# Patient Record
Sex: FEMALE | Race: WHITE | NOT HISPANIC OR LATINO | Employment: OTHER | URBAN - METROPOLITAN AREA
[De-identification: names, ages, dates, MRNs, and addresses within clinical notes are randomized per-mention and may not be internally consistent; named-entity substitution may affect disease eponyms.]

---

## 2017-07-21 RX ORDER — CLOPIDOGREL BISULFATE 75 MG/1
75 TABLET ORAL EVERY MORNING
COMMUNITY
End: 2021-10-30 | Stop reason: HOSPADM

## 2017-07-21 RX ORDER — HYDROCHLOROTHIAZIDE 25 MG/1
25 TABLET ORAL EVERY MORNING
COMMUNITY

## 2017-07-21 RX ORDER — METOPROLOL SUCCINATE 50 MG/1
25 TABLET, EXTENDED RELEASE ORAL EVERY MORNING
COMMUNITY

## 2017-07-21 RX ORDER — ATORVASTATIN CALCIUM 10 MG/1
10 TABLET, FILM COATED ORAL
COMMUNITY
End: 2021-10-30 | Stop reason: HOSPADM

## 2017-07-24 ENCOUNTER — ANESTHESIA EVENT (OUTPATIENT)
Dept: PERIOP | Facility: AMBULARY SURGERY CENTER | Age: 77
End: 2017-07-24
Payer: COMMERCIAL

## 2017-07-24 ENCOUNTER — ANESTHESIA (OUTPATIENT)
Dept: PERIOP | Facility: AMBULARY SURGERY CENTER | Age: 77
End: 2017-07-24
Payer: COMMERCIAL

## 2017-07-24 ENCOUNTER — HOSPITAL ENCOUNTER (OUTPATIENT)
Facility: AMBULARY SURGERY CENTER | Age: 77
Setting detail: OUTPATIENT SURGERY
Discharge: HOME | End: 2017-07-24
Attending: OPHTHALMOLOGY | Admitting: OPHTHALMOLOGY
Payer: COMMERCIAL

## 2017-07-24 VITALS
DIASTOLIC BLOOD PRESSURE: 76 MMHG | TEMPERATURE: 98.1 F | SYSTOLIC BLOOD PRESSURE: 164 MMHG | BODY MASS INDEX: 40.48 KG/M2 | RESPIRATION RATE: 20 BRPM | OXYGEN SATURATION: 98 % | HEIGHT: 62 IN | HEART RATE: 58 BPM | WEIGHT: 220 LBS

## 2017-07-24 PROCEDURE — V2632 POST CHMBR INTRAOCULAR LENS: HCPCS | Performed by: OPHTHALMOLOGY

## 2017-07-24 DEVICE — IOL SN60WF 17.5: Type: IMPLANTABLE DEVICE | Site: EYE | Status: FUNCTIONAL

## 2017-07-24 RX ORDER — GATIFLOXACIN 5 MG/ML
SOLUTION/ DROPS OPHTHALMIC AS NEEDED
Status: DISCONTINUED | OUTPATIENT
Start: 2017-07-24 | End: 2017-07-24 | Stop reason: HOSPADM

## 2017-07-24 RX ORDER — GATIFLOXACIN 5 MG/ML
1 SOLUTION/ DROPS OPHTHALMIC 2 TIMES DAILY
Qty: 3 ML | Refills: 0
Start: 2017-07-24 | End: 2021-10-30 | Stop reason: HOSPADM

## 2017-07-24 RX ORDER — TETRACAINE HYDROCHLORIDE 5 MG/ML
1 SOLUTION OPHTHALMIC ONCE
Status: COMPLETED | OUTPATIENT
Start: 2017-07-24 | End: 2017-07-24

## 2017-07-24 RX ORDER — MIDAZOLAM HYDROCHLORIDE 1 MG/ML
INJECTION INTRAMUSCULAR; INTRAVENOUS AS NEEDED
Status: DISCONTINUED | OUTPATIENT
Start: 2017-07-24 | End: 2017-07-24 | Stop reason: SURG

## 2017-07-24 RX ORDER — PHENYLEPHRINE HCL 2.5 %
1 DROPS OPHTHALMIC (EYE)
Status: ACTIVE | OUTPATIENT
Start: 2017-07-24 | End: 2017-07-24

## 2017-07-24 RX ORDER — LIDOCAINE HYDROCHLORIDE 20 MG/ML
1 JELLY TOPICAL
Status: COMPLETED | OUTPATIENT
Start: 2017-07-24 | End: 2017-07-24

## 2017-07-24 RX ORDER — CYCLOPENTOLATE HYDROCHLORIDE 10 MG/ML
1 SOLUTION/ DROPS OPHTHALMIC
Status: COMPLETED | OUTPATIENT
Start: 2017-07-24 | End: 2017-07-24

## 2017-07-24 RX ORDER — KETOROLAC TROMETHAMINE 5 MG/ML
1 SOLUTION OPHTHALMIC 4 TIMES DAILY
Qty: 5 ML | Refills: 0
Start: 2017-07-24 | End: 2021-10-30 | Stop reason: HOSPADM

## 2017-07-24 RX ORDER — KETOROLAC TROMETHAMINE 5 MG/ML
1 SOLUTION OPHTHALMIC
Status: DISCONTINUED | OUTPATIENT
Start: 2017-07-24 | End: 2017-07-24 | Stop reason: HOSPADM

## 2017-07-24 RX ORDER — LIDOCAINE HYDROCHLORIDE 10 MG/ML
INJECTION, SOLUTION EPIDURAL; INFILTRATION; INTRACAUDAL; PERINEURAL AS NEEDED
Status: DISCONTINUED | OUTPATIENT
Start: 2017-07-24 | End: 2017-07-24 | Stop reason: HOSPADM

## 2017-07-24 RX ORDER — TETRACAINE HYDROCHLORIDE 5 MG/ML
SOLUTION OPHTHALMIC AS NEEDED
Status: DISCONTINUED | OUTPATIENT
Start: 2017-07-24 | End: 2017-07-24 | Stop reason: HOSPADM

## 2017-07-24 RX ADMIN — LIDOCAINE HYDROCHLORIDE 1 APPLICATION: 20 JELLY TOPICAL at 12:25

## 2017-07-24 RX ADMIN — CYCLOPENTOLATE HYDROCHLORIDE 1 DROP: 10 SOLUTION/ DROPS OPHTHALMIC at 13:11

## 2017-07-24 RX ADMIN — PHENYLEPHRINE HYDROCHLORIDE 1 DROP: 25 SOLUTION/ DROPS OPHTHALMIC at 12:55

## 2017-07-24 RX ADMIN — TETRACAINE HYDROCHLORIDE 1 DROP: 5 SOLUTION OPHTHALMIC at 12:25

## 2017-07-24 RX ADMIN — LIDOCAINE HYDROCHLORIDE 1 APPLICATION: 20 JELLY TOPICAL at 12:40

## 2017-07-24 RX ADMIN — PHENYLEPHRINE HYDROCHLORIDE 1 DROP: 25 SOLUTION/ DROPS OPHTHALMIC at 12:25

## 2017-07-24 RX ADMIN — CYCLOPENTOLATE HYDROCHLORIDE 1 DROP: 10 SOLUTION/ DROPS OPHTHALMIC at 12:25

## 2017-07-24 RX ADMIN — KETOROLAC TROMETHAMINE 1 DROP: 5 SOLUTION OPHTHALMIC at 12:25

## 2017-07-24 RX ADMIN — LIDOCAINE HYDROCHLORIDE 1 APPLICATION: 20 JELLY TOPICAL at 13:11

## 2017-07-24 RX ADMIN — MIDAZOLAM HYDROCHLORIDE 2 MG: 1 INJECTION, SOLUTION INTRAMUSCULAR; INTRAVENOUS at 13:27

## 2017-07-24 RX ADMIN — CYCLOPENTOLATE HYDROCHLORIDE 1 DROP: 10 SOLUTION/ DROPS OPHTHALMIC at 12:40

## 2017-07-24 RX ADMIN — KETOROLAC TROMETHAMINE 1 DROP: 5 SOLUTION OPHTHALMIC at 12:40

## 2017-07-24 RX ADMIN — LIDOCAINE HYDROCHLORIDE 1 APPLICATION: 20 JELLY TOPICAL at 12:55

## 2017-07-24 RX ADMIN — KETOROLAC TROMETHAMINE 1 DROP: 5 SOLUTION OPHTHALMIC at 12:55

## 2017-07-24 RX ADMIN — PHENYLEPHRINE HYDROCHLORIDE 1 DROP: 25 SOLUTION/ DROPS OPHTHALMIC at 12:40

## 2017-07-24 RX ADMIN — KETOROLAC TROMETHAMINE 1 DROP: 5 SOLUTION OPHTHALMIC at 13:11

## 2017-07-24 RX ADMIN — CYCLOPENTOLATE HYDROCHLORIDE 1 DROP: 10 SOLUTION/ DROPS OPHTHALMIC at 12:55

## 2019-05-01 ENCOUNTER — TRANSCRIBE ORDERS (OUTPATIENT)
Dept: ADMINISTRATIVE | Facility: HOSPITAL | Age: 79
End: 2019-05-01

## 2019-05-01 DIAGNOSIS — Z12.39 BREAST SCREENING, UNSPECIFIED: Primary | ICD-10-CM

## 2019-05-01 DIAGNOSIS — Z78.0 POST-MENOPAUSAL: ICD-10-CM

## 2019-05-15 ENCOUNTER — HOSPITAL ENCOUNTER (OUTPATIENT)
Dept: RADIOLOGY | Facility: HOSPITAL | Age: 79
Discharge: HOME | End: 2019-05-15
Attending: FAMILY MEDICINE
Payer: COMMERCIAL

## 2019-05-15 VITALS — BODY MASS INDEX: 38.98 KG/M2 | WEIGHT: 220 LBS | HEIGHT: 63 IN

## 2019-05-15 DIAGNOSIS — Z78.0 POST-MENOPAUSAL: ICD-10-CM

## 2019-05-15 DIAGNOSIS — Z12.39 BREAST SCREENING, UNSPECIFIED: ICD-10-CM

## 2019-05-15 PROCEDURE — 77080 DXA BONE DENSITY AXIAL: CPT

## 2019-05-15 PROCEDURE — 77063 BREAST TOMOSYNTHESIS BI: CPT

## 2019-05-15 PROCEDURE — 77067 SCR MAMMO BI INCL CAD: CPT

## 2021-02-19 ENCOUNTER — IMMUNIZATIONS (OUTPATIENT)
Dept: FAMILY MEDICINE CLINIC | Facility: HOSPITAL | Age: 81
End: 2021-02-19

## 2021-02-19 DIAGNOSIS — Z23 ENCOUNTER FOR IMMUNIZATION: Primary | ICD-10-CM

## 2021-02-19 PROCEDURE — 0011A SARS-COV-2 / COVID-19 MRNA VACCINE (MODERNA) 100 MCG: CPT

## 2021-02-19 PROCEDURE — 91301 SARS-COV-2 / COVID-19 MRNA VACCINE (MODERNA) 100 MCG: CPT

## 2021-03-18 ENCOUNTER — IMMUNIZATIONS (OUTPATIENT)
Dept: FAMILY MEDICINE CLINIC | Facility: HOSPITAL | Age: 81
End: 2021-03-18

## 2021-03-18 DIAGNOSIS — Z23 ENCOUNTER FOR IMMUNIZATION: Primary | ICD-10-CM

## 2021-03-18 PROCEDURE — 0012A SARS-COV-2 / COVID-19 MRNA VACCINE (MODERNA) 100 MCG: CPT

## 2021-03-18 PROCEDURE — 91301 SARS-COV-2 / COVID-19 MRNA VACCINE (MODERNA) 100 MCG: CPT

## 2021-10-23 ENCOUNTER — APPOINTMENT (EMERGENCY)
Dept: RADIOLOGY | Facility: HOSPITAL | Age: 81
End: 2021-10-23
Payer: COMMERCIAL

## 2021-10-23 ENCOUNTER — HOSPITAL ENCOUNTER (EMERGENCY)
Facility: HOSPITAL | Age: 81
Discharge: HOME | End: 2021-10-23
Attending: EMERGENCY MEDICINE
Payer: COMMERCIAL

## 2021-10-23 VITALS
TEMPERATURE: 97.7 F | SYSTOLIC BLOOD PRESSURE: 165 MMHG | WEIGHT: 196 LBS | RESPIRATION RATE: 18 BRPM | DIASTOLIC BLOOD PRESSURE: 70 MMHG | HEART RATE: 65 BPM | BODY MASS INDEX: 34.72 KG/M2 | OXYGEN SATURATION: 96 %

## 2021-10-23 DIAGNOSIS — R59.1 LYMPHADENOPATHY: ICD-10-CM

## 2021-10-23 DIAGNOSIS — E04.9 ENLARGED THYROID: ICD-10-CM

## 2021-10-23 DIAGNOSIS — G81.90 HEMIPARESIS (HCC): Primary | ICD-10-CM

## 2021-10-23 DIAGNOSIS — G56.30 RADIAL NERVE PALSY: ICD-10-CM

## 2021-10-23 LAB
ANION GAP SERPL CALCULATED.3IONS-SCNC: 11 MMOL/L (ref 4–13)
APTT PPP: 22 SECONDS (ref 23–37)
BUN SERPL-MCNC: 31 MG/DL (ref 5–25)
CALCIUM SERPL-MCNC: 9.8 MG/DL (ref 8.3–10.1)
CHLORIDE SERPL-SCNC: 101 MMOL/L (ref 100–108)
CO2 SERPL-SCNC: 25 MMOL/L (ref 21–32)
CREAT SERPL-MCNC: 1.89 MG/DL (ref 0.6–1.3)
ERYTHROCYTE [DISTWIDTH] IN BLOOD BY AUTOMATED COUNT: 12.4 % (ref 11.6–15.1)
FLUAV RNA RESP QL NAA+PROBE: NEGATIVE
FLUBV RNA RESP QL NAA+PROBE: NEGATIVE
GFR SERPL CREATININE-BSD FRML MDRD: 24 ML/MIN/1.73SQ M
GLUCOSE SERPL-MCNC: 131 MG/DL (ref 65–140)
GLUCOSE SERPL-MCNC: 133 MG/DL (ref 65–140)
HCT VFR BLD AUTO: 39.9 % (ref 34.8–46.1)
HGB BLD-MCNC: 13.3 G/DL (ref 11.5–15.4)
INR PPP: 0.96 (ref 0.84–1.19)
MCH RBC QN AUTO: 29 PG (ref 26.8–34.3)
MCHC RBC AUTO-ENTMCNC: 33.3 G/DL (ref 31.4–37.4)
MCV RBC AUTO: 87 FL (ref 82–98)
PLATELET # BLD AUTO: 261 THOUSANDS/UL (ref 149–390)
PMV BLD AUTO: 11.5 FL (ref 8.9–12.7)
POTASSIUM SERPL-SCNC: 3.2 MMOL/L (ref 3.5–5.3)
PROTHROMBIN TIME: 12.6 SECONDS (ref 11.6–14.5)
RBC # BLD AUTO: 4.59 MILLION/UL (ref 3.81–5.12)
RSV RNA RESP QL NAA+PROBE: NEGATIVE
SARS-COV-2 RNA RESP QL NAA+PROBE: NEGATIVE
SODIUM SERPL-SCNC: 137 MMOL/L (ref 136–145)
TROPONIN I SERPL-MCNC: <0.02 NG/ML
WBC # BLD AUTO: 6.93 THOUSAND/UL (ref 4.31–10.16)

## 2021-10-23 PROCEDURE — 99285 EMERGENCY DEPT VISIT HI MDM: CPT

## 2021-10-23 PROCEDURE — 70498 CT ANGIOGRAPHY NECK: CPT

## 2021-10-23 PROCEDURE — 84484 ASSAY OF TROPONIN QUANT: CPT | Performed by: EMERGENCY MEDICINE

## 2021-10-23 PROCEDURE — 99285 EMERGENCY DEPT VISIT HI MDM: CPT | Performed by: EMERGENCY MEDICINE

## 2021-10-23 PROCEDURE — 85610 PROTHROMBIN TIME: CPT | Performed by: EMERGENCY MEDICINE

## 2021-10-23 PROCEDURE — 0241U HB NFCT DS VIR RESP RNA 4 TRGT: CPT | Performed by: EMERGENCY MEDICINE

## 2021-10-23 PROCEDURE — 85027 COMPLETE CBC AUTOMATED: CPT | Performed by: EMERGENCY MEDICINE

## 2021-10-23 PROCEDURE — 82948 REAGENT STRIP/BLOOD GLUCOSE: CPT

## 2021-10-23 PROCEDURE — 80048 BASIC METABOLIC PNL TOTAL CA: CPT | Performed by: EMERGENCY MEDICINE

## 2021-10-23 PROCEDURE — 93005 ELECTROCARDIOGRAM TRACING: CPT

## 2021-10-23 PROCEDURE — NC001 PR NO CHARGE: Performed by: PSYCHIATRY & NEUROLOGY

## 2021-10-23 PROCEDURE — 36415 COLL VENOUS BLD VENIPUNCTURE: CPT | Performed by: EMERGENCY MEDICINE

## 2021-10-23 PROCEDURE — 71045 X-RAY EXAM CHEST 1 VIEW: CPT

## 2021-10-23 PROCEDURE — 70496 CT ANGIOGRAPHY HEAD: CPT

## 2021-10-23 PROCEDURE — 85730 THROMBOPLASTIN TIME PARTIAL: CPT | Performed by: EMERGENCY MEDICINE

## 2021-10-23 RX ORDER — SULFAMETHOXAZOLE AND TRIMETHOPRIM 800; 160 MG/1; MG/1
1 TABLET ORAL 2 TIMES DAILY
COMMUNITY
Start: 2021-10-18 | End: 2021-10-25

## 2021-10-23 RX ADMIN — IOHEXOL 70 ML: 350 INJECTION, SOLUTION INTRAVENOUS at 13:27

## 2021-10-27 ENCOUNTER — HOSPITAL ENCOUNTER (OUTPATIENT)
Dept: RADIOLOGY | Facility: HOSPITAL | Age: 81
Discharge: HOME | DRG: 698 | End: 2021-10-27
Payer: COMMERCIAL

## 2021-10-27 DIAGNOSIS — R59.1 LYMPHADENOPATHY: ICD-10-CM

## 2021-10-27 DIAGNOSIS — E04.9 ENLARGEMENT OF THYROID: ICD-10-CM

## 2021-10-27 PROCEDURE — 71250 CT THORAX DX C-: CPT

## 2021-10-28 ENCOUNTER — APPOINTMENT (EMERGENCY)
Dept: RADIOLOGY | Facility: HOSPITAL | Age: 81
DRG: 698 | End: 2021-10-28
Payer: COMMERCIAL

## 2021-10-28 ENCOUNTER — HOSPITAL ENCOUNTER (INPATIENT)
Facility: HOSPITAL | Age: 81
LOS: 2 days | Discharge: HOME HEALTH | DRG: 698 | End: 2021-10-30
Attending: EMERGENCY MEDICINE | Admitting: STUDENT IN AN ORGANIZED HEALTH CARE EDUCATION/TRAINING PROGRAM
Payer: COMMERCIAL

## 2021-10-28 DIAGNOSIS — N32.89 BLADDER MASS: ICD-10-CM

## 2021-10-28 DIAGNOSIS — N30.01 HEMATURIA DUE TO ACUTE CYSTITIS: Primary | ICD-10-CM

## 2021-10-28 DIAGNOSIS — N13.30 HYDRONEPHROSIS, UNSPECIFIED HYDRONEPHROSIS TYPE: ICD-10-CM

## 2021-10-28 DIAGNOSIS — I63.9 STROKE (HCC): ICD-10-CM

## 2021-10-28 DIAGNOSIS — M89.9 BONE LESION: ICD-10-CM

## 2021-10-28 PROBLEM — M21.339 WRIST DROP: Status: ACTIVE | Noted: 2021-10-28

## 2021-10-28 PROBLEM — E87.6 HYPOKALEMIA: Status: ACTIVE | Noted: 2021-10-28

## 2021-10-28 LAB
ALBUMIN SERPL BCP-MCNC: 3.8 G/DL (ref 3.5–5)
ALP SERPL-CCNC: 75 U/L (ref 46–116)
ALT SERPL W P-5'-P-CCNC: 29 U/L (ref 12–78)
ANION GAP SERPL CALCULATED.3IONS-SCNC: 12 MMOL/L (ref 4–13)
AST SERPL W P-5'-P-CCNC: 19 U/L (ref 5–45)
BACTERIA UR QL AUTO: ABNORMAL /HPF
BASOPHILS # BLD AUTO: 0.06 THOUSANDS/ΜL (ref 0–0.1)
BASOPHILS NFR BLD AUTO: 1 % (ref 0–1)
BILIRUB SERPL-MCNC: 0.63 MG/DL (ref 0.2–1)
BILIRUB UR QL STRIP: NEGATIVE
BUN SERPL-MCNC: 31 MG/DL (ref 5–25)
CALCIUM SERPL-MCNC: 9.6 MG/DL (ref 8.3–10.1)
CHLORIDE SERPL-SCNC: 104 MMOL/L (ref 100–108)
CLARITY UR: ABNORMAL
CO2 SERPL-SCNC: 24 MMOL/L (ref 21–32)
COLOR UR: YELLOW
CREAT SERPL-MCNC: 1.47 MG/DL (ref 0.6–1.3)
EOSINOPHIL # BLD AUTO: 0.21 THOUSAND/ΜL (ref 0–0.61)
EOSINOPHIL NFR BLD AUTO: 3 % (ref 0–6)
ERYTHROCYTE [DISTWIDTH] IN BLOOD BY AUTOMATED COUNT: 12.2 % (ref 11.6–15.1)
GFR SERPL CREATININE-BSD FRML MDRD: 33 ML/MIN/1.73SQ M
GLUCOSE SERPL-MCNC: 118 MG/DL (ref 65–140)
GLUCOSE UR STRIP-MCNC: NEGATIVE MG/DL
HCT VFR BLD AUTO: 41.8 % (ref 34.8–46.1)
HGB BLD-MCNC: 13.7 G/DL (ref 11.5–15.4)
HGB UR QL STRIP.AUTO: ABNORMAL
IMM GRANULOCYTES # BLD AUTO: 0.04 THOUSAND/UL (ref 0–0.2)
IMM GRANULOCYTES NFR BLD AUTO: 1 % (ref 0–2)
KETONES UR STRIP-MCNC: NEGATIVE MG/DL
LEUKOCYTE ESTERASE UR QL STRIP: ABNORMAL
LYMPHOCYTES # BLD AUTO: 1.2 THOUSANDS/ΜL (ref 0.6–4.47)
LYMPHOCYTES NFR BLD AUTO: 15 % (ref 14–44)
MCH RBC QN AUTO: 29 PG (ref 26.8–34.3)
MCHC RBC AUTO-ENTMCNC: 32.8 G/DL (ref 31.4–37.4)
MCV RBC AUTO: 88 FL (ref 82–98)
MONOCYTES # BLD AUTO: 0.59 THOUSAND/ΜL (ref 0.17–1.22)
MONOCYTES NFR BLD AUTO: 7 % (ref 4–12)
NEUTROPHILS # BLD AUTO: 5.88 THOUSANDS/ΜL (ref 1.85–7.62)
NEUTS SEG NFR BLD AUTO: 73 % (ref 43–75)
NITRITE UR QL STRIP: NEGATIVE
NON-SQ EPI CELLS URNS QL MICRO: ABNORMAL /HPF
NRBC BLD AUTO-RTO: 0 /100 WBCS
OTHER STN SPEC: ABNORMAL
PH UR STRIP.AUTO: 5.5 [PH]
PLATELET # BLD AUTO: 280 THOUSANDS/UL (ref 149–390)
PMV BLD AUTO: 10.9 FL (ref 8.9–12.7)
POTASSIUM SERPL-SCNC: 3.4 MMOL/L (ref 3.5–5.3)
PROT SERPL-MCNC: 7.1 G/DL (ref 6.4–8.2)
PROT UR STRIP-MCNC: ABNORMAL MG/DL
RBC # BLD AUTO: 4.73 MILLION/UL (ref 3.81–5.12)
RBC #/AREA URNS AUTO: ABNORMAL /HPF
SODIUM SERPL-SCNC: 140 MMOL/L (ref 136–145)
SP GR UR STRIP.AUTO: 1.02 (ref 1–1.03)
UROBILINOGEN UR QL STRIP.AUTO: 0.2 E.U./DL
WBC # BLD AUTO: 7.98 THOUSAND/UL (ref 4.31–10.16)
WBC #/AREA URNS AUTO: ABNORMAL /HPF

## 2021-10-28 PROCEDURE — 99223 1ST HOSP IP/OBS HIGH 75: CPT | Performed by: STUDENT IN AN ORGANIZED HEALTH CARE EDUCATION/TRAINING PROGRAM

## 2021-10-28 PROCEDURE — 36415 COLL VENOUS BLD VENIPUNCTURE: CPT | Performed by: EMERGENCY MEDICINE

## 2021-10-28 PROCEDURE — 80053 COMPREHEN METABOLIC PANEL: CPT | Performed by: EMERGENCY MEDICINE

## 2021-10-28 PROCEDURE — 74176 CT ABD & PELVIS W/O CONTRAST: CPT

## 2021-10-28 PROCEDURE — 81001 URINALYSIS AUTO W/SCOPE: CPT | Performed by: EMERGENCY MEDICINE

## 2021-10-28 PROCEDURE — 99285 EMERGENCY DEPT VISIT HI MDM: CPT

## 2021-10-28 PROCEDURE — 73700 CT LOWER EXTREMITY W/O DYE: CPT

## 2021-10-28 PROCEDURE — 85025 COMPLETE CBC W/AUTO DIFF WBC: CPT | Performed by: EMERGENCY MEDICINE

## 2021-10-28 PROCEDURE — G1004 CDSM NDSC: HCPCS

## 2021-10-28 PROCEDURE — 99285 EMERGENCY DEPT VISIT HI MDM: CPT | Performed by: EMERGENCY MEDICINE

## 2021-10-28 PROCEDURE — 96365 THER/PROPH/DIAG IV INF INIT: CPT

## 2021-10-28 PROCEDURE — 87086 URINE CULTURE/COLONY COUNT: CPT | Performed by: SPECIALIST

## 2021-10-28 PROCEDURE — 0T9B70Z DRAINAGE OF BLADDER WITH DRAINAGE DEVICE, VIA NATURAL OR ARTIFICIAL OPENING: ICD-10-PCS | Performed by: STUDENT IN AN ORGANIZED HEALTH CARE EDUCATION/TRAINING PROGRAM

## 2021-10-28 PROCEDURE — 96375 TX/PRO/DX INJ NEW DRUG ADDON: CPT

## 2021-10-28 PROCEDURE — 96376 TX/PRO/DX INJ SAME DRUG ADON: CPT

## 2021-10-28 RX ORDER — TRAMADOL HYDROCHLORIDE 50 MG/1
50 TABLET ORAL EVERY 6 HOURS PRN
Status: DISCONTINUED | OUTPATIENT
Start: 2021-10-28 | End: 2021-10-30 | Stop reason: HOSPADM

## 2021-10-28 RX ORDER — METOPROLOL SUCCINATE 25 MG/1
25 TABLET, EXTENDED RELEASE ORAL EVERY MORNING
Status: DISCONTINUED | OUTPATIENT
Start: 2021-10-29 | End: 2021-10-29

## 2021-10-28 RX ORDER — ATORVASTATIN CALCIUM 10 MG/1
10 TABLET, FILM COATED ORAL
Status: DISCONTINUED | OUTPATIENT
Start: 2021-10-28 | End: 2021-10-29

## 2021-10-28 RX ORDER — CEFTRIAXONE 1 G/50ML
1000 INJECTION, SOLUTION INTRAVENOUS ONCE
Status: COMPLETED | OUTPATIENT
Start: 2021-10-28 | End: 2021-10-28

## 2021-10-28 RX ORDER — CEFTRIAXONE 1 G/50ML
1000 INJECTION, SOLUTION INTRAVENOUS EVERY 24 HOURS
Status: DISCONTINUED | OUTPATIENT
Start: 2021-10-29 | End: 2021-10-30 | Stop reason: HOSPADM

## 2021-10-28 RX ORDER — HYDROMORPHONE HCL/PF 1 MG/ML
0.5 SYRINGE (ML) INJECTION ONCE
Status: COMPLETED | OUTPATIENT
Start: 2021-10-28 | End: 2021-10-28

## 2021-10-28 RX ORDER — HYDROMORPHONE HCL/PF 1 MG/ML
0.2 SYRINGE (ML) INJECTION ONCE
Status: COMPLETED | OUTPATIENT
Start: 2021-10-28 | End: 2021-10-28

## 2021-10-28 RX ORDER — POTASSIUM CHLORIDE 20 MEQ/1
40 TABLET, EXTENDED RELEASE ORAL ONCE
Status: COMPLETED | OUTPATIENT
Start: 2021-10-28 | End: 2021-10-28

## 2021-10-28 RX ADMIN — TRAMADOL HYDROCHLORIDE 50 MG: 50 TABLET, FILM COATED ORAL at 21:53

## 2021-10-28 RX ADMIN — HYDROMORPHONE HYDROCHLORIDE 0.5 MG: 1 INJECTION, SOLUTION INTRAMUSCULAR; INTRAVENOUS; SUBCUTANEOUS at 15:41

## 2021-10-28 RX ADMIN — WATER: 1 INJECTION INTRAMUSCULAR; INTRAVENOUS; SUBCUTANEOUS at 18:35

## 2021-10-28 RX ADMIN — CEFTRIAXONE 1000 MG: 1 INJECTION, SOLUTION INTRAVENOUS at 14:32

## 2021-10-28 RX ADMIN — POTASSIUM CHLORIDE 40 MEQ: 1500 TABLET, EXTENDED RELEASE ORAL at 18:40

## 2021-10-28 RX ADMIN — HYDROMORPHONE HYDROCHLORIDE 0.2 MG: 1 INJECTION, SOLUTION INTRAMUSCULAR; INTRAVENOUS; SUBCUTANEOUS at 12:32

## 2021-10-28 RX ADMIN — ATORVASTATIN CALCIUM 10 MG: 10 TABLET, FILM COATED ORAL at 21:53

## 2021-10-29 ENCOUNTER — APPOINTMENT (INPATIENT)
Dept: NON INVASIVE DIAGNOSTICS | Facility: HOSPITAL | Age: 81
DRG: 698 | End: 2021-10-29
Payer: COMMERCIAL

## 2021-10-29 ENCOUNTER — APPOINTMENT (INPATIENT)
Dept: RADIOLOGY | Facility: HOSPITAL | Age: 81
DRG: 698 | End: 2021-10-29
Payer: COMMERCIAL

## 2021-10-29 PROBLEM — I63.9 STROKE (HCC): Status: ACTIVE | Noted: 2021-10-29

## 2021-10-29 LAB
ALBUMIN SERPL BCP-MCNC: 3.3 G/DL (ref 3.5–5)
ALP SERPL-CCNC: 69 U/L (ref 46–116)
ALT SERPL W P-5'-P-CCNC: 25 U/L (ref 12–78)
ANION GAP SERPL CALCULATED.3IONS-SCNC: 11 MMOL/L (ref 4–13)
AORTIC ROOT: 3.2 CM
AST SERPL W P-5'-P-CCNC: 15 U/L (ref 5–45)
BASOPHILS # BLD AUTO: 0.05 THOUSANDS/ΜL (ref 0–0.1)
BASOPHILS NFR BLD AUTO: 1 % (ref 0–1)
BILIRUB SERPL-MCNC: 0.63 MG/DL (ref 0.2–1)
BUN SERPL-MCNC: 36 MG/DL (ref 5–25)
CALCIUM ALBUM COR SERPL-MCNC: 10 MG/DL (ref 8.3–10.1)
CALCIUM SERPL-MCNC: 9.4 MG/DL (ref 8.3–10.1)
CHLORIDE SERPL-SCNC: 105 MMOL/L (ref 100–108)
CO2 SERPL-SCNC: 24 MMOL/L (ref 21–32)
CREAT SERPL-MCNC: 1.43 MG/DL (ref 0.6–1.3)
E WAVE DECELERATION TIME: 257 MS
EOSINOPHIL # BLD AUTO: 0.36 THOUSAND/ΜL (ref 0–0.61)
EOSINOPHIL NFR BLD AUTO: 4 % (ref 0–6)
ERYTHROCYTE [DISTWIDTH] IN BLOOD BY AUTOMATED COUNT: 12.2 % (ref 11.6–15.1)
FRACTIONAL SHORTENING: 32 % (ref 28–44)
GFR SERPL CREATININE-BSD FRML MDRD: 34 ML/MIN/1.73SQ M
GLUCOSE SERPL-MCNC: 91 MG/DL (ref 65–140)
HCT VFR BLD AUTO: 38.7 % (ref 34.8–46.1)
HGB BLD-MCNC: 13 G/DL (ref 11.5–15.4)
IMM GRANULOCYTES # BLD AUTO: 0.03 THOUSAND/UL (ref 0–0.2)
IMM GRANULOCYTES NFR BLD AUTO: 0 % (ref 0–2)
INTERVENTRICULAR SEPTUM IN DIASTOLE (PARASTERNAL SHORT AXIS VIEW): 1.2 CM
LEFT INTERNAL DIMENSION IN SYSTOLE: 4 CM (ref 2.1–4)
LEFT VENTRICULAR INTERNAL DIMENSION IN DIASTOLE: 5.9 CM (ref 4.93–7.34)
LEFT VENTRICULAR POSTERIOR WALL IN END DIASTOLE: 1.1 CM
LEFT VENTRICULAR STROKE VOLUME: 103 ML
LYMPHOCYTES # BLD AUTO: 1.75 THOUSANDS/ΜL (ref 0.6–4.47)
LYMPHOCYTES NFR BLD AUTO: 18 % (ref 14–44)
MCH RBC QN AUTO: 29.3 PG (ref 26.8–34.3)
MCHC RBC AUTO-ENTMCNC: 33.6 G/DL (ref 31.4–37.4)
MCV RBC AUTO: 87 FL (ref 82–98)
MONOCYTES # BLD AUTO: 0.65 THOUSAND/ΜL (ref 0.17–1.22)
MONOCYTES NFR BLD AUTO: 7 % (ref 4–12)
MV E'TISSUE VEL-LAT: 65 CM/S
MV E'TISSUE VEL-SEP: 3 CM/S
MV PEAK A VEL: 1.32 M/S
MV PEAK E VEL: 73 CM/S
MV STENOSIS PRESSURE HALF TIME: 0 MS
NEUTROPHILS # BLD AUTO: 7.02 THOUSANDS/ΜL (ref 1.85–7.62)
NEUTS SEG NFR BLD AUTO: 70 % (ref 43–75)
NRBC BLD AUTO-RTO: 0 /100 WBCS
PLATELET # BLD AUTO: 278 THOUSANDS/UL (ref 149–390)
PMV BLD AUTO: 11 FL (ref 8.9–12.7)
POTASSIUM SERPL-SCNC: 3.5 MMOL/L (ref 3.5–5.3)
PROT SERPL-MCNC: 6.5 G/DL (ref 6.4–8.2)
RBC # BLD AUTO: 4.43 MILLION/UL (ref 3.81–5.12)
RIGHT VENTRICLE ID DIMENSION: 3.3 CM
RV PSP: 31 MMHG
SL CV LV EF: 60
SL CV PED ECHO LEFT VENTRICLE DIASTOLIC VOLUME (MOD BIPLANE) 2D: 175 ML
SL CV PED ECHO LEFT VENTRICLE SYSTOLIC VOLUME (MOD BIPLANE) 2D: 72 ML
SODIUM SERPL-SCNC: 140 MMOL/L (ref 136–145)
TR PEAK VELOCITY: 2.4 M/S
TRICUSPID VALVE PEAK REGURGITATION VELOCITY: 2.39 M/S
TRICUSPID VALVE S': 0.7 CM/S
TV PEAK GRADIENT: 23 MMHG
WBC # BLD AUTO: 9.86 THOUSAND/UL (ref 4.31–10.16)
Z-SCORE OF LEFT VENTRICULAR DIMENSION IN END SYSTOLE: -0.19

## 2021-10-29 PROCEDURE — G0008 ADMIN INFLUENZA VIRUS VAC: HCPCS | Performed by: STUDENT IN AN ORGANIZED HEALTH CARE EDUCATION/TRAINING PROGRAM

## 2021-10-29 PROCEDURE — G1004 CDSM NDSC: HCPCS

## 2021-10-29 PROCEDURE — 99223 1ST HOSP IP/OBS HIGH 75: CPT | Performed by: PSYCHIATRY & NEUROLOGY

## 2021-10-29 PROCEDURE — 97167 OT EVAL HIGH COMPLEX 60 MIN: CPT

## 2021-10-29 PROCEDURE — 97163 PT EVAL HIGH COMPLEX 45 MIN: CPT

## 2021-10-29 PROCEDURE — 90662 IIV NO PRSV INCREASED AG IM: CPT | Performed by: STUDENT IN AN ORGANIZED HEALTH CARE EDUCATION/TRAINING PROGRAM

## 2021-10-29 PROCEDURE — 70551 MRI BRAIN STEM W/O DYE: CPT

## 2021-10-29 PROCEDURE — 72141 MRI NECK SPINE W/O DYE: CPT

## 2021-10-29 PROCEDURE — 85025 COMPLETE CBC W/AUTO DIFF WBC: CPT | Performed by: STUDENT IN AN ORGANIZED HEALTH CARE EDUCATION/TRAINING PROGRAM

## 2021-10-29 PROCEDURE — 80053 COMPREHEN METABOLIC PANEL: CPT | Performed by: STUDENT IN AN ORGANIZED HEALTH CARE EDUCATION/TRAINING PROGRAM

## 2021-10-29 PROCEDURE — 93306 TTE W/DOPPLER COMPLETE: CPT | Performed by: INTERNAL MEDICINE

## 2021-10-29 PROCEDURE — 97535 SELF CARE MNGMENT TRAINING: CPT

## 2021-10-29 PROCEDURE — 93306 TTE W/DOPPLER COMPLETE: CPT

## 2021-10-29 PROCEDURE — 99232 SBSQ HOSP IP/OBS MODERATE 35: CPT | Performed by: STUDENT IN AN ORGANIZED HEALTH CARE EDUCATION/TRAINING PROGRAM

## 2021-10-29 PROCEDURE — 99222 1ST HOSP IP/OBS MODERATE 55: CPT | Performed by: INTERNAL MEDICINE

## 2021-10-29 PROCEDURE — 97110 THERAPEUTIC EXERCISES: CPT

## 2021-10-29 RX ORDER — ROSUVASTATIN CALCIUM 5 MG/1
40 TABLET, COATED ORAL
Status: DISCONTINUED | OUTPATIENT
Start: 2021-10-29 | End: 2021-10-29

## 2021-10-29 RX ORDER — ASPIRIN 81 MG/1
81 TABLET, CHEWABLE ORAL DAILY
Status: DISCONTINUED | OUTPATIENT
Start: 2021-10-29 | End: 2021-10-30 | Stop reason: HOSPADM

## 2021-10-29 RX ORDER — ATORVASTATIN CALCIUM 40 MG/1
40 TABLET, FILM COATED ORAL
Status: DISCONTINUED | OUTPATIENT
Start: 2021-10-30 | End: 2021-10-30 | Stop reason: HOSPADM

## 2021-10-29 RX ORDER — SODIUM CHLORIDE 9 MG/ML
75 INJECTION, SOLUTION INTRAVENOUS CONTINUOUS
Status: DISCONTINUED | OUTPATIENT
Start: 2021-10-29 | End: 2021-10-30 | Stop reason: HOSPADM

## 2021-10-29 RX ORDER — ROSUVASTATIN CALCIUM 5 MG/1
10 TABLET, COATED ORAL
Status: DISCONTINUED | OUTPATIENT
Start: 2021-10-30 | End: 2021-10-29

## 2021-10-29 RX ORDER — ATORVASTATIN CALCIUM 80 MG/1
80 TABLET, FILM COATED ORAL
Status: DISCONTINUED | OUTPATIENT
Start: 2021-10-29 | End: 2021-10-29

## 2021-10-29 RX ADMIN — METOPROLOL SUCCINATE 25 MG: 25 TABLET, EXTENDED RELEASE ORAL at 11:39

## 2021-10-29 RX ADMIN — INFLUENZA A VIRUS A/VICTORIA/2570/2019 IVR-215 (H1N1) ANTIGEN (FORMALDEHYDE INACTIVATED), INFLUENZA A VIRUS A/TASMANIA/503/2020 IVR-221 (H3N2) ANTIGEN (FORMALDEHYDE INACTIVATED), INFLUENZA B VIRUS B/PHUKET/3073/2013 ANTIGEN (FORMALDEHYDE INACTIVATED), AND INFLUENZA B VIRUS B/WASHINGTON/02/2019 ANTIGEN (FORMALDEHYDE INACTIVATED) 0.7 ML: 60; 60; 60; 60 INJECTION, SUSPENSION INTRAMUSCULAR at 11:38

## 2021-10-29 RX ADMIN — CEFTRIAXONE 1000 MG: 1 INJECTION, SOLUTION INTRAVENOUS at 11:40

## 2021-10-29 RX ADMIN — ASPIRIN 81 MG CHEWABLE TABLET 81 MG: 81 TABLET CHEWABLE at 11:55

## 2021-10-29 RX ADMIN — TRAMADOL HYDROCHLORIDE 50 MG: 50 TABLET, FILM COATED ORAL at 23:12

## 2021-10-29 RX ADMIN — SODIUM CHLORIDE 75 ML/HR: 0.9 INJECTION, SOLUTION INTRAVENOUS at 18:05

## 2021-10-30 VITALS
SYSTOLIC BLOOD PRESSURE: 154 MMHG | WEIGHT: 196 LBS | BODY MASS INDEX: 34.73 KG/M2 | RESPIRATION RATE: 24 BRPM | TEMPERATURE: 97.9 F | DIASTOLIC BLOOD PRESSURE: 80 MMHG | OXYGEN SATURATION: 95 % | HEART RATE: 68 BPM | HEIGHT: 63 IN

## 2021-10-30 LAB
ANION GAP SERPL CALCULATED.3IONS-SCNC: 11 MMOL/L (ref 4–13)
BACTERIA UR CULT: NORMAL
BUN SERPL-MCNC: 38 MG/DL (ref 5–25)
CALCIUM SERPL-MCNC: 9.3 MG/DL (ref 8.3–10.1)
CHLORIDE SERPL-SCNC: 105 MMOL/L (ref 100–108)
CHOLEST SERPL-MCNC: 144 MG/DL (ref 50–200)
CO2 SERPL-SCNC: 23 MMOL/L (ref 21–32)
CREAT SERPL-MCNC: 1.48 MG/DL (ref 0.6–1.3)
EST. AVERAGE GLUCOSE BLD GHB EST-MCNC: 108 MG/DL
GFR SERPL CREATININE-BSD FRML MDRD: 33 ML/MIN/1.73SQ M
GLUCOSE SERPL-MCNC: 89 MG/DL (ref 65–140)
HBA1C MFR BLD: 5.4 %
HDLC SERPL-MCNC: 72 MG/DL
LDLC SERPL CALC-MCNC: 50 MG/DL (ref 0–100)
POTASSIUM SERPL-SCNC: 3.2 MMOL/L (ref 3.5–5.3)
SODIUM SERPL-SCNC: 139 MMOL/L (ref 136–145)
TRIGL SERPL-MCNC: 109 MG/DL

## 2021-10-30 PROCEDURE — 97535 SELF CARE MNGMENT TRAINING: CPT

## 2021-10-30 PROCEDURE — 97110 THERAPEUTIC EXERCISES: CPT

## 2021-10-30 PROCEDURE — 99239 HOSP IP/OBS DSCHRG MGMT >30: CPT | Performed by: STUDENT IN AN ORGANIZED HEALTH CARE EDUCATION/TRAINING PROGRAM

## 2021-10-30 PROCEDURE — 80048 BASIC METABOLIC PNL TOTAL CA: CPT | Performed by: STUDENT IN AN ORGANIZED HEALTH CARE EDUCATION/TRAINING PROGRAM

## 2021-10-30 PROCEDURE — 83036 HEMOGLOBIN GLYCOSYLATED A1C: CPT | Performed by: STUDENT IN AN ORGANIZED HEALTH CARE EDUCATION/TRAINING PROGRAM

## 2021-10-30 PROCEDURE — 80061 LIPID PANEL: CPT | Performed by: STUDENT IN AN ORGANIZED HEALTH CARE EDUCATION/TRAINING PROGRAM

## 2021-10-30 PROCEDURE — 97530 THERAPEUTIC ACTIVITIES: CPT

## 2021-10-30 RX ORDER — POTASSIUM CHLORIDE 20 MEQ/1
40 TABLET, EXTENDED RELEASE ORAL ONCE
Status: COMPLETED | OUTPATIENT
Start: 2021-10-30 | End: 2021-10-30

## 2021-10-30 RX ORDER — ASPIRIN 81 MG/1
81 TABLET, CHEWABLE ORAL DAILY
Qty: 14 TABLET | Refills: 0 | Status: SHIPPED | OUTPATIENT
Start: 2021-10-31 | End: 2021-11-12 | Stop reason: HOSPADM

## 2021-10-30 RX ORDER — ATORVASTATIN CALCIUM 40 MG/1
40 TABLET, FILM COATED ORAL
Qty: 14 TABLET | Refills: 0 | Status: SHIPPED | OUTPATIENT
Start: 2021-10-30

## 2021-10-30 RX ORDER — TRAMADOL HYDROCHLORIDE 50 MG/1
50 TABLET ORAL EVERY 6 HOURS PRN
Qty: 12 TABLET | Refills: 0 | Status: SHIPPED | OUTPATIENT
Start: 2021-10-30 | End: 2021-11-02

## 2021-10-30 RX ADMIN — CEFTRIAXONE 1000 MG: 1 INJECTION, SOLUTION INTRAVENOUS at 10:08

## 2021-10-30 RX ADMIN — ASPIRIN 81 MG CHEWABLE TABLET 81 MG: 81 TABLET CHEWABLE at 10:08

## 2021-10-30 RX ADMIN — POTASSIUM CHLORIDE 40 MEQ: 1500 TABLET, EXTENDED RELEASE ORAL at 14:53

## 2021-11-01 ENCOUNTER — TELEPHONE (OUTPATIENT)
Dept: NEUROLOGY | Facility: CLINIC | Age: 81
End: 2021-11-01

## 2021-11-02 ENCOUNTER — TELEPHONE (OUTPATIENT)
Dept: HEMATOLOGY ONCOLOGY | Facility: MEDICAL CENTER | Age: 81
End: 2021-11-02

## 2021-11-09 ENCOUNTER — OFFICE VISIT (OUTPATIENT)
Dept: LAB | Facility: HOSPITAL | Age: 81
End: 2021-11-09
Attending: SPECIALIST
Payer: COMMERCIAL

## 2021-11-09 ENCOUNTER — APPOINTMENT (OUTPATIENT)
Dept: LAB | Facility: HOSPITAL | Age: 81
End: 2021-11-09
Attending: SPECIALIST
Payer: COMMERCIAL

## 2021-11-09 DIAGNOSIS — Z01.818 PRE-OP TESTING: ICD-10-CM

## 2021-11-09 LAB — POTASSIUM SERPL-SCNC: 3.1 MMOL/L (ref 3.5–5.3)

## 2021-11-09 PROCEDURE — 84132 ASSAY OF SERUM POTASSIUM: CPT

## 2021-11-09 PROCEDURE — 36415 COLL VENOUS BLD VENIPUNCTURE: CPT

## 2021-11-09 PROCEDURE — 93005 ELECTROCARDIOGRAM TRACING: CPT

## 2021-11-10 ENCOUNTER — APPOINTMENT (OUTPATIENT)
Dept: LAB | Facility: HOSPITAL | Age: 81
End: 2021-11-10
Payer: COMMERCIAL

## 2021-11-10 DIAGNOSIS — E87.6 HYPOKALEMIA: ICD-10-CM

## 2021-11-10 LAB
ATRIAL RATE: 62 BPM
P AXIS: 51 DEGREES
POTASSIUM SERPL-SCNC: 3.8 MMOL/L (ref 3.5–5.3)
PR INTERVAL: 184 MS
QRS AXIS: -55 DEGREES
QRSD INTERVAL: 140 MS
QT INTERVAL: 444 MS
QTC INTERVAL: 450 MS
T WAVE AXIS: -34 DEGREES
VENTRICULAR RATE: 62 BPM

## 2021-11-10 PROCEDURE — 84132 ASSAY OF SERUM POTASSIUM: CPT

## 2021-11-10 PROCEDURE — 93010 ELECTROCARDIOGRAM REPORT: CPT | Performed by: INTERNAL MEDICINE

## 2021-11-10 PROCEDURE — 36415 COLL VENOUS BLD VENIPUNCTURE: CPT

## 2021-11-11 ENCOUNTER — HOSPITAL ENCOUNTER (OUTPATIENT)
Facility: HOSPITAL | Age: 81
Setting detail: OUTPATIENT SURGERY
Discharge: HOME | End: 2021-11-12
Attending: SPECIALIST | Admitting: SPECIALIST
Payer: COMMERCIAL

## 2021-11-11 ENCOUNTER — ANESTHESIA (OUTPATIENT)
Dept: PERIOP | Facility: HOSPITAL | Age: 81
End: 2021-11-11
Payer: COMMERCIAL

## 2021-11-11 ENCOUNTER — APPOINTMENT (OUTPATIENT)
Dept: RADIOLOGY | Facility: HOSPITAL | Age: 81
End: 2021-11-11
Payer: COMMERCIAL

## 2021-11-11 ENCOUNTER — ANESTHESIA EVENT (OUTPATIENT)
Dept: PERIOP | Facility: HOSPITAL | Age: 81
End: 2021-11-11
Payer: COMMERCIAL

## 2021-11-11 DIAGNOSIS — D41.4 NEOPLASM OF UNCERTAIN BEHAVIOR OF BLADDER: ICD-10-CM

## 2021-11-11 DIAGNOSIS — I63.9 CEREBROVASCULAR ACCIDENT (CVA), UNSPECIFIED MECHANISM (HCC): ICD-10-CM

## 2021-11-11 DIAGNOSIS — N32.89 BLADDER MASS: Primary | ICD-10-CM

## 2021-11-11 PROCEDURE — 88342 IMHCHEM/IMCYTCHM 1ST ANTB: CPT | Performed by: PATHOLOGY

## 2021-11-11 PROCEDURE — 88307 TISSUE EXAM BY PATHOLOGIST: CPT | Performed by: PATHOLOGY

## 2021-11-11 PROCEDURE — 88341 IMHCHEM/IMCYTCHM EA ADD ANTB: CPT | Performed by: PATHOLOGY

## 2021-11-11 RX ORDER — FENTANYL CITRATE 50 UG/ML
INJECTION, SOLUTION INTRAMUSCULAR; INTRAVENOUS AS NEEDED
Status: DISCONTINUED | OUTPATIENT
Start: 2021-11-11 | End: 2021-11-11

## 2021-11-11 RX ORDER — LEVOFLOXACIN 5 MG/ML
500 INJECTION, SOLUTION INTRAVENOUS EVERY 24 HOURS
Status: COMPLETED | OUTPATIENT
Start: 2021-11-12 | End: 2021-11-12

## 2021-11-11 RX ORDER — SODIUM CHLORIDE, SODIUM LACTATE, POTASSIUM CHLORIDE, CALCIUM CHLORIDE 600; 310; 30; 20 MG/100ML; MG/100ML; MG/100ML; MG/100ML
125 INJECTION, SOLUTION INTRAVENOUS CONTINUOUS
Status: DISCONTINUED | OUTPATIENT
Start: 2021-11-11 | End: 2021-11-12 | Stop reason: HOSPADM

## 2021-11-11 RX ORDER — ATORVASTATIN CALCIUM 40 MG/1
40 TABLET, FILM COATED ORAL
Status: DISCONTINUED | OUTPATIENT
Start: 2021-11-11 | End: 2021-11-12 | Stop reason: HOSPADM

## 2021-11-11 RX ORDER — EPHEDRINE SULFATE 50 MG/ML
INJECTION INTRAVENOUS AS NEEDED
Status: DISCONTINUED | OUTPATIENT
Start: 2021-11-11 | End: 2021-11-11

## 2021-11-11 RX ORDER — MAGNESIUM HYDROXIDE/ALUMINUM HYDROXICE/SIMETHICONE 120; 1200; 1200 MG/30ML; MG/30ML; MG/30ML
30 SUSPENSION ORAL EVERY 6 HOURS PRN
Status: DISCONTINUED | OUTPATIENT
Start: 2021-11-11 | End: 2021-11-12 | Stop reason: HOSPADM

## 2021-11-11 RX ORDER — PROPOFOL 10 MG/ML
INJECTION, EMULSION INTRAVENOUS AS NEEDED
Status: DISCONTINUED | OUTPATIENT
Start: 2021-11-11 | End: 2021-11-11

## 2021-11-11 RX ORDER — HYDROCHLOROTHIAZIDE 25 MG/1
25 TABLET ORAL EVERY MORNING
Status: DISCONTINUED | OUTPATIENT
Start: 2021-11-12 | End: 2021-11-12 | Stop reason: HOSPADM

## 2021-11-11 RX ORDER — LIDOCAINE HYDROCHLORIDE 10 MG/ML
INJECTION, SOLUTION EPIDURAL; INFILTRATION; INTRACAUDAL; PERINEURAL AS NEEDED
Status: DISCONTINUED | OUTPATIENT
Start: 2021-11-11 | End: 2021-11-11

## 2021-11-11 RX ORDER — METOPROLOL SUCCINATE 25 MG/1
25 TABLET, EXTENDED RELEASE ORAL EVERY MORNING
Status: DISCONTINUED | OUTPATIENT
Start: 2021-11-12 | End: 2021-11-12 | Stop reason: HOSPADM

## 2021-11-11 RX ORDER — GLYCINE 1.5 G/100ML
SOLUTION IRRIGATION AS NEEDED
Status: DISCONTINUED | OUTPATIENT
Start: 2021-11-11 | End: 2021-11-11 | Stop reason: HOSPADM

## 2021-11-11 RX ORDER — ONDANSETRON 2 MG/ML
4 INJECTION INTRAMUSCULAR; INTRAVENOUS EVERY 6 HOURS PRN
Status: DISCONTINUED | OUTPATIENT
Start: 2021-11-11 | End: 2021-11-12 | Stop reason: HOSPADM

## 2021-11-11 RX ORDER — ONDANSETRON 2 MG/ML
INJECTION INTRAMUSCULAR; INTRAVENOUS AS NEEDED
Status: DISCONTINUED | OUTPATIENT
Start: 2021-11-11 | End: 2021-11-11

## 2021-11-11 RX ORDER — LEVOFLOXACIN 5 MG/ML
500 INJECTION, SOLUTION INTRAVENOUS ONCE
Status: COMPLETED | OUTPATIENT
Start: 2021-11-11 | End: 2021-11-11

## 2021-11-11 RX ORDER — FENTANYL CITRATE/PF 50 MCG/ML
25 SYRINGE (ML) INJECTION
Status: DISCONTINUED | OUTPATIENT
Start: 2021-11-11 | End: 2021-11-11 | Stop reason: HOSPADM

## 2021-11-11 RX ADMIN — LEVOFLOXACIN 500 MG: 5 INJECTION, SOLUTION INTRAVENOUS at 10:32

## 2021-11-11 RX ADMIN — FENTANYL CITRATE 25 MCG: 50 INJECTION, SOLUTION INTRAMUSCULAR; INTRAVENOUS at 10:58

## 2021-11-11 RX ADMIN — FENTANYL CITRATE 25 MCG: 50 INJECTION, SOLUTION INTRAMUSCULAR; INTRAVENOUS at 11:12

## 2021-11-11 RX ADMIN — ONDANSETRON 4 MG: 2 INJECTION INTRAMUSCULAR; INTRAVENOUS at 11:13

## 2021-11-11 RX ADMIN — PROPOFOL 150 MG: 10 INJECTION, EMULSION INTRAVENOUS at 10:40

## 2021-11-11 RX ADMIN — PROPOFOL 50 MG: 10 INJECTION, EMULSION INTRAVENOUS at 11:39

## 2021-11-11 RX ADMIN — SODIUM CHLORIDE, SODIUM LACTATE, POTASSIUM CHLORIDE, AND CALCIUM CHLORIDE 125 ML/HR: .6; .31; .03; .02 INJECTION, SOLUTION INTRAVENOUS at 23:59

## 2021-11-11 RX ADMIN — PROPOFOL 50 MG: 10 INJECTION, EMULSION INTRAVENOUS at 10:42

## 2021-11-11 RX ADMIN — LIDOCAINE HYDROCHLORIDE 50 MG: 10 INJECTION, SOLUTION EPIDURAL; INFILTRATION; INTRACAUDAL; PERINEURAL at 10:40

## 2021-11-11 RX ADMIN — SODIUM CHLORIDE, SODIUM LACTATE, POTASSIUM CHLORIDE, AND CALCIUM CHLORIDE: .6; .31; .03; .02 INJECTION, SOLUTION INTRAVENOUS at 10:09

## 2021-11-11 RX ADMIN — EPHEDRINE SULFATE 10 MG: 50 INJECTION, SOLUTION INTRAVENOUS at 10:46

## 2021-11-11 RX ADMIN — FENTANYL CITRATE 50 MCG: 50 INJECTION, SOLUTION INTRAMUSCULAR; INTRAVENOUS at 10:42

## 2021-11-11 RX ADMIN — ATORVASTATIN CALCIUM 40 MG: 40 TABLET, FILM COATED ORAL at 21:49

## 2021-11-12 VITALS
BODY MASS INDEX: 35.34 KG/M2 | WEIGHT: 192.02 LBS | OXYGEN SATURATION: 96 % | SYSTOLIC BLOOD PRESSURE: 125 MMHG | HEART RATE: 70 BPM | TEMPERATURE: 98.2 F | HEIGHT: 62 IN | DIASTOLIC BLOOD PRESSURE: 60 MMHG | RESPIRATION RATE: 18 BRPM

## 2021-11-12 LAB
ATRIAL RATE: 70 BPM
P AXIS: 27 DEGREES
PR INTERVAL: 188 MS
QRS AXIS: -51 DEGREES
QRSD INTERVAL: 144 MS
QT INTERVAL: 458 MS
QTC INTERVAL: 494 MS
T WAVE AXIS: -27 DEGREES
VENTRICULAR RATE: 70 BPM

## 2021-11-12 PROCEDURE — 93010 ELECTROCARDIOGRAM REPORT: CPT | Performed by: INTERNAL MEDICINE

## 2021-11-12 RX ADMIN — ONDANSETRON 4 MG: 2 INJECTION INTRAMUSCULAR; INTRAVENOUS at 12:28

## 2021-11-12 RX ADMIN — ATORVASTATIN CALCIUM 40 MG: 40 TABLET, FILM COATED ORAL at 15:45

## 2021-11-12 RX ADMIN — LEVOFLOXACIN 500 MG: 5 INJECTION, SOLUTION INTRAVENOUS at 10:32

## 2021-11-12 RX ADMIN — HYDROCHLOROTHIAZIDE 25 MG: 25 TABLET ORAL at 09:00

## 2021-11-12 RX ADMIN — ONDANSETRON 4 MG: 2 INJECTION INTRAMUSCULAR; INTRAVENOUS at 02:14

## 2021-11-12 RX ADMIN — METOPROLOL SUCCINATE 25 MG: 25 TABLET, EXTENDED RELEASE ORAL at 09:00

## 2021-12-01 ENCOUNTER — TELEPHONE (OUTPATIENT)
Dept: HEMATOLOGY ONCOLOGY | Facility: CLINIC | Age: 81
End: 2021-12-01

## 2021-12-06 ENCOUNTER — HOSPITAL ENCOUNTER (OUTPATIENT)
Dept: RADIOLOGY | Facility: HOSPITAL | Age: 81
Discharge: HOME | End: 2021-12-06
Attending: SPECIALIST
Payer: COMMERCIAL

## 2021-12-06 DIAGNOSIS — C67.9 MALIGNANT NEOPLASM OF BLADDER, UNSPECIFIED (HCC): ICD-10-CM

## 2021-12-06 PROCEDURE — G1004 CDSM NDSC: HCPCS

## 2021-12-06 PROCEDURE — A9503 TC99M MEDRONATE: HCPCS

## 2021-12-06 PROCEDURE — 78306 BONE IMAGING WHOLE BODY: CPT

## 2021-12-07 ENCOUNTER — TELEPHONE (OUTPATIENT)
Dept: NEUROLOGY | Facility: CLINIC | Age: 81
End: 2021-12-07

## 2021-12-15 ENCOUNTER — DOCUMENTATION (OUTPATIENT)
Dept: HEMATOLOGY ONCOLOGY | Facility: MEDICAL CENTER | Age: 81
End: 2021-12-15

## 2021-12-16 ENCOUNTER — TELEPHONE (OUTPATIENT)
Dept: HEMATOLOGY ONCOLOGY | Facility: MEDICAL CENTER | Age: 81
End: 2021-12-16

## 2021-12-16 ENCOUNTER — OFFICE VISIT (OUTPATIENT)
Dept: HEMATOLOGY ONCOLOGY | Facility: MEDICAL CENTER | Age: 81
End: 2021-12-16
Payer: COMMERCIAL

## 2021-12-16 VITALS
HEART RATE: 60 BPM | HEIGHT: 62 IN | SYSTOLIC BLOOD PRESSURE: 108 MMHG | TEMPERATURE: 97.3 F | OXYGEN SATURATION: 98 % | BODY MASS INDEX: 33.49 KG/M2 | WEIGHT: 182 LBS | RESPIRATION RATE: 16 BRPM | DIASTOLIC BLOOD PRESSURE: 62 MMHG

## 2021-12-16 DIAGNOSIS — C67.8 MALIGNANT NEOPLASM OF OVERLAPPING SITES OF BLADDER (HCC): Primary | ICD-10-CM

## 2021-12-16 PROCEDURE — 99215 OFFICE O/P EST HI 40 MIN: CPT | Performed by: INTERNAL MEDICINE

## 2021-12-16 RX ORDER — ASPIRIN 81 MG/1
81 TABLET, CHEWABLE ORAL DAILY
COMMUNITY

## 2021-12-17 ENCOUNTER — TELEPHONE (OUTPATIENT)
Dept: UROLOGY | Facility: AMBULATORY SURGERY CENTER | Age: 81
End: 2021-12-17

## 2022-01-05 ENCOUNTER — TELEPHONE (OUTPATIENT)
Dept: HEMATOLOGY ONCOLOGY | Facility: MEDICAL CENTER | Age: 82
End: 2022-01-05

## 2022-01-05 NOTE — TELEPHONE ENCOUNTER
Patient cancelled f/u appt with dr Diamante Lewis for 1/6/2022  Spoke with patient  Patient is going out of town for a few weeks  Has not decided aon whether she will pursue treatment  Patient will call office if she wishes to f/u with Dr Diamante Lewis

## 2022-02-10 ENCOUNTER — TELEPHONE (OUTPATIENT)
Dept: UROLOGY | Facility: CLINIC | Age: 82
End: 2022-02-10

## 2022-02-14 ENCOUNTER — CONSULT (OUTPATIENT)
Dept: UROLOGY | Facility: CLINIC | Age: 82
End: 2022-02-14
Payer: COMMERCIAL

## 2022-02-14 ENCOUNTER — PATIENT OUTREACH (OUTPATIENT)
Dept: HEMATOLOGY ONCOLOGY | Facility: CLINIC | Age: 82
End: 2022-02-14

## 2022-02-14 VITALS
BODY MASS INDEX: 35.07 KG/M2 | OXYGEN SATURATION: 99 % | DIASTOLIC BLOOD PRESSURE: 78 MMHG | SYSTOLIC BLOOD PRESSURE: 130 MMHG | HEART RATE: 62 BPM | HEIGHT: 62 IN | WEIGHT: 190.6 LBS | RESPIRATION RATE: 18 BRPM

## 2022-02-14 DIAGNOSIS — Z76.89 ENCOUNTER FOR SUPPORT AND COORDINATION OF TRANSITION OF CARE: ICD-10-CM

## 2022-02-14 DIAGNOSIS — C67.8 MALIGNANT NEOPLASM OF OVERLAPPING SITES OF BLADDER (HCC): Primary | ICD-10-CM

## 2022-02-14 DIAGNOSIS — Z71.2 ENCOUNTER TO DISCUSS TEST RESULTS: ICD-10-CM

## 2022-02-14 PROCEDURE — 52000 CYSTOURETHROSCOPY: CPT | Performed by: UROLOGY

## 2022-02-14 PROCEDURE — 99205 OFFICE O/P NEW HI 60 MIN: CPT | Performed by: UROLOGY

## 2022-02-14 RX ORDER — ACETAMINOPHEN 325 MG/1
975 TABLET ORAL ONCE
Status: CANCELLED | OUTPATIENT
Start: 2022-02-14 | End: 2022-02-14

## 2022-02-14 NOTE — PROGRESS NOTES
Problem List Items Addressed This Visit        Genitourinary    Malignant neoplasm of overlapping sites of bladder ([facility] [address] ) - Primary    Relevant Orders    Basic metabolic panel    MRI abdomen w wo contrast    MRI pelvis w wo contrast    Case request operating room: TRANSURETHRAL RESECTION OF BLADDER TUMOR (TURBT), CYSTOSCOPY WITH RETROGRADE PYELOGRAM (Completed)    Ambulatory Referral to Hematology / Oncology    Ambulatory Referral to Radiation Oncology       Other    Encounter to discuss test results    Encounter for support and coordination of transition of care            Discussion:    Crys Ramirez and her family and I had a productive consultation today totaling over 60 minutes of counseling and coordination of care and participation in a discussion regarding goals of care and the shared decision making/informed decision-making model  She underwent a resection of bladder tumor in the latter part of last year showing a invasive high-grade muscle invasive tumor  She did consult with Medical Oncology but had various things going on it did not pursue immediate treatment, opting for no intervention at that time  She did wish to have an opinion with me as 1 of our fellowship trained urologic surgeons with experience treating bladder cancer  I reviewed with her her presentation along with the presence of our oncology nurse navigator    We talked about the implications of a diagnosis of muscle invasive bladder cancer and the options of no intervention, active surveillance of her case (to be contrasted with no intervention in which patient would choose comfort care measures), radical cystectomy with ileal conduit urinary diversion and bilateral pelvic lymph node dissection, potential placement of bilateral nephroureteral or nephrostomy tubes to help with bilateral hydronephrosis, and potential tri modality therapy with repeat resection of any bladder tumor recurrence or tumor burden and chemotherapy and radiation  We talked about the potential survival with each of the above measures and the risks and benefits and alternatives of each of these as well  I did review with her that the complication rate for radical cystectomy is likely over 100% in her age and demographic cohort, we also talked about expected hospital stays and the typical complications experienced after radical cystectomy with ileal conduit urinary diversion  I outlined to her the multi-disciplinary approach to trimodality care with chemotherapy and/or immunotherapy and radiation to the bladder and radical resection by way of a transurethral approach  We did perform cystoscopy today which does show some recurrence of bladder tumor, this appears to be over the right anterior lateral aspect of the bladder and has a total area of roughly 5 centimeters or so  This tumor is mostly plateau like in nature and does not have a large papillary component  I do recommend consideration of a repeat TURBT with bilateral retrograde pyelography for appropriate staging  She does believe that she would be amenable to this approach  While she has seen our medical oncology team previously, she is interested in a 2nd opinion and I have placed a repeat referral to Medical Oncology so that she may obtain this here within the [address] [facility],[address]  I have also placed an order for our radiation oncology colleagues to evaluate this patient with the goal of her proceeding potentially to trimodality therapy  She has not yet decided on whether not she would want this versus comfort care but she has fully participated in the shared decision-making model  Of note she is quite functional, still working part-time, she is of sound mind and she is also able to make her own healthcare decisions  All questions and concerns answered addressed    She will return for TURBT with all indicated procedures      Assessment and plan:     Please see problem oriented charting for the assessment plan of today's urological complaints    Bhargav Mohamud MD      Chief Complaint     As above      History of Present Illness     Shala Baxter is a 79 y o  woman with a heavy smoking history who had gross hematuria in October 2021 and underwent TURBT for this in early November 2021  At that time a large tumor was noted at the right lateral wall, right trigone and left bladder, the orifices could not be located    She tolerated cystoscopy well today, please see separate procedure note for details  ECOG performance status is currently 0  No flank pain or pelvic pain at this time  The following portions of the patient's history were reviewed and updated as appropriate: allergies, current medications, past family history, past medical history, past social history, past surgical history and problem list     Detailed Urologic History     - please refer to HPI    Review of Systems     Review of Systems   Constitutional: Negative  HENT: Negative  Eyes: Negative  Respiratory: Negative  Cardiovascular: Negative  Gastrointestinal: Negative  Endocrine: Negative  Genitourinary: Negative  Musculoskeletal: Negative  Skin: Negative  Allergic/Immunologic: Negative  Neurological: Negative  Hematological: Negative  Psychiatric/Behavioral: Negative  Allergies     Allergies   Allergen Reactions    Other GI Intolerance     Allergic to darvocet  Allergic to adhesive tape,moderate rash    Percocet [Oxycodone-Acetaminophen] GI Intolerance    Vicodin [Hydrocodone-Acetaminophen] GI Intolerance       Physical Exam     Physical Exam  Vitals reviewed  Constitutional:       General: She is not in acute distress  Appearance: Normal appearance  She is obese  She is not ill-appearing, toxic-appearing or diaphoretic  HENT:      Head: Normocephalic and atraumatic  Eyes:      General: No scleral icterus  Right eye: No discharge           Left eye: No discharge  Cardiovascular:      Pulses: Normal pulses  Pulmonary:      Effort: Pulmonary effort is normal    Abdominal:      General: There is no distension  Palpations: There is no mass  Tenderness: There is no abdominal tenderness  Hernia: No hernia is present  Genitourinary:     General: Normal vulva  Musculoskeletal:         General: No swelling  Skin:     General: Skin is warm  Neurological:      General: No focal deficit present  Mental Status: She is alert and oriented to person, place, and time  Cranial Nerves: No cranial nerve deficit  Psychiatric:         Mood and Affect: Mood normal          Behavior: Behavior normal          Thought Content:  Thought content normal          Judgment: Judgment normal              Vital Signs  Vitals:    02/14/22 1514   BP: 130/78   BP Location: Left arm   Patient Position: Sitting   Cuff Size: Large   Pulse: 62   Resp: 18   SpO2: 99%   Weight: 86 5 kg (190 lb 9 6 oz)   Height: 5' 2" (1 575 m)         Current Medications       Current Outpatient Medications:     aspirin 81 mg chewable tablet, Chew 81 mg daily, Disp: , Rfl:     atorvastatin (LIPITOR) 40 mg tablet, Take 1 tablet (40 mg total) by mouth daily with dinner, Disp: 14 tablet, Rfl: 0    hydrochlorothiazide (HYDRODIURIL) 25 mg tablet, Take 25 mg by mouth every morning, Disp: , Rfl:     metoprolol succinate (TOPROL-XL) 50 mg 24 hr tablet, Take 25 mg by mouth every morning, Disp: , Rfl:       Active Problems     Patient Active Problem List   Diagnosis    Hypokalemia    Bone lesion    Wrist drop    Stroke ([facility] [address] )    Malignant neoplasm of overlapping sites of bladder ([facility] [address] )    Encounter to discuss test results    Encounter for support and coordination of transition of care         Past Medical History     Past Medical History:   Diagnosis Date    Arthritis     right knee    Cancer ([facility] [address] ) 2003    ovarian    Hay fever     Hyperlipidemia     Hypertension     Myocardial infarction [facility])     questionable MI during exploratory surgery    Ovarian cancer ([facility] [address] )     Stroke ([facility] [address] ) 2003    " mini stroke " no deficits ; another poss stroke with  left hand weakness    Wears glasses     Wears partial dentures     upper & lower         Surgical History     Past Surgical History:   Procedure Laterality Date    BREAST SURGERY Right     exc  bx  lump (R) breast    DILATION AND CURETTAGE OF UTERUS      HYSTERECTOMY  2003    TAHBSO    JOINT REPLACEMENT Right     OOPHORECTOMY Bilateral     cancer    DC CYSTOURETHROSCOPY,FULGUR >5 CM LESN Bilateral 2021    Procedure: CYSTOSCOPY, TRANSURETHRAL RESECTION OF BLADDER TUMOR (TURBT), EVACUATION OF CLOTS, LARGE TUMOR 7-8CM; Surgeon: Tricia River MD;  Location: [address] [place] [address];  Service: Urology    DC XCAPSL CTRC RMVL INSJ IO LENS PROSTH W/O ECP Left 2017    Procedure: EXTRACTION EXTRACAPSULAR CATARACT PHACO INTRAOCULAR LENS (IOL);   Surgeon: Kaylan Jay MD;  Location: [facility] MAIN OR;  Service: Ophthalmology    TONSILLECTOMY           Family History     Family History   Problem Relation Age of Onset   Ramona Courser Parkinsonism Mother     Heart disease Father     Cancer Father         leukemia    Heart disease Sister     Diabetes Sister     Diabetes Brother     Diabetes Sister     Melanoma Sister     Melanoma Sister     Diabetes Maternal Aunt     No Known Problems Maternal Aunt     No Known Problems Paternal Aunt          Social History     Social History     Social History     Tobacco Use   Smoking Status Former Smoker    Packs/day: 1 50    Years: 40 00    Pack years: 60 00    Types: Cigarettes    Quit date:     Years since quittin 1   Smokeless Tobacco Never Used         Pertinent Lab Values     Lab Results   Component Value Date    CREATININE 1 48 (H) 10/30/2021                 Pertinent Imaging      Films reviewed

## 2022-02-14 NOTE — PROGRESS NOTES
Saw pt with 2 family members in new consult with Dr Alissa Bright  Present for visit and cystoscopy  Introduced myself, explained my role and provided contact information  Reinforced treatment options presented and answered questions to her satisfaction  Assured her I would follow-up with her in next couple of days after she had time to process visit and to write down any questions that may come up  She was very appreciative

## 2022-02-14 NOTE — LETTER
February 14, 2022     [facility], [facility] [address] [address]    Patient: Eitan Phan   YOB: 1940   Date of Visit: 2/14/2022       Dear Dr Gamal Middleton: Thank you for referring Mayank Melara to me for evaluation  Below are my notes for this consultation  If you have questions, please do not hesitate to call me  I look forward to following your patient along with you  Sincerely,        Taylor Haas MD        CC: MD Taylor Ceja MD  2/14/2022  4:22 PM  Sign when Signing Visit  Office Cystoscopy Procedure Note    Indication:    Urothelial carcinoma of the bladder    Informed consent   The risks, benefits, complications, treatment options, and expected outcomes were discussed with the patient  The patient concurred with the proposed plan and provided informed consent  Anesthesia  Lidocaine jelly 2%    Antibiotic prophylaxis   None    Procedure  In the presence of a female nurse, the patient was placed in the supine frog-legged position, was prepped and draped in the usual manner using sterile technique, and 2% lidocaine jelly instilled into the urethra  Prior to this pelvic examination showed normal labia majora and minora, a moderate caliber vaginal introitus, moderate quality vaginal mucosa, and showed no pelvic floor descensus and no urethral hypermobility  Upon stress maneuvers there was no stress incontinence  A 17 F flexible cystoscope was then inserted into the urethra and the urethra and bladder carefully examined  The following findings were noted:    Findings:  Urethra:  Normal  Bladder:  Abnormal:  Areas of recurrence over the right lateral and right posterior bladder wall, a mixture of small papillary and plateau like tumor burden is noted  Ureteral orifices:  Abnormal, due to tumor burden  Other findings:  None     Specimens: None                 Complications:    None; patient tolerated the procedure well           Disposition:  To home            Condition: Stable    Plan: Recurrence of bladder tumor, likely high-grade, we plan for a transurethral section of bladder tumor and bilateral retrograde pyelography and all indicated procedures  Cystoscopy     Date/Time 2/14/2022 4:21 PM     Performed by  Radha Chapman MD     Authorized by Radha Chapman MD      Universal Protocol:  Consent: Verbal consent obtained  Written consent obtained  Risks and benefits: risks, benefits and alternatives were discussed  Consent given by: patient  Patient understanding: patient states understanding of the procedure being performed  Patient consent: the patient's understanding of the procedure matches consent given  Procedure consent: procedure consent matches procedure scheduled  Relevant documents: relevant documents present and verified  Test results: test results available and properly labeled  Site marked: the operative site was not marked  Radiology Images displayed and confirmed  If images not available, report reviewed: imaging studies available  Required items: required blood products, implants, devices, and special equipment available  Patient identity confirmed: verbally with patient and provided demographic data        Procedure Details:  Procedure type: cystoscopy    Patient tolerance: Patient tolerated the procedure well with no immediate complications    Additional Procedure Details: Office Cystoscopy Procedure Note    Indication:    Urothelial carcinoma of the bladder    Informed consent   The risks, benefits, complications, treatment options, and expected outcomes were discussed with the patient  The patient concurred with the proposed plan and provided informed consent      Anesthesia  Lidocaine jelly 2%    Antibiotic prophylaxis   None    Procedure  In the presence of a female nurse, the patient was placed in the supine frog-legged position, was prepped and draped in the usual manner using sterile technique, and 2% lidocaine jelly instilled into the urethra  Prior to this pelvic examination showed normal labia majora and minora, a moderate caliber vaginal introitus, moderate quality vaginal mucosa, and showed no pelvic floor descensus and no urethral hypermobility  Upon stress maneuvers there was no stress incontinence  A 17 F flexible cystoscope was then inserted into the urethra and the urethra and bladder carefully examined  The following findings were noted:    Findings:  Urethra:  Normal  Bladder:  Abnormal:  Areas of recurrence over the right lateral and right posterior bladder wall, a mixture of small papillary and plateau like tumor burden is noted  Ureteral orifices:  Abnormal, due to tumor burden  Other findings:  None     Specimens: None                 Complications:    None; patient tolerated the procedure well           Disposition: To home            Condition: Stable    Plan: Recurrence of bladder tumor, likely high-grade, we plan for a transurethral section of bladder tumor and bilateral retrograde pyelography and all indicated procedures              Carmelo Riley MD  2/14/2022  4:20 PM  Sign when Signing Visit       Problem List Items Addressed This Visit        Genitourinary    Malignant neoplasm of overlapping sites of bladder [facility]) - Primary    Relevant Orders    Basic metabolic panel    MRI abdomen w wo contrast    MRI pelvis w wo contrast    Case request operating room: TRANSURETHRAL RESECTION OF BLADDER TUMOR (TURBT), CYSTOSCOPY WITH RETROGRADE PYELOGRAM (Completed)    Ambulatory Referral to Hematology / Oncology    Ambulatory Referral to Radiation Oncology       Other    Encounter to discuss test results    Encounter for support and coordination of transition of care            Discussion:    Malorie Thomas and her family and I had a productive consultation today totaling over 60 minutes of counseling and coordination of care and participation in a discussion regarding goals of care and the shared decision making/informed decision-making model  She underwent a resection of bladder tumor in the latter part of last year showing a invasive high-grade muscle invasive tumor  She did consult with Medical Oncology but had various things going on it did not pursue immediate treatment, opting for no intervention at that time  She did wish to have an opinion with me as 1 of our fellowship trained urologic surgeons with experience treating bladder cancer  I reviewed with her her presentation along with the presence of our oncology nurse navigator  We talked about the implications of a diagnosis of muscle invasive bladder cancer and the options of no intervention, active surveillance of her case (to be contrasted with no intervention in which patient would choose comfort care measures), radical cystectomy with ileal conduit urinary diversion and bilateral pelvic lymph node dissection, potential placement of bilateral nephroureteral or nephrostomy tubes to help with bilateral hydronephrosis, and potential tri modality therapy with repeat resection of any bladder tumor recurrence or tumor burden and chemotherapy and radiation  We talked about the potential survival with each of the above measures and the risks and benefits and alternatives of each of these as well  I did review with her that the complication rate for radical cystectomy is likely over 100% in her age and demographic cohort, we also talked about expected hospital stays and the typical complications experienced after radical cystectomy with ileal conduit urinary diversion  I outlined to her the multi-disciplinary approach to trimodality care with chemotherapy and/or immunotherapy and radiation to the bladder and radical resection by way of a transurethral approach    We did perform cystoscopy today which does show some recurrence of bladder tumor, this appears to be over the right anterior lateral aspect of the bladder and has a total area of roughly 5 centimeters or so   This tumor is mostly plateau like in nature and does not have a large papillary component  I do recommend consideration of a repeat TURBT with bilateral retrograde pyelography for appropriate staging  She does believe that she would be amenable to this approach  While she has seen our medical oncology team previously, she is interested in a 2nd opinion and I have placed a repeat referral to Medical Oncology so that she may obtain this here within the [facility]  I have also placed an order for our radiation oncology colleagues to evaluate this patient with the goal of her proceeding potentially to trimodality therapy  She has not yet decided on whether not she would want this versus comfort care but she has fully participated in the shared decision-making model  Of note she is quite functional, still working part-time, she is of sound mind and she is also able to make her own healthcare decisions  All questions and concerns answered addressed  She will return for TURBT with all indicated procedures      Assessment and plan:     Please see problem oriented charting for the assessment plan of today's urological complaints    Radha Chapman MD      Chief Complaint     As above      History of Present Illness     Tiffani Arredondo is a 79 y o  woman with a heavy smoking history who had gross hematuria in October 2021 and underwent TURBT for this in early November 2021  At that time a large tumor was noted at the right lateral wall, right trigone and left bladder, the orifices could not be located    She tolerated cystoscopy well today, please see separate procedure note for details  ECOG performance status is currently 0  No flank pain or pelvic pain at this time      The following portions of the patient's history were reviewed and updated as appropriate: allergies, current medications, past family history, past medical history, past social history, past surgical history and problem list     Detailed Urologic History     - please refer to HPI    Review of Systems     Review of Systems   Constitutional: Negative  HENT: Negative  Eyes: Negative  Respiratory: Negative  Cardiovascular: Negative  Gastrointestinal: Negative  Endocrine: Negative  Genitourinary: Negative  Musculoskeletal: Negative  Skin: Negative  Allergic/Immunologic: Negative  Neurological: Negative  Hematological: Negative  Psychiatric/Behavioral: Negative  Allergies     Allergies   Allergen Reactions    Other GI Intolerance     Allergic to darvocet  Allergic to adhesive tape,moderate rash    Percocet [Oxycodone-Acetaminophen] GI Intolerance    Vicodin [Hydrocodone-Acetaminophen] GI Intolerance       Physical Exam     Physical Exam  Vitals reviewed  Constitutional:       General: She is not in acute distress  Appearance: Normal appearance  She is obese  She is not ill-appearing, toxic-appearing or diaphoretic  HENT:      Head: Normocephalic and atraumatic  Eyes:      General: No scleral icterus  Right eye: No discharge  Left eye: No discharge  Cardiovascular:      Pulses: Normal pulses  Pulmonary:      Effort: Pulmonary effort is normal    Abdominal:      General: There is no distension  Palpations: There is no mass  Tenderness: There is no abdominal tenderness  Hernia: No hernia is present  Genitourinary:     General: Normal vulva  Musculoskeletal:         General: No swelling  Skin:     General: Skin is warm  Neurological:      General: No focal deficit present  Mental Status: She is alert and oriented to person, place, and time  Cranial Nerves: No cranial nerve deficit  Psychiatric:         Mood and Affect: Mood normal          Behavior: Behavior normal          Thought Content:  Thought content normal          Judgment: Judgment normal              Vital Signs  Vitals:    02/14/22 1514   BP: 130/78   BP Location: Left arm   Patient Position: Sitting   Cuff Size: Large   Pulse: 62   Resp: 18   SpO2: 99%   Weight: 86 5 kg (190 lb 9 6 oz)   Height: 5' 2" (1 575 m)         Current Medications       Current Outpatient Medications:     aspirin 81 mg chewable tablet, Chew 81 mg daily, Disp: , Rfl:     atorvastatin (LIPITOR) 40 mg tablet, Take 1 tablet (40 mg total) by mouth daily with dinner, Disp: 14 tablet, Rfl: 0    hydrochlorothiazide (HYDRODIURIL) 25 mg tablet, Take 25 mg by mouth every morning, Disp: , Rfl:     metoprolol succinate (TOPROL-XL) 50 mg 24 hr tablet, Take 25 mg by mouth every morning, Disp: , Rfl:       Active Problems     Patient Active Problem List   Diagnosis    Hypokalemia    Bone lesion    Wrist drop    [facility]roke [facility]    [facility]    Encounter to discuss test results    Encounter for support and coordination of transition of care         Past Medical History     Past Medical History:   Diagnosis Date    Arthritis     right knee    Cancer ([facility] ) 2003    ovarian    Hay fever     Hyperlipidemia     Hypertension     Myocardial infarction ([facility] ) 2003    questionable MI during exploratory surgery    Ovarian cancer ([facility]     Stroke ([facility] ) 2003    " mini stroke " no deficits ; another poss stroke with  left hand weakness    Wears glasses     Wears partial dentures     upper & lower         Surgical History     Past Surgical History:   Procedure Laterality Date    BREAST SURGERY Right     exc  bx  lump (R) breast    DILATION AND CURETTAGE OF UTERUS      HYSTERECTOMY  2003    TAHBSO    JOINT REPLACEMENT Right     OOPHORECTOMY Bilateral     cancer    VT CYSTOURETHROSCOPY,FULGUR >5 CM LESN Bilateral 11/11/2021    Procedure: CYSTOSCOPY, TRANSURETHRAL RESECTION OF BLADDER TUMOR (TURBT), EVACUATION OF CLOTS, LARGE TUMOR 7-8CM;   Surgeon: Lani Faust MD;  Location: [address] [place] [address];  Service: Urology    VT XCAPSL CTRC RMVL INSJ IO LENS PROSTH W/O ECP Left 2017    Procedure: EXTRACTION EXTRACAPSULAR CATARACT PHACO INTRAOCULAR LENS (IOL);   Surgeon: Vita Mcdaniels MD;  Location: [facility] MAIN OR;  Service: Ophthalmology    TONSILLECTOMY           Family History     Family History   Problem Relation Age of Onset   Clifm Ab Parkinsonism Mother     Heart disease Father     Cancer Father         leukemia    Heart disease Sister     Diabetes Sister     Diabetes Brother     Diabetes Sister     Melanoma Sister     Melanoma Sister     Diabetes Maternal Aunt     No Known Problems Maternal Aunt     No Known Problems Paternal Aunt          Social History     Social History     Social History     Tobacco Use   Smoking Status Former Smoker    Packs/day: 1 50    Years: 40 00    Pack years: 60 00    Types: Cigarettes    Quit date:     Years since quittin 1   Smokeless Tobacco Never Used         Pertinent Lab Values     Lab Results   Component Value Date    CREATININE 1 48 (H) 10/30/2021                 Pertinent Imaging      Films reviewed

## 2022-02-14 NOTE — PROGRESS NOTES
Office Cystoscopy Procedure Note    Indication:    Urothelial carcinoma of the bladder    Informed consent   The risks, benefits, complications, treatment options, and expected outcomes were discussed with the patient  The patient concurred with the proposed plan and provided informed consent  Anesthesia  Lidocaine jelly 2%    Antibiotic prophylaxis   None    Procedure  In the presence of a female nurse, the patient was placed in the supine frog-legged position, was prepped and draped in the usual manner using sterile technique, and 2% lidocaine jelly instilled into the urethra  Prior to this pelvic examination showed normal labia majora and minora, a moderate caliber vaginal introitus, moderate quality vaginal mucosa, and showed no pelvic floor descensus and no urethral hypermobility  Upon stress maneuvers there was no stress incontinence  A 17 F flexible cystoscope was then inserted into the urethra and the urethra and bladder carefully examined  The following findings were noted:    Findings:  Urethra:  Normal  Bladder:  Abnormal:  Areas of recurrence over the right lateral and right posterior bladder wall, a mixture of small papillary and plateau like tumor burden is noted  Ureteral orifices:  Abnormal, due to tumor burden  Other findings:  None     Specimens: None                 Complications:    None; patient tolerated the procedure well           Disposition: To home            Condition: Stable    Plan: Recurrence of bladder tumor, likely high-grade, we plan for a transurethral section of bladder tumor and bilateral retrograde pyelography and all indicated procedures  Cystoscopy     Date/Time 2/14/2022 4:21 PM     Performed by  Sukhdeep Adamson MD     Authorized by Sukhdeep Adamson MD      Universal Protocol:  Consent: Verbal consent obtained  Written consent obtained    Risks and benefits: risks, benefits and alternatives were discussed  Consent given by: patient  Patient understanding: patient states understanding of the procedure being performed  Patient consent: the patient's understanding of the procedure matches consent given  Procedure consent: procedure consent matches procedure scheduled  Relevant documents: relevant documents present and verified  Test results: test results available and properly labeled  Site marked: the operative site was not marked  Radiology Images displayed and confirmed  If images not available, report reviewed: imaging studies available  Required items: required blood products, implants, devices, and special equipment available  Patient identity confirmed: verbally with patient and provided demographic data        Procedure Details:  Procedure type: cystoscopy    Patient tolerance: Patient tolerated the procedure well with no immediate complications    Additional Procedure Details: Office Cystoscopy Procedure Note    Indication:    Urothelial carcinoma of the bladder    Informed consent   The risks, benefits, complications, treatment options, and expected outcomes were discussed with the patient  The patient concurred with the proposed plan and provided informed consent  Anesthesia  Lidocaine jelly 2%    Antibiotic prophylaxis   None    Procedure  In the presence of a female nurse, the patient was placed in the supine frog-legged position, was prepped and draped in the usual manner using sterile technique, and 2% lidocaine jelly instilled into the urethra  Prior to this pelvic examination showed normal labia majora and minora, a moderate caliber vaginal introitus, moderate quality vaginal mucosa, and showed no pelvic floor descensus and no urethral hypermobility  Upon stress maneuvers there was no stress incontinence  A 17 F flexible cystoscope was then inserted into the urethra and the urethra and bladder carefully examined    The following findings were noted:    Findings:  Urethra:  Normal  Bladder:  Abnormal:  Areas of recurrence over the right lateral and right posterior bladder wall, a mixture of small papillary and plateau like tumor burden is noted  Ureteral orifices:  Abnormal, due to tumor burden  Other findings:  None     Specimens: None                 Complications:    None; patient tolerated the procedure well           Disposition: To home            Condition: Stable    Plan: Recurrence of bladder tumor, likely high-grade, we plan for a transurethral section of bladder tumor and bilateral retrograde pyelography and all indicated procedures

## 2022-02-14 NOTE — PATIENT INSTRUCTIONS
Bladder Cancer   WHAT YOU NEED TO KNOW:   What is bladder cancer? Bladder cancer starts in the cells that line your bladder  What increases my risk for bladder cancer? · Smoking cigarettes    · Age older than 60    · Exposure to certain chemicals found in paint, dyes, rubber, plastic, metal, and automobile exhaust    · Exposure to arsenic in drinking water or food    · A family history of bladder cancer    · Chronic bladder irritation or inflammation from urinary catheters or frequent urinary tract infections    · Eating large amounts of high-fat foods or red meats    What are the signs and symptoms of bladder cancer? · Blood in your urine or urine that is pink or orange    · A sudden need to urinate, or urinating more often than usual    · Trouble starting the stream of urine or urinating very little    · Pain or burning when you urinate    · Pain in your abdomen or pelvis     · Unexplained weight loss    · Feeling tired or weak    How is bladder cancer diagnosed? Your healthcare provider will examine you  He or she may insert a gloved finger into your rectum and feel your bladder  You may need any of the following:  · A urine sample  is checked for blood, an infection, or abnormal cells  · X-ray, ultrasound, CT, or MRI  pictures may show the tumor size and location  The pictures may also show if the cancer has spread to other places in your body  You may be given contrast liquid to help your bladder, kidneys, and ureters show up better in pictures  Tell the healthcare provider if you have ever had an allergic reaction to contrast liquid  Do not enter the MRI room with anything metal  Metal can cause serious injury  Tell the healthcare provider if you have any metal in or on your body  · Cystoscopy  is a procedure used to look inside the bladder  · A biopsy  is a procedure to remove a small piece of tissue from your bladder  The tissue is sent to the lab and tested for cancer      How is bladder cancer treated? Your healthcare provider will help you create a treatment plan  He or she will talk to you about the benefits and risks of each treatment  Some treatments may cause incontinence (leaking urine) or bowel movement problems  You may also develop problems with having sex or being able to have children  Talk to your provider about these and other problems that may develop after treatment  This will help you feel comfortable with your treatment plan  You may need more than one of the following:  · Transurethral resection of bladder tumor (TURBT)  is a procedure used to remove the tumor  Bladder muscle near the tumor may also be removed  This procedure is done by inserting tools through your urethra and into your bladder  TURBT may be done if the cancer has not spread to the muscle layer of the bladder  · Immunotherapy  is medicine given to help your immune system kill cancer cells  It is injected into your vein or directly into your bladder  · Chemotherapy  is medicine given to kill cancer cells  It may be given to you as a pill or an injection into your vein or muscle  It may also be injected directly into your bladder  This is called intravesical chemo  Intravesical chemo is placed into the bladder through a catheter  The chemo usually stays in the bladder for 2 hours  Then chemo is drained from the bladder and the catheter is removed  · Radiation therapy  uses high-energy x-ray beams to kill cancer cells  · Surgery  may be needed to remove your bladder  Surrounding organs and lymph nodes may also be removed  Surgery may be needed if the cancer has spread to the muscle layer of the bladder  What can I do to care for myself? · Do not smoke  Nicotine can damage blood vessels and make it hard to manage your bladder cancer  Smoking also increases your risk for new or returning cancer  Ask your healthcare provider for information if you currently smoke and need help to quit  E-cigarettes or smokeless tobacco still contain nicotine  Talk to your healthcare provider before you use these products  · Limit or do not drink alcohol  Alcohol may cause you to become dehydrated  Ask your oncologist if it is safe for you to drink alcohol, and how much is safe to drink  · Eat healthy foods  Healthy foods include fruit, vegetables, whole-grain breads, low-fat dairy products, beans, lean meats, and fish  Your healthcare provider may recommend that you eat less red meat  You need to eat enough calories to help prevent weight loss and increase your energy level  You also need protein to give you strength  If you do not feel hungry, eat small amounts often instead of large meals  · Drink liquids as directed  You may need to drink more liquids than usual to prevent dehydration  Ask how much liquid to drink each day and which liquids are best for you  · Exercise as directed  Exercise may help increase your energy level and appetite  Ask your healthcare provider how much exercise you need and which exercises are best for you  Where can I find more information and support? It may be difficult for you and your family to go through cancer and cancer treatments  Join a support group or talk with others who have gone through treatment  · [address] [place] [address]  Phone: 7- [phone]  Web Address: http://[employer]/  [employer]    · [address] [place] [address]  Phone: 2- 023 - [phone]  Web Address: http://[employer]/  gov    Call 911 for any of the following:   · You suddenly feel lightheaded and short of breath  · You cough up blood  When should I seek immediate care? · Your arm or leg feels warm, tender, and painful  It may look swollen and red  · You are unable to urinate  When should I contact my healthcare provider? · You have a fever       · You vomit and cannot keep any liquids or food down  · You have new or worsening pain  · Your pain gets worse or does not go away after you take pain medicine  · You have questions or concerns about your condition or care  CARE AGREEMENT:   You have the right to help plan your care  Learn about your health condition and how it may be treated  Discuss treatment options with your healthcare providers to decide what care you want to receive  You always have the right to refuse treatment  The above information is an  only  It is not intended as medical advice for individual conditions or treatments  Talk to your doctor, nurse or pharmacist before following any medical regimen to see if it is safe and effective for you  © Copyright [employer] 2021 Information is for End User's use only and may not be sold, redistributed or otherwise used for commercial purposes   All illustrations and images included in CareNotes® are the copyrighted property of [employer]  or [address] [place] [address]

## 2022-02-15 ENCOUNTER — TELEPHONE (OUTPATIENT)
Dept: UROLOGY | Facility: CLINIC | Age: 82
End: 2022-02-15

## 2022-02-15 NOTE — TELEPHONE ENCOUNTER
Patient is sched for 4/4 after MRI on 3/22 performed  She is aware she needs a  and hospital will contact her day prior w time of arrival  She will go for PATs week of 3/14 and advised 7 day hold of Asprin products, multivitamins and fish oils  I am mailing her a surgical packet and she is aware to contact us w any questions or concerns

## 2022-02-16 ENCOUNTER — PATIENT OUTREACH (OUTPATIENT)
Dept: HEMATOLOGY ONCOLOGY | Facility: CLINIC | Age: 82
End: 2022-02-16

## 2022-02-16 DIAGNOSIS — C67.8 MALIGNANT NEOPLASM OF OVERLAPPING SITES OF BLADDER (HCC): Primary | ICD-10-CM

## 2022-02-18 ENCOUNTER — PATIENT OUTREACH (OUTPATIENT)
Dept: HEMATOLOGY ONCOLOGY | Facility: CLINIC | Age: 82
End: 2022-02-18

## 2022-02-18 NOTE — PROGRESS NOTES
Called pt to provide [facility] and review other appts  Pt confirmed appt  Pt expressed nervousness and support provided  Enouraged her to call if she needs anything and confirmed I would attend her visit with Dr Lucía Pal

## 2022-02-28 ENCOUNTER — CONSULT (OUTPATIENT)
Dept: HEMATOLOGY ONCOLOGY | Facility: CLINIC | Age: 82
End: 2022-02-28
Payer: COMMERCIAL

## 2022-02-28 VITALS
HEIGHT: 62 IN | WEIGHT: 188 LBS | DIASTOLIC BLOOD PRESSURE: 82 MMHG | OXYGEN SATURATION: 99 % | RESPIRATION RATE: 17 BRPM | BODY MASS INDEX: 34.6 KG/M2 | TEMPERATURE: 97.9 F | SYSTOLIC BLOOD PRESSURE: 150 MMHG | HEART RATE: 66 BPM

## 2022-02-28 DIAGNOSIS — C67.8 MALIGNANT NEOPLASM OF OVERLAPPING SITES OF BLADDER (HCC): Primary | ICD-10-CM

## 2022-02-28 PROCEDURE — 99205 OFFICE O/P NEW HI 60 MIN: CPT | Performed by: INTERNAL MEDICINE

## 2022-02-28 NOTE — PROGRESS NOTES
Oncology Consult Note  Angi Wang 79 y o  female MRN: [id number]  Unit/Bed#:  Encounter: [id number]      Presenting Complaint:  Bladder cancer    History of Presenting Illness:  44-year-old  female with history of hypertension, coronary artery disease, stroke, she used to smoke heavily quit smoking in 2001, history of ovarian cancer status post surgical resection in 2003, I do not have the exact records in front of me, the patient experienced hematuria CT scan showed evidence of old right coronary artery infarction    CT scan of the abdomen and pelvis without IV contrast showed 4 7 x 3 5 x 4 1 cm right posterior lateral bladder lesion however the exam is limited without IV contrast    MRI of the brain was done on 10/29/2021 showed acute to subacute infarction involving posterior right frontal lobe, she is on aspirin    Status post cystoscopy showing transitional cell bladder cancer, muscle invasive, involving the muscularis propria another cystoscopy on 02/14/2022 showed recurrence of bladder tumor     Scheduled for MRI of the abdomen and pelvis and to follow up for another TURBT in March 2022  Hematology Outpatient Follow - Up Note  Angi Wang 79 y o  female MRN: @ Encounter: [id number]        Date:  2/28/2022        Assessment/ Plan:    44-year-old  female with history of hypertension, coronary artery disease, stroke, quit smoking in 2001, she said that she had a history of ovarian cancer in 2003 status post surgical resection    Presented with hematuria in October 2021, was found to have 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 more pronounced on the right side, the CT scan was done without IV contrast, no evidence of distant metastases    Now scheduled for another TURBT    I had long discussion with the patient and her daughter, for muscle invasive usually the treatment is neoadjuvant chemotherapy followed by surgical resection however given her advanced age and comorbidities, might be not able to have surgical resection    I will wait for imaging studies and determine to proceed from there    Pembrolizumab is indicated for patient for bladder cancer who are not candidate for cisplatin/platinum treatment and locally advanced or metastatic cancer      Labs and imaging studies are reviewed by ordering provider once results are available  If there are findings that need immediate attention, you will be contacted when results available  Discussing results and the implication on your healthcare is best discussed in person at your follow-up visit  HPI:      Interval History:        Previous Treatment:         Test Results:    Imaging: No results found  Labs:   Lab Results   Component Value Date    WBC 9 86 10/29/2021    HGB 13 0 10/29/2021    HCT 38 7 10/29/2021    MCV 87 10/29/2021     10/29/2021     Lab Results   Component Value Date    K 3 8 11/10/2021     10/30/2021    CO2 23 10/30/2021    BUN 38 (H) 10/30/2021    CREATININE 1 48 (H) 10/30/2021    CALCIUM 9 3 10/30/2021    CORRECTEDCA 10 0 10/29/2021    AST 15 10/29/2021    ALT 25 10/29/2021    ALKPHOS 69 10/29/2021    EGFR 33 10/30/2021       No results found for: IRON, TIBC, FERRITIN    No results found for: DLJRFRPC19      ROS: Review of Systems   Constitutional: Positive for appetite change  Negative for chills, diaphoresis, fatigue and unexpected weight change  HENT:   Negative for mouth sores, nosebleeds, sore throat, trouble swallowing and voice change  Eyes: Negative for eye problems and icterus  Respiratory: Negative for chest tightness, cough, hemoptysis and wheezing  Cardiovascular: Negative for chest pain, leg swelling and palpitations  Gastrointestinal: Negative for abdominal distention, abdominal pain, blood in stool, constipation, diarrhea, nausea and vomiting  Endocrine: Negative for hot flashes     Genitourinary: Negative for bladder incontinence, difficulty urinating, dyspareunia, dysuria and frequency  Musculoskeletal: Negative for arthralgias, back pain, gait problem, neck pain and neck stiffness  Skin: Negative for itching and rash  Neurological: Negative for dizziness, gait problem, headaches, numbness, seizures and speech difficulty  Hematological: Negative for adenopathy  Does not bruise/bleed easily  Psychiatric/Behavioral: Negative for decreased concentration, depression, sleep disturbance and suicidal ideas  The patient is not nervous/anxious  Current Medications: Reviewed  Allergies: Reviewed  PMH/FH/SH:  Reviewed      Physical Exam:    Body surface area is 1 86 meters squared  Wt Readings from Last 3 Encounters:   02/28/22 85 3 kg (188 lb)   02/14/22 86 5 kg (190 lb 9 6 oz)   12/16/21 82 6 kg (182 lb)        Temp Readings from Last 3 Encounters:   02/28/22 97 9 °F (36 6 °C)   12/16/21 (!) 97 3 °F (36 3 °C)   11/12/21 98 2 °F (36 8 °C) (Oral)        BP Readings from Last 3 Encounters:   02/28/22 150/82   02/14/22 130/78   12/16/21 108/62         Pulse Readings from Last 3 Encounters:   02/28/22 66   02/14/22 62   12/16/21 60        Physical Exam  Vitals reviewed  Constitutional:       General: She is not in acute distress  Appearance: She is well-developed  She is not diaphoretic  HENT:      Head: Normocephalic and atraumatic  Eyes:      Conjunctiva/sclera: Conjunctivae normal    Neck:      Trachea: No tracheal deviation  Cardiovascular:      Rate and Rhythm: Normal rate and regular rhythm  Heart sounds: No murmur heard  No friction rub  No gallop  Pulmonary:      Effort: Pulmonary effort is normal  No respiratory distress  Breath sounds: Normal breath sounds  No wheezing or rales  Chest:      Chest wall: No tenderness  Abdominal:      General: There is no distension  Palpations: Abdomen is soft  Tenderness: There is no abdominal tenderness     Musculoskeletal: Cervical back: Normal range of motion and neck supple  Right lower leg: Edema present  Left lower leg: Edema present  Lymphadenopathy:      Cervical: No cervical adenopathy  Skin:     General: Skin is warm and dry  Coloration: Skin is not pale  Findings: No erythema  Neurological:      Mental Status: She is alert and oriented to person, place, and time  Psychiatric:         Behavior: Behavior normal          Thought Content: Thought content normal          Judgment: Judgment normal          ECO    Goals and Barriers:  Current Goal: Minimize effects of disease  Barriers: None  Patient's Capacity to Self Care:  Patient is able to self care  Code Status: [unfilled]      Review of Systems - As stated in the HPI otherwise the fourteen point review of systems was negative  Past Medical History:   Diagnosis Date    Arthritis     right knee    Cancer ([facility] ) 2003    ovarian    Hay fever     Hyperlipidemia     Hypertension     Myocardial infarction [facility])     questionable MI during exploratory surgery    Ovarian cancer ([facility] )     Stroke ([facility] )     " mini stroke " no deficits ; another poss stroke with  left hand weakness    Wears glasses     Wears partial dentures     upper & lower       Social History     Socioeconomic History    Marital status:       Spouse name: None    Number of children: None    Years of education: None    Highest education level: None   Occupational History    None   Tobacco Use    Smoking status: Former Smoker     Packs/day: 1 50     Years: 40 00     Pack years: 60 00     Types: Cigarettes     Quit date:      Years since quittin 1    Smokeless tobacco: Never Used   Vaping Use    Vaping Use: Never used   Substance and Sexual Activity    Alcohol use: Yes     Comment: occassional, socially    Drug use: No    Sexual activity: None   Other Topics Concern    None   Social History Narrative    None     Social Determinants of Health     Financial Resource Strain: Not on file   Food Insecurity: Not on file   Transportation Needs: Not on file   Physical Activity: Not on file   Stress: Not on file   Social Connections: Not on file   Intimate Partner Violence: Not on file   Housing Stability: Not on file       Family History   Problem Relation Age of Onset    Parkinsonism Mother     Heart disease Father     Cancer Father         leukemia    Heart disease Sister     Diabetes Sister     Diabetes Brother     Diabetes Sister     Melanoma Sister     Melanoma Sister     Diabetes Maternal Aunt     No Known Problems Maternal Aunt     No Known Problems Paternal Aunt        Allergies   Allergen Reactions    Other GI Intolerance     Allergic to darvocet  Allergic to adhesive tape,moderate rash    Percocet [Oxycodone-Acetaminophen] GI Intolerance    Vicodin [Hydrocodone-Acetaminophen] GI Intolerance         Current Outpatient Medications:     aspirin 81 mg chewable tablet, Chew 81 mg daily, Disp: , Rfl:     atorvastatin (LIPITOR) 40 mg tablet, Take 1 tablet (40 mg total) by mouth daily with dinner (Patient taking differently: Take 40 mg by mouth Every two days ), Disp: 14 tablet, Rfl: 0    hydrochlorothiazide (HYDRODIURIL) 25 mg tablet, Take 25 mg by mouth every morning, Disp: , Rfl:     metoprolol succinate (TOPROL-XL) 50 mg 24 hr tablet, Take 25 mg by mouth every morning, Disp: , Rfl:       /82 (BP Location: Left arm, Patient Position: Sitting, Cuff Size: Adult)   Pulse 66   Temp 97 9 °F (36 6 °C)   Resp 17   Ht 5' 2" (1 575 m)   Wt 85 3 kg (188 lb)   SpO2 99%   BMI 34 39 kg/m²       General Appearance:    Alert, oriented        Eyes:    PERRL   Ears:    Normal external ear canals, both ears   Nose:   Nares normal, septum midline   Throat:   Mucosa moist  Pharynx without injection      Neck:   Supple       Lungs:     Clear to auscultation bilaterally   Chest Wall:    No tenderness or deformity    Heart: Regular rate and rhythm       Abdomen:     Soft, non-tender, bowel sounds +, no organomegaly           Extremities:   Extremities no cyanosis or edema       Skin:   no rash or icterus  Lymph nodes:   Cervical, supraclavicular, and axillary nodes normal   Neurologic:   CNII-XII intact, normal strength, sensation and reflexes     Throughout               No results found for this or any previous visit (from the past 48 hour(s))  No results found    ECOG :      Assessment and plan:

## 2022-03-01 ENCOUNTER — PATIENT OUTREACH (OUTPATIENT)
Dept: CASE MANAGEMENT | Facility: HOSPITAL | Age: 82
End: 2022-03-01

## 2022-03-01 NOTE — PROGRESS NOTES
LSW received DT and problem list via referral process  Pt self scored 6/10 but did not note any concerns or stressors  LSW contacted pt via telephone to review DT and offer support  Pt stated she did not have time to speak and requested a call back  LSW will call pt back at a later date

## 2022-03-07 ENCOUNTER — PATIENT OUTREACH (OUTPATIENT)
Dept: CASE MANAGEMENT | Facility: HOSPITAL | Age: 82
End: 2022-03-07

## 2022-03-07 NOTE — PROGRESS NOTES
LSW attempted to contact pt via telephone, no answer       LSW will attempt 3rd outreach prior to closing referral

## 2022-03-11 ENCOUNTER — PATIENT OUTREACH (OUTPATIENT)
Dept: CASE MANAGEMENT | Facility: HOSPITAL | Age: 82
End: 2022-03-11

## 2022-03-11 NOTE — PROGRESS NOTES
LSW attempted 3rd outreach, no answer  LSW is closing referral and will remain available for new referrals as needed

## 2022-03-14 ENCOUNTER — LAB (OUTPATIENT)
Dept: LAB | Facility: HOSPITAL | Age: 82
End: 2022-03-14
Attending: UROLOGY
Payer: COMMERCIAL

## 2022-03-14 ENCOUNTER — APPOINTMENT (OUTPATIENT)
Dept: LAB | Facility: HOSPITAL | Age: 82
End: 2022-03-14
Attending: UROLOGY
Payer: COMMERCIAL

## 2022-03-14 DIAGNOSIS — C67.8 MALIGNANT NEOPLASM OF OVERLAPPING SITES OF BLADDER (HCC): ICD-10-CM

## 2022-03-14 LAB
ANION GAP SERPL CALCULATED.3IONS-SCNC: 8 MMOL/L (ref 4–13)
APTT PPP: 23 SECONDS (ref 23–37)
BASOPHILS # BLD AUTO: 0.08 THOUSANDS/ΜL (ref 0–0.1)
BASOPHILS NFR BLD AUTO: 1 % (ref 0–1)
BUN SERPL-MCNC: 34 MG/DL (ref 5–25)
CALCIUM SERPL-MCNC: 9.5 MG/DL (ref 8.3–10.1)
CHLORIDE SERPL-SCNC: 104 MMOL/L (ref 100–108)
CO2 SERPL-SCNC: 27 MMOL/L (ref 21–32)
CREAT SERPL-MCNC: 1.23 MG/DL (ref 0.6–1.3)
EOSINOPHIL # BLD AUTO: 0.49 THOUSAND/ΜL (ref 0–0.61)
EOSINOPHIL NFR BLD AUTO: 6 % (ref 0–6)
ERYTHROCYTE [DISTWIDTH] IN BLOOD BY AUTOMATED COUNT: 12.3 % (ref 11.6–15.1)
GFR SERPL CREATININE-BSD FRML MDRD: 40 ML/MIN/1.73SQ M
GLUCOSE P FAST SERPL-MCNC: 103 MG/DL (ref 65–99)
HCT VFR BLD AUTO: 43.3 % (ref 34.8–46.1)
HGB BLD-MCNC: 13.8 G/DL (ref 11.5–15.4)
IMM GRANULOCYTES # BLD AUTO: 0.02 THOUSAND/UL (ref 0–0.2)
IMM GRANULOCYTES NFR BLD AUTO: 0 % (ref 0–2)
INR PPP: 0.89 (ref 0.84–1.19)
LYMPHOCYTES # BLD AUTO: 1.43 THOUSANDS/ΜL (ref 0.6–4.47)
LYMPHOCYTES NFR BLD AUTO: 19 % (ref 14–44)
MCH RBC QN AUTO: 28.3 PG (ref 26.8–34.3)
MCHC RBC AUTO-ENTMCNC: 31.9 G/DL (ref 31.4–37.4)
MCV RBC AUTO: 89 FL (ref 82–98)
MONOCYTES # BLD AUTO: 0.64 THOUSAND/ΜL (ref 0.17–1.22)
MONOCYTES NFR BLD AUTO: 8 % (ref 4–12)
NEUTROPHILS # BLD AUTO: 4.95 THOUSANDS/ΜL (ref 1.85–7.62)
NEUTS SEG NFR BLD AUTO: 66 % (ref 43–75)
NRBC BLD AUTO-RTO: 0 /100 WBCS
PLATELET # BLD AUTO: 282 THOUSANDS/UL (ref 149–390)
PMV BLD AUTO: 11.7 FL (ref 8.9–12.7)
POTASSIUM SERPL-SCNC: 3.9 MMOL/L (ref 3.5–5.3)
PROTHROMBIN TIME: 11.9 SECONDS (ref 11.6–14.5)
RBC # BLD AUTO: 4.87 MILLION/UL (ref 3.81–5.12)
SODIUM SERPL-SCNC: 139 MMOL/L (ref 136–145)
WBC # BLD AUTO: 7.61 THOUSAND/UL (ref 4.31–10.16)

## 2022-03-14 PROCEDURE — 86901 BLOOD TYPING SEROLOGIC RH(D): CPT

## 2022-03-14 PROCEDURE — 86850 RBC ANTIBODY SCREEN: CPT

## 2022-03-14 PROCEDURE — 93005 ELECTROCARDIOGRAM TRACING: CPT

## 2022-03-14 PROCEDURE — 85610 PROTHROMBIN TIME: CPT

## 2022-03-14 PROCEDURE — 36415 COLL VENOUS BLD VENIPUNCTURE: CPT

## 2022-03-14 PROCEDURE — 85025 COMPLETE CBC W/AUTO DIFF WBC: CPT

## 2022-03-14 PROCEDURE — 87086 URINE CULTURE/COLONY COUNT: CPT

## 2022-03-14 PROCEDURE — 86900 BLOOD TYPING SEROLOGIC ABO: CPT

## 2022-03-14 PROCEDURE — 80048 BASIC METABOLIC PNL TOTAL CA: CPT

## 2022-03-14 PROCEDURE — 85730 THROMBOPLASTIN TIME PARTIAL: CPT

## 2022-03-15 LAB
ATRIAL RATE: 60 BPM
P AXIS: -25 DEGREES
PR INTERVAL: 182 MS
QRS AXIS: -56 DEGREES
QRSD INTERVAL: 132 MS
QT INTERVAL: 448 MS
QTC INTERVAL: 448 MS
T WAVE AXIS: -34 DEGREES
VENTRICULAR RATE: 60 BPM

## 2022-03-15 PROCEDURE — 93010 ELECTROCARDIOGRAM REPORT: CPT | Performed by: INTERNAL MEDICINE

## 2022-03-16 ENCOUNTER — TELEPHONE (OUTPATIENT)
Dept: UROLOGY | Facility: CLINIC | Age: 82
End: 2022-03-16

## 2022-03-16 LAB — BACTERIA UR CULT: NORMAL

## 2022-03-16 NOTE — TELEPHONE ENCOUNTER
Please arrange for the patient have a nursing visit for a catheterized urine specimen for culture as soon as possible given upcoming surgery and mixed contaminants noted on voided urine specimen, thank you

## 2022-03-16 NOTE — TELEPHONE ENCOUNTER
Scheduled patient for 3/17 nurse visit for straight cath sample  Please call and confirm  Thank you!

## 2022-03-17 ENCOUNTER — PROCEDURE VISIT (OUTPATIENT)
Dept: UROLOGY | Facility: CLINIC | Age: 82
End: 2022-03-17
Payer: COMMERCIAL

## 2022-03-17 VITALS
HEART RATE: 64 BPM | WEIGHT: 185 LBS | HEIGHT: 62 IN | SYSTOLIC BLOOD PRESSURE: 140 MMHG | BODY MASS INDEX: 34.04 KG/M2 | DIASTOLIC BLOOD PRESSURE: 80 MMHG

## 2022-03-17 DIAGNOSIS — C67.8 MALIGNANT NEOPLASM OF OVERLAPPING SITES OF BLADDER (HCC): Primary | ICD-10-CM

## 2022-03-17 LAB
ABO GROUP BLD: NORMAL
BLD GP AB SCN SERPL QL: NEGATIVE
RH BLD: POSITIVE
SPECIMEN EXPIRATION DATE: NORMAL

## 2022-03-17 PROCEDURE — 87086 URINE CULTURE/COLONY COUNT: CPT | Performed by: UROLOGY

## 2022-03-17 PROCEDURE — 51701 INSERT BLADDER CATHETER: CPT

## 2022-03-17 PROCEDURE — 87077 CULTURE AEROBIC IDENTIFY: CPT | Performed by: UROLOGY

## 2022-03-17 NOTE — PROGRESS NOTES
[birth year]  Ted Roca is a 79 y o  female  [id number]    Diagnosis:  Chief Complaint     Bladder Cancer          Patient presents for straight cath urine specimen managed by Dr Arnol Morales  Patient was scheduled in [facility] office today, but she showed up at the [facility] office  RN agreed to perform walk in RN visit  Plan:  Office to monitor for results and will call patient  Patient instructed to call with any questions or concerns in the meantime  Vitals:    03/17/22 1110   BP: 140/80   BP Location: Left arm   Pulse: 64   Weight: 83 9 kg (185 lb)   Height: 5' 2" (1 575 m)           Procedure:    Universal Protocol:  Procedure performed by:  Consent: Verbal consent obtained  Risks and benefits: risks, benefits and alternatives were discussed  Consent given by: patient  Patient understanding: patient states understanding of the procedure being performed  Patient identity confirmed: verbally with patient      Bladder catheterization    Date/Time: 3/17/2022 11:41 AM  Performed by: Pablo Ponce RN  Authorized by: Yannick Ward MD     Patient location:  Bedside  Consent:     Consent given by:  Patient  Universal protocol:     Procedure explained and questions answered to patient or proxy's satisfaction: yes      Patient identity confirmed:  Verbally with patient  Pre-procedure details:     Procedure purpose:  Therapeutic  Anesthesia (see MAR for exact dosages): Anesthesia method:  None  Procedure details:     Bladder irrigation: no      Catheter insertion:  Non-indwelling    Approach: natural orifice      Catheter type:  Latex    Catheter size:  14 Fr    Number of attempts:  2    Successful placement: yes      Urine characteristics:  Yellow    Provider performed due to:  Complicated insertion  Post-procedure details:     Patient tolerance of procedure:   Tolerated well, no immediate complications  Comments:      Due to RN unable to find urethra, SABAS Kwok came into exam room and was able to obtain straight cath urine specimen which was then sent out for urine culture  Patient is aware office will call her if any antibiotics are warranted  All questions answered              Jeana Esteves, RN,BSN

## 2022-03-21 LAB — BACTERIA UR CULT: ABNORMAL

## 2022-03-22 ENCOUNTER — HOSPITAL ENCOUNTER (OUTPATIENT)
Dept: RADIOLOGY | Facility: HOSPITAL | Age: 82
Discharge: HOME | End: 2022-03-22
Attending: UROLOGY
Payer: COMMERCIAL

## 2022-03-22 ENCOUNTER — TELEPHONE (OUTPATIENT)
Dept: UROLOGY | Facility: CLINIC | Age: 82
End: 2022-03-22

## 2022-03-22 DIAGNOSIS — C67.8 MALIGNANT NEOPLASM OF OVERLAPPING SITES OF BLADDER (HCC): ICD-10-CM

## 2022-03-22 DIAGNOSIS — R82.71 BACTERIURIA: Primary | ICD-10-CM

## 2022-03-22 LAB
ATRIAL RATE: 66 BPM
QRS AXIS: -57 DEGREES
QRSD INTERVAL: 136 MS
QT INTERVAL: 438 MS
QTC INTERVAL: 433 MS
T WAVE AXIS: -35 DEGREES
VENTRICULAR RATE: 59 BPM

## 2022-03-22 PROCEDURE — A9585 GADOBUTROL INJECTION: HCPCS | Performed by: UROLOGY

## 2022-03-22 PROCEDURE — G1004 CDSM NDSC: HCPCS

## 2022-03-22 PROCEDURE — 72197 MRI PELVIS W/O & W/DYE: CPT

## 2022-03-22 PROCEDURE — 93010 ELECTROCARDIOGRAM REPORT: CPT | Performed by: INTERNAL MEDICINE

## 2022-03-22 PROCEDURE — 74183 MRI ABD W/O CNTR FLWD CNTR: CPT

## 2022-03-22 RX ORDER — CEFUROXIME AXETIL 500 MG/1
500 TABLET ORAL EVERY 12 HOURS SCHEDULED
Qty: 6 TABLET | Refills: 0 | Status: SHIPPED | OUTPATIENT
Start: 2022-03-22 | End: 2022-03-25

## 2022-03-22 RX ADMIN — GADOBUTROL 9 ML: 604.72 INJECTION INTRAVENOUS at 12:01

## 2022-03-22 NOTE — TELEPHONE ENCOUNTER
Called and made patient aware of lab work and antibiotics sent  Verified pharmacy and instructions on when to start taking it  No further questions

## 2022-03-22 NOTE — RESULT ENCOUNTER NOTE
Called micro lab and they said they don't preform sensitivities on gram positive rods  Person I spoke with directed to ID pharmacists who are able to make suggestions according to lab    Names are [facility] and Lamin Ríos [phone]    Spoke with [facility] (ID pharmacist) per the most recent studies he recommends the following:  - Cefuroxime  MG BID 3 days prior to surgery  -Ancef pre procedure

## 2022-03-22 NOTE — TELEPHONE ENCOUNTER
----- Message from Bar Lin, RN sent at 3/22/2022  9:04 AM EDT -----  Called micro lab and they said they don't preform sensitivities on gram positive rods  Person I spoke with directed to ID pharmacists who are able to make suggestions according to lab    Names are Jory Robb and Willian Marcelo [phone]    Spoke with Jory Robb (ID pharmacist) per the most recent studies he recommends the following:  - Cefuroxime  MG BID 3 days prior to surgery  -Ancef pre procedure

## 2022-03-22 NOTE — TELEPHONE ENCOUNTER
Attempted to call patient annd phone was picked up then hung up   Will try to advise patient of medication again later

## 2022-03-25 ENCOUNTER — TELEPHONE (OUTPATIENT)
Dept: HEMATOLOGY ONCOLOGY | Facility: CLINIC | Age: 82
End: 2022-03-25

## 2022-03-28 ENCOUNTER — RADIATION ONCOLOGY CONSULT (OUTPATIENT)
Dept: RADIATION ONCOLOGY | Facility: HOSPITAL | Age: 82
End: 2022-03-28
Attending: RADIOLOGY
Payer: COMMERCIAL

## 2022-03-28 VITALS
HEART RATE: 58 BPM | BODY MASS INDEX: 34.57 KG/M2 | WEIGHT: 189 LBS | RESPIRATION RATE: 18 BRPM | TEMPERATURE: 97.2 F | OXYGEN SATURATION: 96 %

## 2022-03-28 DIAGNOSIS — C67.8 MALIGNANT NEOPLASM OF OVERLAPPING SITES OF BLADDER (HCC): ICD-10-CM

## 2022-03-28 DIAGNOSIS — C67.8 MALIGNANT NEOPLASM OF OVERLAPPING SITES OF BLADDER (HCC): Primary | ICD-10-CM

## 2022-03-28 PROCEDURE — 99204 OFFICE O/P NEW MOD 45 MIN: CPT | Performed by: RADIOLOGY

## 2022-03-28 PROCEDURE — 99211 OFF/OP EST MAY X REQ PHY/QHP: CPT | Performed by: RADIOLOGY

## 2022-03-28 RX ORDER — MELATONIN
1000 DAILY
COMMUNITY

## 2022-03-28 NOTE — PROGRESS NOTES
[facility] [place][birth year][place] a 79 y o  female diagnosed with muscle invasive high-grade urothelial carcinoma  during hospitalization for hip pain she was found to have a  bladder mass and bilateral hydronephrosis  She had TURBT with Dr Liberty Moraes on 11/11/21 and was referred to Medical Oncology  She had consult with Dr Lisette Pineda on 12/16/21 and cancelled follow up with him because she was going out of town  She then had consults with Dr Charissa De La Rosa and Dr Jose Maria Gongora  She is being referred by Dr Jose Maria Gongora  10/28/21 CT renal stone study  Approximate 5 cm right posterolateral bladder lesion suspicious for bladder malignancy  RIGHT KIDNEY AND URETER:  Moderate right hydroureteronephrosis without a ureteral calculus secondary to a right bladder lesion described below  LEFT KIDNEY AND URETER:  Mild left hydroureteronephrosis without a left ureteral calculus  11/11/21 TURBT with Dr Liberty Moraes      12/6/21 Bone Scan  1  No definite scintigraphic evidence of osseous metastasis  Likely degenerative changes at the lumbosacral junction, see discussion above  2   Asymmetric right-sided hydroureteronephrosis in this patient with right bladder mass which was seen on prior CT dated 10/28/2021   3   Degenerative changes as above  12/16/21 Dr Lisette Pineda, Medical Oncology  Mrs Elisa Cruz states that she would not undergo a cystectomy, possibly would allow a right flank nephrostomy to if a stent cannot be placed  Patient also does not believe that she will accept chemotherapy - concerned about the potential side effects and toxicities  She stated that she would be willing to undergo radiation  return in 3 weeks but this will likely change when decisions have been made      1/5/22 Telephone call to Medical Oncology  Patient cancelled f/u appt with dr Maxime Tavarez for 1/6/2022 because she is going out of town for a few weeks    2/14/22 Consult with Dr Jose Maria Gongora  Cystoscopy done  Findings:  Urethra:                       Normal  Bladder: Abnormal:  Areas of recurrence over the right lateral and right posterior bladder wall, a mixture of small papillary and plateau like tumor burden is noted  Ureteral orifices:          Abnormal, due to tumor burden  Other findings:            None     plan for a transurethral section of bladder tumor and bilateral retrograde pyelography and all indicated procedures  22 Consult with Dr Shree Bansal  usually the treatment is neoadjuvant chemotherapy followed by surgical resection however given her advanced age and comorbidities, might not be able to have surgical resection  wait for imaging studies to determine how to proceed       3/22/22 MRI pelvis w wo contrast  1  Areas of focal nodular mucosal thickening along the posterior bladder wall, with a more nodular component just to the left of the midline which shows restricted diffusion  Smaller nodular areas of mucosal thickening along the right lateral and posterior bladder wall also show restricted diffusion compatible with tumor  2   No evidence of invasion of adjacent structures or lymphadenopathy  3/22/22 MRI abdomen w wo contrast  1  No metastatic disease identified in the abdomen  2   Known ventral hernias, better shown on prior CT scan  No evidence of bowel obstruction  3   Cholelithiasis  Upcomin22 TURBT with Dr Keesha Hansen  22 Dr Keesha Hansen  6/10/22 Medical Oncology               Oncology History   Malignant neoplasm of overlapping sites of bladder [facility])    Initial Diagnosis    Malignant neoplasm of overlapping sites of bladder ([facility] [address] )     2021 Biopsy    TURBT by Dr Popeye Nogueira  Bladder tumor (TURB):  - Invasive high grade papillary urothelial carcinoma  - Carcinoma invades muscularis propria (detrusor muscle)     Comment:  - Focal in situ urothelial carcinoma with micropapillary features is noted     - CK AE1/3, CK7, CK20, Uroplakin, and GATA3 highlight tumor while desmin highlights muscularis propria involvement by tumor    - Intradepartmental consultation concurs with the diagnosis of carcinoma invading muscularis propria    - Dr Claudell Ruths was notified of the diagnosis on 11/23/21 at 9:00 am     B  Bladder, right posterior wall (biopsy):  - Invasive high grade urothelial carcinoma  - Muscularis propria (detrusor muscle) negative for carcinoma          Review of Systems:  Review of Systems   Constitutional: Negative  HENT: Negative  Eyes: Negative  Respiratory: Negative  Cardiovascular: Negative  Gastrointestinal: Negative  Endocrine: Negative  Genitourinary: Negative  Musculoskeletal: Negative  Skin: Negative  Allergic/Immunologic: Negative  Neurological: Negative  Hematological: Negative  Psychiatric/Behavioral: Negative          Clinical Trial: no      Pregnancy test needed:  no    ONCOTYPE/MAMMOPRINT results: n/a    PFT: n/a    Prior Radiation: no    Teaching: NCI radiation packet     MST completed    Implantable Devices (Port, Pacemaker, pain stimulator): no    Hip Replacement: no    Covid Vaccine Status: vaccinated        Health Maintenance   Topic Date Due    Medicare Annual Wellness Visit (AWV)  Never done    Depression Screening  Never done    BMI: Followup Plan  Never done    DTaP,Tdap,and Td Vaccines (1 - Tdap) Never done    Fall Risk  Never done    Pneumococcal Vaccine: 65+ Years (2 of 4 - PPSV23) 05/18/2016    COVID-19 Vaccine (3 - Moderna risk 4-dose series) 04/15/2021    BMI: Adult  03/17/2023    Osteoporosis Screening  Completed    Influenza Vaccine  Completed    HIB Vaccine  Aged Out    Hepatitis B Vaccine  Aged Out    IPV Vaccine  Aged Out    Hepatitis A Vaccine  Aged Out    Meningococcal ACWY Vaccine  Aged Out    HPV Vaccine  Aged Out     Past Medical History:   Diagnosis Date    Arthritis     right knee    Bladder cancer ([facility] [address] )     Cancer ([facility] [address] ) 2003    ovarian    Hay fever     Hyperlipidemia     Hypertension     Myocardial infarction [facility]     questionable MI during exploratory surgery    Ovarian cancer [facility])     surgery (did not have chemo or radiation)    Stroke [facility]     " mini stroke " no deficits ; another poss stroke with  left hand weakness    Wears glasses     Wears partial dentures     upper & lower     Past Surgical History:   Procedure Laterality Date    BREAST SURGERY Right     exc  bx  lump (R) breast    DILATION AND CURETTAGE OF UTERUS      HYSTERECTOMY  2003    TAHBSO    JOINT REPLACEMENT Right     OOPHORECTOMY Bilateral     cancer    ID CYSTOURETHROSCOPY,FULGUR >5 CM LESN Bilateral 2021    Procedure: CYSTOSCOPY, TRANSURETHRAL RESECTION OF BLADDER TUMOR (TURBT), EVACUATION OF CLOTS, LARGE TUMOR 7-8CM; Surgeon: Demarco Bergman MD;  Location: [address] [place] [address];  Service: Urology    ID XCAPSL CTRC RMVL INSJ IO LENS PROSTH W/O ECP Left 2017    Procedure: EXTRACTION EXTRACAPSULAR CATARACT PHACO INTRAOCULAR LENS (IOL);   Surgeon: Mariaa Tony MD;  [facility] MAIN OR;  Service: Ophthalmology    TONSILLECTOMY       Family History   Problem Relation Age of Onset   Ardeth Needs Parkinsonism Mother     Heart disease Father    Ardeth Needs Cancer Father         leukemia    Heart disease Sister     Diabetes Sister     Diabetes Brother     Diabetes Sister     Melanoma Sister     Melanoma Sister     Diabetes Maternal Aunt     No Known Problems Maternal Aunt     No Known Problems Paternal Aunt      Social History     Tobacco Use    Smoking status: Former Smoker     Packs/day: 1 50     Years: 40 00     Pack years: 60 00     Types: Cigarettes     Quit date:      Years since quittin 2    Smokeless tobacco: Never Used   Vaping Use    Vaping Use: Never used   Substance Use Topics    Alcohol use: Yes     Comment: occassional, socially    Drug use: No        Current Outpatient Medications:     aspirin 81 mg chewable tablet, Chew 81 mg daily, Disp: , Rfl:     atorvastatin (LIPITOR) 40 mg tablet, Take 1 tablet (40 mg total) by mouth daily with dinner (Patient taking differently: Take 40 mg by mouth Every two days ), Disp: 14 tablet, Rfl: 0    cholecalciferol (VITAMIN D3) 1,000 units tablet, Take 1,000 Units by mouth daily, Disp: , Rfl:     hydrochlorothiazide (HYDRODIURIL) 25 mg tablet, Take 25 mg by mouth every morning, Disp: , Rfl:     metoprolol succinate (TOPROL-XL) 50 mg 24 hr tablet, Take 25 mg by mouth every morning, Disp: , Rfl:   Allergies   Allergen Reactions    Other GI Intolerance     Allergic to darvocet  Allergic to adhesive tape,moderate rash    Percocet [Oxycodone-Acetaminophen] GI Intolerance    Vicodin [Hydrocodone-Acetaminophen] GI Intolerance      Vitals:    03/28/22 1429   Pulse: 58   Resp: 18   Temp: (!) 97 2 °F (36 2 °C)   TempSrc: Temporal   SpO2: 96%   Weight: 85 7 kg (189 lb)

## 2022-03-28 NOTE — PROGRESS NOTES
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         History of Present Illness   Kirstie Nunez is a 79 y o  female who initially presented for evaluation of left-sided hip pain on 10/28/21  She also reported gross hematuria  CT renal stone study demonstrated a 4 7 x 3 5 x 4 1 cm right posterolateral bladder lesion  CT of the left lower extremity on 10/28/21 demonstrated scattered dense subcentimeter left iliac lesions, likely bone islands  She also reported wrist drop and neuroimaging was performed  MRI brain on 10/29/21 demonstrated an acute to subacute infarct involving the posterior right frontal lobe without edema or mass effect  On 2021 she underwent cystoscopy with TURBT  Per the note, there was a 6 x 7 cm large polypoid bladder tumor extending from the right lateral wall over the right trigone to the left bladder  Resection was performed with extensive fulguration and deep muscle biopsy was taken separately  No remaining tumor was noted with muscle fibers seen throughout the entire resection  Pathology confirmed an invasive high grade papillary urothelial carcinoma invading the muscularis propria (detrusor muscle)  There was focal in situ urothelial carcinoma with micropapillary features  The right posterior wall biopsy was also positive for invasive high grade urothelial carcinoma  Muscularis propria was negative for carcinoma  Bone scan on 21 demonstrated no definite scintigraphic evidence of osseous metastasis  There was asymmetric right sided hydronephrosis  She was evaluated by medical oncology and declined additional surgery or chemotherapy      She was re-evaluated by urology in 2/2022  Cystoscopy on 2/14/2022 demonstrated areas of recurrence over the right lateral and right posterior bladder wall, a mixture of small papillary and plateau like tumor burden  MRI pelvis on 3/22/2022 demonstrated focal nodular bladder wall thickening along the posterior bladder wall in the midline and slightly to the left of midline with corresponding restricted diffusion likely representing tumor, measuring approximately 1 3 x 0 8 cm  There was mild nodular mucosal thickening along the right posterior and lateral bladder wall with some restricted diffusion suspicious for tumor  There was no evidence of invasion of adjacent structures or adenopathy  MRI abdomen demonstrated no evidence of metastatic disease  She is now scheduled for repeat cystoscopy and TURBT on 4/4/2022  Upon interview, she endorses the above history  She denies gross hematuria, urinary urgency, urinary frequency, urinary hesitancy or dysuria  She denies flank pain  She has regular bowel movements and denies hematochezia  She has no weight loss or bone pain  She is a former smoker  She is without additional acute concerns  Historical Information   Oncology History   Malignant neoplasm of overlapping sites of bladder [facility])   2021 Initial Diagnosis    Malignant neoplasm of overlapping sites of bladder ([facility] [address] )     11/11/2021 Biopsy    TURBT by Dr Jm Bland  Bladder tumor (TURB):  - Invasive high grade papillary urothelial carcinoma  - Carcinoma invades muscularis propria (detrusor muscle)     Comment:  - Focal in situ urothelial carcinoma with micropapillary features is noted     - CK AE1/3, CK7, CK20, Uroplakin, and GATA3 highlight tumor while desmin highlights muscularis propria involvement by tumor    - Intradepartmental consultation concurs with the diagnosis of carcinoma invading muscularis propria    - Dr Roland Carolina was notified of the diagnosis on 11/23/21 at 9:00 am     B  Bladder, right posterior wall (biopsy):  - Invasive high grade urothelial carcinoma  - Muscularis propria (detrusor muscle) negative for carcinoma            Past Medical History:   Diagnosis Date    Arthritis     right knee    Bladder cancer ([facility] [address] )     Cancer ([facility] ) 2003    ovarian    Hay fever     Hyperlipidemia     Hypertension     Myocardial infarction [facility]) 2003    questionable MI during exploratory surgery    Ovarian cancer ([facility] ) 2003    surgery (did not have chemo or radiation)    Stroke ([facility] ) 2003    " mini stroke " no deficits ; another poss stroke with  left hand weakness    Wears glasses     Wears partial dentures     upper & lower     Past Surgical History:   Procedure Laterality Date    BREAST SURGERY Right     exc  bx  lump (R) breast    DILATION AND CURETTAGE OF UTERUS      HYSTERECTOMY  2003    TAHBSO    JOINT REPLACEMENT Right     OOPHORECTOMY Bilateral     cancer    WA CYSTOURETHROSCOPY,FULGUR >5 CM LESN Bilateral 11/11/2021    Procedure: CYSTOSCOPY, TRANSURETHRAL RESECTION OF BLADDER TUMOR (TURBT), EVACUATION OF CLOTS, LARGE TUMOR 7-8CM; Surgeon: Melissa Navas MD;  Location: [address] [place] [address];  Service: Urology    WA XCAPSL CTRC RMVL INSJ IO LENS PROSTH W/O ECP Left 7/24/2017    Procedure: EXTRACTION EXTRACAPSULAR CATARACT PHACO INTRAOCULAR LENS (IOL);   Surgeon: Rosemarie Yap MD;  [facility] MAIN OR;  Service: Ophthalmology    TONSILLECTOMY         Family History   Problem Relation Age of Onset   [facility] Parkinsonism Mother     Heart disease Father    [facility] Cancer Father         leukemia    Heart disease Sister     Diabetes Sister     Diabetes Brother     Diabetes Sister     Melanoma Sister     Melanoma Sister     Diabetes Maternal Aunt     No Known Problems Maternal Aunt     No Known Problems Paternal Aunt        Social History   Social History     Substance and Sexual Activity   Alcohol Use Yes    Comment: occassional, socially     Social History     Substance and Sexual Activity   Drug Use No     Social History     Tobacco Use   Smoking Status Former Smoker    Packs/day: 1 50    Years: 40 00    Pack years: 60 00    Types: Cigarettes    Quit date:     Years since quittin 2   Smokeless Tobacco Never Used         Meds/Allergies     Current Outpatient Medications:     aspirin 81 mg chewable tablet, Chew 81 mg daily, Disp: , Rfl:     atorvastatin (LIPITOR) 40 mg tablet, Take 1 tablet (40 mg total) by mouth daily with dinner (Patient taking differently: Take 40 mg by mouth Every two days ), Disp: 14 tablet, Rfl: 0    cholecalciferol (VITAMIN D3) 1,000 units tablet, Take 1,000 Units by mouth daily, Disp: , Rfl:     hydrochlorothiazide (HYDRODIURIL) 25 mg tablet, Take 25 mg by mouth every morning, Disp: , Rfl:     metoprolol succinate (TOPROL-XL) 50 mg 24 hr tablet, Take 25 mg by mouth every morning, Disp: , Rfl:   Allergies   Allergen Reactions    Other GI Intolerance     Allergic to darvocet  Allergic to adhesive tape,moderate rash    Percocet [Oxycodone-Acetaminophen] GI Intolerance    Vicodin [Hydrocodone-Acetaminophen] GI Intolerance         Review of Systems   Constitutional: Negative  HENT: Negative  Eyes: Negative  Respiratory: Negative  Cardiovascular: Negative  Gastrointestinal: Negative  Endocrine: Negative  Genitourinary: Negative  Musculoskeletal: Negative  Skin: Negative  Allergic/Immunologic: Negative  Neurological: Negative  Hematological: Negative  Psychiatric/Behavioral: Negative  OBJECTIVE:   Pulse 58   Temp (!) 97 2 °F (36 2 °C) (Temporal)   Resp 18   Wt 85 7 kg (189 lb)   SpO2 96%   BMI 34 57 kg/m²   Pain Assessment:  0  Performance Status: Karnofsky: 90 - Able to carry on normal activity; minor signs or symptoms of disease     Physical Exam  HENT:      Head: Normocephalic and atraumatic  Eyes:      General: No scleral icterus  Pupils: Pupils are equal, round, and reactive to light     Cardiovascular:      Pulses: Normal pulses  Pulmonary:      Effort: Pulmonary effort is normal    Abdominal:      General: Abdomen is flat  There is no distension  Musculoskeletal:      Cervical back: Normal range of motion  Skin:     General: Skin is warm and dry  Neurological:      General: No focal deficit present  Mental Status: She is alert  Psychiatric:         Mood and Affect: Mood normal               RESULTS  Lab Results    Chemistry        Component Value Date/Time    K 3 9 03/14/2022 0956     03/14/2022 0956    CO2 27 03/14/2022 0956    BUN 34 (H) 03/14/2022 0956    CREATININE 1 23 03/14/2022 0956        Component Value Date/Time    CALCIUM 9 5 03/14/2022 0956    ALKPHOS 69 10/29/2021 0606    AST 15 10/29/2021 0606    ALT 25 10/29/2021 0606            Lab Results   Component Value Date    WBC 7 61 03/14/2022    HGB 13 8 03/14/2022    HCT 43 3 03/14/2022    MCV 89 03/14/2022     03/14/2022         Imaging Studies  MRI pelvis w wo contrast    Result Date: 3/25/2022  Narrative: Clinical history: Recent diagnosis of recurrent muscle invasive bladder cancer  TECHNIQUE: Multi planar imaging of the pelvis was obtained both before and after the administration of 9 mL of intravenous contrast  COMPARISON: No prior MRI exams of the pelvis  Most recent prior imaging of the pelvis is a CT scan dated October 28, 2021  FINDINGS: Bladder: Focal nodular bladder wall thickening along the posterior bladder wall in the midline and slightly to the left of midline with corresponding restricted diffusion likely represents tumor and measures approximately 1 3 x 0 8 cm (8, 84)  Mild nodular mucosal thickening along the right posterior and lateral bladder wall shows some restricted diffusion suspicious for tumor  No evidence of invasion of the vagina or pelvic side walls  Bowel: Multiple colonic diverticula identified  Small bowel appears normal  Reproductive: Status post hysterectomy  Lymph nodes: No lymphadenopathy   Osseous structures no abnormal bone marrow signal      Impression: 1  Areas of focal nodular mucosal thickening along the posterior bladder wall, with a more nodular component just to the left of the midline which shows restricted diffusion  Smaller nodular areas of mucosal thickening along the right lateral and posterior bladder wall also show restricted diffusion compatible with tumor  2   No evidence of invasion of adjacent structures or lymphadenopathy  Workstation performed: [id number]     MRI abdomen w wo contrast    Result Date: 3/25/2022  Narrative: MRI - ABDOMEN - WITH AND WITHOUT CONTRAST INDICATION: 79 years / Female  C67 8: Malignant neoplasm of overlapping sites of bladder  Bladder cancer follow-up  COMPARISON: No prior MRI studies of the abdomen  Most recent CT scan is dated October 28, 2021  TECHNIQUE:  The following pulse sequences were obtained prior to and following the administration of intravenous contrast:     Coronal and axial T2 with TE of 90 and 180 respectively, axial T2 with fat saturation, axial FIESTA fat-sat, axial T1-weighted in-and-out-of phase, axial DWI/ADC, precontrast axial T1 with fat saturation, post-contrast dynamic axial T1 with fat saturation at 20, 70, and 180 seconds, coronal T1  with fat saturation and 7 minute delayed axial T1 with fat saturation  IV Contrast:  9 mL of Gadobutrol injection (SINGLE-DOSE) FINDINGS: LOWER CHEST:   Unremarkable  LIVER: Normal in size and configuration  No suspicious mass  The hepatic veins and portal veins are patent  BILE DUCTS: No intrahepatic or extrahepatic bile duct dilation  GALLBLADDER:  Cholelithiasis  Focal fundal adenomyomatosis  PANCREAS:  Unremarkable  ADRENAL GLANDS:  Normal  SPLEEN:  Normal  KIDNEYS/PROXIMAL URETERS: Simple cyst posterior upper pole right kidney measure up to 0 9 cm    No suspicious renal mass  BOWEL:   No dilated loops of bowel  PERITONEUM/RETROPERITONEUM: No mass  No ascites   LYMPH NODES: No abdominal lymphadenopathy  VASCULAR STRUCTURES:  No aneurysm  ABDOMINAL WALL: Known ventral hernia is better shown on CT scan from 2021  No evidence of bowel obstruction  OSSEOUS STRUCTURES:  No abnormal bone marrow signal      Impression: 1  No metastatic disease identified in the abdomen  2   Known ventral hernias, better shown on prior CT scan  No evidence of bowel obstruction  3   Cholelithiasis  Workstation performed: [id number]         Pathology: See above  ASSESSMENT  1  Malignant neoplasm of overlapping sites of bladder [facility])  Ambulatory Referral to Radiation Oncology     Cancer Staging  At least stage II [id number] 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    PLAN/DISCUSSION     Alfonso Solis is a 79 y o  female with recently diagnosed stage II [id number] 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  MRI of the pelvis and abdomen did not reveal lymphadenopathy or metastatic disease  CT chest in 10/2021 did not reveal metastatic disease  Bone scan was also unrevealing  I reviewed the diagnosis of localized muscle invasive bladder cancer  I discussed treatment strategies including neoadjuvant chemotherapy followed by radical cystectomy  She has not been offered and refuses chemotherapy  She is not interested in radical cystectomy  Therefore, bladder preservation with radiation alone is offered  This is preceded by maximal TURBT, and this procedure is scheduled for 4/4/2022  The operative report and pathology will be reviewed after they are available  I described optimal candidates for bladder preservation  These features include chemotherapy candidates with tumors that present without moderate/severe hydronephrosis, are without concurrent extensive or multifocal Tis, and are < 6 cm  I discussed the logistics of radiotherapy including the need for CT simulation and treatment over the course of 4 weeks, targeting the whole bladder with margin    Given her desire to minimize toxicity, I would not electively treat the pelvic lymph nodes  I discussed that generally, outcomes are improved with concurrent chemotherapy; however, she has not been deemed a candidate for such intervention  RT alone is a potentially curative approach in those who refuse surgery  She inquired about no additional treatment following TURBT  I reviewed the possibility of disease recurrence or progression which may lead to metastasis and/or death  She has discussed immunotherapy (pembrolizumab) with medical oncology, which is typically offered to those with locally advanced or metastatic urothelial carcinoma who are not eligible for any platinum-containing chemotherapy  The combination of immunotherapy and radiation has not been well studied  I described potential side effects of pelvic radiation  These include but are not limited to acute side effects such as fatigue, skin reaction/dermatitis, hyperpigmentation, dysuria, diarrhea, nausea/vomiting and abdominal fullness/bloating  In the long term, she could experience a permanent change in bowel habits, bowel obstruction, vaginal stenosis, cystitis, proctitis, risk of fistulas/adhesions, vaginal cuff dehiscence, soft tissue necrosis and risk of secondary malignancy  We also discussed that, even with appropriate therapy, there is still a risk of progression or recurrence  Going forward, she will proceed with TURBT and will be re-evaluated through  multidisciplinary clinic  She states she needs more time to consider options at this moment  I have encouraged her to call with questions or concerns  Thank you for allowing me to participate in Ms Chambers's care  Jase Gao MD  1/11/2585,8:76 PM      Portions of the record may have been created with voice recognition software  Occasional wrong word or "sound a like" substitutions may have occurred due to the inherent limitations of voice recognition software    Read the chart carefully and recognize, using context, where substitutions have occurred

## 2022-03-29 NOTE — PRE-PROCEDURE INSTRUCTIONS
Pre-Surgery Instructions:   Medication Instructions    aspirin 81 mg chewable tablet Patient was instructed by Physician and understands   atorvastatin (LIPITOR) 40 mg tablet Patient was instructed by Physician and understands   cholecalciferol (VITAMIN D3) 1,000 units tablet Patient was instructed by Physician and understands   hydrochlorothiazide (HYDRODIURIL) 25 mg tablet Patient was instructed by Physician and understands   metoprolol succinate (TOPROL-XL) 50 mg 24 hr tablet Patient was instructed by Physician and understands  Patient may take Toprol morning of procedure with sip of water is she wishes    She held Aspirin starting 3/28

## 2022-04-01 PROCEDURE — NC001 PR NO CHARGE: Performed by: UROLOGY

## 2022-04-01 NOTE — H&P
H&P Exam - Urology   Josette Yip 79 y o  female MRN: [id number]  Unit/Bed#:  Encounter: [id number]    Assessment/Plan     Assessment:  28-year-old woman with bladder cancer, muscle invasive, she wishes to avoid cystectomy  She is considering trimodality therapy  She is here for transurethral resection bladder tumor  Ready for surgery today  Plan:  Proceed to transurethral resection of bladder tumor and all indicated procedures    History of Present Illness   HPI:  Josette Yip is a 79 y o  female who presents with muscle invasive bladder cancer, opting for bladder sparing therapy  Here today for transurethral resection of bladder tumor for treatment of recurrence  Changes in her state of health since her last visit include non  The following portions of the patient's history were reviewed and updated as appropriate: allergies, current medications, past family history, past medical history, past social history, past surgical history and problem list     Review of Systems   Constitutional: Negative  HENT: Negative  Eyes: Negative  Respiratory: Negative  Cardiovascular: Negative  Gastrointestinal: Negative  Endocrine: Negative  Genitourinary: Negative  Musculoskeletal: Negative  Skin: Negative  Allergic/Immunologic: Negative  Neurological: Negative  Hematological: Negative  Psychiatric/Behavioral: Negative          Historical Information   Past Medical History:   Diagnosis Date    Arthritis     right knee    Bl[facility] )     Cancer ([place] [id number] ) 2003    ovarian    Hay fever     Hyperlipidemia     Hypertension     Myocardial infarction [facility]) 2003    questionable MI during exploratory surgery    Ovarian cancer ([place] [address] ) 2003    surgery (did not have chemo or radiation)    Stroke ([place] [address] ) 2003    " mini stroke " no deficits ; another poss stroke with  left hand weakness    Wears glasses     Wears partial dentures     upper & lower     Past Surgical History: Procedure Laterality Date    BREAST SURGERY Right     exc  bx  lump (R) breast    DILATION AND CURETTAGE OF UTERUS      HYSTERECTOMY      TAHBSO    JOINT REPLACEMENT Right     OOPHORECTOMY Bilateral     cancer    SC CYSTOURETHROSCOPY,FULGUR >5 CM LESN Bilateral 2021    Procedure: CYSTOSCOPY, TRANSURETHRAL RESECTION OF BLADDER TUMOR (TURBT), EVACUATION OF CLOTS, LARGE TUMOR 7-8CM; Surgeon: Garima Rg MD;  Location: [address] [place] [address];  Service: Urology    SC XCAPSL CTRC RMVL INSJ IO LENS PROSTH W/O ECP Left 2017    Procedure: EXTRACTION EXTRACAPSULAR CATARACT PHACO INTRAOCULAR LENS (IOL); Surgeon: Cam Bermudez MD;  Location: [facility] MAIN OR;  Service: Ophthalmology    TONSILLECTOMY       Social History   Social History     Substance and Sexual Activity   Alcohol Use Yes    Comment: occassional, socially     Social History     Substance and Sexual Activity   Drug Use No     Social History     Tobacco Use   Smoking Status Former Smoker    Packs/day: 1 50    Years: 40 00    Pack years: 60 00    Types: Cigarettes    Quit date:     Years since quittin 2   Smokeless Tobacco Never Used     E-Cigarette/Vaping    E-Cigarette Use Never User      E-Cigarette/Vaping Substances    Nicotine No     THC No     CBD No     Flavoring No     Other No     Unknown No      Family History:   Family History   Problem Relation Age of Onset    Parkinsonism Mother     Heart disease Father     Cancer Father         leukemia    Heart disease Sister     Diabetes Sister     Diabetes Brother     Diabetes Sister     Melanoma Sister     Melanoma Sister     Diabetes Maternal Aunt     No Known Problems Maternal Aunt     No Known Problems Paternal Aunt        Meds/Allergies   PTA meds:   Cannot display prior to admission medications because the patient has not been admitted in this contact       Allergies   Allergen Reactions    Other GI Intolerance     Allergic to darvocet  Allergic to adhesive tape,moderate rash    Percocet [Oxycodone-Acetaminophen] GI Intolerance    Vicodin [Hydrocodone-Acetaminophen] GI Intolerance       Objective   Vitals: Height 5' 2" (1 575 m), weight 85 7 kg (189 lb), not currently breastfeeding  No intake/output data recorded  Invasive Devices  Report    Drain            Urethral Catheter Three way 22 Fr  140 days                Physical Exam  Vitals reviewed  Constitutional:       General: She is not in acute distress  Appearance: Normal appearance  She is not ill-appearing, toxic-appearing or diaphoretic  HENT:      Head: Normocephalic and atraumatic  Eyes:      General: No scleral icterus  Right eye: No discharge  Left eye: No discharge  Cardiovascular:      Pulses: Normal pulses  Pulmonary:      Effort: Pulmonary effort is normal    Abdominal:      General: There is no distension  Palpations: There is no mass  Genitourinary:     Comments: Deferred for the OR  Musculoskeletal:         General: No swelling  Skin:     General: Skin is warm  Neurological:      General: No focal deficit present  Mental Status: She is alert and oriented to person, place, and time  Cranial Nerves: No cranial nerve deficit  Psychiatric:         Mood and Affect: Mood normal          Behavior: Behavior normal          Thought Content: Thought content normal          Judgment: Judgment normal          Lab Results: I have personally reviewed pertinent reports  Imaging: I have personally reviewed pertinent reports  EKG, Pathology, and Other Studies: I have personally reviewed pertinent reports  VTE Prophylaxis: Sequential compression device Ragena Marker)     Code Status: Prior  Advance Directive and Living Will:      Power of :    POLST:      Counseling / Coordination of Care  Total floor / unit time spent today 15 minutes    Greater than 50% of total time was spent with the patient and / or family counseling and / or coordination of care   A description of the counseling / coordination of care:  Review of today's case

## 2022-04-02 ENCOUNTER — ANESTHESIA EVENT (OUTPATIENT)
Dept: PERIOP | Facility: HOSPITAL | Age: 82
End: 2022-04-02
Payer: COMMERCIAL

## 2022-04-03 NOTE — ANESTHESIA PREPROCEDURE EVALUATION
Procedure:  TRANSURETHRAL RESECTION OF BLADDER TUMOR (TURBT) (N/A Bladder)  CYSTOSCOPY WITH RETROGRADE PYELOGRAM (Bilateral Bladder)    Relevant Problems   NEURO/PSYCH   (+) Stroke ([facility] [address] ) (10/21  Had TURBT in11/21 after Cleared with Neurologist)      Other   (+) Malignant neoplasm of overlapping sites of bladder ([facility] [address] )   (+) Wrist drop (resolved)      10/21  Left Ventricle Left ventricular cavity size is normal  Wall thickness is mildly increased  The left ventricular ejection fraction is 60% by visual estimation       Physical Exam    Airway    Mallampati score: II  TM Distance: <3 FB  Neck ROM: full     Dental   Comment: Dentures to be removed  Remaining teeth are not loose,     Cardiovascular      Pulmonary      Other Findings        Anesthesia Plan  ASA Score- 3     Anesthesia Type- general with ASA Monitors  Additional Monitors:   Airway Plan: LMA  Comment: NPO after MN  Plan Factors-Exercise comment: Does not attempt to mount 4 METS but able to perform ADLs  Chart reviewed  EKG reviewed  Existing labs reviewed  Patient summary reviewed  Patient is not a current smoker  Patient not instructed to abstain from smoking on day of procedure  Patient did not smoke on day of surgery  Induction- intravenous  Postoperative Plan-     Informed Consent- Anesthetic plan and risks discussed with patient  I personally reviewed this patient with the CRNA  Discussed and agreed on the Anesthesia Plan with the CRNA  Chapincito Rodriguez

## 2022-04-04 ENCOUNTER — TELEPHONE (OUTPATIENT)
Dept: UROLOGY | Facility: CLINIC | Age: 82
End: 2022-04-04

## 2022-04-04 ENCOUNTER — HOSPITAL ENCOUNTER (OUTPATIENT)
Facility: HOSPITAL | Age: 82
Setting detail: OUTPATIENT SURGERY
Discharge: HOME | End: 2022-04-04
Attending: UROLOGY | Admitting: UROLOGY
Payer: COMMERCIAL

## 2022-04-04 ENCOUNTER — ANESTHESIA (OUTPATIENT)
Dept: PERIOP | Facility: HOSPITAL | Age: 82
End: 2022-04-04
Payer: COMMERCIAL

## 2022-04-04 ENCOUNTER — APPOINTMENT (OUTPATIENT)
Dept: RADIOLOGY | Facility: HOSPITAL | Age: 82
End: 2022-04-04
Payer: COMMERCIAL

## 2022-04-04 VITALS
SYSTOLIC BLOOD PRESSURE: 159 MMHG | BODY MASS INDEX: 34.78 KG/M2 | OXYGEN SATURATION: 97 % | HEART RATE: 62 BPM | RESPIRATION RATE: 18 BRPM | WEIGHT: 189 LBS | TEMPERATURE: 97.6 F | DIASTOLIC BLOOD PRESSURE: 70 MMHG | HEIGHT: 62 IN

## 2022-04-04 DIAGNOSIS — C67.8 MALIGNANT NEOPLASM OF OVERLAPPING SITES OF BLADDER (HCC): Primary | ICD-10-CM

## 2022-04-04 PROCEDURE — 88307 TISSUE EXAM BY PATHOLOGIST: CPT | Performed by: PATHOLOGY

## 2022-04-04 PROCEDURE — 72170 X-RAY EXAM OF PELVIS: CPT

## 2022-04-04 PROCEDURE — 52235 CYSTOSCOPY AND TREATMENT: CPT | Performed by: UROLOGY

## 2022-04-04 PROCEDURE — C1758 CATHETER, URETERAL: HCPCS | Performed by: UROLOGY

## 2022-04-04 PROCEDURE — C1769 GUIDE WIRE: HCPCS | Performed by: UROLOGY

## 2022-04-04 RX ORDER — ACETAMINOPHEN 325 MG/1
975 TABLET ORAL ONCE
Status: COMPLETED | OUTPATIENT
Start: 2022-04-04 | End: 2022-04-04

## 2022-04-04 RX ORDER — ONDANSETRON 2 MG/ML
INJECTION INTRAMUSCULAR; INTRAVENOUS AS NEEDED
Status: DISCONTINUED | OUTPATIENT
Start: 2022-04-04 | End: 2022-04-04

## 2022-04-04 RX ORDER — FENTANYL CITRATE/PF 50 MCG/ML
25 SYRINGE (ML) INJECTION
Status: DISCONTINUED | OUTPATIENT
Start: 2022-04-04 | End: 2022-04-04 | Stop reason: HOSPADM

## 2022-04-04 RX ORDER — EPHEDRINE SULFATE 50 MG/ML
INJECTION INTRAVENOUS AS NEEDED
Status: DISCONTINUED | OUTPATIENT
Start: 2022-04-04 | End: 2022-04-04

## 2022-04-04 RX ORDER — ONDANSETRON 2 MG/ML
4 INJECTION INTRAMUSCULAR; INTRAVENOUS EVERY 6 HOURS PRN
Status: DISCONTINUED | OUTPATIENT
Start: 2022-04-04 | End: 2022-04-04 | Stop reason: HOSPADM

## 2022-04-04 RX ORDER — DIPHENHYDRAMINE HCL 25 MG
12.5 TABLET ORAL EVERY 6 HOURS PRN
Status: DISCONTINUED | OUTPATIENT
Start: 2022-04-04 | End: 2022-04-04 | Stop reason: HOSPADM

## 2022-04-04 RX ORDER — OXYCODONE HYDROCHLORIDE 5 MG/1
5 TABLET ORAL EVERY 4 HOURS PRN
Qty: 8 TABLET | Refills: 0 | Status: SHIPPED | OUTPATIENT
Start: 2022-04-04 | End: 2022-04-09

## 2022-04-04 RX ORDER — GLYCOPYRROLATE 0.2 MG/ML
INJECTION INTRAMUSCULAR; INTRAVENOUS AS NEEDED
Status: DISCONTINUED | OUTPATIENT
Start: 2022-04-04 | End: 2022-04-04

## 2022-04-04 RX ORDER — FUROSEMIDE 10 MG/ML
20 INJECTION INTRAMUSCULAR; INTRAVENOUS ONCE
Status: COMPLETED | OUTPATIENT
Start: 2022-04-04 | End: 2022-04-04

## 2022-04-04 RX ORDER — SULFAMETHOXAZOLE AND TRIMETHOPRIM 400; 80 MG/1; MG/1
2 TABLET ORAL EVERY 12 HOURS SCHEDULED
Qty: 12 TABLET | Refills: 0 | Status: SHIPPED | OUTPATIENT
Start: 2022-04-04 | End: 2022-04-07

## 2022-04-04 RX ORDER — LIDOCAINE HYDROCHLORIDE 10 MG/ML
INJECTION, SOLUTION EPIDURAL; INFILTRATION; INTRACAUDAL; PERINEURAL AS NEEDED
Status: DISCONTINUED | OUTPATIENT
Start: 2022-04-04 | End: 2022-04-04

## 2022-04-04 RX ORDER — MAGNESIUM HYDROXIDE 1200 MG/15ML
LIQUID ORAL AS NEEDED
Status: DISCONTINUED | OUTPATIENT
Start: 2022-04-04 | End: 2022-04-04 | Stop reason: HOSPADM

## 2022-04-04 RX ORDER — ONDANSETRON 2 MG/ML
4 INJECTION INTRAMUSCULAR; INTRAVENOUS ONCE AS NEEDED
Status: DISCONTINUED | OUTPATIENT
Start: 2022-04-04 | End: 2022-04-04 | Stop reason: HOSPADM

## 2022-04-04 RX ORDER — PROPOFOL 10 MG/ML
INJECTION, EMULSION INTRAVENOUS AS NEEDED
Status: DISCONTINUED | OUTPATIENT
Start: 2022-04-04 | End: 2022-04-04

## 2022-04-04 RX ORDER — HYDROMORPHONE HCL/PF 1 MG/ML
0.5 SYRINGE (ML) INJECTION EVERY 2 HOUR PRN
Status: DISCONTINUED | OUTPATIENT
Start: 2022-04-04 | End: 2022-04-04 | Stop reason: HOSPADM

## 2022-04-04 RX ORDER — FENTANYL CITRATE 50 UG/ML
INJECTION, SOLUTION INTRAMUSCULAR; INTRAVENOUS AS NEEDED
Status: DISCONTINUED | OUTPATIENT
Start: 2022-04-04 | End: 2022-04-04

## 2022-04-04 RX ORDER — OXYCODONE HYDROCHLORIDE 5 MG/1
5 TABLET ORAL EVERY 4 HOURS PRN
Status: DISCONTINUED | OUTPATIENT
Start: 2022-04-04 | End: 2022-04-04 | Stop reason: HOSPADM

## 2022-04-04 RX ORDER — ACETAMINOPHEN 325 MG/1
650 TABLET ORAL EVERY 6 HOURS SCHEDULED
Status: DISCONTINUED | OUTPATIENT
Start: 2022-04-04 | End: 2022-04-04 | Stop reason: HOSPADM

## 2022-04-04 RX ORDER — SODIUM CHLORIDE, SODIUM LACTATE, POTASSIUM CHLORIDE, CALCIUM CHLORIDE 600; 310; 30; 20 MG/100ML; MG/100ML; MG/100ML; MG/100ML
125 INJECTION, SOLUTION INTRAVENOUS CONTINUOUS
Status: DISCONTINUED | OUTPATIENT
Start: 2022-04-04 | End: 2022-04-04 | Stop reason: HOSPADM

## 2022-04-04 RX ORDER — SODIUM CHLORIDE, SODIUM LACTATE, POTASSIUM CHLORIDE, CALCIUM CHLORIDE 600; 310; 30; 20 MG/100ML; MG/100ML; MG/100ML; MG/100ML
75 INJECTION, SOLUTION INTRAVENOUS CONTINUOUS
Status: DISCONTINUED | OUTPATIENT
Start: 2022-04-04 | End: 2022-04-04 | Stop reason: HOSPADM

## 2022-04-04 RX ORDER — LIDOCAINE HYDROCHLORIDE 10 MG/ML
0.5 INJECTION, SOLUTION EPIDURAL; INFILTRATION; INTRACAUDAL; PERINEURAL ONCE AS NEEDED
Status: DISCONTINUED | OUTPATIENT
Start: 2022-04-04 | End: 2022-04-04

## 2022-04-04 RX ORDER — MAGNESIUM HYDROXIDE/ALUMINUM HYDROXICE/SIMETHICONE 120; 1200; 1200 MG/30ML; MG/30ML; MG/30ML
30 SUSPENSION ORAL EVERY 6 HOURS PRN
Status: DISCONTINUED | OUTPATIENT
Start: 2022-04-04 | End: 2022-04-04 | Stop reason: HOSPADM

## 2022-04-04 RX ORDER — SENNOSIDES 8.6 MG
8.6 TABLET ORAL
Qty: 5 TABLET | Refills: 0 | Status: SHIPPED | OUTPATIENT
Start: 2022-04-04 | End: 2022-04-09

## 2022-04-04 RX ORDER — OXYCODONE HYDROCHLORIDE 5 MG/1
10 TABLET ORAL EVERY 4 HOURS PRN
Status: DISCONTINUED | OUTPATIENT
Start: 2022-04-04 | End: 2022-04-04 | Stop reason: HOSPADM

## 2022-04-04 RX ORDER — CEFAZOLIN SODIUM 2 G/50ML
2000 SOLUTION INTRAVENOUS ONCE
Status: COMPLETED | OUTPATIENT
Start: 2022-04-04 | End: 2022-04-04

## 2022-04-04 RX ORDER — SODIUM CHLORIDE, SODIUM LACTATE, POTASSIUM CHLORIDE, CALCIUM CHLORIDE 600; 310; 30; 20 MG/100ML; MG/100ML; MG/100ML; MG/100ML
INJECTION, SOLUTION INTRAVENOUS CONTINUOUS PRN
Status: DISCONTINUED | OUTPATIENT
Start: 2022-04-04 | End: 2022-04-04

## 2022-04-04 RX ADMIN — EPHEDRINE SULFATE 5 MG: 50 INJECTION, SOLUTION INTRAVENOUS at 14:23

## 2022-04-04 RX ADMIN — CEFAZOLIN SODIUM 2000 MG: 2 SOLUTION INTRAVENOUS at 14:02

## 2022-04-04 RX ADMIN — FUROSEMIDE 20 MG: 10 INJECTION, SOLUTION INTRAMUSCULAR; INTRAVENOUS at 15:18

## 2022-04-04 RX ADMIN — FENTANYL CITRATE 25 MCG: 50 INJECTION, SOLUTION INTRAMUSCULAR; INTRAVENOUS at 14:10

## 2022-04-04 RX ADMIN — PROPOFOL 100 MG: 10 INJECTION, EMULSION INTRAVENOUS at 14:05

## 2022-04-04 RX ADMIN — ONDANSETRON 4 MG: 2 INJECTION INTRAMUSCULAR; INTRAVENOUS at 14:18

## 2022-04-04 RX ADMIN — SODIUM CHLORIDE, SODIUM LACTATE, POTASSIUM CHLORIDE, AND CALCIUM CHLORIDE 75 ML/HR: .6; .31; .03; .02 INJECTION, SOLUTION INTRAVENOUS at 15:19

## 2022-04-04 RX ADMIN — GLYCOPYRROLATE 0.1 MG: 0.2 INJECTION, SOLUTION INTRAMUSCULAR; INTRAVENOUS at 14:19

## 2022-04-04 RX ADMIN — SODIUM CHLORIDE, SODIUM LACTATE, POTASSIUM CHLORIDE, AND CALCIUM CHLORIDE: .6; .31; .03; .02 INJECTION, SOLUTION INTRAVENOUS at 14:00

## 2022-04-04 RX ADMIN — EPHEDRINE SULFATE 5 MG: 50 INJECTION, SOLUTION INTRAVENOUS at 14:18

## 2022-04-04 RX ADMIN — PROPOFOL 50 MG: 10 INJECTION, EMULSION INTRAVENOUS at 14:06

## 2022-04-04 RX ADMIN — LIDOCAINE HYDROCHLORIDE 50 MG: 10 INJECTION, SOLUTION EPIDURAL; INFILTRATION; INTRACAUDAL at 14:05

## 2022-04-04 RX ADMIN — ACETAMINOPHEN 325MG 975 MG: 325 TABLET ORAL at 11:25

## 2022-04-04 RX ADMIN — FENTANYL CITRATE 25 MCG: 50 INJECTION, SOLUTION INTRAMUSCULAR; INTRAVENOUS at 14:20

## 2022-04-04 NOTE — INTERVAL H&P NOTE
H&P reviewed  After examining the patient I find no changes in the patients condition since the H&P had been written      Vitals:    04/04/22 1140   BP: (!) 182/98   Pulse: 58   Resp: 20   Temp: 98 °F (36 7 °C)   SpO2: 99%

## 2022-04-04 NOTE — NURSING NOTE
Pt posadas draining easily without problems after irrigation and clot removal by Dr Sherry Cuevas  No leaking  Pt denies pain  Pt and family instructed in length on posadas care, emptying of bag and issues that would require pt to return to ER  Pt and family verbalized understanding of same   Discharge instructions reviewed in detail and pt prepared for discharge home with family

## 2022-04-04 NOTE — DISCHARGE INSTRUCTIONS
Adams Catheter Placement and Care   WHAT YOU NEED TO KNOW:   A Adams catheter is a sterile tube that is inserted into your bladder to drain urine  It is also called an indwelling urinary catheter  DISCHARGE INSTRUCTIONS:   Return to the emergency department if:   · Your catheter comes out  · You suddenly have material that looks like sand in the tubing or drainage bag  · No urine is draining into the bag and you have checked the system  · You have pain in your hip, back, pelvis, or lower abdomen  · You are confused or cannot think clearly  Call your doctor or urologist if:   · You have a fever  · You have bladder spasms for more than 1 day after the catheter is placed  · You see blood in the tubing or drainage bag  · You have a rash or itching where the catheter tube is secured to your skin  · Urine leaks from or around the catheter, tubing, or drainage bag  · The closed drainage system has accidently come open or apart  · You see a layer of crystals inside the tubing  · You have questions or concerns about your condition or care  Care for your catheter and drainage bag: You can reduce your risk for infection and injury by caring for your catheter and drainage bag properly  · Wash your hands often  Wash before and after you touch your catheter, tubing, or drainage bag  Use soap and water  Wear clean disposable gloves when you care for your catheter or disconnect the drainage bag  Wash your hands before you prepare or eat food  · Clean your genital area 2 times every day  Clean your catheter area and anal opening after every bowel movement  ? For men:  Use a soapy cloth to clean the tip of your penis  Start where the catheter enters  Wipe backward making sure to pull back the foreskin  Then use a cloth with clear water in the same direction to clean away the soap  ? For women:  Use a soapy cloth to clean the area that the catheter enters your body   Make sure to separate your labia and wipe toward the anus  Then use a cloth with clear water and wipe in the same direction  · Secure the catheter tube  so you do not pull or move the catheter  This helps prevent pain and bladder spasms  Healthcare providers will show you how to use medical tape or a strap to secure the catheter tube to your body  · Keep a closed drainage system  Your catheter should always be attached to the drainage bag to form a closed system  Do not disconnect any part of the closed system unless you need to change the bag  · Keep the drainage bag below the level of your waist   This helps stop urine from moving back up the tubing and into your bladder  Do not loop or kink the tubing  This can cause urine to back up and collect in your bladder  Do not let the drainage bag touch or lie on the floor  · Empty the drainage bag when needed  The weight of a full drainage bag can be painful  Empty the drainage bag every 3 to 6 hours or when it is ? full  · Clean and change the drainage bag as directed  Ask your healthcare provider how often you should change the drainage bag and what cleaning solution to use  Wear disposable gloves when you change the bag  Do not allow the end of the catheter or tubing to touch anything  Clean the ends with an alcohol pad before you reconnect them  What to do if problems develop:   · No urine is draining into the bag:      ? Check for kinks in the tubing and straighten them out  ? Check the tape or strap used to secure the catheter tube to your skin  Make sure it is not blocking the tube  ? Make sure you are not sitting or lying on the tubing  ? Make sure the urine bag is hanging below the level of your waist     · Urine leaks from or around the catheter, tubing, or drainage bag:  Check if the closed drainage system has accidently come open or apart  Clean the catheter and tubing ends with a new alcohol pad and reconnect them      Follow up with your doctor or urologist as directed:  Write down your questions so you remember to ask them during your visits  © Copyright [employer] 2022 Information is for End User's use only and may not be sold, redistributed or otherwise used for commercial purposes  All illustrations and images included in CareNotes® are the copyrighted property of [employer]  or Nadiya Godinez  The above information is an  only  It is not intended as medical advice for individual conditions or treatments  Talk to your doctor, nurse or pharmacist before following any medical regimen to see if it is safe and effective for you  Transurethral Resection of Bladder Tumors   WHAT YOU NEED TO KNOW:     Kristian,    Today you had a transurethral resection of bladder tumor  This went well  I could not find your ureteral orifices to do retrograde pyelography due to your previous resections  You lost minimal blood and the specimen was sent for analysis  Your case will be discussed at tumor board  I will arrange for your Adams catheter to be removed in a few days in the office  It is normal to have urgency and frequency of urination along with blood in the urine as you recover from this operation  If you have questions or concerns, please let us know  Thank you for allowing us take care of you today,  Dr Garcia Ripa  Transurethral resection of bladder tumors (TURBT) is surgery to remove one or more tumors from your bladder  DISCHARGE INSTRUCTIONS:   Medicines:   · Medicines  help decrease pain or prevent vomiting  · Take your medicine as directed  Contact your healthcare provider if you think your medicine is not helping or if you have side effects  Tell him or her if you are allergic to any medicine  Keep a list of the medicines, vitamins, and herbs you take  Include the amounts, and when and why you take them  Bring the list or the pill bottles to follow-up visits   Carry your medicine list with you in case of an emergency  Follow up with your healthcare provider as directed:  Write down your questions so you remember to ask them during your visits  Care for your Adams catheter:  Keep the bag below your waist  This will prevent urine from flowing back into your bladder and causing an infection or other problems  Also, keep the tube free of kinks so the urine will drain properly  Do not pull on the catheter  This can cause pain and bleeding and may cause the catheter to come out  Empty your urine drainage bag when it is ½ to ? full, or every 8 hours  If you have a smaller leg bag, empty it every 3 to 4 hours  Do the following when you empty your urine drainage bag:  · Hold the urine bag over the toilet or a large container  · Remove the drain spout from its sleeve at the bottom of the urine bag  Do not touch the tip of the drain spout  Open the slide valve on the spout  · Let the urine flow out of the urine bag into the toilet or container  Do not let the drainage tube touch anything  · Clean the end of the drain spout with alcohol when the bag is empty  Ask which cleaning solution is best to use  · Close the slide valve and put the drain spout into its sleeve at the bottom of the urine bag  Write down how much urine was in your bag if you were asked to keep a record  Contact your healthcare provider if:   · You have a fever or chills  · You have new or more blood in your urine  · You have nausea or are vomiting  · You have new or more pain when you urinate  · You are unable to control when you urinate  · You have questions or concerns about your condition or care  Seek care immediately or call 911 if:   · You have heavy bleeding from your urethra  · You start to urinate less often, very little, or not at all  · You have severe pain in your abdomen or pelvis      © Copyright [employer] 2018 Information is for End User's use only and may not be sold, redistributed or otherwise used for commercial purposes  All illustrations and images included in CareNotes® are the copyrighted property of [employer]  or Nadiya Godinez  The above information is an  only  It is not intended as medical advice for individual conditions or treatments  Talk to your doctor, nurse or pharmacist before following any medical regimen to see if it is safe and effective for you

## 2022-04-04 NOTE — ANESTHESIA POSTPROCEDURE EVALUATION
Post-Op Assessment Note    CV Status:  Stable  Pain Score: 0    Pain management: adequate     Mental Status:  Alert and awake   Hydration Status:  Euvolemic   PONV Controlled:  Controlled   Airway Patency:  Patent      Post Op Vitals Reviewed: Yes      Staff: CRNA   Comments: vss, sv        No complications documented      BP  160/70   Temp   97 9   Pulse  73   Resp   14   SpO2   96% RA

## 2022-04-04 NOTE — TELEPHONE ENCOUNTER
Patient still currently admitted  Adams removal scheduled for 4/7/22 at 10am with Frankie  Please confirm on d/c

## 2022-04-07 ENCOUNTER — PROCEDURE VISIT (OUTPATIENT)
Dept: UROLOGY | Facility: CLINIC | Age: 82
End: 2022-04-07

## 2022-04-07 VITALS
HEIGHT: 62 IN | BODY MASS INDEX: 34.78 KG/M2 | SYSTOLIC BLOOD PRESSURE: 146 MMHG | WEIGHT: 189 LBS | DIASTOLIC BLOOD PRESSURE: 96 MMHG | RESPIRATION RATE: 18 BRPM

## 2022-04-07 DIAGNOSIS — C67.8 MALIGNANT NEOPLASM OF OVERLAPPING SITES OF BLADDER (HCC): Primary | ICD-10-CM

## 2022-04-07 PROCEDURE — 99024 POSTOP FOLLOW-UP VISIT: CPT

## 2022-04-07 NOTE — PROGRESS NOTES
0/4/[birth year]68    Torrie Curtis is a 79 y o  female  [id number]    Diagnosis:  Chief Complaint     Bladder Cancer          Patient presents for posadas removal s/p TURBT on 4/4/22 with Dr Saldana Meth:  Patient will follow up for post op appt as scheduled  Procedure:  Patient arrived to visit today in good spirits  She reports she is feeling well  Urine was clear yellow in drainage bag  She states she did have hematuria right after discharge but that subsided  Posadas catheter removed after deflation of intact balloon  Patient tolerated well  She knows to drink at least 40oz of water today  Advised if she has not urinated after 6 hrs, she should call the office and come in  She verbalized understanding       Vitals:    04/07/22 1015   BP: 146/96   Resp: 18   Weight: 85 7 kg (189 lb)   Height: 5' 2" (1 575 m)         Lupe Encarnacion RN BSN

## 2022-04-11 ENCOUNTER — TELEPHONE (OUTPATIENT)
Dept: UROLOGY | Facility: CLINIC | Age: 82
End: 2022-04-11

## 2022-04-11 NOTE — TELEPHONE ENCOUNTER
Fyi, I spoke to pt and confirmed cancellation  Pt need to reschedule 5/5 apt to a Tuesday so a family member can come along  She can't come by herself  I r/s to 5/10 at 4:00

## 2022-04-19 ENCOUNTER — PATIENT OUTREACH (OUTPATIENT)
Dept: HEMATOLOGY ONCOLOGY | Facility: CLINIC | Age: 82
End: 2022-04-19

## 2022-04-19 ENCOUNTER — DOCUMENTATION (OUTPATIENT)
Dept: HEMATOLOGY ONCOLOGY | Facility: CLINIC | Age: 82
End: 2022-04-19

## 2022-04-19 NOTE — PROGRESS NOTES
Called pt and left voicemail stating I was calling to discuss recommendations after discussion of case during tumor board    Advised that Medical Oncology appt was moved up to discuss treatment options with Renee Martinez PA-C to 5/6/22 at 11am   Requested call back to further discuss

## 2022-04-19 NOTE — PROGRESS NOTES
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, s/p TURBT, but did not pursue any treatment at that time  4/4/2022 pt with repeat TURBT which showed Invasive high grade papillary urothelial carcinoma          Recommendations of Group:  Pt has previously declined any surgical intervention and previously undecided about any treatment  Follow-up with Medical Oncology to discuss immunotherapy and possibly radiation  Patient was discussed at the Multidisciplinary Urology Case review on 4/19/2022      NCCN guidelines were available for review  The final treatment plan will be left to the discretion of the patient and the treating physician  DISCLAIMERS:  TO THE TREATING PHYSICIAN:  This conference is a meeting of clinicians from various specialty areas who evaluate and discuss patients for whom a multidisciplinary treatment approach is being considered  Please note that the above opinion was a consensus of the conference attendees and is intended only to assist in quality care of the discussed patient  The responsibility for follow up on the input given during the conference, along with any final decisions regarding plan of care, is that of the patient and the patient's provider  Accordingly, appointments have only been recommended based on this information and have NOT been scheduled unless otherwise noted  TO THE PATIENT:  This summary is a brief record of major aspects of your cancer treatment  You may choose to share a copy with any of your doctors or nurses  However, this is not a detailed or comprehensive record of your care

## 2022-04-20 ENCOUNTER — PATIENT OUTREACH (OUTPATIENT)
Dept: HEMATOLOGY ONCOLOGY | Facility: CLINIC | Age: 82
End: 2022-04-20

## 2022-04-20 NOTE — PROGRESS NOTES
Received voicemail from pt stating the appt I gave her for a Friday she can not do and is unable to do Fridays because her DIL can not be there with her  States she will not be home tomorrow  Left message for pt with new appt information  Due to lmited schedule for [facility] and Dr Audi Romero in [facility], next option is for 5/9/22 at 9:30am with [facility]  Advised if she would be willing to go to [address] [place] [address] for this visit, we could have a sooner appt for her    Requested call back

## 2022-05-06 ENCOUNTER — APPOINTMENT (OUTPATIENT)
Dept: LAB | Facility: HOSPITAL | Age: 82
End: 2022-05-06
Payer: COMMERCIAL

## 2022-05-06 ENCOUNTER — TELEPHONE (OUTPATIENT)
Dept: HEMATOLOGY ONCOLOGY | Facility: CLINIC | Age: 82
End: 2022-05-06

## 2022-05-06 DIAGNOSIS — C67.8 MALIGNANT NEOPLASM OF OVERLAPPING SITES OF BLADDER (HCC): ICD-10-CM

## 2022-05-06 LAB
ALBUMIN SERPL BCP-MCNC: 3.7 G/DL (ref 3.5–5)
ALP SERPL-CCNC: 74 U/L (ref 46–116)
ALT SERPL W P-5'-P-CCNC: 23 U/L (ref 12–78)
ANION GAP SERPL CALCULATED.3IONS-SCNC: 10 MMOL/L (ref 4–13)
AST SERPL W P-5'-P-CCNC: 17 U/L (ref 5–45)
BASOPHILS # BLD AUTO: 0.07 THOUSANDS/ΜL (ref 0–0.1)
BASOPHILS NFR BLD AUTO: 1 % (ref 0–1)
BILIRUB SERPL-MCNC: 0.55 MG/DL (ref 0.2–1)
BUN SERPL-MCNC: 36 MG/DL (ref 5–25)
CALCIUM SERPL-MCNC: 9.7 MG/DL (ref 8.3–10.1)
CHLORIDE SERPL-SCNC: 102 MMOL/L (ref 100–108)
CO2 SERPL-SCNC: 27 MMOL/L (ref 21–32)
CREAT SERPL-MCNC: 1.37 MG/DL (ref 0.6–1.3)
EOSINOPHIL # BLD AUTO: 0.24 THOUSAND/ΜL (ref 0–0.61)
EOSINOPHIL NFR BLD AUTO: 3 % (ref 0–6)
ERYTHROCYTE [DISTWIDTH] IN BLOOD BY AUTOMATED COUNT: 12.3 % (ref 11.6–15.1)
GFR SERPL CREATININE-BSD FRML MDRD: 35 ML/MIN/1.73SQ M
GLUCOSE SERPL-MCNC: 96 MG/DL (ref 65–140)
HCT VFR BLD AUTO: 40.1 % (ref 34.8–46.1)
HGB BLD-MCNC: 13.2 G/DL (ref 11.5–15.4)
IMM GRANULOCYTES # BLD AUTO: 0.02 THOUSAND/UL (ref 0–0.2)
IMM GRANULOCYTES NFR BLD AUTO: 0 % (ref 0–2)
LYMPHOCYTES # BLD AUTO: 1.62 THOUSANDS/ΜL (ref 0.6–4.47)
LYMPHOCYTES NFR BLD AUTO: 22 % (ref 14–44)
MCH RBC QN AUTO: 28.6 PG (ref 26.8–34.3)
MCHC RBC AUTO-ENTMCNC: 32.9 G/DL (ref 31.4–37.4)
MCV RBC AUTO: 87 FL (ref 82–98)
MONOCYTES # BLD AUTO: 0.55 THOUSAND/ΜL (ref 0.17–1.22)
MONOCYTES NFR BLD AUTO: 7 % (ref 4–12)
NEUTROPHILS # BLD AUTO: 4.96 THOUSANDS/ΜL (ref 1.85–7.62)
NEUTS SEG NFR BLD AUTO: 67 % (ref 43–75)
NRBC BLD AUTO-RTO: 0 /100 WBCS
PLATELET # BLD AUTO: 305 THOUSANDS/UL (ref 149–390)
PMV BLD AUTO: 11.1 FL (ref 8.9–12.7)
POTASSIUM SERPL-SCNC: 3.9 MMOL/L (ref 3.5–5.3)
PROT SERPL-MCNC: 7.1 G/DL (ref 6.4–8.2)
RBC # BLD AUTO: 4.61 MILLION/UL (ref 3.81–5.12)
SODIUM SERPL-SCNC: 139 MMOL/L (ref 136–145)
WBC # BLD AUTO: 7.46 THOUSAND/UL (ref 4.31–10.16)

## 2022-05-06 PROCEDURE — 85025 COMPLETE CBC W/AUTO DIFF WBC: CPT

## 2022-05-06 PROCEDURE — 80053 COMPREHEN METABOLIC PANEL: CPT

## 2022-05-06 PROCEDURE — 36415 COLL VENOUS BLD VENIPUNCTURE: CPT

## 2022-05-09 ENCOUNTER — OFFICE VISIT (OUTPATIENT)
Dept: HEMATOLOGY ONCOLOGY | Facility: CLINIC | Age: 82
End: 2022-05-09
Payer: COMMERCIAL

## 2022-05-09 VITALS
BODY MASS INDEX: 34.96 KG/M2 | TEMPERATURE: 97.7 F | HEIGHT: 62 IN | WEIGHT: 190 LBS | SYSTOLIC BLOOD PRESSURE: 170 MMHG | OXYGEN SATURATION: 100 % | DIASTOLIC BLOOD PRESSURE: 72 MMHG | RESPIRATION RATE: 18 BRPM | HEART RATE: 65 BPM

## 2022-05-09 DIAGNOSIS — C67.8 MALIGNANT NEOPLASM OF OVERLAPPING SITES OF BLADDER (HCC): Primary | ICD-10-CM

## 2022-05-09 PROCEDURE — 99215 OFFICE O/P EST HI 40 MIN: CPT | Performed by: PHYSICIAN ASSISTANT

## 2022-05-09 NOTE — PROGRESS NOTES
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 diagnosed 10/2021  Recurrent disease on cystoscopy 2/22  S/P TURBT 4/2022 with recurrent disease  No evidence of invasion of adjacent structures or lymphadenopathy on MRI abd and pelvis 3/22/22  She is not interested in neoadjuvant chemotherapy and surgical resection  We reviewed the possibility of Keytruda immunotherapy which is indicated   for patient for bladder cancer who are not candidate for cisplatin/platinum treatment and locally advanced or metastatic cancer    Reviewed that this can be dosed Q 6 weeks  We discussed potential side effects  She also met with Dr Peri Hollingsworth regarding radiation therapy  She states she will likely not proceed with radiation therapy  She wants to meet with Dr Harjeet Trujillo tomorrow and review her options for a few more days  She did give signed informed consent for Keytruda  No appointments were scheduled at this time pending rediscussion later this week  History of Presenting Illness:  Carrington Mosquera is an [id number]  female seen initially 12/2021 by Dr Huma Samuel regarding Bladder cancer  She has a history of hypertension, coronary artery disease, stroke  She used to smoke heavily, quit smoking in 2001  She has history of ovarian cancer status post surgical resection in 2003  In October 2021, she experienced left sided hip pain and 2 week history of hematuria  Noncontrast CT scan of the chest 10/27/21 - 2 nonspecific solid pulmonary nodules that require follow-up, largest 0 7 cm  Evidence of old right coronary artery infarct with questioned right ventricular aneurysm    CT renal stone study - 4 7 x 3 5 x 4 1 cm right posterolateral bladder lesion suspicious for bladder malignancy  Noncontrast CT scan of her left lower extremity 10/28/2021 - Scattered dense subcentimeter left iliac lesions, likely bone islands; blastic metastatic disease cannot be excluded in the setting of a suspected right bladder malignancy  Patient also reported wrist drop for which an MRI of the brain was done which showed an acute to subacute infarct  Echo did not show PFO  She is on aspirin     Status post cystoscopy 11/11/21 by Dr Ta Dunn showing transitional cell bladder cancer, muscle invasive, 6-7cm involving the muscularis propria  At her 12/16/21 appointment with Dr Dyan Garcia, she reported she was not interested in surgical resection nor chemotherapy  3 week f/u requested  Another cystoscopy on 02/14/2022 by Dr Zoran Snow showed recurrence of bladder tumor      MRI of the abdomen and pelvis 3/22/22: No metastatic disease identified in the abdomen  Known ventral hernias  Areas of focal nodular mucosal thickening along the posterior bladder wall, with a more nodular component just to the left of the midline which shows restricted diffusion  Smaller nodular areas of mucosal thickening along the right lateral and posterior bladder wall also show restricted diffusion compatible with tumor  No evidence of invasion of adjacent structures or lymphadenopathy  Appointment with Dr Amadou Ramos 3/28/22  She again reiterated that she is not interested in chemotherapy and surgery  He discussed radiation therapy  She states she needs more time to consider options at this moment  4/4/22: cystoscopy, TURBT  Distorted trigonal anatomy due to previous resections  Unable to find ureteral orifices despite fishing for these with a wire   Most tumor recurrences along the right lateral wall the bladder, this area is completely resected  Pathology consistent with Bladder tumor, multifocal (TURB):  - Invasive high grade papillary urothelial carcinoma  - Muscularis propria (detrusor muscle) is negative for carcinoma    Case again discussed at multidisciplinary urology group 4/19/22  Pt has previously declined any surgical intervention and previously undecided about any treatment  Follow-up with Medical Oncology to discuss immunotherapy and possibly radiation  Interval History:    Here with son, Olena Dunbar and daughter in law, Sybil Stewart  Feeling very well other than new pain  Denies any bladder discomfort, hematuria  Test Results:        Labs:   Lab Results   Component Value Date    HGB 13 2 05/06/2022    HCT 40 1 05/06/2022    MCV 87 05/06/2022     05/06/2022    WBC 7 46 05/06/2022    NRBC 0 05/06/2022     Lab Results   Component Value Date    K 3 9 05/06/2022     05/06/2022    CO2 27 05/06/2022    BUN 36 (H) 05/06/2022    CREATININE 1 37 (H) 05/06/2022    GLUF 103 (H) 03/14/2022    CALCIUM 9 7 05/06/2022    CORRECTEDCA 10 0 10/29/2021    AST 17 05/06/2022    ALT 23 05/06/2022    ALKPHOS 74 05/06/2022    EGFR 35 05/06/2022       Imaging: No results found  ROS:  As mentioned in HPI & Interval History otherwise 14 point ROS negative  Allergies: Allergies   Allergen Reactions    Other GI Intolerance     Allergic to darvocet  Allergic to adhesive tape,moderate rash    Percocet [Oxycodone-Acetaminophen] GI Intolerance    Vicodin [Hydrocodone-Acetaminophen] GI Intolerance     Current Medications: Reviewed  PMH/FH/SH:  Reviewed      Physical Exam:    There is no height or weight on file to calculate BSA      Ht Readings from Last 3 Encounters:   04/07/22 5' 2" (1 575 m)   03/29/22 5' 2" (1 575 m)   03/17/22 5' 2" (1 575 m)        Wt Readings from Last 3 Encounters:   04/07/22 85 7 kg (189 lb)   03/29/22 85 7 kg (189 lb)   03/28/22 85 7 kg (189 lb)        Temp Readings from Last 3 Encounters:   04/04/22 97 6 °F (36 4 °C)   03/28/22 (!) 97 2 °F (36 2 °C) (Temporal)   02/28/22 97 9 °F (36 6 °C)        BP Readings from Last 3 Encounters:   04/07/22 146/96   22 159/70   22 140/80           Physical Exam  Vitals reviewed  Constitutional:       General: She is not in acute distress  Appearance: She is well-developed  She is not diaphoretic  HENT:      Head: Normocephalic and atraumatic  Eyes:      Conjunctiva/sclera: Conjunctivae normal    Neck:      Trachea: No tracheal deviation  Cardiovascular:      Rate and Rhythm: Normal rate and regular rhythm  Heart sounds: No murmur heard  No friction rub  No gallop  Pulmonary:      Effort: Pulmonary effort is normal  No respiratory distress  Breath sounds: Normal breath sounds  No wheezing or rales  Chest:      Chest wall: No tenderness  Abdominal:      General: There is no distension  Palpations: Abdomen is soft  Tenderness: There is no abdominal tenderness  Musculoskeletal:      Cervical back: Normal range of motion and neck supple  Lymphadenopathy:      Cervical: No cervical adenopathy  Skin:     General: Skin is warm and dry  Coloration: Skin is not pale  Findings: No erythema  Neurological:      Mental Status: She is alert and oriented to person, place, and time  Psychiatric:         Behavior: Behavior normal          Thought Content: Thought content normal          Judgment: Judgment normal          ECO      Emergency Contacts:    Extended Emergency Contact Information  Primary Emergency Contact:  [address] [place] [address] [employer]: [phone]  Relation: None

## 2022-05-09 NOTE — PROGRESS NOTES
Problem List Items Addressed This Visit        Genitourinary    Malignant neoplasm of overlapping sites of bladder ([facility] [address] ) - Primary       Other    Encounter to discuss test results    Goals of care, counseling/discussion      Other Visit Diagnoses     Left hip pain        Relevant Orders    Ambulatory Referral to Orthopedic Surgery            Discussion:    Mary Garcia is doing well after her last transurethral resection of bladder tumor  This shows recurrent, high-grade disease, without muscle invasion  She is complaining of some left hip pain that radiates down to her left knee  I have referred her to orthopedics for their evaluation  She has undergone a right-sided knee replacement in the past     ECOG is currently 0, eating well, drinking well, accomplishing all activities of daily living  She does think that she will undergo trimodality therapy, she would like to start this in July as she has some trips upcoming  She will be due for surveillance cystoscopy in 3 months, she will return for that purpose  All questions and concerns answered and addressed today    Assessment and plan:       Please see problem oriented charting for the assessment plan of today's urological complaints    Jaqueline Orlando MD      Chief Complaint     As above      History of Present Illness     Jacqulynn Skiff is a 79 y o  woman with MIBC, previously refusing invasive surgeries, considering trimodality therapy, s/p TURBT for recurrence not long ago  Post-op course has been uncomplicated  Pathology shows recurrent high grade T1 bladder cancer, muscle prsent and negative    ECOG is currently 0    New complaints include left hip and knee pain, this is not present when she is not ambulatory, it does get worse with ambulation, with continued ambulation it tends to improve slightly    She thinks that she may have some arthritis in her hip and knee and has previously undergone a right-sided knee replacement in the past    The following portions of the patient's history were reviewed and updated as appropriate: allergies, current medications, past family history, past medical history, past social history, past surgical history and problem list       Detailed Urologic History     - please refer to HPI    Review of Systems     Review of Systems   Constitutional: Negative  HENT: Negative  Eyes: Negative  Respiratory: Negative  Cardiovascular: Negative  Gastrointestinal: Negative  Endocrine: Negative  Genitourinary: Negative  Musculoskeletal: Positive for arthralgias and gait problem  Skin: Negative  Allergic/Immunologic: Negative  Hematological: Negative  Psychiatric/Behavioral: Negative  Allergies     Allergies   Allergen Reactions    Other GI Intolerance     Allergic to darvocet  Allergic to adhesive tape,moderate rash    Percocet [Oxycodone-Acetaminophen] GI Intolerance    Vicodin [Hydrocodone-Acetaminophen] GI Intolerance       Physical Exam     Physical Exam  Vitals reviewed  Constitutional:       General: She is not in acute distress  Appearance: Normal appearance  She is not ill-appearing, toxic-appearing or diaphoretic  HENT:      Head: Normocephalic and atraumatic  Eyes:      General: No scleral icterus  Right eye: No discharge  Left eye: No discharge  Cardiovascular:      Pulses: Normal pulses  Pulmonary:      Effort: Pulmonary effort is normal    Abdominal:      General: There is no distension  Palpations: There is no mass  Musculoskeletal:         General: No swelling  Comments: Antalgic gait, limping   Skin:     Coloration: Skin is not jaundiced  Neurological:      General: No focal deficit present  Mental Status: She is alert and oriented to person, place, and time  Cranial Nerves: No cranial nerve deficit  Psychiatric:         Mood and Affect: Mood normal          Behavior: Behavior normal          Thought Content:  Thought content normal          Judgment: Judgment normal              Vital Signs  Vitals:    05/10/22 1047   BP: 144/82   Pulse: 71   Resp: 16   SpO2: 98%   Weight: 84 6 kg (186 lb 6 4 oz)   Height: 5' 2" (1 575 m)         Current Medications       Current Outpatient Medications:     aspirin 81 mg chewable tablet, Chew 81 mg daily, Disp: , Rfl:     atorvastatin (LIPITOR) 40 mg tablet, Take 1 tablet (40 mg total) by mouth daily with dinner (Patient taking differently: Take 20 mg by mouth Every two days ), Disp: 14 tablet, Rfl: 0    cholecalciferol (VITAMIN D3) 1,000 units tablet, Take 1,000 Units by mouth daily, Disp: , Rfl:     hydrochlorothiazide (HYDRODIURIL) 25 mg tablet, Take 25 mg by mouth every morning, Disp: , Rfl:     metoprolol succinate (TOPROL-XL) 50 mg 24 hr tablet, Take 25 mg by mouth every morning (Patient not taking: Reported on 5/9/2022 ), Disp: , Rfl:     senna (SENOKOT) 8 6 mg, Take 1 tablet (8 6 mg total) by mouth daily at bedtime for 5 days (Patient not taking: Reported on 4/7/2022 ), Disp: 5 tablet, Rfl: 0      Active Problems     Patient Active Problem List   Diagnosis    Hypokalemia    Bone lesion    Wrist drop    Stroke (HCC)    Malignant neoplasm of overlapping sites of bladder ([facility] [address] )    Encounter to discuss test results    Goals of care, counseling/discussion         Past Medical History     Past Medical History:   Diagnosis Date    Arthritis     right knee    Hay fever     Hyperlipidemia     Hypertension     Myocardial infarction ([facility] [address] ) 2003    questionable MI during exploratory surgery    Ovarian cancer ([facility] [address] ) 2003    surgery (did not have chemo or radiation)    [facility] 2003    " mini stroke " no deficits ; another poss stroke with  left hand weakness    Wears glasses     Wears partial dentures     upper & lower         Surgical History     Past Surgical History:   Procedure Laterality Date    BREAST SURGERY Right     exc  bx  lump (R) breast    DILATION AND CURETTAGE OF UTERUS      HYSTERECTOMY      TAHBSO    JOINT REPLACEMENT Right     OOPHORECTOMY Bilateral     cancer    MA CYSTOURETHROSCOPY,FULGUR 0 5-2 CM LESN N/A 2022    Procedure: TRANSURETHRAL RESECTION OF BLADDER (MULTIFOCAL) TUMOR (TURBT); Surgeon: Alex Vuong MD;  [facility];  Service: Urology    MA CYSTOURETHROSCOPY,FULGUR >5 CM LESN Bilateral 2021    Procedure: CYSTOSCOPY, TRANSURETHRAL RESECTION OF BLADDER TUMOR (TURBT), EVACUATION OF CLOTS, LARGE TUMOR 7-8CM; Surgeon: Lori Rust MD;  Location: [address] [place] [address];  Service: Urology    MA CYSTOURETHROSCOPY,URETER CATHETER Bilateral 2022    Procedure: CYSTOSCOPY WITH ATTEMPTED B/L RETROGRADE PYELOGRAM;  Surgeon: Alex Vuong MD;  [facility];  Service: Urology    MA XCAPSL CTRC RMVL INSJ IO LENS PROSTH W/O ECP Left 2017    Procedure: EXTRACTION EXTRACAPSULAR CATARACT PHACO INTRAOCULAR LENS (IOL); Surgeon: Allegra Bowman MD;  Location: [facility] MAIN OR;  Service: Ophthalmology    TONSILLECTOMY           Family History     Family History   Problem Relation Age of Onset   Tony Flower Parkinsonism Mother     Heart disease Father     Cancer Father         leukemia    Heart disease Sister     Diabetes Sister     Diabetes Brother     Diabetes Sister     Melanoma Sister     Melanoma Sister     Diabetes Maternal Aunt     No Known Problems Maternal Aunt     No Known Problems Paternal Aunt          Social History     Social History     Social History     Tobacco Use   Smoking Status Former Smoker    Packs/day: 1 50    Years: 40 00    Pack years: 60 00    Types: Cigarettes    Quit date:     Years since quittin 3   Smokeless Tobacco Never Used         Pertinent Lab Values     Lab Results   Component Value Date    CREATININE 1 37 (H) 2022       Final Diagnosis   A   Bladder tumor, multifocal (TURB):  - Invasive high grade papillary urothelial carcinoma  - Muscularis propria (detrusor muscle) is negative for carcinoma     Comment:  - The patient's prior biopsy result, [id number], showing muscularis propria (detrusor muscle) invasion is noted     - A focus of possible lymph-vascular invasion is present however clefting artifact around tumor cells is present precluding definitive characterization       Case signed out at [address] [place] [address] [place] [id number]      Electronically signed by Taylor Macias MD on 4/18/2022 at  9:22 AM           Pertinent Imaging      No new imaging for my review

## 2022-05-09 NOTE — PATIENT INSTRUCTIONS
Bladder Cancer   WHAT YOU NEED TO KNOW:   Bladder cancer starts in the cells that line your bladder  DISCHARGE INSTRUCTIONS:   Call 911 for any of the following:   · You suddenly feel lightheaded and short of breath  · You cough up blood  Seek care immediately if:   · Your arm or leg feels warm, tender, and painful  It may look swollen and red  · You are unable to urinate  Contact your healthcare provider or oncologist if:   · You have a fever  · You vomit and cannot keep any liquids or food down  · You have new or worsening pain  · Your pain gets worse or does not go away after you take pain medicine  · You have questions or concerns about your condition or care  Self-care:   · Do not smoke  Nicotine can damage blood vessels and make it hard to manage your bladder cancer  Smoking also increases your risk for new or returning cancer  Ask your healthcare provider for information if you currently smoke and need help to quit  E-cigarettes or smokeless tobacco still contain nicotine  Talk to your healthcare provider before you use these products  · Limit or do not drink alcohol  Alcohol may cause you to become dehydrated  Ask your oncologist if it is safe for you to drink alcohol, and how much is safe to drink  · Eat healthy foods  Healthy foods include fruit, vegetables, whole-grain breads, low-fat dairy products, beans, lean meats, and fish  Your healthcare provider may recommend that you eat less red meat  You need to eat enough calories to help prevent weight loss and increase your energy level  You also need protein to give you strength  If you do not feel hungry, eat small amounts often instead of large meals  · Drink liquids as directed  You may need to drink more liquids than usual to prevent dehydration  Ask how much liquid to drink each day and which liquids are best for you  · Exercise as directed    Exercise may help increase your energy level and appetite  Ask your healthcare provider how much exercise you need and which exercises are best for you  For more information and support: It may be difficult for you and your family to go through cancer and cancer treatments  Join a support group or talk with others who have gone through treatment  · [address]  [place]    [address]  Phone: [phone]  Web Address: http://[employer]/  [place]    · [address] [place] [address], [address] [place] [address]  Phone: [phone]  Web Address: http://[employer]/  gov    Follow up with your healthcare provider or oncologist as directed: You will need to see your oncologist for ongoing treatment  Write down your questions so you remember to ask them during your visits  © Copyright [employer] 2022 Information is for End User's use only and may not be sold, redistributed or otherwise used for commercial purposes  All illustrations and images included in CareNotes® are the copyrighted property of A D A M , Inc  or [address] [place] [address]  The above information is an  only  It is not intended as medical advice for individual conditions or treatments  Talk to your doctor, nurse or pharmacist before following any medical regimen to see if it is safe and effective for you

## 2022-05-10 ENCOUNTER — OFFICE VISIT (OUTPATIENT)
Dept: UROLOGY | Facility: CLINIC | Age: 82
End: 2022-05-10
Payer: COMMERCIAL

## 2022-05-10 VITALS
HEIGHT: 62 IN | OXYGEN SATURATION: 98 % | HEART RATE: 71 BPM | RESPIRATION RATE: 16 BRPM | SYSTOLIC BLOOD PRESSURE: 144 MMHG | DIASTOLIC BLOOD PRESSURE: 82 MMHG | WEIGHT: 186.4 LBS | BODY MASS INDEX: 34.3 KG/M2

## 2022-05-10 DIAGNOSIS — C67.8 MALIGNANT NEOPLASM OF OVERLAPPING SITES OF BLADDER (HCC): Primary | ICD-10-CM

## 2022-05-10 DIAGNOSIS — M25.552 LEFT HIP PAIN: ICD-10-CM

## 2022-05-10 DIAGNOSIS — Z71.2 ENCOUNTER TO DISCUSS TEST RESULTS: ICD-10-CM

## 2022-05-10 DIAGNOSIS — Z71.89 GOALS OF CARE, COUNSELING/DISCUSSION: ICD-10-CM

## 2022-05-10 PROCEDURE — 99214 OFFICE O/P EST MOD 30 MIN: CPT | Performed by: UROLOGY

## 2022-05-11 ENCOUNTER — PATIENT OUTREACH (OUTPATIENT)
Dept: HEMATOLOGY ONCOLOGY | Facility: CLINIC | Age: 82
End: 2022-05-11

## 2022-05-11 NOTE — PROGRESS NOTES
Pt seen by Dr oBnnie Fair 5/10/2022 and at this time agreeable to radiation with systemic treatment but wants to wait to start until July  Last seen by Dr Estephanie Lema on 3/28/2022  Please schedule pt for appt

## 2022-05-18 ENCOUNTER — TELEPHONE (OUTPATIENT)
Dept: RADIATION ONCOLOGY | Facility: HOSPITAL | Age: 82
End: 2022-05-18

## 2022-05-18 NOTE — TELEPHONE ENCOUNTER
I called the patient to review recent TURBT pathology and the recommendation to proceed with radiotherapy with or without pembrolizumab for bladder preservation as she has not been offered and is not interested in cystectomy  I reviewed the appropriate timing of radiation, taking place in the weeks following maximal TURBT  As such, I offered simulation and treatment now  She has developed leg pain, worse when standing in the morning  She denies numbness, weakness or tingling  She is awaiting evaluation next week  She states she will not make a decision regarding treatment until this evaluation  I counseled that delaying radiation may negatively impact outcomes give potential tumor progression  She is still not willing to make a decision at this time  I urged her to go to the ED for expedited evaluation of her leg pain, and she declined  All questions were answered to her satisfaction and she will contact this office if and when she wishes to proceed with radiation

## 2022-08-02 ENCOUNTER — OFFICE VISIT (OUTPATIENT)
Dept: AUDIOLOGY | Facility: CLINIC | Age: 82
End: 2022-08-02
Payer: COMMERCIAL

## 2022-08-02 DIAGNOSIS — H90.3 SENSORINEURAL HEARING LOSS (SNHL), BILATERAL: Primary | ICD-10-CM

## 2022-08-02 PROCEDURE — 92567 TYMPANOMETRY: CPT | Performed by: AUDIOLOGIST

## 2022-08-02 PROCEDURE — 92557 COMPREHENSIVE HEARING TEST: CPT | Performed by: AUDIOLOGIST

## 2022-08-02 NOTE — PROGRESS NOTES
ADULT HEARING EVALUATION - [place] [address] AUDIOLOGY      Patient Name: Bobby Pillai   MRN:  [id number]   :  1940   Age: 79 y o  Gender: female   DOS: 2022     HISTORY:     Bobby Pillai, a 79 y o  female, was seen on 2022 at the referral of Dr Carrillo Baltazar for an audiometric evaluation  Ms Nisreen Panchal was unaccompanied to today's visit  Ms Nisreen Panchal is a new patient to our practice  Today, Ms Chambers's primary complaint(s) was bilateral hearing loss, AD>AS  Onset of symptoms reportedly began 3-4 years ago and have been gradual in nature  Of note, Ms Nisreen Panchal explained a cluster of vertigo episodes during this initial onset  Since this time, her right ear has been poorer re: left  Ms Nisreen Panchal denied otalgia, otorrhea, aural fullness, tinnitus and dizziness  History of otitis was negative  Ms Nisreen Panchal has no history of ear surgeries  Family history of hearing loss was positive, with her sister, brother, and father having been diagnosed with acquired hearing loss over time  Communication difficulties include needing to ask communication partners to repeat themselves  History of noise exposure is positive, with Ms Nisreen Panchal being 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  Other documented medical history states that Ms Nisreen Panchal  has a past medical history of Arthritis, Hay fever, Hyperlipidemia, Hypertension, Myocardial infarction [facility]) (), Ovarian cancer ([facility] [address] ) (), Stroke [facility]) (), Wears glasses, and Wears partial dentures  Ms Nisreen Panchal has had her hearing tested previously 5-10 years ago (previous records not available for review)      RESULTS:    Otoscopic Evaluation:   Right Ear: Unremarkable, canal clear   Left Ear: Unremarkable, canal clear    Tympanometry:   Right Ear: Type A; normal middle ear pressure and static compliance    Left Ear: Type A; normal middle ear pressure and static compliance     Audiometry:  Conventional pure tone audiometry from 250 - 8000 Hz  obtained with good reliability and revealed the following:     Right Ear: moderate to profound sensorineural hearing loss (SNHL)   Left Ear: normal to severe sensorineural hearing loss (SNHL)     Stengers negative from 250 - 1000 Hz    Speech Audiometry:    Speech Reception (SRT)   Right Ear: NR @ 105 dB HL, SAT @ 55 dB HL   Left Ear: 20 dB HL    Word Recognition Scores (WRS):  Right Ear: very poor (8 % correct)     Left Ear: excellent (92 % correct)   Stimuli: NU-6    IMPRESSIONS:  Bilateral SNHL, normal to severe AS, moderate to profound AD  There is marked asymmetry AD>AS  Establishment of care with ENT recommended for further investigation/monitoring of asymmetrical hearing  The results of today's findings were reviewed with Ms Arlyn Barrios and her hearing thresholds were explained at length  Treatment options, including amplification and communication strategies, were discussed as appropriate  Ms Arlyn Barrios voiced understanding of her test results and had no further questions  RECOMMENDATIONS:    1 ) Follow-up with referring provider  2 ) Establish care with ENT for HL asymmetry  3 ) Based on today's findings and patient symptoms, Ms Arlyn Barrios is a candidate for a trial with amplification  A hearing aid evaluation to further discuss Ms Chambers's lifestyle, communication needs, and develop a treatment plan for their hearing loss is recommended following medical clearance  *see attached audiogram*    It was a pleasure working with Ms Arlyn Barrios today  Thank you for referring this patient  Patience Irving    Clinical Audiologist    [id number] [place] [id number]

## 2022-08-08 ENCOUNTER — OFFICE VISIT (OUTPATIENT)
Dept: OTOLARYNGOLOGY | Facility: CLINIC | Age: 82
End: 2022-08-08
Payer: COMMERCIAL

## 2022-08-08 VITALS — TEMPERATURE: 97.3 F | WEIGHT: 190 LBS | BODY MASS INDEX: 34.96 KG/M2 | HEIGHT: 62 IN

## 2022-08-08 DIAGNOSIS — H90.3 SENSORINEURAL HEARING LOSS (SNHL), BILATERAL: Primary | ICD-10-CM

## 2022-08-08 DIAGNOSIS — H90.A21 SENSORINEURAL HEARING LOSS (SNHL) OF RIGHT EAR WITH RESTRICTED HEARING OF LEFT EAR: ICD-10-CM

## 2022-08-08 PROCEDURE — 99204 OFFICE O/P NEW MOD 45 MIN: CPT | Performed by: NURSE PRACTITIONER

## 2022-08-08 RX ORDER — MELOXICAM 15 MG/1
TABLET ORAL
COMMUNITY
Start: 2022-05-26 | End: 2023-04-13 | Stop reason: ALTCHOICE

## 2022-08-08 NOTE — ASSESSMENT & PLAN NOTE
Gradual hearing loss, unknown longevity  Reviewed recent audiogram indicating left SNHL mild sloping to severe in high frequency, right hearing loss severe to profound SNHL  Tymps type A bilaterally  Word discrimination on left 92% and 8% on right  Reviewed causes of asymmetrical SNHL in gradual nature including noise exposure, autoimmune, Lyme, viral, inflammatory process, vs acoustic neuroma  Reviewed treatment options including lab studies, and imaging  Pt did undergo MRI brain without contrast 10/2021, review of results with no evaluation of IAC  Agreed to reach out to radiology for addendum  Pt declined lab studies at this time  Offered options for bilateral SNHL including acceptance, lifestyle changes such as moving closer to those who are speaking, or hearing amplification  She would qualify for hearing amplification based on audiogram  Bi Cros may be most beneficial due to severity of right hearing loss  Pt declining surgical discussion related to ears  Discussed hearing aid options including facilities to obtain a hearing aid from as well as costs and benefits  Discussed that quality of life may improve with hearing amplification  Recommend repeat audiogram annually and pt agreed

## 2022-08-08 NOTE — PROGRESS NOTES
Assessment/Plan:    Sensorineural hearing loss (SNHL) of right ear with restricted hearing of left ear  Gradual hearing loss, unknown longevity  Reviewed recent audiogram indicating left SNHL mild sloping to severe in high frequency, right hearing loss severe to profound SNHL  Tymps type A bilaterally  Word discrimination on left 92% and 8% on right  Reviewed causes of asymmetrical SNHL in gradual nature including noise exposure, autoimmune, Lyme, viral, inflammatory process, vs acoustic neuroma  Reviewed treatment options including lab studies, and imaging  Pt did undergo MRI brain without contrast 10/2021, review of results with no evaluation of IAC  Agreed to reach out to radiology for addendum  Pt declined lab studies at this time  Offered options for bilateral SNHL including acceptance, lifestyle changes such as moving closer to those who are speaking, or hearing amplification  She would qualify for hearing amplification based on audiogram  Bi Cros may be most beneficial due to severity of right hearing loss  Pt declining surgical discussion related to ears  Discussed hearing aid options including facilities to obtain a hearing aid from as well as costs and benefits  Discussed that quality of life may improve with hearing amplification  Recommend repeat audiogram annually and pt agreed  Diagnoses and all orders for this visit:    Sensorineural hearing loss (SNHL), bilateral    Sensorineural hearing loss (SNHL) of right ear with restricted hearing of left ear          Subjective:      Patient ID: Lynne Barillas is a 79 y o  female  Presents today as a new patient due to hearing loss  Gradual hearing loss  Family complains of her not hearing well  Right is worse than left  Denies excessive noise exposure  Notes vertigo several years ago and hearing loss at the time  No otalgia, otorrhea  No prior ear surgery  No current hearing aids          The following portions of the patient's history were reviewed and updated as appropriate: allergies, current medications, past family history, past medical history, past social history, past surgical history and problem list     Review of Systems   Constitutional: Negative  HENT: Positive for hearing loss  Negative for congestion, ear discharge, ear pain, nosebleeds, postnasal drip, rhinorrhea, sinus pressure, sinus pain, sore throat, tinnitus and voice change  Eyes: Negative  Respiratory: Negative for chest tightness and shortness of breath  Cardiovascular: Negative  Gastrointestinal: Negative  Endocrine: Negative  Musculoskeletal: Negative  Skin: Negative for color change  Neurological: Negative for dizziness, numbness and headaches  Psychiatric/Behavioral: Negative  Objective:      Temp (!) 97 3 °F (36 3 °C) (Temporal)   Ht 5' 2" (1 575 m)   Wt 86 2 kg (190 lb)   BMI 34 75 kg/m²          Physical Exam  Constitutional:       Appearance: She is well-developed  HENT:      Head: Normocephalic  Right Ear: Hearing, tympanic membrane, ear canal and external ear normal  No decreased hearing noted  No drainage or tenderness  Tympanic membrane is not perforated or erythematous  Left Ear: Hearing, tympanic membrane, ear canal and external ear normal  No decreased hearing noted  No drainage or tenderness  Tympanic membrane is not perforated or erythematous  Nose: Nose normal  No nasal deformity or septal deviation  Mouth/Throat:      Mouth: Mucous membranes are not pale and not dry  No oral lesions  Dentition: Normal dentition  Pharynx: Uvula midline  No oropharyngeal exudate  Neck:      Trachea: No tracheal deviation  Cardiovascular:      Rate and Rhythm: Normal rate  Pulmonary:      Effort: Pulmonary effort is normal  No accessory muscle usage or respiratory distress  Musculoskeletal:      Right shoulder: Normal range of motion        Cervical back: Full passive range of motion without pain, normal range of motion and neck supple  Lymphadenopathy:      Cervical: No cervical adenopathy  Skin:     General: Skin is warm and dry  Neurological:      Mental Status: She is alert and oriented to person, place, and time  Cranial Nerves: No cranial nerve deficit  Sensory: No sensory deficit  Psychiatric:         Behavior: Behavior is cooperative

## 2022-08-16 ENCOUNTER — PATIENT OUTREACH (OUTPATIENT)
Dept: HEMATOLOGY ONCOLOGY | Facility: CLINIC | Age: 82
End: 2022-08-16

## 2022-08-16 NOTE — PROGRESS NOTES
Call to pt to inquire about status of her following up regarding her bladder cancer  Pt states she has been dealing with additional health issues in the meantime, but is agreeable to scheduling with Medical Oncology, but she does not want to have radiation  Informed her I would look for an appt with Medical Oncology and either myself or their office will call her back  Pt agreeable to schedule now and proceed with Immunotherapy only  Available Mon/Tues only due to DIL schedule  Prefers [facility]    I looked for appt but unable to find any timely opening with Dr Danielle Ch or Judith Lane PA-C

## 2022-08-17 ENCOUNTER — PATIENT OUTREACH (OUTPATIENT)
Dept: HEMATOLOGY ONCOLOGY | Facility: CLINIC | Age: 82
End: 2022-08-17

## 2022-08-17 NOTE — PROGRESS NOTES
Left voicemail for pt advising of appt chanteled with Dr Radha Tinoco on 8/31/22 at 2pm  this visit was scheduled at [facility] due to availability but will schedule the remainder at [facility]  Requested call back with any questions or concerns

## 2022-08-17 NOTE — PROGRESS NOTES
Pt has been scheduled for 08/31/2022 2:00 PM with Dr Sandrita Stone  Due to availability patient is scheduled in [facility] for this visit  Remainder of visits can be scheduled at [facility]       [facility], can you please call the patient and let her know of apt and need to be seen in [facility]

## 2022-08-22 ENCOUNTER — PATIENT OUTREACH (OUTPATIENT)
Dept: HEMATOLOGY ONCOLOGY | Facility: CLINIC | Age: 82
End: 2022-08-22

## 2022-08-22 NOTE — PROGRESS NOTES
Incoming call from pt stating she will not be able to make appt 8/31/22 because she will be out of town and then has another trip planned in September and again in October and questioning if she should just wait until then  Strongly advised against this and reminded her the recommendation was to start treatment months ago  Attempted to reschedule for after her return but she states she doesn't know when she is returning Pt states she doesn't want to reschedule until after she sees the surgeon and asked if that would be ok  Again emphasized that is not advised and suggested we look for another appt  Pt again declined and states she feels fine, which I reminded her she may not experience symptoms  She verbalized understanding and stated she will wait until after she sees Dr Doni Morales

## 2022-08-29 ENCOUNTER — OFFICE VISIT (OUTPATIENT)
Dept: AUDIOLOGY | Facility: CLINIC | Age: 82
End: 2022-08-29

## 2022-08-29 DIAGNOSIS — H90.3 SENSORINEURAL HEARING LOSS (SNHL), BILATERAL: Primary | ICD-10-CM

## 2022-08-29 NOTE — PROGRESS NOTES
HEARING AID EVALUTION - [place] [address] AUDIOLOGY    Patient Name: Toyin Horne   MRN:  [id number]   :  1940   Age: 79 y o  Gender: female   DOS:      Toyin Horne was seen today for a hearing aid evaluation following her audiometric testing performed on 2022  Ms Rigo Samano was unaccompanied to today's visit  Ms Rigo Samano was referred by Dr Jodee Jones  The audiometric evaluation on 2022 revealed:              Right Ear: moderate to profound sensorineural hearing loss (SNHL)  Left Ear: normal to severe sensorineural hearing loss (SNHL)     Word recognition scores were very poor in the right ear and excellent in the left ear  This findings were reviewed with Ms Rigo Samano  All of her questions regarding her hearing status were addressed, and the importance of realistic expectations of hearing loss and amplification were emphasized  Ms Pavan Ann main goal is aiding her right ear  Hearing aids are assistive devices and are not designed to restore normal hearing  The difference between peripheral hearing loss and speech understanding (central hearing loss) was explained  While improvement with amplification is possible, there will always be word understanding problems due to the inherent nature of hearing loss  These problems will be greater in background noise than in quiet situations  Ms Rigo Samano was counseled on the importance of self-advocacy, motivation, as well as effective communication strategies that can be used to optimize hearing aid success  These effective communication strategies include:   1 ) Maintaining face-to-face communication, allowing for speechreading of facial expressions, lips, and gestures  2 ) Reducing background noise and distance between communication partners  3 ) Having communication partners reduce their rate of speech when appropriate     4 ) Beginning conversation by getting communication partner's attention and stating the topic, allowing for context clues to help fill in gaps in comprehension  5 ) Asking for rephrasing of aspects of conversation that are missed when needed rather than asking for repetitions  To better determine which communication difficulties they are looking to improve upon, a client-oriented scale of improvement (COSI) questionnaire was completed  Based on these results, Ms Chambers's prioritized communication difficulties include:     1 ) Understanding at work (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)   2 ) Understanding friends at dinner settings (goes out weekly)  3 ) Hearing family members  4 ) Hearing the television    Ms Arlyn Barrios was counseled at length regarding her hearing test and limitations of aiding the right side  Due to very poor WRS and poor tolerance of aided sound on this side during today's evaluation, it is likely that Ms Arlyn Barrios will not be happy with sound quality on this side  The prognosis for aiding Ms Chambers's left ear is much better  Expectations were established that Ms Arlyn Barrios may get some improved sound awareness on this side, but that benefit would be very limited  CROS technology was also offered as an option, as well as consideration of cochlear implantation  Ms Arlyn Barrios was uninterested in these two possibilities  Ms Arlyn Barrios wished to proceed with the right ear, and if this does not go as well as she expects, she will consider changing receivers and aiding the left ear  Ms Arlyn Barrios expressed understanding of these topics and possible poor outcome on this right side  Strengths and limitations of amplification, including various hearing aid styles, options, and circuitry were discussed at length with Ms Arlyn Barrios  Based on the degree of her hearing loss, preferences, and lifestyle needs, a trial with Ot[employer]ia miniRITE 3 hearing aid was recommended with size 2/100 dB  and small power dome domes  Ms Arlyn Barrios preferred the Chroma beige form factor color   [facility] office policies regarding our hearing aid program were reviewed, including the 45 day trial period, non-refundable fees, as well as the  warranties  At this time, Ms Juana Dinero wished to proceed with purchase of the above hearing aids  Devices ordered as specified (confirmation # [id number])  She will return for a hearing aid fitting in 2 weeks  It was a pleasure working with Ms Juana Dinero  They were encouraged to contact the clinic with any further questions or concerns in the meantime  Patience Wynne    Clinical Audiologist    [address] [address] [id number]      '

## 2022-09-12 ENCOUNTER — OFFICE VISIT (OUTPATIENT)
Dept: AUDIOLOGY | Facility: CLINIC | Age: 82
End: 2022-09-12
Payer: COMMERCIAL

## 2022-09-12 DIAGNOSIS — H90.3 SENSORINEURAL HEARING LOSS (SNHL), BILATERAL: Primary | ICD-10-CM

## 2022-09-12 PROCEDURE — V5257 HEARING AID, DIGIT, MON, BTE: HCPCS | Performed by: AUDIOLOGIST

## 2022-09-12 NOTE — PROGRESS NOTES
[place] Hearing Aid Fitting    Jessica Tamayo was seen today (9/12/2022) for a monaural hearing aid fitting of her Oticon More 3  in the canal (APRYL) hearing aid(s)  Ms Alexa Alcaraz was unaccompanied to today's visit    Device Information    Hearing Aid Fitting Date: 9/12/2022     Left Device Right Device   Hearing Aid Make: N/A Oticon   Hearing Aid Model: N/A More 3 miniRITE   Serial Number: N/[id number]   Repair Warranty Expiration Date: N/A 09/28/2022   Loss/Damage Warranty Expiration Date:  N/A 09/28/2022    Length: N/A 2    Output: N/A 100   Wax System: N/A Pro Wax miniFIT   Dome Size/Style: N/A 6mm power   Battery: N/A Lithium-ion Rechargeable   Earmold Serial Number: N/A N/A   Zettolegario Basil Date:  N/A N/A      Serial Number: [id number]  Accessories: N/A      Initial Hearing Aid Settings    Hearing aid(s) were programmed using [employer] software's VAC+ fitting formula and was validated by Ms Alexa Alcaraz for perceived comfort levels   Manual control button was deactivated  Hearing aids set to experience level 1 per Ms  Reyes's subjective listening preference  Ms Alexa Alcaraz has limited tolerance for amplification on this side    Device Orientation    Ms Alexa Alcaraz was counseled on device components and component function  Proper insertion and removal of the aid(s) was demonstrated  The importance of realistic expectations with expectation, especially in the presence of background noise, was emphasized  Hearing aids are assistive devices and are not designed to restore normal hearing sensitivity  The need for daily consistent usage (8-12 hours per day) for proper device acclimatization, as well as the importance of self-advocacy and practicing effective communication strategies was outlined  Effective communication strategies include:  1 ) Maintaining face-to-face communication, allowing for speechreading of facial expressions, lips, and gestures    2 ) Reducing background noise and distance between communication partners  3 ) Having communication partners reduce their rate of speech when appropriate  4 ) Beginning conversation by getting communication partner's attention and stating the topic, allowing for context clues to help fill in gaps in comprehension  5 ) Asking for rephrasing of missed aspects of conversation rather than asking for repetitions  Ms Jarrett Tyson was given the information booklet (or QR code pamphlet to the booklet) regarding hearing aid usage and operation, hearing aid cleaning tools, and hearing aid carrying case  Hearing aid repair and loss and damage warranties offered through the  were outlined thoroughly  Ms Jarrett Tyson agreed to the terms of sale listed on the purchase agreement containing device specifications, warranties, pricing information, as well as [facility] 45-day trial period timeline  After this period has elapsed, hearing aids cannot be returned  Recommendations & Follow-up    Ms Jarrett Tyson demonstrated understanding of the topics discussed  It appears that she has realistic expectations regarding the benefits and limitations with the use of amplification  The prognosis for successful hearing aid use is poor given Ms Chambers's WRS and intolerance of gain on this side  Limited prognosis was outlined at length in today's plan of care  Ms Jarrett Tyson will continue to increase gain slowly in an effort to feel more balanced  As a backup plan if Ms Chambers cannot acclimate she should consider switching the HA to her left ear, my initial recommendation  While improvement with amplification is expected, there will always be word understanding problems due to the inherent nature of hearing loss  These problems will be greater in background noise and adverse listening environments than in quiet situations  Ms Jarrett Tyson is to follow-up in 2-3 weeks for a hearing aid check within the trial period as scheduled  At this time of Ms Chambers's next visit, the following topics should be reviewed: wax guard removal/replacement, increasing adaptation/gain settings after initial acclimatization, activation of push button/toggle switches for manual volume control, as well as noise reductive features in Oticon Genie 2 software  The above recommendations were discussed with Ms Afshin Ibarra  It was a pleasure working with Ms Afshin Ibarra today  She was encouraged to contact the clinic with any further questions/concerns in the meantime  Patience Guthrie    Clinical Audiologist    [address] [place] [phone]

## 2022-09-12 NOTE — PROGRESS NOTES
Problem List Items Addressed This Visit        Genitourinary    Malignant neoplasm of overlapping sites of bladder [facility]) - Primary    Relevant Orders    CT chest abdomen pelvis wo contrast       Other    Goals of care, counseling/discussion            Discussion:    Elisa Nash is feeling well, ECOG is 0, we have previously recommended trimodality therapy, she does not wish to proceed to chemotherapy and radiation  Her cystoscopy today does show recurrence of a nodular area at the right bladder neck, along with some mucosal changes at the area of previous resection at the right anterior bladder wall  I did offer her resection of these areas which she wishes to survey this  We will proceed to surveillance cystoscopy with repeat CT scan without contrast given her relatively poor kidney function in 3 months  All questions and concerns answered and addressed  Assessment and plan:       Please see problem oriented charting for the assessment plan of today's urological complaints      Kylee Ceballos MD      Chief Complaint     As above      History of Present Illness     Kimberly Garcia is a 79 y o  woman with muscle invasive bladder cancer, previously recommended to undergo trimodality therapy  She does not want this done, she has been okay with palliative resection in the past   She is feeling well, ECOG of 0  Real-time review of cystoscopic findings with her, recurrent area of 2 centimeters of nodular bladder tumor at the right bladder neck  No other complaints today  The following portions of the patient's history were reviewed and updated as appropriate: allergies, current medications, past family history, past medical history, past social history, past surgical history and problem list     Detailed Urologic History     - please refer to HPI    Review of Systems     Review of Systems   Constitutional: Negative  HENT: Negative  Eyes: Negative  Respiratory: Negative      Cardiovascular: Negative  Gastrointestinal: Negative  Endocrine: Negative  Genitourinary: Negative  Musculoskeletal: Negative  Skin: Negative  Allergic/Immunologic: Negative  Neurological: Negative  Hematological: Negative  Psychiatric/Behavioral: Negative  Allergies     Allergies   Allergen Reactions    Other GI Intolerance     Allergic to darvocet  Allergic to adhesive tape,moderate rash    Percocet [Oxycodone-Acetaminophen] GI Intolerance    Vicodin [Hydrocodone-Acetaminophen] GI Intolerance    Medical Tape Rash    Propoxyphene Other (See Comments)       Physical Exam     Physical Exam  Vitals reviewed  Constitutional:       General: She is not in acute distress  Appearance: Normal appearance  She is not ill-appearing, toxic-appearing or diaphoretic  HENT:      Head: Normocephalic and atraumatic  Eyes:      General: No scleral icterus  Right eye: No discharge  Left eye: No discharge  Cardiovascular:      Pulses: Normal pulses  Pulmonary:      Effort: Pulmonary effort is normal    Abdominal:      General: There is no distension  Palpations: There is no mass  Genitourinary:     Comments: Vaginal mucosal atrophy is present  Musculoskeletal:         General: No swelling  Skin:     Coloration: Skin is not jaundiced  Neurological:      General: No focal deficit present  Mental Status: She is alert and oriented to person, place, and time  Cranial Nerves: No cranial nerve deficit  Psychiatric:         Mood and Affect: Mood normal          Behavior: Behavior normal          Thought Content: Thought content normal          Judgment: Judgment normal              Vital Signs  There were no vitals filed for this visit        Current Medications       Current Outpatient Medications:     aspirin 81 mg chewable tablet, Chew 81 mg daily, Disp: , Rfl:     atorvastatin (LIPITOR) 40 mg tablet, Take 1 tablet (40 mg total) by mouth daily with dinner (Patient taking differently: Take 20 mg by mouth Every two days), Disp: 14 tablet, Rfl: 0    cholecalciferol (VITAMIN D3) 1,000 units tablet, Take 1,000 Units by mouth daily, Disp: , Rfl:     hydrochlorothiazide (HYDRODIURIL) 25 mg tablet, Take 25 mg by mouth every morning, Disp: , Rfl:     meloxicam (MOBIC) 15 mg tablet, , Disp: , Rfl:     metoprolol succinate (TOPROL-XL) 50 mg 24 hr tablet, Take 25 mg by mouth every morning, Disp: , Rfl:     senna (SENOKOT) 8 6 mg, Take 1 tablet (8 6 mg total) by mouth daily at bedtime for 5 days (Patient not taking: Reported on 4/7/2022 ), Disp: 5 tablet, Rfl: 0      Active Problems     Patient Active Problem List   Diagnosis    Hypokalemia    Bone lesion    Wrist drop    Stroke ([address] )    Malignant neoplasm of overlapping sites of bladder [facility] )    Encounter to discuss test results    Goals of care, counseling/discussion    Sensorineural hearing loss (SNHL), bilateral    Sensorineural hearing loss (SNHL) of right ear with restricted hearing of left ear         Past Medical History     Past Medical History:   Diagnosis Date    Arthritis     right knee    Hay fever     Hyperlipidemia     Hypertension     Myocardial infarction ([address] ) 2003    questionable MI during exploratory surgery    Ovarian cancer ([address] ) 2003    surgery (did not have chemo or radiation)    Stroke ([facility] ) 2003    " mini stroke " no deficits ; another poss stroke with  left hand weakness    Wears glasses     Wears partial dentures     upper & lower         Surgical History     Past Surgical History:   Procedure Laterality Date    BREAST SURGERY Right     exc  bx  lump (R) breast    DILATION AND CURETTAGE OF UTERUS      HYSTERECTOMY  2003    TAHBSO    JOINT REPLACEMENT Right     OOPHORECTOMY Bilateral     cancer    IN CYSTOURETHROSCOPY,FULGUR 0 5-2 CM LESN N/A 4/4/2022    Procedure: TRANSURETHRAL RESECTION OF BLADDER (MULTIFOCAL) TUMOR (TURBT);   Surgeon: Roxanna Rao MD;  [facility];  Service: Urology    HI CYSTOURETHROSCOPY,FULGUR >5 CM LESN Bilateral 2021    Procedure: CYSTOSCOPY, TRANSURETHRAL RESECTION OF BLADDER TUMOR (TURBT), EVACUATION OF CLOTS, LARGE TUMOR 7-8CM; Surgeon: Lor Diamond MD;  Location: [address] [place] [address];  Service: Urology    HI CYSTOURETHROSCOPY,URETER CATHETER Bilateral 2022    Procedure: CYSTOSCOPY WITH ATTEMPTED B/L RETROGRADE PYELOGRAM;  Surgeon: Bhargav Mohamud MD;  [facility];  Service: Urology    HI XCAPSL CTRC RMVL INSJ IO LENS PROSTH W/O ECP Left 2017    Procedure: EXTRACTION EXTRACAPSULAR CATARACT PHACO INTRAOCULAR LENS (IOL);   Surgeon: Александр Roy MD;  Location: [facility] OR;  Service: Ophthalmology    TONSILLECTOMY           Family History     Family History   Problem Relation Age of Onset    Parkinsonism Mother     Heart disease Father     Cancer Father         leukemia    Heart disease Sister     Diabetes Sister     Diabetes Brother     Diabetes Sister     Melanoma Sister     Melanoma Sister     Diabetes Maternal Aunt     No Known Problems Maternal Aunt     No Known Problems Paternal Aunt          Social History     Social History     Social History     Tobacco Use   Smoking Status Former Smoker    Packs/day: 1 50    Years: 40 00    Pack years: 60 00    Types: Cigarettes    Quit date:     Years since quittin 7   Smokeless Tobacco Never Used         Pertinent Lab Values     Lab Results   Component Value Date    CREATININE 1 37 (H) 2022                 Pertinent Imaging      Repeat imaging has been ordered for 3 months from now

## 2022-09-14 ENCOUNTER — PROCEDURE VISIT (OUTPATIENT)
Dept: UROLOGY | Facility: CLINIC | Age: 82
End: 2022-09-14
Payer: COMMERCIAL

## 2022-09-14 VITALS
SYSTOLIC BLOOD PRESSURE: 130 MMHG | HEART RATE: 90 BPM | OXYGEN SATURATION: 98 % | BODY MASS INDEX: 35.3 KG/M2 | WEIGHT: 191.8 LBS | HEIGHT: 62 IN | RESPIRATION RATE: 16 BRPM | DIASTOLIC BLOOD PRESSURE: 70 MMHG

## 2022-09-14 DIAGNOSIS — C67.8 MALIGNANT NEOPLASM OF OVERLAPPING SITES OF BLADDER (HCC): Primary | ICD-10-CM

## 2022-09-14 DIAGNOSIS — Z71.89 GOALS OF CARE, COUNSELING/DISCUSSION: ICD-10-CM

## 2022-09-14 DIAGNOSIS — Z71.2 ENCOUNTER TO DISCUSS TEST RESULTS: ICD-10-CM

## 2022-09-14 PROCEDURE — 52000 CYSTOURETHROSCOPY: CPT | Performed by: UROLOGY

## 2022-09-14 PROCEDURE — 99214 OFFICE O/P EST MOD 30 MIN: CPT | Performed by: UROLOGY

## 2022-09-14 NOTE — PROGRESS NOTES
Office Cystoscopy Procedure Note    Indication:    Urothelial carcinoma of the bladder    Informed consent   The risks, benefits, complications, treatment options, and expected outcomes were discussed with the patient  The patient concurred with the proposed plan and provided informed consent  Anesthesia  Lidocaine jelly 2%    Antibiotic prophylaxis   None    Procedure  In the presence of a female nurse, the patient was placed in the supine frog-legged position, was prepped and draped in the usual manner using sterile technique, and 2% lidocaine jelly instilled into the urethra  Prior to this pelvic examination showed atrophic labia majora and minora, a small vaginal introitus, poor quality vaginal mucosa, and showed no pelvic floor descensus and no urethral hypermobility  Upon stress maneuvers there was no stress incontinence  A 17 F flexible cystoscope was then inserted into the urethra and the urethra and bladder carefully examined  The following findings were noted:    Findings:  Urethra:  Normal  Bladder:  Abnormal, area of recurrent nodular growth at the right bladder neck with a fibrinous exudate covering this area, some mucosal changes along the right anterior bladder wall as well at sites of previous resection  Ureteral orifices:  Difficult to visualize due to previous resections  Other findings:  None , retroflexed view confirms    Specimens: None                 Complications:    None; patient tolerated the procedure well           Disposition: To home            Condition: Stable    Plan: Recurrence is noted within the bladder, mostly at the right bladder neck and anterior wall the bladder  The patient declines palliative resection  Cystoscopy     Date/Time 9/14/2022 10:12 AM     Performed by  Alberto Guy MD     Authorized by Alberto Guy MD      Universal Protocol:  Consent: Verbal consent obtained  Written consent obtained    Risks and benefits: risks, benefits and alternatives were discussed  Consent given by: patient  Patient understanding: patient states understanding of the procedure being performed  Patient consent: the patient's understanding of the procedure matches consent given  Procedure consent: procedure consent matches procedure scheduled  Relevant documents: relevant documents present and verified  Test results: test results available and properly labeled  Site marked: the operative site was not marked  Radiology Images displayed and confirmed  If images not available, report reviewed: imaging studies available  Required items: required blood products, implants, devices, and special equipment available  Patient identity confirmed: verbally with patient and provided demographic data        Procedure Details:  Procedure type: cystoscopy    Patient tolerance: Patient tolerated the procedure well with no immediate complications    Additional Procedure Details: Office Cystoscopy Procedure Note    Indication:    Urothelial carcinoma of the bladder    Informed consent   The risks, benefits, complications, treatment options, and expected outcomes were discussed with the patient  The patient concurred with the proposed plan and provided informed consent  Anesthesia  Lidocaine jelly 2%    Antibiotic prophylaxis   None    Procedure  In the presence of a female nurse, the patient was placed in the supine frog-legged position, was prepped and draped in the usual manner using sterile technique, and 2% lidocaine jelly instilled into the urethra  Prior to this pelvic examination showed atrophic labia majora and minora, a small vaginal introitus, poor quality vaginal mucosa, and showed no pelvic floor descensus and no urethral hypermobility  Upon stress maneuvers there was no stress incontinence  A 17 F flexible cystoscope was then inserted into the urethra and the urethra and bladder carefully examined    The following findings were noted:    Findings:  Urethra:  Normal  Bladder:  Abnormal, area of recurrent nodular growth at the right bladder neck with a fibrinous exudate covering this area, some mucosal changes along the right anterior bladder wall as well at sites of previous resection  Ureteral orifices:  Difficult to visualize due to previous resections  Other findings:  None , retroflexed view confirms    Specimens: None                 Complications:    None; patient tolerated the procedure well           Disposition: To home            Condition: Stable    Plan: Recurrence is noted within the bladder, mostly at the right bladder neck and anterior wall the bladder  The patient declines palliative resection

## 2022-09-26 ENCOUNTER — OFFICE VISIT (OUTPATIENT)
Dept: AUDIOLOGY | Facility: CLINIC | Age: 82
End: 2022-09-26

## 2022-09-26 DIAGNOSIS — H90.3 SENSORINEURAL HEARING LOSS (SNHL), BILATERAL: Primary | ICD-10-CM

## 2022-09-28 NOTE — PROGRESS NOTES
[facility] AUDIOLOGY HEARING AID CHECK    Art Rivera was seen today (9/26/2022) for a hearing aid check of her monaural hearing aid  Today, Ms Granado reported that she was still getting used to the aid  There are no problems with insertion/removal and usage at home  She is continuing to work up to a full day of usage  Device Information    Hearing Aid Fitting Date: 9/26/2022     Left Device Right Device   Hearing Aid Make: N/A Oticon   Hearing Aid Model: N/A More 3 miniRITE   Serial Number: N/A [id number]   Repair Warranty Expiration Date: N/A 09/28/2022   Loss/Damage Warranty Expiration Date:  N/A 09/28/2022    Length: N/A 2    Output: N/A 100   Wax System: N/A Pro Wax miniFIT   Dome Size/Style: N/A 6mm power   Battery: N/A Lithium-ion Rechargeable   E[employer]old Serial Number: N/A N/A   Britta Bimler Date:  N/A N/A      Serial Number: [id number]  Accessories: N/A    Visual inspection of the device(s) revealed no noticeable damage or defects  A listening check revealed good sound quality from the device(s)  Datalogging revealed ~6h of daily usage  Changes to Device(s)   None    Recommendations & Follow-up    Hearing aids(s) are in good working condition  Ms Granado stated that she has no further questions with her hearing aid(s) and does not feed that any other adjustments are needed at this time  She will follow-up in 2-3 weeks, sooner if other problems/concerns arise      Obed Aguayo  Clinical Audiologist    [address] [address] [phone]

## 2022-10-10 ENCOUNTER — OFFICE VISIT (OUTPATIENT)
Dept: AUDIOLOGY | Facility: CLINIC | Age: 82
End: 2022-10-10

## 2022-10-10 DIAGNOSIS — H90.3 SENSORINEURAL HEARING LOSS (SNHL), BILATERAL: Primary | ICD-10-CM

## 2022-10-12 NOTE — PROGRESS NOTES
[address] Roberto Linares was seen today (10/10/2022) for a hearing aid check of her monaural hearing aid  Today, Ms Roberto Linares reported that she was not able to get used to the aid  Hearing aid sounds distorted (e g , static) noise  Device Information    Hearing Aid Fitting Date: 10/10/2022     Right Device Left Device   Hearing Aid Make: N/A Oticon   Hearing Aid Model: N/A More 3 miniRITE   Serial Number: N/A [id number]   Repair Warranty Expiration Date: N/A 09/28/2022   Loss/Damage Warranty Expiration Date:  N/A 09/28/2022    Length: N/A 2    Output: N/A 100   Wax System: N/A Pro Wax miniFIT   Dome Size/Style: N/A 6mm power   Battery: N/A Lithium-ion Rechargeable   Earmold Serial Number: N/A N/A   Voncille Horse Date:  N/A N/A      Serial Number: [id number]  Accessories: N/A    Visual inspection of the device(s) revealed no noticeable damage or defects  A listening check revealed good sound quality from the device(s)  Datalogging revealed ~6h of daily usage  Changes to Device(s)  • None    Recommendations & Follow-up    Hearing aids(s) are in good working condition  Ms Marisol Meneses right ear only had 8% WRS on her hearing exam  Poor prognosis outlined at fitting was again reviewed  It was decided to switch the hearing aid to the left ear for better outcomes  Ms Robetro Linares reported greatly improved comfort, and no longer perceived static  R ear is likely a candidate for a cochlear implant  She was not interested in this  She will trial this and follow-up in 2 weeks      Martín Cespedes  Clinical Audiologist    [address] [address] [id number]

## 2022-11-03 ENCOUNTER — OFFICE VISIT (OUTPATIENT)
Dept: AUDIOLOGY | Facility: CLINIC | Age: 82
End: 2022-11-03

## 2022-11-03 DIAGNOSIS — H90.3 SENSORINEURAL HEARING LOSS (SNHL), BILATERAL: Primary | ICD-10-CM

## 2022-11-10 NOTE — PROGRESS NOTES
[facility] HEARING AID CHECK    Janeth Pope was seen today (11/3/2022) for a hearing aid check of her monaural hearing aid that was switched to her left (better) ear  Device Information    Hearing Aid Fitting Date: 11/3/2022     Right Device Left Device   Hearing Aid Make: N/A Oticon   Hearing Aid Model: N/A More 3 miniRITE   Serial Number: [id number]   Repair Warranty Expiration Date: N/A 09/28/2022   Loss/Damage Warranty Expiration Date:  N/A 09/28/2022    Length: N/A 2    Output: N/A 100   Wax System: N/A Pro Wax miniFIT   Dome Size/Style: N/A 6mm power   Battery: N/A Lithium-ion Rechargeable   Earmold Serial Number: N/A N/A   Cherelle Rueda Date:  N/A N/A      Serial Number: [id number]  Accessories: N/A    Visual inspection of the device(s) revealed no noticeable damage or defects  A listening check revealed good sound quality from the device(s)  Datalogging revealed ~6h of daily usage  Recommendations & Follow-up    Hearing aids(s) are in good working condition  Cleaning and maintenance was reviewed  No other changes made today  Ms Betty Payton will follow-up in 1 month for huy [facility]  Clinical Audiologist    [address] [place] [id number]

## 2022-12-05 ENCOUNTER — HOSPITAL ENCOUNTER (OUTPATIENT)
Dept: RADIOLOGY | Facility: HOSPITAL | Age: 82
Discharge: HOME | End: 2022-12-05
Attending: UROLOGY

## 2022-12-05 DIAGNOSIS — C67.8 MALIGNANT NEOPLASM OF OVERLAPPING SITES OF BLADDER (HCC): ICD-10-CM

## 2022-12-16 NOTE — PROGRESS NOTES
Problem List Items Addressed This Visit        Genitourinary    Malignant neoplasm of overlapping sites of bladder ([facility] [address] ) - Primary    Relevant Medications    cephalexin (KEFLEX) 500 mg capsule    Other Relevant Orders    Cytology, urine    Case request operating room: TRANSURETHRAL RESECTION OF BLADDER TUMOR (TURBT) (Completed)       Other    Encounter to discuss test results    Goals of care, counseling/discussion       Discussion:    Juarez Hall is doing mostly well although she is having worsening lower Aba tract symptoms with urgency and frequency having to urinate every 15 to 20 minutes  I do suspect that this is due to the nodule of bladder cancer that is near her bladder outlet mostly on the right side of her bladder  She has extensive debris within the bladder today as well as some heterotrophic calcification over the bladder nodule  I have prescribed for her empiric Keflex for treatment  A urine cytology was sent today  I do recommend palliative transurethral resection of bladder tumor  I have sent a message to her medical oncology team regarding starting Dorma Wheeler as well  I will see her back for palliative TURBT  We will also consider bilateral retrograde pyelography at that time possible stent placement      Assessment and plan:       Please see problem oriented charting for the assessment plan of today's urological complaints      Steph Sibley MD      Chief Complaint     Chief Complaint   Patient presents with   • Cystoscopy         History of Present Illness     Sadi Marquez is a 79 y o  woman with muscle invasive bladder cancer, she made the informed decision to not undergo try modality therapy but has been okay with palliative resection of recurrences in the past   At her last surveillance cystoscopy we noted a recurrence at the bladder neck measuring 2 cm, she did not wish for intervention at that time    Her symptoms have worsened with urgency and frequency of urination along with debris in the bladder  This is confirmed cystoscopically with concerning findings for worsening of her recurrence  She is amenable to a palliative TURBT and will consider going forward    The following portions of the patient's history were reviewed and updated as appropriate: allergies, current medications, past family history, past medical history, past social history, past surgical history and problem list     Detailed Urologic History     - please refer to HPI    Review of Systems     Review of Systems   Constitutional: Negative  HENT: Negative  Eyes: Negative  Respiratory: Negative  Cardiovascular: Negative  Gastrointestinal: Negative  Endocrine: Negative  Genitourinary: Positive for frequency and urgency  Musculoskeletal: Negative  Skin: Negative  Allergic/Immunologic: Negative  Neurological: Negative  Hematological: Negative  Psychiatric/Behavioral: Negative  Allergies     Allergies   Allergen Reactions   • Percocet [Oxycodone-Acetaminophen] GI Intolerance   • Vicodin [Hydrocodone-Acetaminophen] GI Intolerance     Also darvocet   • Medical Tape Rash   • Propoxyphene Other (See Comments)       Physical Exam     Physical Exam  Vitals reviewed  Exam conducted with a chaperone present  Constitutional:       General: She is not in acute distress  Appearance: Normal appearance  She is obese  She is not ill-appearing, toxic-appearing or diaphoretic  HENT:      Head: Normocephalic and atraumatic  Eyes:      General: No scleral icterus  Right eye: No discharge  Left eye: No discharge  Cardiovascular:      Pulses: Normal pulses  Pulmonary:      Effort: Pulmonary effort is normal    Abdominal:      General: There is no distension  Palpations: There is no mass  Genitourinary:     Comments: Vaginal mucosal atrophy is present  Musculoskeletal:         General: No swelling  Skin:     General: Skin is warm     Neurological: General: No focal deficit present  Mental Status: She is alert and oriented to person, place, and time  Cranial Nerves: No cranial nerve deficit  Psychiatric:         Mood and Affect: Mood normal          Behavior: Behavior normal          Thought Content:  Thought content normal          Judgment: Judgment normal              Vital Signs  Vitals:    12/19/22 1242   BP: 122/72   BP Location: Left arm   Patient Position: Sitting   Cuff Size: Adult   Pulse: 80   SpO2: 98%   Weight: 84 4 kg (186 lb)   Height: 5' 2" (1 575 m)         Current Medications       Current Outpatient Medications:   •  aspirin 81 mg chewable tablet, Chew 81 mg daily, Disp: , Rfl:   •  atorvastatin (LIPITOR) 40 mg tablet, Take 1 tablet (40 mg total) by mouth daily with dinner (Patient taking differently: Take 20 mg by mouth Every two days), Disp: 14 tablet, Rfl: 0  •  cephalexin (KEFLEX) 500 mg capsule, Take 1 capsule (500 mg total) by mouth every 6 (six) hours for 5 days, Disp: 20 capsule, Rfl: 0  •  cholecalciferol (VITAMIN D3) 1,000 units tablet, Take 1,000 Units by mouth daily, Disp: , Rfl:   •  fluticasone (FLONASE) 50 mcg/act nasal spray, , Disp: , Rfl:   •  hydrochlorothiazide (HYDRODIURIL) 25 mg tablet, Take 25 mg by mouth every morning, Disp: , Rfl:   •  meloxicam (MOBIC) 15 mg tablet, , Disp: , Rfl:   •  metoprolol succinate (TOPROL-XL) 50 mg 24 hr tablet, Take 25 mg by mouth every morning, Disp: , Rfl:   •  senna (SENOKOT) 8 6 mg, Take 1 tablet (8 6 mg total) by mouth daily at bedtime for 5 days, Disp: 5 tablet, Rfl: 0  •  traZODone (DESYREL) 50 mg tablet, Take 50 mg by mouth daily, Disp: , Rfl:       Active Problems     Patient Active Problem List   Diagnosis   • Hypokalemia   • Bone lesion   • Wrist drop   • Stroke [facility])   • Malignant neoplasm of overlapping sites of bladder [facility])   • Encounter to discuss test results   • Goals of care, counseling/discussion   • Sensorineural hearing loss (SNHL), bilateral   • Sensorineural hearing loss (SNHL) of right ear with restricted hearing of left ear         Past Medical History     Past Medical History:   Diagnosis Date   • Arthritis     right knee   • Hay fever    • Hyperlipidemia    • Hypertension    • Myocardial infarction [facility]) 2003    questionable MI during exploratory surgery   • Ovarian cancer ([facility] [address] ) 2003    surgery (did not have chemo or radiation)   • Stroke [facility]) 2003    " mini stroke " no deficits ; another poss stroke with  left hand weakness   • Wears glasses    • Wears partial dentures     upper & lower         Surgical History     Past Surgical History:   Procedure Laterality Date   • BREAST SURGERY Right     exc  bx  lump (R) breast   • DILATION AND CURETTAGE OF UTERUS     • HYSTERECTOMY  2003    TAHBSO   • JOINT REPLACEMENT Right    • OOPHORECTOMY Bilateral     cancer   • WA CYSTOURETHROSCOPY,FULGUR 0 5-2 CM LESN N/A 4/4/2022    Procedure: TRANSURETHRAL RESECTION OF BLADDER (MULTIFOCAL) TUMOR (TURBT); Surgeon: Dai Hernandez MD;  Location: [facility];  Service: Urology   • WA CYSTOURETHROSCOPY,FULGUR >5 CM LESN Bilateral 11/11/2021    Procedure: CYSTOSCOPY, TRANSURETHRAL RESECTION OF BLADDER TUMOR (TURBT), EVACUATION OF CLOTS, LARGE TUMOR 7-8CM; Surgeon: Alida Shook MD;  Location: [address] [place] [address];  Service: Urology   • WA CYSTOURETHROSCOPY,URETER CATHETER Bilateral 4/4/2022    Procedure: CYSTOSCOPY WITH ATTEMPTED B/L RETROGRADE PYELOGRAM;  Surgeon: Dai Hernandez MD;  Location: [facility];  Service: Urology   • WA XCAPSL CTRC RMVL INSJ IO LENS PROSTH W/O ECP Left 7/24/2017    Procedure: EXTRACTION EXTRACAPSULAR CATARACT PHACO INTRAOCULAR LENS (IOL);   Surgeon: Estuardo Jay MD;  Location: [facility] MAIN OR;  Service: Ophthalmology   • TONSILLECTOMY           Family History     Family History   Problem Relation Age of Onset   • Parkinsonism Mother    • Heart disease Father    • Cancer Father         leukemia   • Heart disease Sister    • Diabetes Sister    • Diabetes Brother    • Diabetes Sister    • Melanoma Sister    • Melanoma Sister    • Diabetes Maternal Aunt    • No Known Problems Maternal Aunt    • No Known Problems Paternal Aunt          Social History     Social History     Social History     Tobacco Use   Smoking Status Former   • Packs/day: 1 50   • Years: 40 00   • Pack years: 60 00   • Types: Cigarettes   • Quit date:    • Years since quittin 9   Smokeless Tobacco Never         Pertinent Lab Values     Lab Results   Component Value Date    CREATININE 1 37 (H) 2022                 Pertinent Imaging     Mild hydronephrosis on the right and left, no sites of widely metastatic disease

## 2022-12-19 ENCOUNTER — TELEPHONE (OUTPATIENT)
Dept: HEMATOLOGY ONCOLOGY | Facility: CLINIC | Age: 82
End: 2022-12-19

## 2022-12-19 ENCOUNTER — PROCEDURE VISIT (OUTPATIENT)
Dept: UROLOGY | Facility: CLINIC | Age: 82
End: 2022-12-19

## 2022-12-19 VITALS
SYSTOLIC BLOOD PRESSURE: 122 MMHG | OXYGEN SATURATION: 98 % | BODY MASS INDEX: 34.23 KG/M2 | HEIGHT: 62 IN | DIASTOLIC BLOOD PRESSURE: 72 MMHG | WEIGHT: 186 LBS | HEART RATE: 80 BPM

## 2022-12-19 DIAGNOSIS — C67.8 MALIGNANT NEOPLASM OF OVERLAPPING SITES OF BLADDER (HCC): Primary | ICD-10-CM

## 2022-12-19 DIAGNOSIS — Z71.89 GOALS OF CARE, COUNSELING/DISCUSSION: ICD-10-CM

## 2022-12-19 DIAGNOSIS — Z71.2 ENCOUNTER TO DISCUSS TEST RESULTS: ICD-10-CM

## 2022-12-19 RX ORDER — GABAPENTIN 100 MG/1
300 CAPSULE ORAL ONCE
OUTPATIENT
Start: 2022-12-19 | End: 2022-12-19

## 2022-12-19 RX ORDER — FLUTICASONE PROPIONATE 50 MCG
SPRAY, SUSPENSION (ML) NASAL
COMMUNITY
Start: 2022-09-27

## 2022-12-19 RX ORDER — TRAZODONE HYDROCHLORIDE 50 MG/1
50 TABLET ORAL DAILY
COMMUNITY
Start: 2022-10-13

## 2022-12-19 RX ORDER — CEPHALEXIN 500 MG/1
500 CAPSULE ORAL EVERY 6 HOURS SCHEDULED
Qty: 20 CAPSULE | Refills: 0 | Status: SHIPPED | OUTPATIENT
Start: 2022-12-19 | End: 2022-12-24

## 2022-12-19 RX ORDER — ACETAMINOPHEN 325 MG/1
975 TABLET ORAL ONCE
OUTPATIENT
Start: 2022-12-19 | End: 2022-12-19

## 2022-12-19 NOTE — TELEPHONE ENCOUNTER
Spoke with pt in regard to scheduling follow up visit with either, Dr Florencia Roa or Connor Anderson PA-C  Patient scheduled for Monday 2/6/2023 at 56 AM in the [facility] office, patient made aware of location, patient stated she is only available on Monday's due to transportation  Offered the first available Monday appointment

## 2022-12-19 NOTE — LETTER
December 19, 2022     Dougie Carrasco, [address] [id number]    Patient: Lis Erickson   YOB: 1940   Date of Visit: 12/19/2022       Dear Dr Aniya Jeff: Thank you for referring Nathan Talavera to me for evaluation  Below are my notes for this consultation  If you have questions, please do not hesitate to call me  I look forward to following your patient along with you  Sincerely,        Kameron Neff MD        CC: MD Kameron Aguero MD  12/19/2022  1:15 PM  Sign when Signing Visit  Office Cystoscopy Procedure Note    Indication:    Urothelial carcinoma of the bladder    Informed consent   The risks, benefits, complications, treatment options, and expected outcomes were discussed with the patient  The patient concurred with the proposed plan and provided informed consent  Anesthesia  Lidocaine jelly 2%    Antibiotic prophylaxis   None    Procedure  In the presence of a female nurse, the patient was placed in the supine frog-legged position, was prepped and draped in the usual manner using sterile technique, and 2% lidocaine jelly instilled into the urethra  Prior to this pelvic examination showed atrophic labia majora and minora, a smaller caliber vaginal introitus, poor quality vaginal mucosa, and showed no pelvic floor descensus and no urethral hypermobility  Upon stress maneuvers there was no stress incontinence  A 17 F flexible cystoscope was then inserted into the urethra and the urethra and bladder carefully examined    The following findings were noted:    Findings:  Urethra:  Normal  Bladder:  Abnormal, large amount of debris present within the bladder, calcification present over the recurrent bladder nodule at the right bladder neck  Ureteral orifices:  Normal  Other findings:  None, retroflexed view confirms    Specimens: None                 Complications:    None; patient tolerated the procedure well           Disposition: To home Condition: Stable    Plan: Recurrence has worsened, debris also present in the bladder, I recommend a palliative transurethral resection of bladder tumor  This is in keeping with her goals of care  She will also consider Keytruda going forward       Cystoscopy     Date/Time 12/19/2022 1:15 PM     Performed by  Blue Martinez MD     Authorized by Blue Martinez MD      Universal Protocol:  Consent: Verbal consent obtained  Written consent obtained  Risks and benefits: risks, benefits and alternatives were discussed  Consent given by: patient  Patient understanding: patient states understanding of the procedure being performed  Patient consent: the patient's understanding of the procedure matches consent given  Procedure consent: procedure consent matches procedure scheduled  Relevant documents: relevant documents present and verified  Test results: test results available and properly labeled  Site marked: the operative site was not marked  Radiology Images displayed and confirmed  If images not available, report reviewed: imaging studies available  Required items: required blood products, implants, devices, and special equipment available  Patient identity confirmed: verbally with patient and provided demographic data        Procedure Details:  Procedure type: cystoscopy    Patient tolerance: Patient tolerated the procedure well with no immediate complications    Additional Procedure Details: Office Cystoscopy Procedure Note    Indication:    Urothelial carcinoma of the bladder    Informed consent   The risks, benefits, complications, treatment options, and expected outcomes were discussed with the patient  The patient concurred with the proposed plan and provided informed consent      Anesthesia  Lidocaine jelly 2%    Antibiotic prophylaxis   None    Procedure  In the presence of a female nurse, the patient was placed in the supine frog-legged position, was prepped and draped in the usual manner using sterile technique, and 2% lidocaine jelly instilled into the urethra  Prior to this pelvic examination showed atrophic labia majora and minora, a smaller caliber vaginal introitus, poor quality vaginal mucosa, and showed no pelvic floor descensus and no urethral hypermobility  Upon stress maneuvers there was no stress incontinence  A 17 F flexible cystoscope was then inserted into the urethra and the urethra and bladder carefully examined  The following findings were noted:    Findings:  Urethra:  Normal  Bladder:  Abnormal, large amount of debris present within the bladder, calcification present over the recurrent bladder nodule at the right bladder neck  Ureteral orifices:  Normal  Other findings:  None, retroflexed view confirms    Specimens: None                 Complications:    None; patient tolerated the procedure well           Disposition: To home            Condition: Stable    Plan: Recurrence has worsened, debris also present in the bladder, I recommend a palliative transurethral resection of bladder tumor  This is in keeping with her goals of care  She will also consider Keytruda going forward            Kareem Mendoza MD  12/19/2022  1:14 PM  Sign when Signing Visit       Problem List Items Addressed This Visit        Genitourinary    Malignant neoplasm of overlapping sites of bladder ([facility] [address] ) - Primary    Relevant Medications    cephalexin (KEFLEX) 500 mg capsule    Other Relevant Orders    Cytology, urine    Case request operating room: TRANSURETHRAL RESECTION OF BLADDER TUMOR (TURBT) (Completed)       Other    Encounter to discuss test results    Goals of care, counseling/discussion       Discussion:    Waqas Saldana is doing mostly well although she is having worsening lower Aba tract symptoms with urgency and frequency having to urinate every 15 to 20 minutes  I do suspect that this is due to the nodule of bladder cancer that is near her bladder outlet mostly on the right side of her bladder  She has extensive debris within the bladder today as well as some heterotrophic calcification over the bladder nodule  I have prescribed for her empiric Keflex for treatment  A urine cytology was sent today  I do recommend palliative transurethral resection of bladder tumor  I have sent a message to her medical oncology team regarding starting Stevie Coffin as well  I will see her back for palliative TURBT  We will also consider bilateral retrograde pyelography at that time possible stent placement      Assessment and plan:       Please see problem oriented charting for the assessment plan of today's urological complaints      Aramis Olvera MD      Chief Complaint     Chief Complaint   Patient presents with   • Cystoscopy         History of Present Illness     Kimberley Ortega is a 79 y o  woman with muscle invasive bladder cancer, she made the informed decision to not undergo try modality therapy but has been okay with palliative resection of recurrences in the past   At her last surveillance cystoscopy we noted a recurrence at the bladder neck measuring 2 cm, she did not wish for intervention at that time  Her symptoms have worsened with urgency and frequency of urination along with debris in the bladder  This is confirmed cystoscopically with concerning findings for worsening of her recurrence  She is amenable to a palliative TURBT and will consider going forward    The following portions of the patient's history were reviewed and updated as appropriate: allergies, current medications, past family history, past medical history, past social history, past surgical history and problem list     Detailed Urologic History     - please refer to HPI    Review of Systems     Review of Systems   Constitutional: Negative  HENT: Negative  Eyes: Negative  Respiratory: Negative  Cardiovascular: Negative  Gastrointestinal: Negative  Endocrine: Negative      Genitourinary: Positive for frequency and urgency  Musculoskeletal: Negative  Skin: Negative  Allergic/Immunologic: Negative  Neurological: Negative  Hematological: Negative  Psychiatric/Behavioral: Negative  Allergies     Allergies   Allergen Reactions   • Percocet [Oxycodone-Acetaminophen] GI Intolerance   • Vicodin [Hydrocodone-Acetaminophen] GI Intolerance     Also darvocet   • Medical Tape Rash   • Propoxyphene Other (See Comments)       Physical Exam     Physical Exam  Vitals reviewed  Exam conducted with a chaperone present  Constitutional:       General: She is not in acute distress  Appearance: Normal appearance  She is obese  She is not ill-appearing, toxic-appearing or diaphoretic  HENT:      Head: Normocephalic and atraumatic  Eyes:      General: No scleral icterus  Right eye: No discharge  Left eye: No discharge  Cardiovascular:      Pulses: Normal pulses  Pulmonary:      Effort: Pulmonary effort is normal    Abdominal:      General: There is no distension  Palpations: There is no mass  Genitourinary:     Comments: Vaginal mucosal atrophy is present  Musculoskeletal:         General: No swelling  Skin:     General: Skin is warm  Neurological:      General: No focal deficit present  Mental Status: She is alert and oriented to person, place, and time  Cranial Nerves: No cranial nerve deficit  Psychiatric:         Mood and Affect: Mood normal          Behavior: Behavior normal          Thought Content:  Thought content normal          Judgment: Judgment normal              Vital Signs  Vitals:    12/19/22 1242   BP: 122/72   BP Location: Left arm   Patient Position: Sitting   Cuff Size: Adult   Pulse: 80   SpO2: 98%   Weight: 84 4 kg (186 lb)   Height: 5' 2" (1 575 m)         Current Medications       Current Outpatient Medications:   •  aspirin 81 mg chewable tablet, Chew 81 mg daily, Disp: , Rfl:   •  atorvastatin (LIPITOR) 40 mg tablet, Take 1 tablet (40 mg total) by mouth daily with dinner (Patient taking differently: Take 20 mg by mouth Every two days), Disp: 14 tablet, Rfl: 0  •  cephalexin (KEFLEX) 500 mg capsule, Take 1 capsule (500 mg total) by mouth every 6 (six) hours for 5 days, Disp: 20 capsule, Rfl: 0  •  cholecalciferol (VITAMIN D3) 1,000 units tablet, Take 1,000 Units by mouth daily, Disp: , Rfl:   •  fluticasone (FLONASE) 50 mcg/act nasal spray, , Disp: , Rfl:   •  hydrochlorothiazide (HYDRODIURIL) 25 mg tablet, Take 25 mg by mouth every morning, Disp: , Rfl:   •  meloxicam (MOBIC) 15 mg tablet, , Disp: , Rfl:   •  metoprolol succinate (TOPROL-XL) 50 mg 24 hr tablet, Take 25 mg by mouth every morning, Disp: , Rfl:   •  senna (SENOKOT) 8 6 mg, Take 1 tablet (8 6 mg total) by mouth daily at bedtime for 5 days, Disp: 5 tablet, Rfl: 0  •  traZODone (DESYREL) 50 mg tablet, Take 50 mg by mouth daily, Disp: , Rfl:       Active Problems     Patient Active Problem List   Diagnosis   • Hypokalemia   • Bone lesion   • Wrist drop   • Stroke [facility])   • Malignant neoplasm of overlapping sites of bladder (HCC)   • Encounter to discuss test results   • Goals of care, counseling/discussion   • Sensorineural hearing loss (SNHL), bilateral   • Sensorineural hearing loss (SNHL) of right ear with restricted hearing of left ear         Past Medical History     Past Medical History:   Diagnosis Date   • Arthritis     right knee   • Hay fever    • Hyperlipidemia    • Hypertension    • Myocardial infarction [facility]) 2003    questionable MI during exploratory surgery   • Ovarian cancer ([facility] [address] ) 2003    surgery (did not have chemo or radiation)   • Stroke ([facility] [address] ) 2003    " mini stroke " no deficits ; another poss stroke with  left hand weakness   • Wears glasses    • Wears partial dentures     upper & lower         Surgical History     Past Surgical History:   Procedure Laterality Date   • BREAST SURGERY Right     exc  bx  lump (R) breast   • DILATION AND CURETTAGE OF UTERUS     • HYSTERECTOMY     TAHBSO   • JOINT REPLACEMENT Right    • OOPHORECTOMY Bilateral     cancer   • MS CYSTOURETHROSCOPY,FULGUR 0 5-2 CM LESN N/A 2022    Procedure: TRANSURETHRAL RESECTION OF BLADDER (MULTIFOCAL) TUMOR (TURBT); Surgeon: Jerson Montero MD;  Location: [facility];  Service: Urology   • MS CYSTOURETHROSCOPY,FULGUR >5 CM LESN Bilateral 2021    Procedure: CYSTOSCOPY, TRANSURETHRAL RESECTION OF BLADDER TUMOR (TURBT), EVACUATION OF CLOTS, LARGE TUMOR 7-8CM; Surgeon: Irene Tovar MD;  Location: [address] [place] [address];  Service: Urology   • MS CYSTOURETHROSCOPY,URETER CATHETER Bilateral 2022    Procedure: CYSTOSCOPY WITH ATTEMPTED B/L RETROGRADE PYELOGRAM;  Surgeon: Jerson Montero MD;  Location: [facility];  Service: Urology   • MS XCAPSL CTRC RMVL INSJ IO LENS PROSTH W/O ECP Left 2017    Procedure: EXTRACTION EXTRACAPSULAR CATARACT PHACO INTRAOCULAR LENS (IOL); Surgeon: Eliane Blanchard MD;  Location: [facility] MAIN OR;  Service: Ophthalmology   • TONSILLECTOMY           Family History     Family History   Problem Relation Age of Onset   • Parkinsonism Mother    • Heart disease Father    • Cancer Father         leukemia   • Heart disease Sister    • Diabetes Sister    • Diabetes Brother    • Diabetes Sister    • Melanoma Sister    • Melanoma Sister    • Diabetes Maternal Aunt    • No Known Problems Maternal Aunt    • No Known Problems Paternal Aunt          Social History     Social History     Social History     Tobacco Use   Smoking Status Former   • Packs/day: 1 50   • Years: 40 00   • Pack years: 60 00   • Types: Cigarettes   • Quit date:    • Years since quittin 9   Smokeless Tobacco Never         Pertinent Lab Values     Lab Results   Component Value Date    CREATININE 1 37 (H) 2022                 Pertinent Imaging     Mild hydronephrosis on the right and left, no sites of widely metastatic disease

## 2022-12-19 NOTE — PROGRESS NOTES
Office Cystoscopy Procedure Note    Indication:    Urothelial carcinoma of the bladder    Informed consent   The risks, benefits, complications, treatment options, and expected outcomes were discussed with the patient  The patient concurred with the proposed plan and provided informed consent  Anesthesia  Lidocaine jelly 2%    Antibiotic prophylaxis   None    Procedure  In the presence of a female nurse, the patient was placed in the supine frog-legged position, was prepped and draped in the usual manner using sterile technique, and 2% lidocaine jelly instilled into the urethra  Prior to this pelvic examination showed atrophic labia majora and minora, a smaller caliber vaginal introitus, poor quality vaginal mucosa, and showed no pelvic floor descensus and no urethral hypermobility  Upon stress maneuvers there was no stress incontinence  A 17 F flexible cystoscope was then inserted into the urethra and the urethra and bladder carefully examined  The following findings were noted:    Findings:  Urethra:  Normal  Bladder:  Abnormal, large amount of debris present within the bladder, calcification present over the recurrent bladder nodule at the right bladder neck  Ureteral orifices:  Normal  Other findings:  None, retroflexed view confirms    Specimens: None                 Complications:    None; patient tolerated the procedure well           Disposition: To home            Condition: Stable    Plan: Recurrence has worsened, debris also present in the bladder, I recommend a palliative transurethral resection of bladder tumor  This is in keeping with her goals of care  She will also consider Keytruda going forward       Cystoscopy     Date/Time 12/19/2022 1:15 PM     Performed by  Alba Samuels MD     Authorized by Alba Samuels MD      Universal Protocol:  Consent: Verbal consent obtained  Written consent obtained    Risks and benefits: risks, benefits and alternatives were discussed  Consent given by: patient  Patient understanding: patient states understanding of the procedure being performed  Patient consent: the patient's understanding of the procedure matches consent given  Procedure consent: procedure consent matches procedure scheduled  Relevant documents: relevant documents present and verified  Test results: test results available and properly labeled  Site marked: the operative site was not marked  Radiology Images displayed and confirmed  If images not available, report reviewed: imaging studies available  Required items: required blood products, implants, devices, and special equipment available  Patient identity confirmed: verbally with patient and provided demographic data        Procedure Details:  Procedure type: cystoscopy    Patient tolerance: Patient tolerated the procedure well with no immediate complications    Additional Procedure Details: Office Cystoscopy Procedure Note    Indication:    Urothelial carcinoma of the bladder    Informed consent   The risks, benefits, complications, treatment options, and expected outcomes were discussed with the patient  The patient concurred with the proposed plan and provided informed consent  Anesthesia  Lidocaine jelly 2%    Antibiotic prophylaxis   None    Procedure  In the presence of a female nurse, the patient was placed in the supine frog-legged position, was prepped and draped in the usual manner using sterile technique, and 2% lidocaine jelly instilled into the urethra  Prior to this pelvic examination showed atrophic labia majora and minora, a smaller caliber vaginal introitus, poor quality vaginal mucosa, and showed no pelvic floor descensus and no urethral hypermobility  Upon stress maneuvers there was no stress incontinence  A 17 F flexible cystoscope was then inserted into the urethra and the urethra and bladder carefully examined    The following findings were noted:    Findings:  Urethra:  Normal  Bladder:  Abnormal, large amount of debris present within the bladder, calcification present over the recurrent bladder nodule at the right bladder neck  Ureteral orifices:  Normal  Other findings:  None, retroflexed view confirms    Specimens: None                 Complications:    None; patient tolerated the procedure well           Disposition: To home            Condition: Stable    Plan: Recurrence has worsened, debris also present in the bladder, I recommend a palliative transurethral resection of bladder tumor  This is in keeping with her goals of care    She will also consider Christianreld Sharri going forward

## 2023-01-09 ENCOUNTER — TELEPHONE (OUTPATIENT)
Dept: UROLOGY | Facility: AMBULATORY SURGERY CENTER | Age: 83
End: 2023-01-09

## 2023-01-09 NOTE — TELEPHONE ENCOUNTER
Patient is calling to check on the status of scheduling surgery with Dr Zak Cruz  She is requesting a call back [phone]   She stated she works from 10:30 am -4 pm

## 2023-01-11 ENCOUNTER — OFFICE VISIT (OUTPATIENT)
Dept: AUDIOLOGY | Facility: CLINIC | Age: 83
End: 2023-01-11

## 2023-01-11 DIAGNOSIS — H90.3 SENSORINEURAL HEARING LOSS (SNHL), BILATERAL: Primary | ICD-10-CM

## 2023-01-11 NOTE — PROGRESS NOTES
[facility] AUDIOLOGY HEARING AID CHECK    Karime Chapman was seen today (1/11/2023) for a hearing aid check of her monaural hearing aid  Ms Naman Andrew stated that she was doing well overall and had no concerns  She noted that she has accidentally walked into the shower a few times with the aid on, forgetting that she is wearing it  Device Information    Hearing Aid Fitting Date: 1/11/2023     Right Device Left Device   Hearing Aid Make: N/A Oticon   Hearing Aid Model: N/A More 3 miniRITE   Serial Number: N/A [id number]   Repair Warranty Expiration Date: N/A 09/28/2022   Loss/Damage Warranty Expiration Date:  N/A 09/28/2022    Length: N/A 2    Output: N/A 100   Wax System: N/A Pro Wax miniFIT   Dome Size/Style: N/A 6mm power   Battery: N/A Lithium-ion Rechargeable   [employer]old Serial Number: N/A N/A   Truro Slack Date:  N/A N/A      Serial Number: [id number]  Accessories: N/A    Visual inspection of the device(s) revealed no noticeable damage or defects  A listening check revealed good sound quality from the device(s)  Datalogging revealed ~6h of daily usage  REM performed  Device is meeting NAL-NL2 targets for 45, 55, 65 dB SPL  Recommendations & Follow-up    Hearing aids(s) are in good working condition  Cleaning and maintenance was reviewed  No other changes made today  Ms Naman Andrew will follow-up in 1 month for huy Olivo  Clinical Audiologist    [address] [place] [phone]

## 2023-01-12 NOTE — TELEPHONE ENCOUNTER
Patient returning call to schedule surgery      Patient requesting a call back at [phone] after 4 pm

## 2023-01-16 NOTE — TELEPHONE ENCOUNTER
Patient called stating she is returning call to [facility]      Patient can be reached at [phone]

## 2023-01-17 NOTE — TELEPHONE ENCOUNTER
Pt called requesting c/b to schedule surgery   Pt stated she works so please call her before 10 am    Pt call [phone]

## 2023-01-19 NOTE — TELEPHONE ENCOUNTER
I spoke to the patient and scheduled her TURBT w/Jesu  RTG, Poss  Stent for 3/21/2023 at [facility] with Dr Darshan Boyd   Patient declined sooner at another location    -instructions given verbally and mailed  -CBC, CMP, T&S, PT/PTT, Urine C&S 3 weeks prior  -patient will avoid any potentially blood thinning medications 7 days prior  -[facility] - on auth tracker 1/19/2023

## 2023-02-02 ENCOUNTER — TELEPHONE (OUTPATIENT)
Dept: HEMATOLOGY ONCOLOGY | Facility: CLINIC | Age: 83
End: 2023-02-02

## 2023-02-02 NOTE — TELEPHONE ENCOUNTER
Appointment Cancellation Or Reschedule     Person calling in Patient   If someone other than patient calling, are they listed on the communication consent form? N/A   Provider Elvira Robertson PA-C   Office Visit Date and Time  02/06/23 10:30AM   Office Visit Location [facility]   Did patient want to reschedule their office appointment? If so, when was it scheduled to? No, will call back   Did you have STAR scheduled for this appointment? No   Do you need STAR set up for your new appointment? If yes, please send to "PATIENT RIDESHARE" pool for STAR rescheduling N/A   Is this patient calling to reschedule an infusion appointment? N/A   When is their next infusion appointment? N/A   Is this patient a Chemo patient? No   Reason for Cancellation or Reschedule Patient states she has upcoming surgery and cannot come until after  If the patient is cancelling an appointment and needs their STAR Transport cancelled, please route to Katie [address]  If the patient is a treatment patient, please route this to the office nurse  If the patient is not on treatment, please route to the Clerical pool based on location  If the patient is a surgical oncology patient, please route to surg/onc clinical pool  Route message as high priority if same day cancellation

## 2023-03-08 ENCOUNTER — APPOINTMENT (OUTPATIENT)
Dept: LAB | Facility: HOSPITAL | Age: 83
End: 2023-03-08

## 2023-03-08 ENCOUNTER — LAB REQUISITION (OUTPATIENT)
Dept: LAB | Facility: HOSPITAL | Age: 83
End: 2023-03-08

## 2023-03-08 ENCOUNTER — ANESTHESIA EVENT (OUTPATIENT)
Dept: PERIOP | Facility: HOSPITAL | Age: 83
End: 2023-03-08

## 2023-03-08 DIAGNOSIS — C67.8 MALIGNANT NEOPLASM OF OVERLAPPING SITES OF BLADDER (HCC): ICD-10-CM

## 2023-03-08 LAB
ABO GROUP BLD: NORMAL
ALBUMIN SERPL BCP-MCNC: 3.7 G/DL (ref 3.5–5)
ALP SERPL-CCNC: 73 U/L (ref 34–104)
ALT SERPL W P-5'-P-CCNC: 9 U/L (ref 7–52)
ANION GAP SERPL CALCULATED.3IONS-SCNC: 10 MMOL/L (ref 4–13)
APTT PPP: 25 SECONDS (ref 23–37)
AST SERPL W P-5'-P-CCNC: 12 U/L (ref 13–39)
BASOPHILS # BLD AUTO: 0.08 THOUSANDS/ÂΜL (ref 0–0.1)
BASOPHILS NFR BLD AUTO: 1 % (ref 0–1)
BILIRUB SERPL-MCNC: 0.47 MG/DL (ref 0.2–1)
BLD GP AB SCN SERPL QL: NEGATIVE
BUN SERPL-MCNC: 66 MG/DL (ref 5–25)
CALCIUM SERPL-MCNC: 9.5 MG/DL (ref 8.4–10.2)
CHLORIDE SERPL-SCNC: 108 MMOL/L (ref 96–108)
CO2 SERPL-SCNC: 22 MMOL/L (ref 21–32)
CREAT SERPL-MCNC: 2.78 MG/DL (ref 0.6–1.3)
EOSINOPHIL # BLD AUTO: 0.42 THOUSAND/ÂΜL (ref 0–0.61)
EOSINOPHIL NFR BLD AUTO: 4 % (ref 0–6)
ERYTHROCYTE [DISTWIDTH] IN BLOOD BY AUTOMATED COUNT: 12.1 % (ref 11.6–15.1)
GFR SERPL CREATININE-BSD FRML MDRD: 15 ML/MIN/1.73SQ M
GLUCOSE P FAST SERPL-MCNC: 103 MG/DL (ref 65–99)
HCT VFR BLD AUTO: 37.6 % (ref 34.8–46.1)
HGB BLD-MCNC: 11.9 G/DL (ref 11.5–15.4)
IMM GRANULOCYTES # BLD AUTO: 0.04 THOUSAND/UL (ref 0–0.2)
IMM GRANULOCYTES NFR BLD AUTO: 0 % (ref 0–2)
INR PPP: 1.01 (ref 0.84–1.19)
LYMPHOCYTES # BLD AUTO: 1.45 THOUSANDS/ÂΜL (ref 0.6–4.47)
LYMPHOCYTES NFR BLD AUTO: 15 % (ref 14–44)
MCH RBC QN AUTO: 28.1 PG (ref 26.8–34.3)
MCHC RBC AUTO-ENTMCNC: 31.6 G/DL (ref 31.4–37.4)
MCV RBC AUTO: 89 FL (ref 82–98)
MONOCYTES # BLD AUTO: 0.6 THOUSAND/ÂΜL (ref 0.17–1.22)
MONOCYTES NFR BLD AUTO: 6 % (ref 4–12)
NEUTROPHILS # BLD AUTO: 7.22 THOUSANDS/ÂΜL (ref 1.85–7.62)
NEUTS SEG NFR BLD AUTO: 74 % (ref 43–75)
NRBC BLD AUTO-RTO: 0 /100 WBCS
PLATELET # BLD AUTO: 313 THOUSANDS/UL (ref 149–390)
PMV BLD AUTO: 11 FL (ref 8.9–12.7)
POTASSIUM SERPL-SCNC: 4.1 MMOL/L (ref 3.5–5.3)
PROT SERPL-MCNC: 6.8 G/DL (ref 6.4–8.4)
PROTHROMBIN TIME: 13.4 SECONDS (ref 11.6–14.5)
RBC # BLD AUTO: 4.23 MILLION/UL (ref 3.81–5.12)
RH BLD: POSITIVE
SODIUM SERPL-SCNC: 140 MMOL/L (ref 135–147)
SPECIMEN EXPIRATION DATE: NORMAL
WBC # BLD AUTO: 9.81 THOUSAND/UL (ref 4.31–10.16)

## 2023-03-10 LAB — BACTERIA UR CULT: NORMAL

## 2023-03-16 NOTE — PRE-PROCEDURE INSTRUCTIONS
Pre-Surgery Instructions:   Medication Instructions   • aspirin 81 mg chewable tablet Instructions provided by MD   • atorvastatin (LIPITOR) 40 mg tablet Take night before surgery   • cholecalciferol (VITAMIN D3) 1,000 units tablet Stop taking 7 days prior to surgery  • hydrochlorothiazide (HYDRODIURIL) 25 mg tablet Hold day of surgery  • meloxicam (MOBIC) 15 mg tablet Stop taking 3 days prior to surgery  • metoprolol succinate (TOPROL-XL) 50 mg 24 hr tablet Take day of surgery  Medication instructions for day surgery reviewed  Please use only a sip of water to take your instructed medications  Avoid all over the counter vitamins, supplements and NSAIDS for one week prior to surgery per anesthesia guidelines  Tylenol is ok to take as needed  Pt states she received her ASA hold instructions from the surgeon- [facility] 7 days prior to surgery  You will receive a call one business day prior to surgery with an arrival time and hospital directions  If your surgery is scheduled on a Monday, the hospital will be calling you on the Friday prior to your surgery  If you have not heard from anyone by 8pm, please call the hospital supervisor through the hospital  at [phone]  Richard Rosenberg [phone])  Do not eat or drink anything after midnight the night before your surgery, including candy, mints, lifesavers, or chewing gum  Do not drink alcohol 24hrs before your surgery  Try not to smoke at least 24hrs before your surgery  Follow the pre surgery showering instructions as listed in the [facility] Surgical Experience Booklet” or otherwise provided by your surgeon's office  Do not shave the surgical area 24 hours before surgery  Do not apply any lotions, creams, including makeup, cologne, deodorant, or perfumes after showering on the day of your surgery  No contact lenses, eye make-up, or artificial eyelashes   Remove nail polish, including gel polish, and any artificial, gel, or acrylic nails if possible  Remove all jewelry including rings and body piercing jewelry  Wear causal clothing that is easy to take on and off  Consider your type of surgery  Keep any valuables, jewelry, piercings at home  Please bring any specially ordered equipment (sling, braces) if indicated  Arrange for a responsible person to drive you to and from the hospital on the day of your surgery  Visitor Guidelines discussed  Call the surgeon's office with any new illnesses, exposures, or additional questions prior to surgery  Please reference your [facility] Surgical Experience Booklet” for additional information to prepare for your upcoming surgery

## 2023-03-20 NOTE — H&P
H&P Exam - Urology   Gabe Perales 79 y o  female MRN: [id number]  Unit/Bed#: OR [place] Encounter: [id number]    Assessment/Plan     Assessment:  79year old woman with high risk bladder cancer, here for retrograde pyelography and palliative TURBT given recurrent lesion at the bladder neck causing obstruction  Ready for surgery today    Plan:  Proceed to transurethral resection of bladder tumor with bilateral retrograde pyelography and all indicated procedures    History of Present Illness   HPI:  Gabe Perales is a 79 y o  female who presents with high risk bladder cancer, has opted for palliative care and did not want cystectomy or aggressive systemic treatments  Here for a palliative resection of recurrent bladder cancer at the bladder neck    Changes in her state of health since she was last seen include none    The following portions of the patient's history were reviewed and updated as appropriate: allergies, current medications, past family history, past medical history, past social history, past surgical history and problem list        Review of Systems   Constitutional: Negative  HENT: Negative  Eyes: Negative  Respiratory: Negative  Cardiovascular: Negative  Gastrointestinal: Negative  Endocrine: Negative  Genitourinary: Positive for difficulty urinating  Musculoskeletal: Negative  Skin: Negative  Allergic/Immunologic: Negative  Neurological: Negative  Hematological: Negative  Psychiatric/Behavioral: Negative          Historical Information   Past Medical History:   Diagnosis Date   • Arthritis     right knee   • Hay fever    • Hyperlipidemia    • Hypertension    • Myocardial infarction [facility] 2003    questionable MI during exploratory surgery   • Ovarian cancer ([facility] [address] ) 2003    surgery (did not have chemo or radiation)   • Stroke [facility] 2003    " mini stroke " no deficits ; another poss stroke with  left hand weakness   • Wears glasses    • Wears partial dentures upper & lower     Past Surgical History:   Procedure Laterality Date   • BREAST SURGERY Right     exc  bx  lump (R) breast   • DILATION AND CURETTAGE OF UTERUS     • HYSTERECTOMY      TAHBSO   • JOINT REPLACEMENT Right    • OOPHORECTOMY Bilateral     cancer   • LA CYSTO BLADDER W/URETERAL CATHETERIZATION Bilateral 2022    Procedure: CYSTOSCOPY WITH ATTEMPTED B/L RETROGRADE PYELOGRAM;  Surgeon: Hannah Rodriguez MD;  [facility];  Service: Urology   • LA CYSTO W/REMOVAL OF TUMORS SMALL N/A 2022    Procedure: TRANSURETHRAL RESECTION OF BLADDER (MULTIFOCAL) TUMOR (TURBT); Surgeon: Hannah Rodriguez MD;  Location: [facility];  Service: Urology   • LA CYSTOURETHROSCOPY W/DEST &/RMVL TUMOR LARGE Bilateral 2021    Procedure: CYSTOSCOPY, TRANSURETHRAL RESECTION OF BLADDER TUMOR (TURBT), EVACUATION OF CLOTS, LARGE TUMOR 7-8CM; Surgeon: Augusto Webber MD;  Location: [address] [place] [address];  Service: Urology   • LA XCAPSL CTRC RMVL INSJ IO LENS PROSTH W/O ECP Left 2017    Procedure: EXTRACTION EXTRACAPSULAR CATARACT PHACO INTRAOCULAR LENS (IOL);   Surgeon: Janny Ceballos MD;  Location: [facility] MAIN OR;  Service: Ophthalmology   • TONSILLECTOMY       Social History   Social History     Substance and Sexual Activity   Alcohol Use Yes    Comment: occassional, socially     Social History     Substance and Sexual Activity   Drug Use No     Social History     Tobacco Use   Smoking Status Former   • Packs/day: 1 50   • Years: 40 00   • Pack years: 60 00   • Types: Cigarettes   • Quit date:    • Years since quittin 2   Smokeless Tobacco Never     E-Cigarette/Vaping   • E-Cigarette Use Never User      E-Cigarette/Vaping Substances   • Nicotine No    • THC No    • CBD No    • Flavoring No    • Other No    • Unknown No      Family History:   Family History   Problem Relation Age of Onset   • Parkinsonism Mother    • Heart disease Father    • Cancer Father         leukemia   • Heart disease Sister    • Diabetes Sister    • Diabetes Brother    • Diabetes Sister    • Melanoma Sister    • Melanoma Sister    • Diabetes Maternal Aunt    • No Known Problems Maternal Aunt    • No Known Problems Paternal Aunt        Meds/Allergies   all medications and allergies reviewed  Allergies   Allergen Reactions   • Percocet [Oxycodone-Acetaminophen] GI Intolerance   • Vicodin [Hydrocodone-Acetaminophen] GI Intolerance     Also darvocet   • Medical Tape Rash   • Propoxyphene Other (See Comments)       Objective   Vitals: Blood pressure (!) 181/79, pulse 76, temperature 97 6 °F (36 4 °C), temperature source Temporal, resp  rate 20, SpO2 97 %, not currently breastfeeding  No intake/output data recorded  Invasive Devices     None                 Physical Exam  Vitals reviewed  Constitutional:       General: She is not in acute distress  Appearance: Normal appearance  She is not ill-appearing, toxic-appearing or diaphoretic  HENT:      Head: Normocephalic and atraumatic  Eyes:      General: No scleral icterus  Right eye: No discharge  Left eye: No discharge  Cardiovascular:      Pulses: Normal pulses  Pulmonary:      Effort: Pulmonary effort is normal    Abdominal:      General: There is no distension  Palpations: There is no mass  Musculoskeletal:         General: No swelling  Skin:     Coloration: Skin is not jaundiced  Neurological:      General: No focal deficit present  Mental Status: She is alert and oriented to person, place, and time  Cranial Nerves: No cranial nerve deficit  Psychiatric:         Mood and Affect: Mood normal          Behavior: Behavior normal          Thought Content: Thought content normal          Judgment: Judgment normal          Lab Results: I have personally reviewed pertinent reports  Imaging: I have personally reviewed pertinent reports  EKG, Pathology, and Other Studies: I have personally reviewed pertinent reports      VTE Prophylaxis: Sequential compression device Nirmal Green     Code Status: Prior  Advance Directive and Living Will:      Power of :    POLST:      Counseling / Coordination of Care  Total floor / unit time spent today 15 minutes  Greater than 50% of total time was spent with the patient and / or family counseling and / or coordination of care  A description of the counseling / coordination of care: review of today's case

## 2023-03-21 ENCOUNTER — HOSPITAL ENCOUNTER (OUTPATIENT)
Facility: HOSPITAL | Age: 83
Setting detail: OUTPATIENT SURGERY
Discharge: HOME | End: 2023-03-21
Attending: UROLOGY | Admitting: UROLOGY

## 2023-03-21 ENCOUNTER — TELEPHONE (OUTPATIENT)
Dept: UROLOGY | Facility: CLINIC | Age: 83
End: 2023-03-21

## 2023-03-21 ENCOUNTER — ANESTHESIA (OUTPATIENT)
Dept: PERIOP | Facility: HOSPITAL | Age: 83
End: 2023-03-21

## 2023-03-21 ENCOUNTER — APPOINTMENT (OUTPATIENT)
Dept: RADIOLOGY | Facility: HOSPITAL | Age: 83
End: 2023-03-21

## 2023-03-21 VITALS
HEART RATE: 60 BPM | SYSTOLIC BLOOD PRESSURE: 159 MMHG | TEMPERATURE: 97.9 F | RESPIRATION RATE: 16 BRPM | OXYGEN SATURATION: 98 % | DIASTOLIC BLOOD PRESSURE: 80 MMHG

## 2023-03-21 DIAGNOSIS — N17.9 ACUTE KIDNEY INJURY (HCC): Primary | ICD-10-CM

## 2023-03-21 DIAGNOSIS — C67.8 MALIGNANT NEOPLASM OF OVERLAPPING SITES OF BLADDER (HCC): ICD-10-CM

## 2023-03-21 DIAGNOSIS — C67.8 MALIGNANT NEOPLASM OF OVERLAPPING SITES OF BLADDER (HCC): Primary | ICD-10-CM

## 2023-03-21 RX ORDER — OXYCODONE HYDROCHLORIDE 5 MG/1
5 TABLET ORAL EVERY 4 HOURS PRN
Status: CANCELLED | OUTPATIENT
Start: 2023-03-21

## 2023-03-21 RX ORDER — ONDANSETRON 4 MG/1
4 TABLET, FILM COATED ORAL EVERY 8 HOURS PRN
Qty: 20 TABLET | Refills: 0 | Status: SHIPPED | OUTPATIENT
Start: 2023-03-21

## 2023-03-21 RX ORDER — LIDOCAINE HYDROCHLORIDE 10 MG/ML
INJECTION, SOLUTION EPIDURAL; INFILTRATION; INTRACAUDAL; PERINEURAL AS NEEDED
Status: DISCONTINUED | OUTPATIENT
Start: 2023-03-21 | End: 2023-03-21

## 2023-03-21 RX ORDER — ACETAMINOPHEN 325 MG/1
650 TABLET ORAL EVERY 6 HOURS PRN
Status: CANCELLED | OUTPATIENT
Start: 2023-03-21

## 2023-03-21 RX ORDER — ACETAMINOPHEN 500 MG
500 TABLET ORAL EVERY 6 HOURS
Qty: 20 TABLET | Refills: 0 | Status: SHIPPED | OUTPATIENT
Start: 2023-03-21 | End: 2023-03-26

## 2023-03-21 RX ORDER — ONDANSETRON 2 MG/ML
INJECTION INTRAMUSCULAR; INTRAVENOUS AS NEEDED
Status: DISCONTINUED | OUTPATIENT
Start: 2023-03-21 | End: 2023-03-21

## 2023-03-21 RX ORDER — SODIUM CHLORIDE, SODIUM LACTATE, POTASSIUM CHLORIDE, CALCIUM CHLORIDE 600; 310; 30; 20 MG/100ML; MG/100ML; MG/100ML; MG/100ML
125 INJECTION, SOLUTION INTRAVENOUS CONTINUOUS
Status: CANCELLED | OUTPATIENT
Start: 2023-03-21

## 2023-03-21 RX ORDER — OXYCODONE HYDROCHLORIDE 5 MG/1
2.5 TABLET ORAL EVERY 4 HOURS PRN
Qty: 8 TABLET | Refills: 0 | Status: SHIPPED | OUTPATIENT
Start: 2023-03-21 | End: 2023-03-26

## 2023-03-21 RX ORDER — ROCURONIUM BROMIDE 10 MG/ML
INJECTION, SOLUTION INTRAVENOUS AS NEEDED
Status: DISCONTINUED | OUTPATIENT
Start: 2023-03-21 | End: 2023-03-21

## 2023-03-21 RX ORDER — FUROSEMIDE 10 MG/ML
INJECTION INTRAMUSCULAR; INTRAVENOUS AS NEEDED
Status: DISCONTINUED | OUTPATIENT
Start: 2023-03-21 | End: 2023-03-21

## 2023-03-21 RX ORDER — FENTANYL CITRATE 50 UG/ML
INJECTION, SOLUTION INTRAMUSCULAR; INTRAVENOUS AS NEEDED
Status: DISCONTINUED | OUTPATIENT
Start: 2023-03-21 | End: 2023-03-21

## 2023-03-21 RX ORDER — SODIUM CHLORIDE, SODIUM LACTATE, POTASSIUM CHLORIDE, CALCIUM CHLORIDE 600; 310; 30; 20 MG/100ML; MG/100ML; MG/100ML; MG/100ML
INJECTION, SOLUTION INTRAVENOUS CONTINUOUS PRN
Status: DISCONTINUED | OUTPATIENT
Start: 2023-03-21 | End: 2023-03-21

## 2023-03-21 RX ORDER — CEFAZOLIN SODIUM 2 G/50ML
SOLUTION INTRAVENOUS AS NEEDED
Status: DISCONTINUED | OUTPATIENT
Start: 2023-03-21 | End: 2023-03-21

## 2023-03-21 RX ORDER — SULFAMETHOXAZOLE AND TRIMETHOPRIM 400; 80 MG/1; MG/1
1 TABLET ORAL EVERY 12 HOURS SCHEDULED
Qty: 6 TABLET | Refills: 0 | Status: SHIPPED | OUTPATIENT
Start: 2023-03-21 | End: 2023-03-24

## 2023-03-21 RX ORDER — SODIUM CHLORIDE, SODIUM LACTATE, POTASSIUM CHLORIDE, CALCIUM CHLORIDE 600; 310; 30; 20 MG/100ML; MG/100ML; MG/100ML; MG/100ML
100 INJECTION, SOLUTION INTRAVENOUS CONTINUOUS
Status: CANCELLED | OUTPATIENT
Start: 2023-03-21

## 2023-03-21 RX ORDER — GABAPENTIN 300 MG/1
300 CAPSULE ORAL ONCE
Status: COMPLETED | OUTPATIENT
Start: 2023-03-21 | End: 2023-03-21

## 2023-03-21 RX ORDER — PROPOFOL 10 MG/ML
INJECTION, EMULSION INTRAVENOUS AS NEEDED
Status: DISCONTINUED | OUTPATIENT
Start: 2023-03-21 | End: 2023-03-21

## 2023-03-21 RX ORDER — ONDANSETRON 2 MG/ML
4 INJECTION INTRAMUSCULAR; INTRAVENOUS EVERY 6 HOURS PRN
Status: CANCELLED | OUTPATIENT
Start: 2023-03-21

## 2023-03-21 RX ORDER — ONDANSETRON 2 MG/ML
4 INJECTION INTRAMUSCULAR; INTRAVENOUS ONCE AS NEEDED
Status: DISCONTINUED | OUTPATIENT
Start: 2023-03-21 | End: 2023-03-23 | Stop reason: HOSPADM

## 2023-03-21 RX ORDER — FENTANYL CITRATE/PF 50 MCG/ML
50 SYRINGE (ML) INJECTION
Status: DISCONTINUED | OUTPATIENT
Start: 2023-03-21 | End: 2023-03-23 | Stop reason: HOSPADM

## 2023-03-21 RX ORDER — DEXAMETHASONE SODIUM PHOSPHATE 10 MG/ML
INJECTION, SOLUTION INTRAMUSCULAR; INTRAVENOUS AS NEEDED
Status: DISCONTINUED | OUTPATIENT
Start: 2023-03-21 | End: 2023-03-21

## 2023-03-21 RX ORDER — HYDROMORPHONE HCL IN WATER/PF 6 MG/30 ML
0.2 PATIENT CONTROLLED ANALGESIA SYRINGE INTRAVENOUS
Status: CANCELLED | OUTPATIENT
Start: 2023-03-21

## 2023-03-21 RX ORDER — ACETAMINOPHEN 325 MG/1
975 TABLET ORAL ONCE
Status: COMPLETED | OUTPATIENT
Start: 2023-03-21 | End: 2023-03-21

## 2023-03-21 RX ORDER — CEFAZOLIN SODIUM 2 G/50ML
2000 SOLUTION INTRAVENOUS ONCE
Status: DISCONTINUED | OUTPATIENT
Start: 2023-03-21 | End: 2023-03-23 | Stop reason: HOSPADM

## 2023-03-21 RX ADMIN — PROPOFOL 150 MG: 10 INJECTION, EMULSION INTRAVENOUS at 10:39

## 2023-03-21 RX ADMIN — SUGAMMADEX 200 MG: 100 INJECTION, SOLUTION INTRAVENOUS at 11:11

## 2023-03-21 RX ADMIN — FENTANYL CITRATE 50 MCG: 50 INJECTION INTRAMUSCULAR; INTRAVENOUS at 11:05

## 2023-03-21 RX ADMIN — DEXAMETHASONE SODIUM PHOSPHATE 10 MG: 10 INJECTION INTRAMUSCULAR; INTRAVENOUS at 10:39

## 2023-03-21 RX ADMIN — LIDOCAINE HYDROCHLORIDE 100 MG: 10 INJECTION, SOLUTION EPIDURAL; INFILTRATION; INTRACAUDAL; PERINEURAL at 10:39

## 2023-03-21 RX ADMIN — FUROSEMIDE 20 MG: 10 INJECTION, SOLUTION INTRAMUSCULAR; INTRAVENOUS at 11:07

## 2023-03-21 RX ADMIN — SODIUM CHLORIDE, SODIUM LACTATE, POTASSIUM CHLORIDE, AND CALCIUM CHLORIDE: .6; .31; .03; .02 INJECTION, SOLUTION INTRAVENOUS at 10:35

## 2023-03-21 RX ADMIN — PHENYLEPHRINE HYDROCHLORIDE 30 MCG/MIN: 10 INJECTION INTRAVENOUS at 10:47

## 2023-03-21 RX ADMIN — ONDANSETRON 4 MG: 2 INJECTION INTRAMUSCULAR; INTRAVENOUS at 10:39

## 2023-03-21 RX ADMIN — ROCURONIUM BROMIDE 30 MG: 10 INJECTION, SOLUTION INTRAVENOUS at 10:39

## 2023-03-21 RX ADMIN — GABAPENTIN 300 MG: 300 CAPSULE ORAL at 09:18

## 2023-03-21 RX ADMIN — DEXMEDETOMIDINE HYDROCHLORIDE 0.2 MCG/KG/HR: 100 INJECTION, SOLUTION INTRAVENOUS at 10:46

## 2023-03-21 RX ADMIN — ACETAMINOPHEN 975 MG: 325 TABLET ORAL at 09:18

## 2023-03-21 RX ADMIN — CEFAZOLIN SODIUM 2000 MG: 2 SOLUTION INTRAVENOUS at 10:49

## 2023-03-21 RX ADMIN — FENTANYL CITRATE 50 MCG: 50 INJECTION INTRAMUSCULAR; INTRAVENOUS at 10:39

## 2023-03-21 NOTE — ANESTHESIA POSTPROCEDURE EVALUATION
Post-Op Assessment Note    CV Status:  Stable    Pain management: adequate     Mental Status:  Sleepy   Hydration Status:  Euvolemic   PONV Controlled:  Controlled   Airway Patency:  Patent      Post Op Vitals Reviewed: Yes      Staff: Anesthesiologist, CRNA         No notable events documented      BP     Temp      Pulse     Resp      SpO2

## 2023-03-21 NOTE — INTERVAL H&P NOTE
H&P reviewed  After examining the patient I find no changes in the patients condition since the H&P had been written      Vitals:    03/21/23 0916   BP: (!) 181/79   Pulse: 76   Resp: 20   Temp: 97 6 °F (36 4 °C)   SpO2: 97%   Proceed to TURBT and bilateral retrograde pyelography with all indicated procedures

## 2023-03-21 NOTE — DISCHARGE INSTR - AVS FIRST PAGE
Savita Paz,    Today you had cystoscopy with palliative transurethral resection of bladder tumor  You have a Adams catheter in place  We will have this removed in the coming week  I have sent a message to our oncology nurse navigator to ensure that you have follow-up with Dr Ziggy Haile  I will order a follow-up CT scan to see if you have developed any swelling in your kidneys  If you have, we can have a discussion of nephrostomy tubes, small tube to drain the kidney from above to divert the urinary stream away from the bladder  You may have some blood in the urine and urgency and frequency of urination as you recover  While you do have a GI intolerance to oxycodone and similar medications I did send you with some of these medicines in the event that your pain is severe  I have given you Zofran to help with any nausea associated with taking any opiate pain medications  I will see you back in the office for review of your pathology in some weeks      I hope that you have a uneventful postoperative course and that you feel better soon,  Dr Medardo Zayas

## 2023-03-21 NOTE — TELEPHONE ENCOUNTER
Status post palliative TURBT  Has a 25 Angolan Adams catheter  Please arrange for Adams catheter removal and trial of void    She will need a visit with me for pathology review in 2 weeks

## 2023-03-21 NOTE — ANESTHESIA PREPROCEDURE EVALUATION
EF60%, PASP 31, RV nml    Medical History  History Comments   Hyperlipidemia    Hypertension    Arthritis right knee   Wears partial dentures upper & lower   Wears glasses    Hay fever    Myocardial infarction [facility]) questionable MI during exploratory surgery   Stroke [facility]) " mini stroke " no deficits ; another poss stroke with  left hand weakness   Ovarian cancer ([facility] [address] ) surgery (did not have chemo or radiation)     Procedure:  TRANSURETHRAL RESECTION OF BLADDER TUMOR (TURBT) (Bladder)  CYSTOSCOPY WITH RETROGRADE PYELOGRAM (Bladder)    Relevant Problems   ANESTHESIA (within normal limits)      NEURO/PSYCH   (+) Stroke [facility])        Physical Exam    Airway    Mallampati score: II  TM Distance: >3 FB  Neck ROM: full     Dental   upper dentures and lower dentures,     Cardiovascular  Rate: normal,     Pulmonary  Pulmonary exam normal     Other Findings  Per pt denies anything remaining that is loose or removeable      Anesthesia Plan  ASA Score- 3     Anesthesia Type- general with ASA Monitors  Additional Monitors:   Airway Plan: LMA  Plan Factors-Exercise tolerance (METS): >4 METS  Chart reviewed  Patient summary reviewed  Patient is not a current smoker  Induction- intravenous  Postoperative Plan- Plan for postoperative opioid use  Informed Consent- Anesthetic plan and risks discussed with patient  I personally reviewed this patient with the CRNA  Discussed and agreed on the Anesthesia Plan with the CRNA  Tyson Rocha

## 2023-03-21 NOTE — OP NOTE
OPERATIVE REPORT  PATIENT NAME: Lynne Barillas    :  1940  MRN: [id number]  Pt Location: [facility] OR ROOM 01    SURGERY DATE: 3/21/2023    Surgeon(s) and Role:     * Chelsie Garcia MD - Primary    Preop Diagnosis:  Malignant neoplasm of overlapping sites of bladder ([address] ) [C67 8]    Post-Op Diagnosis Codes:     * Malignant neoplasm of overlapping sites of bladder ([address] ) [C67 8]    Procedure(s):  TRANSURETHRAL RESECTION OF BLADDER TUMOR (TURBT)    Specimen(s):  ID Type Source Tests Collected by Time Destination   1 : RECURRENT BLADDER TUMOR AT [facility] AND BLADDER NECK Tissue Urinary Bladder TISSUE EXAM Chidi Ricardo MD 3/21/2023 11:02 AM        Estimated Blood Loss:   20 mL    Drains:  * No LDAs found *    Anesthesia Type:   General    Operative Indications:  Malignant neoplasm of overlapping sites of bladder ([facility] [address] ) [C67 8]      Operative Findings: The trigone is not identifiable, this was also noted that her initial TURBT with me  She has recurrent tumor along the right posterior bladder wall and right bladder neck blocking the bladder outlet  The bladder was lavaged completely prior to any resection or disruption of mucosa today  A recurrent tumor measuring 4 cm was resected from the trigone and bladder neck  Meticulous hemostasis was obtained  A 24 Turkmen catheter was placed  A bimanual examination shows palpable disease with restricted motion of the pelvic organs but not fixed pelvic organs  This is after resection of the tumor    Complications:   None    Procedure and Technique:    PROCEDURE SUMMARY:    On an outpatient basis the patient's urological workup revealed a bladder tumor at the trigone, posterior wall and right side of the bladder  Risk bladder cancer which has most invasion  She chemotherapy or radical   She had amenable to palliative resections  The management of bladder tumors including Transurethral resection of bladder tumor was discussed with the patient   All risks benefits and alternatives of this procedure were discussed with the patient as well  Witnessed informed consent for the proposed procedure was obtained and medical optimization was also obtained for this patient  The patient was brought to the operating room and placed in the supine position for the induction of general endotracheal anesthesia  A full time-out confirmed the proper patient, position, and procedure  The patient was then placed in the lithotomy position and prepped and draped using the standard sterile technique  All pressure points were carefully padded there was no undue pressure on any bony prominences, muscle bellies, or nervous structures     Prior to the procedure antibiotics were administered as per the guidelines, and thromboembolism prophylaxis was administered in the form of sequential compression devices     A 32 F resectoscope was inserted into the urethra     Bimanual exam under anesthesia findings:    Restricted motion of the pelvic organs pelvic structures with palpable disease  Cystoscopic findings:    Urethra:  Normal  Bladder:  Lesion identified, characteristics below  Ureteral orifices: Normal orthotopic position with clear effluent   Urachus:   Normal    Lesion characteristics:  -Appearance:    Nodular and Papillary  -Number of foci:   1  -Aggregate tumor diameter:  4 cm  -Largest individual tumor:  4 cm  -Location:    Right posterior bladder wall, bladder neck and trigone  -Clinical stage:   T3    Upper tract evaluation (retrograde pyelography and radiological findings): The upper tracts cannot be sedated given no identifiable anatomy of the trigone due to patient disease          Bladder tumor resection:    Using a resectoscope, all abnormal lesions noted above were resected and sent for pathology  Resection craters and surrounding urothelium were electrofulgurated and hemostasis was achieved  All instrument counts and sponge counts were correct              DISPOSITION:  PACU to home  Plan:  Patient will be discharged home with a Adams catheter  Our office will arrange follow up with me in 2-3 weeks               I was present for the entire procedure and A qualified resident physician was not available    Patient Disposition:  PACU         SIGNATURE: Vijay Miller MD  DATE: March 21, 2023  TIME: 11:13 AM

## 2023-03-22 NOTE — TELEPHONE ENCOUNTER
Alberto Guy MD  [place] [facility] (11:26 AM)     Please arrange a repeat staging CT Abd pelvis WITHOUT contrast for Crys Ramirez, thank you      Alberto Guy MD Yesterday (11:26 AM)     Please arrange a repeat staging CT Abd pelvis WITHOUT contrast for Crys Ramirez, thank you

## 2023-03-27 ENCOUNTER — PROCEDURE VISIT (OUTPATIENT)
Dept: UROLOGY | Facility: CLINIC | Age: 83
End: 2023-03-27

## 2023-03-27 ENCOUNTER — TELEPHONE (OUTPATIENT)
Dept: HEMATOLOGY ONCOLOGY | Facility: CLINIC | Age: 83
End: 2023-03-27

## 2023-03-27 VITALS
HEART RATE: 89 BPM | HEIGHT: 62 IN | DIASTOLIC BLOOD PRESSURE: 98 MMHG | OXYGEN SATURATION: 95 % | WEIGHT: 186 LBS | SYSTOLIC BLOOD PRESSURE: 148 MMHG | BODY MASS INDEX: 34.23 KG/M2

## 2023-03-27 DIAGNOSIS — R32 URINARY INCONTINENCE, UNSPECIFIED TYPE: Primary | ICD-10-CM

## 2023-03-27 DIAGNOSIS — C67.8 MALIGNANT NEOPLASM OF OVERLAPPING SITES OF BLADDER (HCC): ICD-10-CM

## 2023-03-27 DIAGNOSIS — N20.0 CALCULUS OF KIDNEY: ICD-10-CM

## 2023-03-27 LAB — POST-VOID RESIDUAL VOLUME, ML POC: 21 ML

## 2023-03-27 NOTE — TELEPHONE ENCOUNTER
Appointment Cancellation or Reschedule     Person calling in Patient   If someone other than patient calling, are they listed on the communication consent form? N/A   Provider Gianluca López PA-C   Office Visit Date and Time  04/06/23 9:50AM [place]   Office Visit Location [facility]   Did patient want to reschedule their visit? If so, when was it scheduled to? 04/03/23 8:30AM [facility]   Did the patient have STAR scheduled for this appointment? No   Does the patient need STAR scheduled for their new appointment? N/A   Is this patient calling to reschedule an infusion appointment? No   When is their next infusion appointment? N/A   Is this patient a Chemo patient? No   Reason for Cancellation or Reschedule Appointment scheduled on Hematology Clinic day    Was the No show policy reviewed with patient if patient is canceling within 24 hours? N/A     If the patient is cancelling an appointment and needs their STAR Transport cancelled, please route to [facility]  If the patient is a treatment patient, please route this to the office nurse  If the patient is not on treatment, please route to the Clerical pool based on location  If the patient is a surgical oncology patient, please route to surg/onc clinical pool  Route message as high priority if same day cancellation

## 2023-03-27 NOTE — PROGRESS NOTES
"1/72/7871    Heath Jose David  [birth year]  [id number]    Diagnosis  Chief Complaint    Malignant neoplasm of overlapping sites of bladder          Patient presents for voiding trial managed by Dr Loki Villareal  Patient to follow up as scheduled for post op visit  Procedure Adams removal/voiding trial    Adams catheter removed after deflation of an intact balloon  Patient tolerated well  Encouraged patient to hydrate well and return this afternoon for post void residual   she knows she may return early if uncomfortable and unable to urinate  Patient agrees to this plan  Patient returned this afternoon  Patient voided in the office but c/o leaking  Bladder scan performed, 21cc urine noted  Provided urine orders to ensure no infection  Patient to get testing done on Friday      Recent Results (from the past 4 hour(s))   POCT Measure PVR    Collection Time: 03/27/23  2:59 PM   Result Value Ref Range    POST-VOID RESIDUAL VOLUME, ML POC 21 mL           Vitals:    03/27/23 0851   BP: 148/98   Pulse: 89   SpO2: 95%   Weight: 84 4 kg (186 lb)   Height: 5' 2\" (1 575 m)           Sigifredo Morales RN      "

## 2023-03-28 ENCOUNTER — TELEPHONE (OUTPATIENT)
Dept: HEMATOLOGY ONCOLOGY | Facility: CLINIC | Age: 83
End: 2023-03-28

## 2023-03-28 NOTE — TELEPHONE ENCOUNTER
Appointment Cancellation or Reschedule     Person calling in Patient   If someone other than patient calling, are they listed on the communication consent form? N/A   Provider Shanon Thomas PA-C   Office Visit Date and Time 04/03 at 8:30am   Office Visit Location [facility]   Did patient want to reschedule their visit? If so, when was it scheduled to? no   Did the patient have STAR scheduled for this appointment? No   Does the patient need STAR scheduled for their new appointment? No   Is this patient calling to reschedule an infusion appointment? No   When is their next infusion appointment? n/a   Is this patient a Chemo patient? No   Reason for Cancellation or Reschedule Patient canceled, call back to reschedule   Was the No show policy reviewed with patient if patient is canceling within 24 hours? Yes     If the patient is cancelling an appointment and needs their STAR Transport cancelled, please route to [facility]  If the patient is a treatment patient, please route this to the office nurse  If the patient is not on treatment, please route to the Clerical pool based on location  If the patient is a surgical oncology patient, please route to surg/onc clinical pool  Route message as high priority if same day cancellation

## 2023-03-31 ENCOUNTER — HOSPITAL ENCOUNTER (OUTPATIENT)
Dept: RADIOLOGY | Facility: HOSPITAL | Age: 83
Discharge: HOME | End: 2023-03-31
Attending: UROLOGY

## 2023-03-31 DIAGNOSIS — N17.9 ACUTE KIDNEY INJURY (HCC): ICD-10-CM

## 2023-03-31 DIAGNOSIS — C67.8 MALIGNANT NEOPLASM OF OVERLAPPING SITES OF BLADDER (HCC): ICD-10-CM

## 2023-04-04 NOTE — PROGRESS NOTES
Problem List Items Addressed This Visit        Genitourinary    Malignant neoplasm of overlapping sites of bladder [facility]) - Primary       Other    Encounter to discuss test results    Goals of care, counseling/discussion   Other Visit Diagnoses     Other hydronephrosis        Relevant Orders    Ambulatory Referral to Interventional Radiology          Discussion:    Berna Aguilar and her family and I had a productive consultation today  We reviewed her high-grade urothelial carcinoma on recent palliative TURBT  While the pathology says that muscularis was not sampled, the resection did extend into the bladder wall and I suspect that this represents disease extending through the bladder wall to the surrounding tissues, at least stage III disease  She is seen to have bilateral hydronephrosis at this time  Her formal read on her CT scan is still pending  I spoke with her today about ongoing conservative measures which she has favored previously as she did not want radical cystectomy or other aggressive systemic therapies  We also talked about talking with the interventional radiology team for bilateral nephrostomy tube placement and told her that this might help with her severe urinary leakage which is her main complaint today  I suspect that her cancer has involve the sphincter mechanism and she also has poor bladder storage due to invasion due to her high-grade cancer in the bladder wall  I did make her aware that placement of nephrostomy tubes would prolong her life and thus potentially prolong her disease course from a renal function perspective  The alternative being no intervention with observation of her kidney function with the understanding that her hydronephrosis will worsen and her kidney function will also worsen until she goes into end-stage renal disease and ultimately death from end-stage renal disease      She has previously entertained Myriam Barry in the past   I did recommend that she follow-up with medical oncology for potential consideration of this given that she is not a candidate for cystectomy at this time given advancement of her disease  I will see her back in 3 months for cystoscopy to look at her disease burden and for recurrence that may require further resection  Ongoing goals of care discussions will be had with her going forward  At this time, apart from severe leakage, worse at night, she still is eating and drinking and obtaining some enjoyment from life at this time    I do not recommend storage medications now given her cystoscopy findings and her history of falls    Assessment and plan:       Please see problem oriented charting for the assessment plan of today's urological complaints      Barbie Goff MD      Chief Complaint     As above      History of Present Illness     Dani Omer is a 79 y  o  woman with high risk bladder cancer, has refused systemic therapy and consideration of cystectomy and radiation in the past  Undergoes palliative resections  The following portions of the patient's history were reviewed and updated as appropriate: allergies, current medications, past family history, past medical history, past social history, past surgical history and problem list       Detailed Urologic History     - please refer to HPI    Review of Systems     Review of Systems   Constitutional: Negative  HENT: Negative  Eyes: Negative  Respiratory: Negative  Cardiovascular: Negative  Gastrointestinal: Negative  Endocrine: Negative  Genitourinary: Negative  As per HPI   Musculoskeletal: Negative  Skin: Negative  Allergic/Immunologic: Negative  Neurological: Negative  Hematological: Negative  Psychiatric/Behavioral: Negative                Allergies     Allergies   Allergen Reactions   • Percocet [Oxycodone-Acetaminophen] GI Intolerance   • Vicodin [Hydrocodone-Acetaminophen] GI Intolerance     Also darvocet   • Medical Tape Rash   • "Propoxyphene Other (See Comments)       Physical Exam     Physical Exam  Vitals reviewed  Constitutional:       General: She is not in acute distress  Appearance: Normal appearance  She is not ill-appearing, toxic-appearing or diaphoretic  Pulmonary:      Effort: Pulmonary effort is normal    Musculoskeletal:         General: No deformity  Skin:     Coloration: Skin is not jaundiced  Neurological:      General: No focal deficit present  Mental Status: She is alert  Cranial Nerves: No cranial nerve deficit  Gait: Gait normal    Psychiatric:         Mood and Affect: Mood normal          Behavior: Behavior normal          Thought Content:  Thought content normal          Judgment: Judgment normal              Vital Signs  Vitals:    04/05/23 0903   BP: 146/88   BP Location: Left arm   Patient Position: Sitting   Pulse: 61   SpO2: 98%   Height: 5' 2\" (1 575 m)         Current Medications       Current Outpatient Medications:   •  aspirin 81 mg chewable tablet, Chew 81 mg daily, Disp: , Rfl:   •  atorvastatin (LIPITOR) 40 mg tablet, Take 1 tablet (40 mg total) by mouth daily with dinner (Patient taking differently: Take 20 mg by mouth Every two days), Disp: 14 tablet, Rfl: 0  •  cholecalciferol (VITAMIN D3) 1,000 units tablet, Take 1,000 Units by mouth daily, Disp: , Rfl:   •  fluticasone (FLONASE) 50 mcg/act nasal spray, , Disp: , Rfl:   •  hydrochlorothiazide (HYDRODIURIL) 25 mg tablet, Take 25 mg by mouth every morning, Disp: , Rfl:   •  meloxicam (MOBIC) 15 mg tablet, As needed, Disp: , Rfl:   •  metoprolol succinate (TOPROL-XL) 50 mg 24 hr tablet, Take 25 mg by mouth every morning, Disp: , Rfl:   •  ondansetron (ZOFRAN) 4 mg tablet, Take 1 tablet (4 mg total) by mouth every 8 (eight) hours as needed for nausea or vomiting, Disp: 20 tablet, Rfl: 0  •  traZODone (DESYREL) 50 mg tablet, Take 50 mg by mouth daily, Disp: , Rfl:       Active Problems     Patient Active Problem List   Diagnosis " "  • Hypokalemia   • Bone lesion   • Wrist drop   • Stroke [facility])   • Malignant neoplasm of overlapping sites of bladder ([facility] )   • Encounter to discuss test results   • Goals of care, counseling/discussion   • Sensorineural hearing loss (SNHL), bilateral   • Sensorineural hearing loss (SNHL) of right ear with restricted hearing of left ear         Past Medical History     Past Medical History:   Diagnosis Date   • Arthritis     right knee   • Hay fever    • Hyperlipidemia    • Hypertension    • Myocardial infarction [facility]) 2003    questionable MI during exploratory surgery   • Ovarian cancer ([facility] ) 2003    surgery (did not have chemo or radiation)   • Stroke ([place] [address] ) 2003    \" mini stroke \" no deficits ; another poss stroke with  left hand weakness   • Wears glasses    • Wears partial dentures     upper & lower         Surgical History     Past Surgical History:   Procedure Laterality Date   • BREAST SURGERY Right     exc  bx  lump (R) breast   • DILATION AND CURETTAGE OF UTERUS     • HYSTERECTOMY  2003    TAHBSO   • JOINT REPLACEMENT Right    • OOPHORECTOMY Bilateral     cancer   • ID CYSTO BLADDER W/URETERAL CATHETERIZATION Bilateral 4/4/2022    Procedure: CYSTOSCOPY WITH ATTEMPTED B/L RETROGRADE PYELOGRAM;  Surgeon: Eryn Jimenez MD;  [facility];  Service: Urology   • ID CYSTO W/REMOVAL OF TUMORS SMALL N/A 4/4/2022    Procedure: TRANSURETHRAL RESECTION OF BLADDER (MULTIFOCAL) TUMOR (TURBT); Surgeon: Eryn Jimeenz MD;  [facility];  Service: Urology   • ID CYSTOURETHROSCOPY W/DEST &/RMVL TUMOR LARGE Bilateral 11/11/2021    Procedure: CYSTOSCOPY, TRANSURETHRAL RESECTION OF BLADDER TUMOR (TURBT), EVACUATION OF CLOTS, LARGE TUMOR 7-8CM; Surgeon: Ye Silveira MD;  Location: [address] [place] [address];  Service: Urology   • ID CYSTOURETHROSCOPY W/DEST &/RMVL TUMOR LARGE N/A 3/21/2023    Procedure: TRANSURETHRAL RESECTION OF BLADDER TUMOR (TURBT);   Surgeon: Eryn Jimenez MD;  [facility];  Service: " Urology   • KS XCAPSL CTRC RMVL INSJ IO LENS PROSTH W/O ECP Left 2017    Procedure: EXTRACTION EXTRACAPSULAR CATARACT PHACO INTRAOCULAR LENS (IOL);   Surgeon: Josesito Ruiz MD;  Location: [facility] MAIN OR;  Service: Ophthalmology   • TONSILLECTOMY           Family History     Family History   Problem Relation Age of Onset   • Parkinsonism Mother    • Heart disease Father    • Cancer Father         leukemia   • Heart disease Sister    • Diabetes Sister    • Diabetes Brother    • Diabetes Sister    • Melanoma Sister    • Melanoma Sister    • Diabetes Maternal Aunt    • No Known Problems Maternal Aunt    • No Known Problems Paternal Aunt          Social History     Social History     Social History     Tobacco Use   Smoking Status Former   • Packs/day: 1 50   • Years: 40 00   • Pack years: 60 00   • Types: Cigarettes   • Quit date:    • Years since quittin 2   Smokeless Tobacco Never         Pertinent Lab Values     Lab Results   Component Value Date    CREATININE 2 78 (H) 2023               Pertinent Imaging      Imaging shows

## 2023-04-05 ENCOUNTER — OFFICE VISIT (OUTPATIENT)
Dept: UROLOGY | Facility: CLINIC | Age: 83
End: 2023-04-05

## 2023-04-05 ENCOUNTER — TELEPHONE (OUTPATIENT)
Dept: UROLOGY | Facility: CLINIC | Age: 83
End: 2023-04-05

## 2023-04-05 VITALS
SYSTOLIC BLOOD PRESSURE: 146 MMHG | BODY MASS INDEX: 34.02 KG/M2 | DIASTOLIC BLOOD PRESSURE: 88 MMHG | OXYGEN SATURATION: 98 % | HEIGHT: 62 IN | HEART RATE: 61 BPM

## 2023-04-05 DIAGNOSIS — N13.30 HYDRONEPHROSIS DUE TO OBSTRUCTIVE MALIGNANT BLADDER CANCER (HCC): Primary | ICD-10-CM

## 2023-04-05 DIAGNOSIS — N13.39 OTHER HYDRONEPHROSIS: ICD-10-CM

## 2023-04-05 DIAGNOSIS — C67.9 HYDRONEPHROSIS DUE TO OBSTRUCTIVE MALIGNANT BLADDER CANCER (HCC): Primary | ICD-10-CM

## 2023-04-05 DIAGNOSIS — Z71.2 ENCOUNTER TO DISCUSS TEST RESULTS: ICD-10-CM

## 2023-04-05 DIAGNOSIS — Z71.89 GOALS OF CARE, COUNSELING/DISCUSSION: ICD-10-CM

## 2023-04-05 DIAGNOSIS — C67.8 MALIGNANT NEOPLASM OF OVERLAPPING SITES OF BLADDER (HCC): Primary | ICD-10-CM

## 2023-04-05 RX ORDER — SODIUM CHLORIDE 9 MG/ML
75 INJECTION, SOLUTION INTRAVENOUS CONTINUOUS
OUTPATIENT
Start: 2023-04-05

## 2023-04-13 ENCOUNTER — ANESTHESIA EVENT (OUTPATIENT)
Dept: NON INVASIVE DIAGNOSTICS | Facility: HOSPITAL | Age: 83
End: 2023-04-13

## 2023-04-13 ENCOUNTER — ANESTHESIA (OUTPATIENT)
Dept: NON INVASIVE DIAGNOSTICS | Facility: HOSPITAL | Age: 83
End: 2023-04-13

## 2023-04-13 RX ORDER — FENTANYL CITRATE 50 UG/ML
INJECTION, SOLUTION INTRAMUSCULAR; INTRAVENOUS AS NEEDED
Status: DISCONTINUED | OUTPATIENT
Start: 2023-04-13 | End: 2023-04-13

## 2023-04-13 RX ORDER — LIDOCAINE HYDROCHLORIDE 10 MG/ML
INJECTION, SOLUTION EPIDURAL; INFILTRATION; INTRACAUDAL; PERINEURAL AS NEEDED
Status: DISCONTINUED | OUTPATIENT
Start: 2023-04-13 | End: 2023-04-13

## 2023-04-13 RX ORDER — ONDANSETRON 2 MG/ML
INJECTION INTRAMUSCULAR; INTRAVENOUS AS NEEDED
Status: DISCONTINUED | OUTPATIENT
Start: 2023-04-13 | End: 2023-04-13

## 2023-04-13 RX ORDER — EPHEDRINE SULFATE 50 MG/ML
INJECTION INTRAVENOUS AS NEEDED
Status: DISCONTINUED | OUTPATIENT
Start: 2023-04-13 | End: 2023-04-13

## 2023-04-13 RX ORDER — ROCURONIUM BROMIDE 10 MG/ML
INJECTION, SOLUTION INTRAVENOUS AS NEEDED
Status: DISCONTINUED | OUTPATIENT
Start: 2023-04-13 | End: 2023-04-13

## 2023-04-13 RX ORDER — DEXAMETHASONE SODIUM PHOSPHATE 4 MG/ML
INJECTION, SOLUTION INTRA-ARTICULAR; INTRALESIONAL; INTRAMUSCULAR; INTRAVENOUS; SOFT TISSUE AS NEEDED
Status: DISCONTINUED | OUTPATIENT
Start: 2023-04-13 | End: 2023-04-13

## 2023-04-13 RX ADMIN — DEXAMETHASONE SODIUM PHOSPHATE 4 MG: 4 INJECTION, SOLUTION INTRA-ARTICULAR; INTRALESIONAL; INTRAMUSCULAR; INTRAVENOUS; SOFT TISSUE at 13:49

## 2023-04-13 RX ADMIN — SUGAMMADEX 300 MG: 100 INJECTION, SOLUTION INTRAVENOUS at 15:03

## 2023-04-13 RX ADMIN — ONDANSETRON 4 MG: 2 INJECTION INTRAMUSCULAR; INTRAVENOUS at 14:54

## 2023-04-13 RX ADMIN — SODIUM CHLORIDE: 0.9 INJECTION, SOLUTION INTRAVENOUS at 13:39

## 2023-04-13 RX ADMIN — CEFAZOLIN SODIUM 2000 MG: 2 SOLUTION INTRAVENOUS at 13:55

## 2023-04-13 RX ADMIN — EPHEDRINE SULFATE 10 MG: 50 INJECTION, SOLUTION INTRAVENOUS at 13:48

## 2023-04-13 RX ADMIN — FENTANYL CITRATE 50 MCG: 50 INJECTION, SOLUTION INTRAMUSCULAR; INTRAVENOUS at 15:05

## 2023-04-13 RX ADMIN — FENTANYL CITRATE 50 MCG: 50 INJECTION, SOLUTION INTRAMUSCULAR; INTRAVENOUS at 13:41

## 2023-04-13 RX ADMIN — ROCURONIUM BROMIDE 10 MG: 10 INJECTION, SOLUTION INTRAVENOUS at 14:17

## 2023-04-13 RX ADMIN — LIDOCAINE HYDROCHLORIDE 150 MG: 10 INJECTION, SOLUTION EPIDURAL; INFILTRATION; INTRACAUDAL; PERINEURAL at 13:43

## 2023-04-13 RX ADMIN — LIDOCAINE HYDROCHLORIDE 50 MG: 10 INJECTION, SOLUTION EPIDURAL; INFILTRATION; INTRACAUDAL; PERINEURAL at 13:42

## 2023-04-13 RX ADMIN — ROCURONIUM BROMIDE 50 MG: 10 INJECTION, SOLUTION INTRAVENOUS at 13:44

## 2023-04-13 RX ADMIN — EPHEDRINE SULFATE 5 MG: 50 INJECTION, SOLUTION INTRAVENOUS at 14:19

## 2023-04-13 NOTE — ANESTHESIA POSTPROCEDURE EVALUATION
Post-Op Assessment Note    CV Status:  Stable  Pain Score: 0    Pain management: adequate     Mental Status:  Sleepy   Hydration Status:  Euvolemic   PONV Controlled:  Controlled   Airway Patency:  Patent      Post Op Vitals Reviewed: Yes      Staff: Anesthesiologist, CRNA         No notable events documented      BP  119/81   Temp      Pulse  56   Resp   14   SpO2   98

## 2023-04-13 NOTE — ANESTHESIA PREPROCEDURE EVALUATION
Procedure:  IR NEPHROSTOMY TUBE PLACEMENT    Relevant Problems   NEURO/PSYCH   (+) Stroke [facility])        Physical Exam    Airway    Mallampati score: II  TM Distance: >3 FB  Neck ROM: full     Dental   No notable dental hx     Cardiovascular  Cardiovascular exam normal    Pulmonary  Pulmonary exam normal     Other Findings        Anesthesia Plan  ASA Score- 3     Anesthesia Type- general with ASA Monitors  Additional Monitors:   Airway Plan: ETT  Plan Factors-Exercise tolerance (METS): >4 METS  Chart reviewed  EKG reviewed  Imaging results reviewed  Existing labs reviewed  Patient summary reviewed  Patient is not a current smoker  Obstructive sleep apnea risk education given perioperatively  Induction- intravenous  Postoperative Plan- Plan for postoperative opioid use  Informed Consent- Anesthetic plan and risks discussed with patient  I personally reviewed this patient with the CRNA  Discussed and agreed on the Anesthesia Plan with the CRNA  Devin Olivares

## 2023-05-24 ENCOUNTER — HOSPITAL ENCOUNTER (OUTPATIENT)
Dept: NON INVASIVE DIAGNOSTICS | Facility: HOSPITAL | Age: 83
Discharge: HOME | End: 2023-05-24
Attending: RADIOLOGY | Admitting: RADIOLOGY

## 2023-05-24 DIAGNOSIS — C67.9 HYDRONEPHROSIS DUE TO OBSTRUCTIVE MALIGNANT BLADDER CANCER (HCC): Primary | ICD-10-CM

## 2023-05-24 DIAGNOSIS — N13.30 HYDRONEPHROSIS DUE TO OBSTRUCTIVE MALIGNANT BLADDER CANCER (HCC): Primary | ICD-10-CM

## 2023-05-24 RX ADMIN — IOHEXOL 16 ML: 350 INJECTION, SOLUTION INTRAVENOUS at 09:06

## 2023-05-24 NOTE — SEDATION DOCUMENTATION
Bilateral nephrostomy tubes checked with Dr Gabriel Look  Tubes to bag drainage and dressings changed  Patient to follow up in 6 weeks for a routine tube exchange, and patient may d/c daily flushes at this time

## 2023-05-24 NOTE — DISCHARGE INSTRUCTIONS
Nephrostomy Tube Care     Return in 6 weeks for bag change  No more daily flushes needed  WHAT YOU NEED TO KNOW:   A nephrostomy tube is a catheter (thin plastic tube) that is inserted through your skin and into your kidney  The nephrostomy tube drains urine from your kidney into a collecting bag outside your body  You may need a nephrostomy tube when something is blocking the normal flow of urine  A nephrostomy tube may be used for a short or a long period of time  The nephrostomy tube comes out of your back, so you will need someone to help care for your nephrostomy tube  DISCHARGE INSTRUCTIONS:      How to clean the skin around the nephrostomy tube and change the bandage:  Since the nephrostomy tube comes out of your back, you will not be able to care for it by yourself  Ask someone to follow the general directions below to check and care for your nephrostomy tube  Gather the items you will need  Disposable (single use) under-pad, and a clean washcloth  Plain soap, warm water, and new medical gloves  Sterile gauze bandages  Clear adhesive dressing or medical tape  Skin barrier  Protective skin film  Trash bag  Remove the old bandage, and check the tube entry site  Have the patient lie on his side with the nephrostomy tube entry site facing up  Place the under-pad where it will catch drainage as you are working with the nephrostomy tube  Wash your hands with soap and water  Put on new medical gloves  Gently remove the old bandage, without pulling on the tube  Do this by holding the skin beside the tube with one hand  With the other hand, gently remove sticky tape and the skin barrier by pulling in the same direction as hair growth  Do not touch the side of the bandage that is placed over or around the tube  Throw the bandage and skin barrier away in a trash bag  Look for signs of infection, such as skin redness and swelling  Report any skin changes to healthcare providers    Clean the tube entry site  Hold the tube in place to keep it from being pulled out while you are cleaning around it  You will need to clean the area twice  For the first cleaning, wet a new gauze bandage with soap and water  Begin at the entry site of the tube  Wipe the skin in circles, moving away from the entry site  Remove blood and any other material with the gauze  Do this as often as needed  Use a new gauze bandage each time you clean the area, moving away from the entry site  For the second cleaning, wet a new gauze bandage with water  Begin at the entry site of the tube  Wipe the skin in circles, moving away from the entry site  Use a new gauze bandage each time you clean the area, moving away from the entry site  Gently pat the skin with a clean washcloth to dry it  Apply the skin barrier and bandages  Roll up a bandage to make it thick, and place it under  the place where the tube enters the skin  Place it to support the tube, and stop it from kinking or bending  Tape the bandage in place, and apply more bandages if directed by a healthcare provider  Bring the tubing forward to the front and tape it to the skin  Do not stretch the tube tight, because this may pull the nephrostomy tube out  How often to change the bandage  Change the bandage around the tube, every other day  If your bandages  get dirty or wet, change them right away, and as often as needed  If your nephrostomy tube is to be used for a long period of time, the tube needs to be changed every 2 to 3 months  Healthcare providers will tell you when you need to make an appointment to have your tube changed  How to care for the urine drainage bag:   Ask if you need to measure and write down how much urine is in the bag before you empty it  Drain urine out of the drainage bag when it is ½ to ? full  Open the spout at the bottom of the bag to empty the urine into the toilet     You may need to detach the drainage bag from the nephrostomy tube to change it    If so, attach a new drainage bag tightly to the nephrostomy tube  How to prevent problems with your nephrostomy tube:   Change bandages, directed  This helps to prevent infection  Throw away or clean your drainage bag as directed by your healthcare provider  Wipe the connecting ends of the drainage bag with alcohol before you reconnect the bag to the tube  This helps prevent infection  Keep the tube taped to your skin and connected to a drainage bag placed below the level of your kidneys  This helps prevent urine from backing up into your kidneys  You may wear a small drainage bag strapped to your leg to let you move around more easily  Check the catheter to be sure it is in place after you change your clothes or do other activities  Do not wear tight clothing over the tube  Place the tubing over your thigh rather than under it when you are sitting down  Be sure that nothing is pulling on the nephrostomy tube when you move around  Change positions if you see little or no urine in your drainage bag  Check to see if the urine tube is twisted or bent  Be sure that you are not sitting or lying on the tube  If there are no kinks and there is little or no urine in the drainage bag, tell your healthcare provider  Flush out the tube as directed  Some tubes get flushed one time a day with 10 mls of NSS You will be given a prescription for the flushes  To flush the nephrostomy tube, clean both connections with alcohol swap  Twist off the drainage bag tube and twist the saline syringe into the nephrostomy tube and flush briskly  Remove the syringe and twist the drainage bag tube back into the nephrostomy tube  Keep the site covered while you shower  Tape a piece of clear adhesive plastic over the dressing to keep it dry while you shower  Do not take tub baths      Contact Interventional Radiology at [phone] [facility] PATIENTS: Contact Interventional Radiology at [phone]) Genie Bullock PATIENTS: Contact Interventional Radiology at [phone]) if:  The skin around the nephrostomy tube is red, swollen, itches, or has a rash  You have a fever greater than 101 or chills  You have lower back or hip pain  There are changes in how your urine looks or smells  You have little or no urine draining from the nephrostomy tube  You have nausea and are vomiting  The black arleth on your tube has moved, or the tube is longer than when it was put in  You have questions or concerns about your condition or care  The nephrostomy tube comes out completely  There is blood, pus, or a bad smell coming from the place where the tube enters your skin  Urine is leaking around the tube  The following pharmacies carry the flush syringes  [facility]                     [facility]       [address]  Phone [phone]            Phone [phone]  [address] & [facility]                      [address]                                 [phone]  Phone [phone]            Phone [phone]    [facility]                                                                         [facility] [phone]  [address]   Phone [phone]

## 2023-06-14 ENCOUNTER — OFFICE VISIT (OUTPATIENT)
Dept: AUDIOLOGY | Facility: CLINIC | Age: 83
End: 2023-06-14

## 2023-06-14 DIAGNOSIS — H90.3 SENSORINEURAL HEARING LOSS (SNHL), BILATERAL: Primary | ICD-10-CM

## 2023-06-14 NOTE — PROGRESS NOTES
[facility] AUDIOLOGY HEARING AID CHECK    Rosanna Terry was seen today (6/14/2023) for a hearing aid check of her monaural hearing aid  Ms Granado stated that she was doing well overall and had no concerns  She noted that she has accidentally walked into the shower a few times with the aid on again, and will consistently forget to wear the hearing aid  Device Information    Hearing Aid Fitting Date: 6/14/2023     Right Device Left Device   Hearing Aid Make: N/A Oticon   Hearing Aid Model: N/A More 3 miniRITE   Serial Number: N/A [id number]   Repair Warranty Expiration Date: N/A 09/28/2022   Loss/Damage Warranty Expiration Date:  N/A 09/28/2022    Length: N/A 2    Output: N/A 100   Wax System: N/A Pro Wax miniFIT   Dome Size/Style: N/A 6mm power   Battery: N/A Lithium-ion Rechargeable   [employer] Serial Number: N/A N/A   Jacob Gómez Date:  N/A N/A      Serial Number: [id number]  Accessories: N/A    Visual inspection of the device(s) revealed no noticeable damage or defects  A listening check revealed good sound quality from the device(s)  Datalogging revealed ~4h of daily usage      - Hearing aid form factor cleaned, microphone ports vacuumed  - Firmware updated  Recommendations & Follow-up    Hearing aids(s) are in good working condition  Cleaning and maintenance was reviewed  No other changes made today  Ms Granado will follow-up in 6 months for huy Tony  Clinical Audiologist    [id number] [place] [phone]

## 2023-07-12 NOTE — PROGRESS NOTES
Problem List Items Addressed This Visit        Genitourinary    Malignant neoplasm of overlapping sites of bladder ([address]) - Primary     High risk invasive bladder cancer s/p multiple recurrences and palliative TURBT procedures. She has declined cystectomy and does not want Keytruda at this time. Feeling mostly well, ECOG is 0, cystoscopy today without recurrences. RTC 6 months for cystoscopy         Relevant Orders    Basic metabolic panel    POCT urine dip       Other    Encounter to discuss test results     I reviewed all findings with her today, all questions and concerns answered and addressed         Relevant Orders    Basic metabolic panel    POCT urine dip    Goals of care, counseling/discussion     Goals are for avoiding aggressive therapies and for maximizing quality over quantity of life         Relevant Orders    Basic metabolic panel    POCT urine dip    Nephrostomy status ([address])     Placed for bilateral hydronephrosis, the majority of her output comes from the right nephrostomy tube. She may have IR take out her left nephrostomy tube given scanty output at her next change. She will think about this                 Discussion:      Updates as above, BMP ordered for follow up    Portions of the above record have been created with voice recognition software. Occasional wrong word or "sound alike" substitution may have occurred due to the inherent limitations of voice recognition software. Read the chart carefully and recognize, using context, where substitution may have occurred. Assessment and plan:       Please see problem oriented charting for the assessment plan of today's urological complaints      Hever Chahal MD      Chief Complaint     As listed above      History of Present Illness     Swapna Christianson is a 79 y. o. woman with history and updates as above    The following portions of the patient's history were reviewed and updated as appropriate: allergies, current medications, past family history, past medical history, past social history, past surgical history and problem list.    Detailed Urologic History     - please refer to HPI    Review of Systems     Review of Systems   Constitutional: Negative. HENT: Negative. Eyes: Negative. Respiratory: Negative. Cardiovascular: Negative. Gastrointestinal: Negative. Endocrine: Negative. Musculoskeletal: Negative. Skin: Negative. Allergic/Immunologic: Negative. Neurological: Negative. Hematological: Negative. Psychiatric/Behavioral: Negative. Allergies     Allergies   Allergen Reactions   • Percocet [Oxycodone-Acetaminophen] GI Intolerance   • Vicodin [Hydrocodone-Acetaminophen] GI Intolerance     Also darvocet   • Medical Tape Rash   • Propoxyphene Other (See Comments)       Physical Exam     Physical Exam  Vitals reviewed. Constitutional:       General: She is not in acute distress. Appearance: Normal appearance. She is obese. She is not ill-appearing, toxic-appearing or diaphoretic. HENT:      Head: Normocephalic and atraumatic. Eyes:      General: No scleral icterus. Right eye: No discharge. Left eye: No discharge. Cardiovascular:      Pulses: Normal pulses. Pulmonary:      Effort: Pulmonary effort is normal.   Abdominal:      General: There is no distension. Musculoskeletal:         General: No swelling. Skin:     Coloration: Skin is not jaundiced. Neurological:      General: No focal deficit present. Mental Status: She is alert. Cranial Nerves: No cranial nerve deficit. Psychiatric:         Mood and Affect: Mood normal.         Behavior: Behavior normal.         Thought Content:  Thought content normal.         Judgment: Judgment normal.             Vital Signs  Vitals:    07/14/23 0943   BP: 138/76   Pulse: 68   Weight: 84.4 kg (186 lb)   Height: 5' 2" (1.575 m)         Current Medications       Current Outpatient Medications:   •  aspirin 81 mg chewable tablet, Chew 81 mg daily, Disp: , Rfl:   •  atorvastatin (LIPITOR) 40 mg tablet, Take 1 tablet (40 mg total) by mouth daily with dinner (Patient taking differently: Take 20 mg by mouth Every two days), Disp: 14 tablet, Rfl: 0  •  cholecalciferol (VITAMIN D3) 1,000 units tablet, Take 1,000 Units by mouth daily, Disp: , Rfl:   •  fluticasone (FLONASE) 50 mcg/act nasal spray, , Disp: , Rfl:   •  hydrochlorothiazide (HYDRODIURIL) 25 mg tablet, Take 25 mg by mouth every morning, Disp: , Rfl:   •  metoprolol succinate (TOPROL-XL) 50 mg 24 hr tablet, Take 25 mg by mouth every morning, Disp: , Rfl:   •  sodium chloride, PF, 0.9 %, 10 mL by Intracatheter route daily Intracatheter flushing daily. May substitute prefilled syringe with normal saline 10 mL vials, 10 mL syringes, and 18 g blunt needles, Disp: 900 mL, Rfl: 0  No current facility-administered medications for this visit.       Active Problems     Patient Active Problem List   Diagnosis   • Hypokalemia   • Bone lesion   • Wrist drop   • Stroke [facility])   • Malignant neoplasm of overlapping sites of bladder ([address])   • Encounter to discuss test results   • Goals of care, counseling/discussion   • Sensorineural hearing loss (SNHL), bilateral   • Sensorineural hearing loss (SNHL) of right ear with restricted hearing of left ear   • Nephrostomy status [facility])         Past Medical History     Past Medical History:   Diagnosis Date   • Arthritis     right knee   • Hay fever    • Hyperlipidemia    • Hypertension    • Myocardial infarction [facility]) 2003    questionable MI during exploratory surgery   • Ovarian cancer ([address]) 2003    surgery (did not have chemo or radiation)   • Stroke [facility]) 2003    " mini stroke " no deficits ; another poss stroke with  left hand weakness   • Wears glasses    • Wears partial dentures     upper & lower         Surgical History     Past Surgical History:   Procedure Laterality Date   • BREAST SURGERY Right     exc. bx. lump (R) breast   • DILATION AND CURETTAGE OF UTERUS     • HYSTERECTOMY  2003    TAHBSO   • IR NEPHROSTOMY TUBE CHECK/CHANGE/REPOSITION/REINSERTION/UPSIZE  5/24/2023   • IR NEPHROSTOMY TUBE CHECK/CHANGE/REPOSITION/REINSERTION/UPSIZE  7/13/2023   • IR NEPHROSTOMY TUBE PLACEMENT  4/13/2023   • JOINT REPLACEMENT Right    • OOPHORECTOMY Bilateral     cancer   • OH CYSTO BLADDER W/URETERAL CATHETERIZATION Bilateral 4/4/2022    Procedure: CYSTOSCOPY WITH ATTEMPTED B/L RETROGRADE PYELOGRAM;  Surgeon: Juan M Burgess MD;  [facility];  Service: Urology   • OH CYSTO W/REMOVAL OF TUMORS SMALL N/A 4/4/2022    Procedure: TRANSURETHRAL RESECTION OF BLADDER (MULTIFOCAL) TUMOR (TURBT); Surgeon: Juan M Burgess MD;  [facility];  Service: Urology   • OH CYSTOURETHROSCOPY W/DEST &/RMVL TUMOR LARGE Bilateral 11/11/2021    Procedure: CYSTOSCOPY, TRANSURETHRAL RESECTION OF BLADDER TUMOR (TURBT), EVACUATION OF CLOTS, LARGE TUMOR 7-8CM; Surgeon: Hector Sequeira MD;  Location: [facility];  Service: Urology   • OH CYSTOURETHROSCOPY W/DEST &/RMVL TUMOR LARGE N/A 3/21/2023    Procedure: TRANSURETHRAL RESECTION OF BLADDER TUMOR (TURBT); Surgeon: Juan M Burgess MD;  [facility];  Service: Urology   • OH XCAPSL CTRC RMVL INSJ IO LENS PROSTH W/O ECP Left 7/24/2017    Procedure: EXTRACTION EXTRACAPSULAR CATARACT PHACO INTRAOCULAR LENS (IOL);   Surgeon: Sydni Petersen MD;  Location: [facility] MAIN OR;  Service: Ophthalmology   • TONSILLECTOMY           Family History     Family History   Problem Relation Age of Onset   • Parkinsonism Mother    • Heart disease Father    • Cancer Father         leukemia   • Heart disease Sister    • Diabetes Sister    • Diabetes Brother    • Diabetes Sister    • Melanoma Sister    • Melanoma Sister    • Diabetes Maternal Aunt    • No Known Problems Maternal Aunt    • No Known Problems Paternal Aunt          Social History     Social History     Social History     Tobacco Use   Smoking Status Former   • Packs/day: 1.50   • Years: 40.00   • Total pack years: 60.00   • Types: Cigarettes   • Quit date:    • Years since quittin.5   Smokeless Tobacco Never         Pertinent Lab Values     Lab Results   Component Value Date    CREATININE 2.78 (H) 2023       No results found for: "PSA"

## 2023-07-13 ENCOUNTER — HOSPITAL ENCOUNTER (OUTPATIENT)
Dept: NON INVASIVE DIAGNOSTICS | Facility: HOSPITAL | Age: 83
Discharge: HOME | End: 2023-07-13
Attending: RADIOLOGY
Payer: COMMERCIAL

## 2023-07-13 DIAGNOSIS — C67.9 HYDRONEPHROSIS DUE TO OBSTRUCTIVE MALIGNANT BLADDER CANCER (HCC): ICD-10-CM

## 2023-07-13 DIAGNOSIS — N13.30 HYDRONEPHROSIS DUE TO OBSTRUCTIVE MALIGNANT BLADDER CANCER (HCC): ICD-10-CM

## 2023-07-13 PROCEDURE — 50435 EXCHANGE NEPHROSTOMY CATH: CPT | Performed by: RADIOLOGY

## 2023-07-13 PROCEDURE — 50435 EXCHANGE NEPHROSTOMY CATH: CPT

## 2023-07-13 PROCEDURE — C1729 CATH, DRAINAGE: HCPCS

## 2023-07-13 PROCEDURE — C1769 GUIDE WIRE: HCPCS

## 2023-07-13 RX ORDER — LIDOCAINE WITH 8.4% SOD BICARB 0.9%(10ML)
SYRINGE (ML) INJECTION AS NEEDED
Status: COMPLETED | OUTPATIENT
Start: 2023-07-13 | End: 2023-07-13

## 2023-07-13 RX ADMIN — LIDOCAINE HYDROCHLORIDE 2 ML: 10 INJECTION, SOLUTION INFILTRATION; PERINEURAL at 10:29

## 2023-07-13 RX ADMIN — IOHEXOL 15 ML: 350 INJECTION, SOLUTION INTRAVENOUS at 10:46

## 2023-07-13 RX ADMIN — LIDOCAINE HYDROCHLORIDE 2 ML: 10 INJECTION, SOLUTION INFILTRATION; PERINEURAL at 10:25

## 2023-07-13 NOTE — DISCHARGE INSTRUCTIONS
Nephrostomy Tube Care     WHAT YOU NEED TO KNOW:   A nephrostomy tube is a catheter (thin plastic tube) that is inserted through your skin and into your kidney. The nephrostomy tube drains urine from your kidney into a collecting bag outside your body. You may need a nephrostomy tube when something is blocking the normal flow of urine. A nephrostomy tube may be used for a short or a long period of time. The nephrostomy tube comes out of your back, so you will need someone to help care for your nephrostomy tube. DISCHARGE INSTRUCTIONS:      How to clean the skin around the nephrostomy tube and change the bandage:  Since the nephrostomy tube comes out of your back, you will not be able to care for it by yourself. Ask someone to follow the general directions below to check and care for your nephrostomy tube. Gather the items you will need. Disposable (single use) under-pad, and a clean washcloth  Plain soap, warm water, and new medical gloves  Sterile gauze bandages  Clear adhesive dressing or medical tape  Skin barrier  Protective skin film  Trash bag  Remove the old bandage, and check the tube entry site. Have the patient lie on his side with the nephrostomy tube entry site facing up. Place the under-pad where it will catch drainage as you are working with the nephrostomy tube. Wash your hands with soap and water. Put on new medical gloves. Gently remove the old bandage, without pulling on the tube. Do this by holding the skin beside the tube with one hand. With the other hand, gently remove sticky tape and the skin barrier by pulling in the same direction as hair growth. Do not touch the side of the bandage that is placed over or around the tube. Throw the bandage and skin barrier away in a trash bag. Look for signs of infection, such as skin redness and swelling. Report any skin changes to healthcare providers. Clean the tube entry site.     Hold the tube in place to keep it from being pulled out while you are cleaning around it. You will need to clean the area twice. For the first cleaning, wet a new gauze bandage with soap and water. Begin at the entry site of the tube. Wipe the skin in circles, moving away from the entry site. Remove blood and any other material with the gauze. Do this as often as needed. Use a new gauze bandage each time you clean the area, moving away from the entry site. For the second cleaning, wet a new gauze bandage with water. Begin at the entry site of the tube. Wipe the skin in circles, moving away from the entry site. Use a new gauze bandage each time you clean the area, moving away from the entry site. Gently pat the skin with a clean washcloth to dry it. Apply the skin barrier and bandages. Roll up a bandage to make it thick, and place it under  the place where the tube enters the skin. Place it to support the tube, and stop it from kinking or bending. Tape the bandage in place, and apply more bandages if directed by a healthcare provider. Bring the tubing forward to the front and tape it to the skin. Do not stretch the tube tight, because this may pull the nephrostomy tube out. How often to change the bandage. Change the bandage around the tube, every other day. If your bandages  get dirty or wet, change them right away, and as often as needed. If your nephrostomy tube is to be used for a long period of time, the tube needs to be changed every 2 to 3 months. Healthcare providers will tell you when you need to make an appointment to have your tube changed. How to care for the urine drainage bag:   Ask if you need to measure and write down how much urine is in the bag before you empty it. Drain urine out of the drainage bag when it is ½ to ? full. Open the spout at the bottom of the bag to empty the urine into the toilet. You may need to detach the drainage bag from the nephrostomy tube to change it. . If so, attach a new drainage bag tightly to the nephrostomy tube. How to prevent problems with your nephrostomy tube:   Change bandages, directed. This helps to prevent infection. Throw away or clean your drainage bag as directed by your healthcare provider. Wipe the connecting ends of the drainage bag with alcohol before you reconnect the bag to the tube. This helps prevent infection. Keep the tube taped to your skin and connected to a drainage bag placed below the level of your kidneys. This helps prevent urine from backing up into your kidneys. You may wear a small drainage bag strapped to your leg to let you move around more easily. Check the catheter to be sure it is in place after you change your clothes or do other activities. Do not wear tight clothing over the tube. Place the tubing over your thigh rather than under it when you are sitting down. Be sure that nothing is pulling on the nephrostomy tube when you move around. Change positions if you see little or no urine in your drainage bag. Check to see if the urine tube is twisted or bent. Be sure that you are not sitting or lying on the tube. If there are no kinks and there is little or no urine in the drainage bag, tell your healthcare provider. Flush out the tube as directed. Some tubes get flushed one time a day with 10 mls of NSS You will be given a prescription for the flushes. To flush the nephrostomy tube, clean both connections with alcohol swap. Twist off the drainage bag tube and twist the saline syringe into the nephrostomy tube and flush briskly. Remove the syringe and twist the drainage bag tube back into the nephrostomy tube. Keep the site covered while you shower. Tape a piece of clear adhesive plastic over the dressing to keep it dry while you shower. Do not take tub baths.     Contact Interventional Radiology at [phone] Angela PATIENTS: Contact Interventional Radiology at [phone]) Jennifer Jerez PATIENTS: Contact Interventional Radiology at [phone]) if:  The skin around the nephrostomy tube is red, swollen, itches, or has a rash. You have a fever greater than 101 or chills. You have lower back or hip pain. There are changes in how your urine looks or smells. You have little or no urine draining from the nephrostomy tube. You have nausea and are vomiting. The black arleth on your tube has moved, or the tube is longer than when it was put in. You have questions or concerns about your condition or care. The nephrostomy tube comes out completely. There is blood, pus, or a bad smell coming from the place where the tube enters your skin. Urine is leaking around the tube. The following pharmacies carry the flush syringes. [facility]                     [facility]                    [address] [place] [address]  Phone [phone]            Phone [phone]  [address] [facility]. [address] [place] [address], [address]                                 [phone]  Phone [phone]            Phone [phone]    [facility]                                                                         [facility] [phone]  [address] [place] [address].   [facility]   Phone [phone]

## 2023-07-13 NOTE — SEDATION DOCUMENTATION
Procedure ended. Bilateral 10fr PCN exchanged by Dr. Antony Hodgson. Patient tolerated well. Patient to return to IR in 3 months for next routine exchange.

## 2023-07-14 ENCOUNTER — PROCEDURE VISIT (OUTPATIENT)
Dept: UROLOGY | Facility: CLINIC | Age: 83
End: 2023-07-14
Payer: COMMERCIAL

## 2023-07-14 VITALS
BODY MASS INDEX: 34.23 KG/M2 | SYSTOLIC BLOOD PRESSURE: 138 MMHG | HEIGHT: 62 IN | HEART RATE: 68 BPM | WEIGHT: 186 LBS | DIASTOLIC BLOOD PRESSURE: 76 MMHG

## 2023-07-14 DIAGNOSIS — Z71.2 ENCOUNTER TO DISCUSS TEST RESULTS: ICD-10-CM

## 2023-07-14 DIAGNOSIS — C67.8 MALIGNANT NEOPLASM OF OVERLAPPING SITES OF BLADDER (HCC): Primary | ICD-10-CM

## 2023-07-14 DIAGNOSIS — Z71.89 GOALS OF CARE, COUNSELING/DISCUSSION: ICD-10-CM

## 2023-07-14 DIAGNOSIS — Z93.6 NEPHROSTOMY STATUS (HCC): ICD-10-CM

## 2023-07-14 PROCEDURE — 99214 OFFICE O/P EST MOD 30 MIN: CPT | Performed by: UROLOGY

## 2023-07-14 PROCEDURE — 52000 CYSTOURETHROSCOPY: CPT | Performed by: UROLOGY

## 2023-07-14 NOTE — PROGRESS NOTES
Office Cystoscopy Procedure Note    Indication:    Urothelial carcinoma of the bladder    Informed consent   The risks, benefits, complications, treatment options, and expected outcomes were discussed with the patient. The patient concurred with the proposed plan and provided informed consent. Anesthesia  Lidocaine jelly 2%    Antibiotic prophylaxis   None    Procedure  In the presence of a female nurse, the patient was placed in the supine frog-legged position, was prepped and draped in the usual manner using sterile technique, and 2% lidocaine jelly instilled into the urethra. Prior to this pelvic examination showed atrophic labia majora and minora, a small vaginal introitus, poor quality vaginal mucosa, and showed no pelvic floor descensus and no urethral hypermobility. Upon stress maneuvers there was no stress incontinence. A 17 F flexible cystoscope was then inserted into the urethra and the urethra and bladder carefully examined. The following findings were noted:    Findings:  Urethra:  Normal  Bladder:  Some debris, no recurrence of bladder tumors  Ureteral orifices:  Normal  Other findings:  None, retroflexed view confirms    Specimens: None                 Complications:    None; patient tolerated the procedure well           Disposition: To home            Condition: Stable    Plan: No recurrence, RTC 6 months for surveillance cystoscopy       Cystoscopy     Date/Time 7/14/2023 10:00 AM     Performed by  Aimee Moore MD   Authorized by Aimee Moore MD     Universal Protocol:  Consent: Verbal consent obtained. Written consent obtained.   Risks and benefits: risks, benefits and alternatives were discussed  Consent given by: patient  Patient understanding: patient states understanding of the procedure being performed  Patient consent: the patient's understanding of the procedure matches consent given  Procedure consent: procedure consent matches procedure scheduled  Relevant documents: relevant documents present and verified  Test results: test results available and properly labeled  Site marked: the operative site was not marked  Radiology Images displayed and confirmed. If images not available, report reviewed: imaging studies available  Required items: required blood products, implants, devices, and special equipment available  Patient identity confirmed: verbally with patient and provided demographic data        Procedure Details:  Procedure type: cystoscopy    Patient tolerance: Patient tolerated the procedure well with no immediate complications    Additional Procedure Details: Office Cystoscopy Procedure Note    Indication:    Urothelial carcinoma of the bladder    Informed consent   The risks, benefits, complications, treatment options, and expected outcomes were discussed with the patient. The patient concurred with the proposed plan and provided informed consent. Anesthesia  Lidocaine jelly 2%    Antibiotic prophylaxis   None    Procedure  In the presence of a female nurse, the patient was placed in the supine frog-legged position, was prepped and draped in the usual manner using sterile technique, and 2% lidocaine jelly instilled into the urethra. Prior to this pelvic examination showed atrophic labia majora and minora, a small vaginal introitus, poor quality vaginal mucosa, and showed no pelvic floor descensus and no urethral hypermobility. Upon stress maneuvers there was no stress incontinence. A 17 F flexible cystoscope was then inserted into the urethra and the urethra and bladder carefully examined.   The following findings were noted:    Findings:  Urethra:  Normal  Bladder:  Some debris, no recurrence of bladder tumors  Ureteral orifices:  Normal  Other findings:  None, retroflexed view confirms    Specimens: None                 Complications:    None; patient tolerated the procedure well           Disposition: To home            Condition: Stable    Plan: No recurrence, RTC 6 months for surveillance cystoscopy

## 2023-07-14 NOTE — ASSESSMENT & PLAN NOTE
High risk invasive bladder cancer s/p multiple recurrences and palliative TURBT procedures. She has declined cystectomy and does not want Keytruda at this time. Feeling mostly well, ECOG is 0, cystoscopy today without recurrences.  RTC 6 months for cystoscopy

## 2023-07-14 NOTE — ASSESSMENT & PLAN NOTE
Placed for bilateral hydronephrosis, the majority of her output comes from the right nephrostomy tube. She may have IR take out her left nephrostomy tube given scanty output at her next change.  She will think about this

## 2023-07-17 ENCOUNTER — APPOINTMENT (OUTPATIENT)
Dept: LAB | Facility: CLINIC | Age: 83
End: 2023-07-17
Payer: COMMERCIAL

## 2023-07-17 DIAGNOSIS — Z71.2 ENCOUNTER TO DISCUSS TEST RESULTS: ICD-10-CM

## 2023-07-17 DIAGNOSIS — Z71.89 GOALS OF CARE, COUNSELING/DISCUSSION: ICD-10-CM

## 2023-07-17 DIAGNOSIS — C67.8 MALIGNANT NEOPLASM OF OVERLAPPING SITES OF BLADDER (HCC): ICD-10-CM

## 2023-07-17 LAB
ANION GAP SERPL CALCULATED.3IONS-SCNC: 5 MMOL/L
BUN SERPL-MCNC: 57 MG/DL (ref 5–25)
CALCIUM SERPL-MCNC: 9.8 MG/DL (ref 8.3–10.1)
CHLORIDE SERPL-SCNC: 114 MMOL/L (ref 96–108)
CO2 SERPL-SCNC: 22 MMOL/L (ref 21–32)
CREAT SERPL-MCNC: 2.99 MG/DL (ref 0.6–1.3)
GFR SERPL CREATININE-BSD FRML MDRD: 13 ML/MIN/1.73SQ M
GLUCOSE P FAST SERPL-MCNC: 114 MG/DL (ref 65–99)
POTASSIUM SERPL-SCNC: 4.3 MMOL/L (ref 3.5–5.3)
SODIUM SERPL-SCNC: 141 MMOL/L (ref 135–147)

## 2023-07-17 PROCEDURE — 36415 COLL VENOUS BLD VENIPUNCTURE: CPT

## 2023-07-17 PROCEDURE — 80048 BASIC METABOLIC PNL TOTAL CA: CPT

## 2023-07-28 DIAGNOSIS — Z93.6 NEPHROSTOMY STATUS (HCC): Primary | ICD-10-CM

## 2023-08-29 ENCOUNTER — HOSPITAL ENCOUNTER (OUTPATIENT)
Dept: NON INVASIVE DIAGNOSTICS | Facility: HOSPITAL | Age: 83
Discharge: HOME | End: 2023-08-29
Attending: RADIOLOGY | Admitting: RADIOLOGY
Payer: COMMERCIAL

## 2023-08-29 DIAGNOSIS — Z93.6 NEPHROSTOMY STATUS (HCC): ICD-10-CM

## 2023-08-29 DIAGNOSIS — C67.8 MALIGNANT NEOPLASM OF OVERLAPPING SITES OF BLADDER (HCC): Primary | ICD-10-CM

## 2023-08-29 PROCEDURE — 50431 NJX PX NFROSGRM &/URTRGRM: CPT | Performed by: RADIOLOGY

## 2023-08-29 PROCEDURE — C1729 CATH, DRAINAGE: HCPCS

## 2023-08-29 PROCEDURE — 50435 EXCHANGE NEPHROSTOMY CATH: CPT

## 2023-08-29 PROCEDURE — C1769 GUIDE WIRE: HCPCS

## 2023-08-29 RX ORDER — LIDOCAINE WITH 8.4% SOD BICARB 0.9%(10ML)
SYRINGE (ML) INJECTION AS NEEDED
Status: COMPLETED | OUTPATIENT
Start: 2023-08-29 | End: 2023-08-29

## 2023-08-29 RX ADMIN — Medication 10 ML: at 10:45

## 2023-08-29 RX ADMIN — Medication 10 ML: at 10:40

## 2023-08-29 RX ADMIN — IOHEXOL 20 ML: 350 INJECTION, SOLUTION INTRAVENOUS at 10:55

## 2023-08-29 NOTE — DISCHARGE INSTRUCTIONS
Nephrostomy Tube Care     WHAT YOU NEED TO KNOW:   A nephrostomy tube is a catheter (thin plastic tube) that is inserted through your skin and into your kidney. The nephrostomy tube drains urine from your kidney into a collecting bag outside your body. You may need a nephrostomy tube when something is blocking the normal flow of urine. A nephrostomy tube may be used for a short or a long period of time. The nephrostomy tube comes out of your back, so you will need someone to help care for your nephrostomy tube. DISCHARGE INSTRUCTIONS:      How to clean the skin around the nephrostomy tube and change the bandage:  Since the nephrostomy tube comes out of your back, you will not be able to care for it by yourself. Ask someone to follow the general directions below to check and care for your nephrostomy tube. Gather the items you will need. Disposable (single use) under-pad, and a clean washcloth  Plain soap, warm water, and new medical gloves  Sterile gauze bandages  Clear adhesive dressing or medical tape  Skin barrier  Protective skin film  Trash bag  Remove the old bandage, and check the tube entry site. Have the patient lie on his side with the nephrostomy tube entry site facing up. Place the under-pad where it will catch drainage as you are working with the nephrostomy tube. Wash your hands with soap and water. Put on new medical gloves. Gently remove the old bandage, without pulling on the tube. Do this by holding the skin beside the tube with one hand. With the other hand, gently remove sticky tape and the skin barrier by pulling in the same direction as hair growth. Do not touch the side of the bandage that is placed over or around the tube. Throw the bandage and skin barrier away in a trash bag. Look for signs of infection, such as skin redness and swelling. Report any skin changes to healthcare providers. Clean the tube entry site.     Hold the tube in place to keep it from being pulled out while you are cleaning around it. You will need to clean the area twice. For the first cleaning, wet a new gauze bandage with soap and water. Begin at the entry site of the tube. Wipe the skin in circles, moving away from the entry site. Remove blood and any other material with the gauze. Do this as often as needed. Use a new gauze bandage each time you clean the area, moving away from the entry site. For the second cleaning, wet a new gauze bandage with water. Begin at the entry site of the tube. Wipe the skin in circles, moving away from the entry site. Use a new gauze bandage each time you clean the area, moving away from the entry site. Gently pat the skin with a clean washcloth to dry it. Apply the skin barrier and bandages. Roll up a bandage to make it thick, and place it under  the place where the tube enters the skin. Place it to support the tube, and stop it from kinking or bending. Tape the bandage in place, and apply more bandages if directed by a healthcare provider. Bring the tubing forward to the front and tape it to the skin. Do not stretch the tube tight, because this may pull the nephrostomy tube out. How often to change the bandage. Change the bandage around the tube, every other day. If your bandages  get dirty or wet, change them right away, and as often as needed. If your nephrostomy tube is to be used for a long period of time, the tube needs to be changed every 2 to 3 months. Healthcare providers will tell you when you need to make an appointment to have your tube changed. How to care for the urine drainage bag:   Ask if you need to measure and write down how much urine is in the bag before you empty it. Drain urine out of the drainage bag when it is ½ to ? full. Open the spout at the bottom of the bag to empty the urine into the toilet. You may need to detach the drainage bag from the nephrostomy tube to change it. . If so, attach a new drainage bag tightly to the nephrostomy tube. How to prevent problems with your nephrostomy tube:   Change bandages, directed. This helps to prevent infection. Throw away or clean your drainage bag as directed by your healthcare provider. Wipe the connecting ends of the drainage bag with alcohol before you reconnect the bag to the tube. This helps prevent infection. Keep the tube taped to your skin and connected to a drainage bag placed below the level of your kidneys. This helps prevent urine from backing up into your kidneys. You may wear a small drainage bag strapped to your leg to let you move around more easily. Check the catheter to be sure it is in place after you change your clothes or do other activities. Do not wear tight clothing over the tube. Place the tubing over your thigh rather than under it when you are sitting down. Be sure that nothing is pulling on the nephrostomy tube when you move around. Change positions if you see little or no urine in your drainage bag. Check to see if the urine tube is twisted or bent. Be sure that you are not sitting or lying on the tube. If there are no kinks and there is little or no urine in the drainage bag, tell your healthcare provider. Flush out the tube as directed. Some tubes get flushed one time a day with 10 mls of NSS You will be given a prescription for the flushes. To flush the nephrostomy tube, clean both connections with alcohol swap. Twist off the drainage bag tube and twist the saline syringe into the nephrostomy tube and flush briskly. Remove the syringe and twist the drainage bag tube back into the nephrostomy tube. Keep the site covered while you shower. Tape a piece of clear adhesive plastic over the dressing to keep it dry while you shower. Do not take tub baths.     Contact Interventional Radiology at [phone] Angela PATIENTS: Contact Interventional Radiology at [phone]) Jenna Chavez PATIENTS: Contact Interventional Radiology at [phone]) if:  The skin around the nephrostomy tube is red, swollen, itches, or has a rash. You have a fever greater than 101 or chills. You have lower back or hip pain. There are changes in how your urine looks or smells. You have little or no urine draining from the nephrostomy tube. You have nausea and are vomiting. The black arleth on your tube has moved, or the tube is longer than when it was put in. You have questions or concerns about your condition or care. The nephrostomy tube comes out completely. There is blood, pus, or a bad smell coming from the place where the tube enters your skin. Urine is leaking around the tube. The following pharmacies carry the flush syringes. [facility]                     [facility]                    [address] [place] [address]  Phone [phone]            Phone [phone]  [address] [place] [address]. [address] [place] [address] [address]                                 [phone]  Phone [phone]            Phone [phone]    [facility]                                                                         [facility] [phone]  [address] [place] [address].   [facility]   Phone [phone]

## 2023-10-11 ENCOUNTER — HOSPITAL ENCOUNTER (OUTPATIENT)
Dept: NON INVASIVE DIAGNOSTICS | Facility: HOSPITAL | Age: 83
Discharge: HOME | End: 2023-10-11
Attending: RADIOLOGY | Admitting: RADIOLOGY
Payer: COMMERCIAL

## 2023-10-11 DIAGNOSIS — C67.8 MALIGNANT NEOPLASM OF OVERLAPPING SITES OF BLADDER (HCC): ICD-10-CM

## 2023-10-11 PROCEDURE — 50435 EXCHANGE NEPHROSTOMY CATH: CPT | Performed by: RADIOLOGY

## 2023-10-11 PROCEDURE — 50435 EXCHANGE NEPHROSTOMY CATH: CPT

## 2023-10-11 PROCEDURE — C1729 CATH, DRAINAGE: HCPCS

## 2023-10-11 PROCEDURE — C1769 GUIDE WIRE: HCPCS

## 2023-10-11 RX ADMIN — IOHEXOL 15 ML: 350 INJECTION, SOLUTION INTRAVENOUS at 11:38

## 2023-10-11 NOTE — DISCHARGE INSTRUCTIONS
Nephrostomy Tube Care     WHAT YOU NEED TO KNOW:   A nephrostomy tube is a catheter (thin plastic tube) that is inserted through your skin and into your kidney. The nephrostomy tube drains urine from your kidney into a collecting bag outside your body. You may need a nephrostomy tube when something is blocking the normal flow of urine. A nephrostomy tube may be used for a short or a long period of time. The nephrostomy tube comes out of your back, so you will need someone to help care for your nephrostomy tube. DISCHARGE INSTRUCTIONS:      How to clean the skin around the nephrostomy tube and change the bandage:  Since the nephrostomy tube comes out of your back, you will not be able to care for it by yourself. Ask someone to follow the general directions below to check and care for your nephrostomy tube. Gather the items you will need. Disposable (single use) under-pad, and a clean washcloth  Plain soap, warm water, and new medical gloves  Sterile gauze bandages  Clear adhesive dressing or medical tape  Skin barrier  Protective skin film  Trash bag  Remove the old bandage, and check the tube entry site. Have the patient lie on his side with the nephrostomy tube entry site facing up. Place the under-pad where it will catch drainage as you are working with the nephrostomy tube. Wash your hands with soap and water. Put on new medical gloves. Gently remove the old bandage, without pulling on the tube. Do this by holding the skin beside the tube with one hand. With the other hand, gently remove sticky tape and the skin barrier by pulling in the same direction as hair growth. Do not touch the side of the bandage that is placed over or around the tube. Throw the bandage and skin barrier away in a trash bag. Look for signs of infection, such as skin redness and swelling. Report any skin changes to healthcare providers. Clean the tube entry site.     Hold the tube in place to keep it from being pulled out while you are cleaning around it. You will need to clean the area twice. For the first cleaning, wet a new gauze bandage with soap and water. Begin at the entry site of the tube. Wipe the skin in circles, moving away from the entry site. Remove blood and any other material with the gauze. Do this as often as needed. Use a new gauze bandage each time you clean the area, moving away from the entry site. For the second cleaning, wet a new gauze bandage with water. Begin at the entry site of the tube. Wipe the skin in circles, moving away from the entry site. Use a new gauze bandage each time you clean the area, moving away from the entry site. Gently pat the skin with a clean washcloth to dry it. Apply the skin barrier and bandages. Roll up a bandage to make it thick, and place it under  the place where the tube enters the skin. Place it to support the tube, and stop it from kinking or bending. Tape the bandage in place, and apply more bandages if directed by a healthcare provider. Bring the tubing forward to the front and tape it to the skin. Do not stretch the tube tight, because this may pull the nephrostomy tube out. How often to change the bandage. Change the bandage around the tube, every other day. If your bandages  get dirty or wet, change them right away, and as often as needed. If your nephrostomy tube is to be used for a long period of time, the tube needs to be changed every 2 to 3 months. Healthcare providers will tell you when you need to make an appointment to have your tube changed. How to care for the urine drainage bag:   Ask if you need to measure and write down how much urine is in the bag before you empty it. Drain urine out of the drainage bag when it is ½ to ? full. Open the spout at the bottom of the bag to empty the urine into the toilet. You may need to detach the drainage bag from the nephrostomy tube to change it. . If so, attach a new drainage bag tightly to the nephrostomy tube. How to prevent problems with your nephrostomy tube:   Change bandages, directed. This helps to prevent infection. Throw away or clean your drainage bag as directed by your healthcare provider. Wipe the connecting ends of the drainage bag with alcohol before you reconnect the bag to the tube. This helps prevent infection. Keep the tube taped to your skin and connected to a drainage bag placed below the level of your kidneys. This helps prevent urine from backing up into your kidneys. You may wear a small drainage bag strapped to your leg to let you move around more easily. Check the catheter to be sure it is in place after you change your clothes or do other activities. Do not wear tight clothing over the tube. Place the tubing over your thigh rather than under it when you are sitting down. Be sure that nothing is pulling on the nephrostomy tube when you move around. Change positions if you see little or no urine in your drainage bag. Check to see if the urine tube is twisted or bent. Be sure that you are not sitting or lying on the tube. If there are no kinks and there is little or no urine in the drainage bag, tell your healthcare provider. Flush out the tube as directed. Some tubes get flushed one time a day with 10 mls of NSS You will be given a prescription for the flushes. To flush the nephrostomy tube, clean both connections with alcohol swap. Twist off the drainage bag tube and twist the saline syringe into the nephrostomy tube and flush briskly. Remove the syringe and twist the drainage bag tube back into the nephrostomy tube. Keep the site covered while you shower. Tape a piece of clear adhesive plastic over the dressing to keep it dry while you shower. Do not take tub baths.     Contact Interventional Radiology at [phone] Angela PATIENTS: Contact Interventional Radiology at [phone]) Barbi Peterson PATIENTS: Contact Interventional Radiology at [phone]) if:  The skin around the nephrostomy tube is red, swollen, itches, or has a rash. You have a fever greater than 101 or chills. You have lower back or hip pain. There are changes in how your urine looks or smells. You have little or no urine draining from the nephrostomy tube. You have nausea and are vomiting. The black arleth on your tube has moved, or the tube is longer than when it was put in. You have questions or concerns about your condition or care. The nephrostomy tube comes out completely. There is blood, pus, or a bad smell coming from the place where the tube enters your skin. Urine is leaking around the tube. The following pharmacies carry the flush syringes.

## 2023-10-11 NOTE — SEDATION DOCUMENTATION
B/l PCN change done, pt tolerated without incident, pt to return in 8 weeks for routine tube exchange.

## 2023-10-11 NOTE — BRIEF OP NOTE (RAD/CATH)
INTERVENTIONAL RADIOLOGY PROCEDURE NOTE    Date: 10/11/2023    Procedure:   Procedure Summary       Date: 10/11/23 Room / Location: [address] Cardiac Cath Lab    Anesthesia Start:  Anesthesia Stop:     Procedure: IR NEPHROSTOMY TUBE CHECK/CHANGE/REPOSITION/REINSERTION/UPSIZE Diagnosis:       Malignant neoplasm of overlapping sites of bladder ([address])      (Chronic bilateral routine nephrostomy tube changes)    Scheduled Providers:  Responsible Provider:     Anesthesia Type: Not recorded ASA Status: Not recorded            Preoperative diagnosis:   1. Malignant neoplasm of overlapping sites of bladder [facility])         Postoperative diagnosis: Same. Surgeon: Glenn Owens MD     Assistant: None. No qualified resident was available. Blood loss: None    Specimens: None     Findings: Bilateral nephrostomy tube exchange. Complications: None immediate.     Anesthesia: local

## 2023-12-11 ENCOUNTER — HOSPITAL ENCOUNTER (OUTPATIENT)
Dept: NON INVASIVE DIAGNOSTICS | Facility: HOSPITAL | Age: 83
Discharge: HOME | End: 2023-12-11
Payer: COMMERCIAL

## 2023-12-11 DIAGNOSIS — C67.8 MALIGNANT NEOPLASM OF OVERLAPPING SITES OF BLADDER (HCC): Primary | ICD-10-CM

## 2023-12-11 PROCEDURE — C1769 GUIDE WIRE: HCPCS

## 2023-12-11 PROCEDURE — 50435 EXCHANGE NEPHROSTOMY CATH: CPT

## 2023-12-11 PROCEDURE — 50435 EXCHANGE NEPHROSTOMY CATH: CPT | Performed by: INTERNAL MEDICINE

## 2023-12-11 PROCEDURE — C1729 CATH, DRAINAGE: HCPCS

## 2023-12-11 RX ORDER — LIDOCAINE WITH 8.4% SOD BICARB 0.9%(10ML)
SYRINGE (ML) INJECTION AS NEEDED
Status: COMPLETED | OUTPATIENT
Start: 2023-12-11 | End: 2023-12-11

## 2023-12-11 RX ADMIN — Medication 10 ML: at 09:47

## 2023-12-11 RX ADMIN — IOHEXOL 12 ML: 350 INJECTION, SOLUTION INTRAVENOUS at 10:17

## 2023-12-11 NOTE — BRIEF OP NOTE (RAD/CATH)
INTERVENTIONAL RADIOLOGY PROCEDURE NOTE    Date: 12/11/2023    Procedure:   Procedure Summary       Date: 12/11/23 Room / Location: [address] Cardiac Cath Lab    Anesthesia Start:  Anesthesia Stop:     Procedure: IR NEPHROSTOMY TUBE CHECK/CHANGE/REPOSITION/REINSERTION/UPSIZE Diagnosis:       Malignant neoplasm of overlapping sites of bladder ([address])      (Routine exchange)    Scheduled Providers:  Responsible Provider:     Anesthesia Type: Not recorded ASA Status: Not recorded            Preoperative diagnosis:   1. Malignant neoplasm of overlapping sites of bladder [facility])         Postoperative diagnosis: Same. Surgeon: Greg Zee MD     Assistant: None. No qualified resident was available. Blood loss: None    Specimens: None     Findings: Fluoroscopic guided exchange of bilateral nephrostomy catheters. We will plan for routine exchange in 2 months. Complications: None immediate.     Anesthesia: local

## 2023-12-11 NOTE — DISCHARGE INSTRUCTIONS
Nephrostomy Tube Care     WHAT YOU NEED TO KNOW:   A nephrostomy tube is a catheter (thin plastic tube) that is inserted through your skin and into your kidney. The nephrostomy tube drains urine from your kidney into a collecting bag outside your body. You may need a nephrostomy tube when something is blocking the normal flow of urine. A nephrostomy tube may be used for a short or a long period of time. The nephrostomy tube comes out of your back, so you will need someone to help care for your nephrostomy tube. DISCHARGE INSTRUCTIONS:      How to clean the skin around the nephrostomy tube and change the bandage:  Since the nephrostomy tube comes out of your back, you will not be able to care for it by yourself. Ask someone to follow the general directions below to check and care for your nephrostomy tube. Gather the items you will need. Disposable (single use) under-pad, and a clean washcloth  Plain soap, warm water, and new medical gloves  Sterile gauze bandages  Clear adhesive dressing or medical tape  Skin barrier  Protective skin film  Trash bag  Remove the old bandage, and check the tube entry site. Have the patient lie on his side with the nephrostomy tube entry site facing up. Place the under-pad where it will catch drainage as you are working with the nephrostomy tube. Wash your hands with soap and water. Put on new medical gloves. Gently remove the old bandage, without pulling on the tube. Do this by holding the skin beside the tube with one hand. With the other hand, gently remove sticky tape and the skin barrier by pulling in the same direction as hair growth. Do not touch the side of the bandage that is placed over or around the tube. Throw the bandage and skin barrier away in a trash bag. Look for signs of infection, such as skin redness and swelling. Report any skin changes to healthcare providers. Clean the tube entry site.     Hold the tube in place to keep it from being pulled out while you are cleaning around it. You will need to clean the area twice. For the first cleaning, wet a new gauze bandage with soap and water. Begin at the entry site of the tube. Wipe the skin in circles, moving away from the entry site. Remove blood and any other material with the gauze. Do this as often as needed. Use a new gauze bandage each time you clean the area, moving away from the entry site. For the second cleaning, wet a new gauze bandage with water. Begin at the entry site of the tube. Wipe the skin in circles, moving away from the entry site. Use a new gauze bandage each time you clean the area, moving away from the entry site. Gently pat the skin with a clean washcloth to dry it. Apply the skin barrier and bandages. Roll up a bandage to make it thick, and place it under  the place where the tube enters the skin. Place it to support the tube, and stop it from kinking or bending. Tape the bandage in place, and apply more bandages if directed by a healthcare provider. Bring the tubing forward to the front and tape it to the skin. Do not stretch the tube tight, because this may pull the nephrostomy tube out. How often to change the bandage. Change the bandage around the tube, every other day. If your bandages  get dirty or wet, change them right away, and as often as needed. If your nephrostomy tube is to be used for a long period of time, the tube needs to be changed every 2 to 3 months. Healthcare providers will tell you when you need to make an appointment to have your tube changed. How to care for the urine drainage bag:   Ask if you need to measure and write down how much urine is in the bag before you empty it. Drain urine out of the drainage bag when it is ½ to ? full. Open the spout at the bottom of the bag to empty the urine into the toilet. You may need to detach the drainage bag from the nephrostomy tube to change it. . If so, attach a new drainage bag tightly to the nephrostomy tube. How to prevent problems with your nephrostomy tube:   Change bandages, directed. This helps to prevent infection. Throw away or clean your drainage bag as directed by your healthcare provider. Wipe the connecting ends of the drainage bag with alcohol before you reconnect the bag to the tube. This helps prevent infection. Keep the tube taped to your skin and connected to a drainage bag placed below the level of your kidneys. This helps prevent urine from backing up into your kidneys. You may wear a small drainage bag strapped to your leg to let you move around more easily. Check the catheter to be sure it is in place after you change your clothes or do other activities. Do not wear tight clothing over the tube. Place the tubing over your thigh rather than under it when you are sitting down. Be sure that nothing is pulling on the nephrostomy tube when you move around. Change positions if you see little or no urine in your drainage bag. Check to see if the urine tube is twisted or bent. Be sure that you are not sitting or lying on the tube. If there are no kinks and there is little or no urine in the drainage bag, tell your healthcare provider. Flush out the tube as directed. Some tubes get flushed one time a day with 10 mls of NSS You will be given a prescription for the flushes. To flush the nephrostomy tube, clean both connections with alcohol swap. Twist off the drainage bag tube and twist the saline syringe into the nephrostomy tube and flush briskly. Remove the syringe and twist the drainage bag tube back into the nephrostomy tube. Keep the site covered while you shower. Tape a piece of clear adhesive plastic over the dressing to keep it dry while you shower. Do not take tub baths.     Contact Interventional Radiology at [phone] Angela PATIENTS: Contact Interventional Radiology at [phone]) Isabella Pritchard PATIENTS: Contact Interventional Radiology at [phone]) if:  The skin around the nephrostomy tube is red, swollen, itches, or has a rash. You have a fever greater than 101 or chills. You have lower back or hip pain. There are changes in how your urine looks or smells. You have little or no urine draining from the nephrostomy tube. You have nausea and are vomiting. The black arleth on your tube has moved, or the tube is longer than when it was put in. You have questions or concerns about your condition or care. The nephrostomy tube comes out completely. There is blood, pus, or a bad smell coming from the place where the tube enters your skin. Urine is leaking around the tube. The following pharmacies carry the flush syringes. [facility]                     [facility]                    [address] [address]  Phone [phone]            Phone [phone]  [address] [place] [address]. [address] [place] [address] [address]                                 [phone]  Phone [phone]            Phone [phone]    [facility]                                                                         [facility] [phone]  [address] [place] [address].   [facility]   Phone [phone]

## 2023-12-13 ENCOUNTER — OFFICE VISIT (OUTPATIENT)
Dept: AUDIOLOGY | Facility: CLINIC | Age: 83
End: 2023-12-13

## 2023-12-13 DIAGNOSIS — H90.3 SENSORINEURAL HEARING LOSS (SNHL), BILATERAL: Primary | ICD-10-CM

## 2023-12-13 NOTE — PROGRESS NOTES
Hearing Aid Visit:    Name:  Amos Koenig  :  1940  Age:  79 y.o. MRN:  [id number]  Date of Evaluation:      Amos Koenig was seen today (2023) for a(n) in-warranty 6 month hearing aid check of her left monaural hearing aid. Device Information:  Patient is fit with [employer] More 3 minirite R hearing aid(s). Right serial number N/[id number]. Left serial number [id number]. Warranty date: 25 (Loss&Damage/Repair). Dome/Earmold: 8 mm DB   : Right N/A / Left # 2 85   Wax system: pro wax mini fit     Action:  Canal is clear  A listening check of the hearing aid revealed a loud static tameka noise that was not remedied by a new   A firmware update will be needed when the aid returns.       Recommendations:   Patient was provided with a loaner More 3 minirite R / see loaner form  Will need to reconnect her aid to the computer when it returns from lab repair     Kaylin Hamilton, [address] [id number]  [address] [phone]

## 2024-01-05 ENCOUNTER — TELEPHONE (OUTPATIENT)
Age: 84
End: 2024-01-05

## 2024-01-05 NOTE — TELEPHONE ENCOUNTER
Patient calling in having to cancel her upcoming apt on 1/9/24 with  due to a transpiration issue. Patient wanted to reschedule but soonest was May with . Patient is wondering if there is anything sooner than this or if it is okay for her to wait this long for cysto. Please review and call patient.     CB: [phone]

## 2024-01-05 NOTE — TELEPHONE ENCOUNTER
I called and LVM for patient asking her if she wants us to schedule lyft for her for 1/9. I scheduled her back on with DV just in case she says yes.

## 2024-01-09 ENCOUNTER — PROCEDURE VISIT (OUTPATIENT)
Dept: UROLOGY | Facility: CLINIC | Age: 84
End: 2024-01-09
Payer: COMMERCIAL

## 2024-01-09 VITALS
HEIGHT: 62 IN | WEIGHT: 186 LBS | HEART RATE: 61 BPM | RESPIRATION RATE: 16 BRPM | BODY MASS INDEX: 34.23 KG/M2 | OXYGEN SATURATION: 91 % | SYSTOLIC BLOOD PRESSURE: 120 MMHG | DIASTOLIC BLOOD PRESSURE: 72 MMHG

## 2024-01-09 DIAGNOSIS — Z71.2 ENCOUNTER TO DISCUSS TEST RESULTS: Primary | ICD-10-CM

## 2024-01-09 DIAGNOSIS — C67.8 MALIGNANT NEOPLASM OF OVERLAPPING SITES OF BLADDER (HCC): ICD-10-CM

## 2024-01-09 DIAGNOSIS — Z71.89 GOALS OF CARE, COUNSELING/DISCUSSION: ICD-10-CM

## 2024-01-09 DIAGNOSIS — Z93.6 NEPHROSTOMY STATUS (HCC): ICD-10-CM

## 2024-01-09 PROCEDURE — 52000 CYSTOURETHROSCOPY: CPT | Performed by: UROLOGY

## 2024-01-09 PROCEDURE — 99214 OFFICE O/P EST MOD 30 MIN: CPT | Performed by: UROLOGY

## 2024-01-09 NOTE — PROGRESS NOTES
Office Cystoscopy Procedure Note    Indication:    Urothelial carcinoma of the bladder    Informed consent   The risks, benefits, complications, treatment options, and expected outcomes were discussed with the patient. The patient concurred with the proposed plan and provided informed consent.    Anesthesia  Lidocaine jelly 2%        Procedure  In the presence of a female nurse, the patient was placed in the supine frog-legged position, was prepped and draped in the usual manner using sterile technique, and 2% lidocaine jelly instilled into the urethra. Prior to this pelvic examination showed atrophic labia majora and minora, a small vaginal introitus, poor quality vaginal mucosa, and showed no pelvic floor descensus and no urethral hypermobility. Upon stress maneuvers there was no stress incontinence.  A 17 F flexible cystoscope was then inserted into the urethra and the urethra and bladder carefully examined.  The following findings were noted:    Findings:  Urethra:  atrophic  Bladder:  Small, contracted, no new tumors or lesions  Ureteral orifices:  Normal  Other findings:  None, retroflexed view confirms    Specimens: None                 Complications:    None; patient tolerated the procedure well           Disposition: To home            Condition: Stable    Plan: Negative surveillance cystoscopy, no new tumors, RTC 6 months for cystoscopy and clinical follow up of high risk muscle invasive bladder cancer       Cystoscopy     Date/Time  1/9/2024 2:00 PM     Performed by  Vinnie Ricardo MD   Authorized by  Vinnie Ricardo MD     Universal Protocol:  Consent: Verbal consent obtained. Written consent obtained.  Risks and benefits: risks, benefits and alternatives were discussed  Consent given by: patient  Patient understanding: patient states understanding of the procedure being performed  Patient consent: the patient's understanding of the procedure matches consent given  Procedure consent: procedure consent  matches procedure scheduled  Relevant documents: relevant documents present and verified  Test results: test results available and properly labeled  Site marked: the operative site was not marked  Radiology Images displayed and confirmed. If images not available, report reviewed: imaging studies available  Required items: required blood products, implants, devices, and special equipment available  Patient identity confirmed: verbally with patient and provided demographic data      Procedure Details:  Procedure type: cystoscopy    Patient tolerance: Patient tolerated the procedure well with no immediate complications    Additional Procedure Details: Office Cystoscopy Procedure Note    Indication:    Urothelial carcinoma of the bladder    Informed consent   The risks, benefits, complications, treatment options, and expected outcomes were discussed with the patient. The patient concurred with the proposed plan and provided informed consent.    Anesthesia  Lidocaine jelly 2%        Procedure  In the presence of a female nurse, the patient was placed in the supine frog-legged position, was prepped and draped in the usual manner using sterile technique, and 2% lidocaine jelly instilled into the urethra. Prior to this pelvic examination showed atrophic labia majora and minora, a small vaginal introitus, poor quality vaginal mucosa, and showed no pelvic floor descensus and no urethral hypermobility. Upon stress maneuvers there was no stress incontinence.  A 17 F flexible cystoscope was then inserted into the urethra and the urethra and bladder carefully examined.  The following findings were noted:    Findings:  Urethra:  atrophic  Bladder:  Small, contracted, no new tumors or lesions  Ureteral orifices:  Normal  Other findings:  None, retroflexed view confirms    Specimens: None                 Complications:    None; patient tolerated the procedure well           Disposition: To home            Condition:  Stable    Plan: Negative surveillance cystoscopy, no new tumors, RTC 6 months for cystoscopy and clinical follow up of high risk muscle invasive bladder cancer

## 2024-01-15 ENCOUNTER — OFFICE VISIT (OUTPATIENT)
Dept: AUDIOLOGY | Facility: CLINIC | Age: 84
End: 2024-01-15

## 2024-01-15 DIAGNOSIS — H90.3 SENSORINEURAL HEARING LOSS (SNHL), BILATERAL: Primary | ICD-10-CM

## 2024-01-16 NOTE — PROGRESS NOTES
[facility] AUDIOLOGY HEARING AID CHECK    Ning Chambers was seen today (1/15/2024) for a hearing aid check of her monaural hearing aid. Ms. Chambers stated that she was doing well overall and had no concerns. She noted that she has accidentally walked into the shower a few times with the aid on again, and will consistently forget to wear the hearing aid.     Device Information    Hearing Aid Fitting Date: 1/15/2024     Right Device Left Device   Hearing Aid Make: N/A OtPartly   Hearing Aid Model: N/A More 3 miniRITE   Serial Number: N/A [id number]   Repair Warranty Expiration Date: N/A 09/28/2022   Loss/Damage Warranty Expiration Date:  N/A 09/28/2022    Length: N/A 2    Output: N/A 100   Wax System: N/A Pro Wax miniFIT   Dome Size/Style: N/A 6mm power   Battery: N/A Lithium-ion Rechargeable   Earmold Serial Number: N/A N/A   Earmold Warranty Date:  N/A N/A      Serial Number:  [id number]  Accessories: N/A    Visual inspection of the device(s) revealed no noticeable damage or defects.     A listening check revealed good sound quality from the device(s).    Datalogging revealed ~4h of daily usage.     - Hearing aid form factor cleaned, microphone ports vacuumed  - Firmware updated.    Loaner hearing aid returned today    Recommendations & Follow-up    Hearing aids(s) are in good working condition. Cleaning and maintenance was reviewed. No other changes made today. Ms. Chambers will follow-up in 6 months for recheck.     Isra Gunderson  Clinical Audiologist    [facility]  [facility] AUDIOLOGY  [address] [id number]

## 2024-02-09 ENCOUNTER — HOSPITAL ENCOUNTER (OUTPATIENT)
Dept: NON INVASIVE DIAGNOSTICS | Facility: HOSPITAL | Age: 84
Discharge: HOME | End: 2024-02-09
Attending: INTERNAL MEDICINE
Payer: COMMERCIAL

## 2024-02-09 DIAGNOSIS — C67.8 MALIGNANT NEOPLASM OF OVERLAPPING SITES OF BLADDER (HCC): Primary | ICD-10-CM

## 2024-02-09 PROCEDURE — 50435 EXCHANGE NEPHROSTOMY CATH: CPT

## 2024-02-09 PROCEDURE — C1769 GUIDE WIRE: HCPCS

## 2024-02-09 PROCEDURE — 50435 EXCHANGE NEPHROSTOMY CATH: CPT | Performed by: INTERNAL MEDICINE

## 2024-02-09 PROCEDURE — C1729 CATH, DRAINAGE: HCPCS

## 2024-02-09 RX ORDER — LIDOCAINE WITH 8.4% SOD BICARB 0.9%(10ML)
SYRINGE (ML) INJECTION AS NEEDED
Status: DISCONTINUED | OUTPATIENT
Start: 2024-02-09 | End: 2024-02-10 | Stop reason: HOSPADM

## 2024-02-09 RX ADMIN — Medication 3 ML: at 09:22

## 2024-02-09 RX ADMIN — IOHEXOL 15 ML: 350 INJECTION, SOLUTION INTRAVENOUS at 09:39

## 2024-02-09 RX ADMIN — Medication 3 ML: at 09:26

## 2024-02-09 NOTE — BRIEF OP NOTE (RAD/CATH)
INTERVENTIONAL RADIOLOGY PROCEDURE NOTE    Date: 2/9/2024    Procedure:   Procedure Summary       Date: 02/09/24 Room / Location: [facility] Cardiac Cath Lab    Anesthesia Start:  Anesthesia Stop:     Procedure: IR NEPHROSTOMY TUBE CHECK/CHANGE/REPOSITION/REINSERTION/UPSIZE Diagnosis:       Malignant neoplasm of overlapping sites of bladder (HCC)      (Routine exchange)    Scheduled Providers:  Responsible Provider:     Anesthesia Type: Not recorded ASA Status: Not recorded            Preoperative diagnosis:   1. Malignant neoplasm of overlapping sites of bladder (HCC)         Postoperative diagnosis: Same.    Surgeon: Shade Bass MD     Assistant: None. No qualified resident was available.    Blood loss: None    Specimens: None     Findings: Routine exchange of bilateral PCN catheters. We will plan for routine exchange in 2 months.    As per note from urology, we will perform antegrade left nephrostogram and consider capping/removing left sided PCN during next exchange, note from urology was seen following this bilateral exchange.    Complications: None immediate.    Anesthesia: local

## 2024-02-09 NOTE — DISCHARGE INSTRUCTIONS
Nephrostomy Tube Care     WHAT YOU NEED TO KNOW:   A nephrostomy tube is a catheter (thin plastic tube) that is inserted through your skin and into your kidney. The nephrostomy tube drains urine from your kidney into a collecting bag outside your body. You may need a nephrostomy tube when something is blocking the normal flow of urine. A nephrostomy tube may be used for a short or a long period of time. The nephrostomy tube comes out of your back, so you will need someone to help care for your nephrostomy tube.          DISCHARGE INSTRUCTIONS:      How to clean the skin around the nephrostomy tube and change the bandage:  Since the nephrostomy tube comes out of your back, you will not be able to care for it by yourself. Ask someone to follow the general directions below to check and care for your nephrostomy tube.   Gather the items you will need.          Disposable (single use) under-pad, and a clean washcloth  Plain soap, warm water, and new medical gloves  Sterile gauze bandages  Clear adhesive dressing or medical tape  Skin barrier  Protective skin film  Trash bag  Remove the old bandage, and check the tube entry site.    Have the patient lie on his side with the nephrostomy tube entry site facing up. Place the under-pad where it will catch drainage as you are working with the nephrostomy tube.   Wash your hands with soap and water. Put on new medical gloves.  Gently remove the old bandage, without pulling on the tube. Do this by holding the skin beside the tube with one hand. With the other hand, gently remove sticky tape and the skin barrier by pulling in the same direction as hair growth. Do not touch the side of the bandage that is placed over or around the tube. Throw the bandage and skin barrier away in a trash bag.  Look for signs of infection, such as skin redness and swelling. Report any skin changes to healthcare providers.  Clean the tube entry site.    Hold the tube in place to keep it from  being pulled out while you are cleaning around it.  You will need to clean the area twice. For the first cleaning, wet a new gauze bandage with soap and water.  Begin at the entry site of the tube. Wipe the skin in circles, moving away from the entry site. Remove blood and any other material with the gauze. Do this as often as needed. Use a new gauze bandage each time you clean the area, moving away from the entry site.   For the second cleaning, wet a new gauze bandage with water. Begin at the entry site of the tube. Wipe the skin in circles, moving away from the entry site. Use a new gauze bandage each time you clean the area, moving away from the entry site.   Gently pat the skin with a clean washcloth to dry it.    Apply the skin barrier and bandages.    Roll up a bandage to make it thick, and place it under  the place where the tube enters the skin. Place it to support the tube, and stop it from kinking or bending. Tape the bandage in place, and apply more bandages if directed by a healthcare provider.   Bring the tubing forward to the front and tape it to the skin. Do not stretch the tube tight, because this may pull the nephrostomy tube out.  How often to change the bandage.  Change the bandage around the tube, every other day. If your bandages  get dirty or wet, change them right away, and as often as needed. If your nephrostomy tube is to be used for a long period of time, the tube needs to be changed every 2 to 3 months. Healthcare providers will tell you when you need to make an appointment to have your tube changed.     How to care for the urine drainage bag:   Ask if you need to measure and write down how much urine is in the bag before you empty it. Drain urine out of the drainage bag when it is ½ to ? full. Open the spout at the bottom of the bag to empty the urine into the toilet.   You may need to detach the drainage bag from the nephrostomy tube to change it.. If so, attach a new drainage bag  tightly to the nephrostomy tube.     How to prevent problems with your nephrostomy tube:   Change bandages, directed.  This helps to prevent infection. Throw away or clean your drainage bag as directed by your healthcare provider.    Wipe the connecting ends of the drainage bag with alcohol before you reconnect the bag to the tube.  This helps prevent infection.     Keep the tube taped to your skin and connected to a drainage bag placed below the level of your kidneys.  This helps prevent urine from backing up into your kidneys. You may wear a small drainage bag strapped to your leg to let you move around more easily.    Check the catheter to be sure it is in place after you change your clothes or do other activities.  Do not wear tight clothing over the tube. Place the tubing over your thigh rather than under it when you are sitting down. Be sure that nothing is pulling on the nephrostomy tube when you move around.    Change positions if you see little or no urine in your drainage bag.  Check to see if the urine tube is twisted or bent. Be sure that you are not sitting or lying on the tube. If there are no kinks and there is little or no urine in the drainage bag, tell your healthcare provider.    Flush out the tube as directed. Some tubes get flushed one time a day with 10 mls of NSS You will be given a prescription for the flushes.  To flush the nephrostomy tube, clean both connections with alcohol swap. Twist off the drainage bag tube and twist the saline syringe into the nephrostomy tube and flush briskly. Remove the syringe and twist the drainage bag tube back into the nephrostomy tube.  Keep the site covered while you shower.  Tape a piece of clear adhesive plastic over the dressing to keep it dry while you shower. Do not take tub baths.    Contact Interventional Radiology at [phone] ([facility] PATIENTS: Contact Interventional Radiology at [phone]) ([facility] PATIENTS: Contact Interventional Radiology at  [phone]) if:  The skin around the nephrostomy tube is red, swollen, itches, or has a rash.   You have a fever greater than 101 or chills.  You have lower back or hip pain.  There are changes in how your urine looks or smells.  You have little or no urine draining from the nephrostomy tube.   You have nausea and are vomiting.  The black arleth on your tube has moved, or the tube is longer than when it was put in.   You have questions or concerns about your condition or care.  The nephrostomy tube comes out completely.   There is blood, pus, or a bad smell coming from the place where the tube enters your skin.  Urine is leaking around the tube.        The following pharmacies carry the flush syringes.       [facility]                     [address]                     [address] [facility]                    [phone]  [place]                       [place]  Phone [phone]            Phone [phone]                 [facility]                                                                                                   [phone]  [facility]             [facility]                             [address] [place], [place] [address] [facility]   [place]                                 [phone]  Phone [phone]            Phone [phone]    [facility]                                                                         [facility] [phone]  Dayna Bansal.  [place]   Phone [phone]

## 2024-04-08 ENCOUNTER — HOSPITAL ENCOUNTER (OUTPATIENT)
Dept: NON INVASIVE DIAGNOSTICS | Facility: HOSPITAL | Age: 84
Discharge: HOME | End: 2024-04-08
Attending: INTERNAL MEDICINE
Payer: COMMERCIAL

## 2024-04-08 DIAGNOSIS — C67.8 MALIGNANT NEOPLASM OF OVERLAPPING SITES OF BLADDER (HCC): ICD-10-CM

## 2024-04-08 DIAGNOSIS — Z93.6 NEPHROSTOMY STATUS (HCC): Primary | ICD-10-CM

## 2024-04-08 PROCEDURE — 50435 EXCHANGE NEPHROSTOMY CATH: CPT

## 2024-04-08 PROCEDURE — C1729 CATH, DRAINAGE: HCPCS | Performed by: RADIOLOGY

## 2024-04-08 PROCEDURE — C1769 GUIDE WIRE: HCPCS | Performed by: RADIOLOGY

## 2024-04-08 RX ORDER — LIDOCAINE WITH 8.4% SOD BICARB 0.9%(10ML)
SYRINGE (ML) INJECTION AS NEEDED
Status: COMPLETED | OUTPATIENT
Start: 2024-04-08 | End: 2024-04-08

## 2024-04-08 RX ADMIN — IOHEXOL 10 ML: 350 INJECTION, SOLUTION INTRAVENOUS at 11:29

## 2024-04-08 RX ADMIN — Medication 10 ML: at 11:15

## 2024-04-08 NOTE — SEDATION DOCUMENTATION
B/L PCN changed without incident, tubes to bag drainage.  Pt to return in 2 months for next exchange.

## 2024-04-08 NOTE — DISCHARGE INSTRUCTIONS
Nephrostomy Tube Care     WHAT YOU NEED TO KNOW:   A nephrostomy tube is a catheter (thin plastic tube) that is inserted through your skin and into your kidney. The nephrostomy tube drains urine from your kidney into a collecting bag outside your body. You may need a nephrostomy tube when something is blocking the normal flow of urine. A nephrostomy tube may be used for a short or a long period of time. The nephrostomy tube comes out of your back, so you will need someone to help care for your nephrostomy tube.        DISCHARGE INSTRUCTIONS:      How to clean the skin around the nephrostomy tube and change the bandage:  Since the nephrostomy tube comes out of your back, you will not be able to care for it by yourself. Ask someone to follow the general directions below to check and care for your nephrostomy tube.   Gather the items you will need.          Disposable (single use) under-pad, and a clean washcloth  Plain soap, warm water, and new medical gloves  Sterile gauze bandages  Clear adhesive dressing or medical tape  Skin barrier  Protective skin film  Trash bag  Remove the old bandage, and check the tube entry site.    Have the patient lie on his side with the nephrostomy tube entry site facing up. Place the under-pad where it will catch drainage as you are working with the nephrostomy tube.   Wash your hands with soap and water. Put on new medical gloves.  Gently remove the old bandage, without pulling on the tube. Do this by holding the skin beside the tube with one hand. With the other hand, gently remove sticky tape and the skin barrier by pulling in the same direction as hair growth. Do not touch the side of the bandage that is placed over or around the tube. Throw the bandage and skin barrier away in a trash bag.  Look for signs of infection, such as skin redness and swelling. Report any skin changes to healthcare providers.  Clean the tube entry site.    Hold the tube in place to keep it from being  pulled out while you are cleaning around it.  You will need to clean the area twice. For the first cleaning, wet a new gauze bandage with soap and water.  Begin at the entry site of the tube. Wipe the skin in circles, moving away from the entry site. Remove blood and any other material with the gauze. Do this as often as needed. Use a new gauze bandage each time you clean the area, moving away from the entry site.   For the second cleaning, wet a new gauze bandage with water. Begin at the entry site of the tube. Wipe the skin in circles, moving away from the entry site. Use a new gauze bandage each time you clean the area, moving away from the entry site.   Gently pat the skin with a clean washcloth to dry it.    Apply the skin barrier and bandages.    Roll up a bandage to make it thick, and place it under  the place where the tube enters the skin. Place it to support the tube, and stop it from kinking or bending. Tape the bandage in place, and apply more bandages if directed by a healthcare provider.   Bring the tubing forward to the front and tape it to the skin. Do not stretch the tube tight, because this may pull the nephrostomy tube out.  How often to change the bandage.  Change the bandage around the tube, every other day. If your bandages  get dirty or wet, change them right away, and as often as needed. If your nephrostomy tube is to be used for a long period of time, the tube needs to be changed every 2 to 3 months. Healthcare providers will tell you when you need to make an appointment to have your tube changed.     How to care for the urine drainage bag:   Ask if you need to measure and write down how much urine is in the bag before you empty it. Drain urine out of the drainage bag when it is ½ to ? full. Open the spout at the bottom of the bag to empty the urine into the toilet.   You may need to detach the drainage bag from the nephrostomy tube to change it.. If so, attach a new drainage bag tightly to  the nephrostomy tube.     How to prevent problems with your nephrostomy tube:   Change bandages, directed.  This helps to prevent infection. Throw away or clean your drainage bag as directed by your healthcare provider.    Wipe the connecting ends of the drainage bag with alcohol before you reconnect the bag to the tube.  This helps prevent infection.     Keep the tube taped to your skin and connected to a drainage bag placed below the level of your kidneys.  This helps prevent urine from backing up into your kidneys. You may wear a small drainage bag strapped to your leg to let you move around more easily.    Check the catheter to be sure it is in place after you change your clothes or do other activities.  Do not wear tight clothing over the tube. Place the tubing over your thigh rather than under it when you are sitting down. Be sure that nothing is pulling on the nephrostomy tube when you move around.    Change positions if you see little or no urine in your drainage bag.  Check to see if the urine tube is twisted or bent. Be sure that you are not sitting or lying on the tube. If there are no kinks and there is little or no urine in the drainage bag, tell your healthcare provider.    Flush out the tube as directed. Some tubes get flushed one time a day with 10 mls of NSS You will be given a prescription for the flushes.  To flush the nephrostomy tube, clean both connections with alcohol swap. Twist off the drainage bag tube and twist the saline syringe into the nephrostomy tube and flush briskly. Remove the syringe and twist the drainage bag tube back into the nephrostomy tube.  Keep the site covered while you shower.  Tape a piece of clear adhesive plastic over the dressing to keep it dry while you shower. Do not take tub baths.    Contact Interventional Radiology at [phone] ([facility] PATIENTS: Contact Interventional Radiology at [phone]) ([facility] PATIENTS: Contact Interventional Radiology at  [phone]) if:  The skin around the nephrostomy tube is red, swollen, itches, or has a rash.   You have a fever greater than 101 or chills.  You have lower back or hip pain.  There are changes in how your urine looks or smells.  You have little or no urine draining from the nephrostomy tube.   You have nausea and are vomiting.  The black arleth on your tube has moved, or the tube is longer than when it was put in.   You have questions or concerns about your condition or care.  The nephrostomy tube comes out completely.   There is blood, pus, or a bad smell coming from the place where the tube enters your skin.  Urine is leaking around the tube.        The following pharmacies carry the flush syringes.       [facility]                     [address]                     [address] [facility]                    [phone]  [place]                       [place]  Phone [phone]            Phone [phone]                 [facility]                                                                                                   [phone]  [facility]             [facility]                             [address] [place], [place] [address] [facility]   [place]                                 [phone]  Phone [phone]            Phone [phone]    [facility]                                                                         [facility] [phone]  Dayna Bansal.  [place]   Phone [phone]

## 2024-04-08 NOTE — BRIEF OP NOTE (RAD/CATH)
INTERVENTIONAL RADIOLOGY PROCEDURE NOTE    Date: 4/8/2024    Procedure:   Procedure Summary       Date: 04/08/24 Room / Location: [facility] Cardiac Cath Lab    Anesthesia Start:  Anesthesia Stop:     Procedure: IR NEPHROSTOMY TUBE CHECK/CHANGE/REPOSITION/REINSERTION/UPSIZE Diagnosis:       Malignant neoplasm of overlapping sites of bladder (HCC)      (Possible removal of left tube, routine exchange of right tube)    Scheduled Providers: David Fall MD Responsible Provider:     Anesthesia Type: Not recorded ASA Status: Not recorded            Preoperative diagnosis:   1. Malignant neoplasm of overlapping sites of bladder (HCC)         Postoperative diagnosis: Same.    Surgeon: David Fall MD     Assistant: None. No qualified resident was available.    Blood loss: None    Specimens: None     Findings: Uneventful routine exchange of bilateral 10F PCNs; next change in 2 months.    Complications: None immediate.    Anesthesia: local

## 2024-06-07 ENCOUNTER — HOSPITAL ENCOUNTER (OUTPATIENT)
Dept: NON INVASIVE DIAGNOSTICS | Facility: HOSPITAL | Age: 84
Discharge: HOME | End: 2024-06-07
Attending: RADIOLOGY
Payer: COMMERCIAL

## 2024-06-07 DIAGNOSIS — Z93.6 NEPHROSTOMY STATUS (HCC): Primary | ICD-10-CM

## 2024-06-07 DIAGNOSIS — C67.8 MALIGNANT NEOPLASM OF OVERLAPPING SITES OF BLADDER (HCC): ICD-10-CM

## 2024-06-07 PROCEDURE — C1769 GUIDE WIRE: HCPCS

## 2024-06-07 PROCEDURE — C1729 CATH, DRAINAGE: HCPCS

## 2024-06-07 PROCEDURE — 50435 EXCHANGE NEPHROSTOMY CATH: CPT | Performed by: INTERNAL MEDICINE

## 2024-06-07 PROCEDURE — 50435 EXCHANGE NEPHROSTOMY CATH: CPT

## 2024-06-07 RX ORDER — LIDOCAINE WITH 8.4% SOD BICARB 0.9%(10ML)
SYRINGE (ML) INJECTION AS NEEDED
Status: COMPLETED | OUTPATIENT
Start: 2024-06-07 | End: 2024-06-07

## 2024-06-07 RX ADMIN — IOHEXOL 25 ML: 350 INJECTION, SOLUTION INTRAVENOUS at 11:35

## 2024-06-07 RX ADMIN — Medication 5 ML: at 11:24

## 2024-06-07 RX ADMIN — Medication 5 ML: at 11:20

## 2024-06-07 NOTE — BRIEF OP NOTE (RAD/CATH)
INTERVENTIONAL RADIOLOGY PROCEDURE NOTE    Date: 6/7/2024    Procedure:   Procedure Summary       Date: 06/07/24 Room / Location: [facility]    Anesthesia Start:  Anesthesia Stop:     Procedure: IR NEPHROSTOMY TUBE CHECK/CHANGE/REPOSITION/REINSERTION/UPSIZE Diagnosis:       Malignant neoplasm of overlapping sites of bladder (HCC)      Nephrostomy status (HCC)      (routine q 2 month change (h/o bladder ca))    Scheduled Providers:  Responsible Provider:     Anesthesia Type: Not recorded ASA Status: Not recorded            Preoperative diagnosis:   1. Malignant neoplasm of overlapping sites of bladder (HCC)    2. Nephrostomy status (HCC)         Postoperative diagnosis: Same.    Surgeon: Shade Bass MD     Assistant: None. No qualified resident was available.    Blood loss: None    Specimens: none     Findings: Bilateral antegrade nephrostograms showed obstruction at the level of the UVJ with no contrast filling the urinary bladder. Therefore, the bilateral nephrostomy catheters were exchanged for new 10.2 Malaysian catheters.    We will plan for routine exchange in 2 months.    Complications: None immediate.    Anesthesia: local

## 2024-06-07 NOTE — DISCHARGE INSTRUCTIONS
Nephrostomy Tube Care     WHAT YOU NEED TO KNOW:   A nephrostomy tube is a catheter (thin plastic tube) that is inserted through your skin and into your kidney. The nephrostomy tube drains urine from your kidney into a collecting bag outside your body. You may need a nephrostomy tube when something is blocking the normal flow of urine. A nephrostomy tube may be used for a short or a long period of time. The nephrostomy tube comes out of your back, so you will need someone to help care for your nephrostomy tube.          DISCHARGE INSTRUCTIONS:      How to clean the skin around the nephrostomy tube and change the bandage:  Since the nephrostomy tube comes out of your back, you will not be able to care for it by yourself. Ask someone to follow the general directions below to check and care for your nephrostomy tube.   Gather the items you will need.          Disposable (single use) under-pad, and a clean washcloth  Plain soap, warm water, and new medical gloves  Sterile gauze bandages  Clear adhesive dressing or medical tape  Skin barrier  Protective skin film  Trash bag  Remove the old bandage, and check the tube entry site.    Have the patient lie on his side with the nephrostomy tube entry site facing up. Place the under-pad where it will catch drainage as you are working with the nephrostomy tube.   Wash your hands with soap and water. Put on new medical gloves.  Gently remove the old bandage, without pulling on the tube. Do this by holding the skin beside the tube with one hand. With the other hand, gently remove sticky tape and the skin barrier by pulling in the same direction as hair growth. Do not touch the side of the bandage that is placed over or around the tube. Throw the bandage and skin barrier away in a trash bag.  Look for signs of infection, such as skin redness and swelling. Report any skin changes to healthcare providers.  Clean the tube entry site.    Hold the tube in place to keep it from  being pulled out while you are cleaning around it.  You will need to clean the area twice. For the first cleaning, wet a new gauze bandage with soap and water.  Begin at the entry site of the tube. Wipe the skin in circles, moving away from the entry site. Remove blood and any other material with the gauze. Do this as often as needed. Use a new gauze bandage each time you clean the area, moving away from the entry site.   For the second cleaning, wet a new gauze bandage with water. Begin at the entry site of the tube. Wipe the skin in circles, moving away from the entry site. Use a new gauze bandage each time you clean the area, moving away from the entry site.   Gently pat the skin with a clean washcloth to dry it.    Apply the skin barrier and bandages.    Roll up a bandage to make it thick, and place it under  the place where the tube enters the skin. Place it to support the tube, and stop it from kinking or bending. Tape the bandage in place, and apply more bandages if directed by a healthcare provider.   Bring the tubing forward to the front and tape it to the skin. Do not stretch the tube tight, because this may pull the nephrostomy tube out.  How often to change the bandage.  Change the bandage around the tube, every other day. If your bandages  get dirty or wet, change them right away, and as often as needed. If your nephrostomy tube is to be used for a long period of time, the tube needs to be changed every 2 to 3 months. Healthcare providers will tell you when you need to make an appointment to have your tube changed.     How to care for the urine drainage bag:   Ask if you need to measure and write down how much urine is in the bag before you empty it. Drain urine out of the drainage bag when it is ½ to ? full. Open the spout at the bottom of the bag to empty the urine into the toilet.   You may need to detach the drainage bag from the nephrostomy tube to change it.. If so, attach a new drainage bag  tightly to the nephrostomy tube.     How to prevent problems with your nephrostomy tube:   Change bandages, directed.  This helps to prevent infection. Throw away or clean your drainage bag as directed by your healthcare provider.    Wipe the connecting ends of the drainage bag with alcohol before you reconnect the bag to the tube.  This helps prevent infection.     Keep the tube taped to your skin and connected to a drainage bag placed below the level of your kidneys.  This helps prevent urine from backing up into your kidneys. You may wear a small drainage bag strapped to your leg to let you move around more easily.    Check the catheter to be sure it is in place after you change your clothes or do other activities.  Do not wear tight clothing over the tube. Place the tubing over your thigh rather than under it when you are sitting down. Be sure that nothing is pulling on the nephrostomy tube when you move around.    Change positions if you see little or no urine in your drainage bag.  Check to see if the urine tube is twisted or bent. Be sure that you are not sitting or lying on the tube. If there are no kinks and there is little or no urine in the drainage bag, tell your healthcare provider.    Keep the site covered while you shower.  Tape a piece of clear adhesive plastic over the dressing to keep it dry while you shower. Do not take tub baths.    Contact Interventional Radiology at [phone] ([facility] PATIENTS: Contact Interventional Radiology at [phone]) (AYO PATIENTS: Contact Interventional Radiology at [phone]) if:  The skin around the nephrostomy tube is red, swollen, itches, or has a rash.   You have a fever greater than 101 or chills.  You have lower back or hip pain.  There are changes in how your urine looks or smells.  You have little or no urine draining from the nephrostomy tube.   You have nausea and are vomiting.  The black arleth on your tube has moved, or the tube is longer than when  it was put in.   You have questions or concerns about your condition or care.  The nephrostomy tube comes out completely.   There is blood, pus, or a bad smell coming from the place where the tube enters your skin.  Urine is leaking around the tube.

## 2024-06-07 NOTE — SEDATION DOCUMENTATION
Procedure completed by Dr Bass. Pt tolerated without issues. Education provided to pt prior to and throughout procedure, questions answered as offered. Tube sutured, dry dressings to site.

## 2024-06-17 ENCOUNTER — OFFICE VISIT (OUTPATIENT)
Dept: AUDIOLOGY | Facility: CLINIC | Age: 84
End: 2024-06-17

## 2024-06-17 DIAGNOSIS — H90.3 SENSORY HEARING LOSS, BILATERAL: Primary | ICD-10-CM

## 2024-06-17 NOTE — PROGRESS NOTES
[facility] AUDIOLOGY HEARING AID CHECK    Ning Chambers was seen today (6/17/2024) for a hearing aid check of her monaural hearing aid. Ms. Chambers stated that she was doing well with one on one conversations but struggles in group settings. She states that she has accidentally showered with the aid in but has not noticed a decrease in sound quality.     Device Information    Hearing Aid Fitting Date:      Right Device Left Device   Hearing Aid Make: N/A Oticon   Hearing Aid Model: N/A More 3 miniRITE   Serial Number: N/A [id number]   Repair Warranty Expiration Date: N/A 09/28/2022   Loss/Damage Warranty Expiration Date:  N/A 09/28/2022    Length: N/A 2    Output: N/A 100   Wax System: N/A Pro Wax miniFIT   Dome Size/Style: N/A 6mm power   Battery: N/A Lithium-ion Rechargeable   Earmold Serial Number: N/A N/A   Earmold Warranty Date:  N/A N/A      Serial Number:  [id number]  Accessories: N/A    Visual inspection of the device(s) revealed no noticeable damage or defects.     A listening check revealed good sound quality from the device(s).    Datalogging revealed ~4h of daily usage.     - Hearing aid form factor cleaned, microphone ports vacuumed  - Dome changed  - Environmental setting changed to improve speech in noise understanding.      Recommendations & Follow-up    Hearing aids(s) are in good working condition. Cleaning and maintenance was reviewed. Ms. Chambers will follow-up in 3 months for recheck and a new hearing test.     Kaylin Cruz, CCC-A  Clinical Audiologist    [facility] PROFESSIONAL [facility] AUDIOLOGY  [address] [place] [address]

## 2024-07-09 ENCOUNTER — HOSPITAL ENCOUNTER (OUTPATIENT)
Dept: RADIOLOGY | Facility: HOSPITAL | Age: 84
Discharge: HOME | End: 2024-07-09
Attending: UROLOGY
Payer: COMMERCIAL

## 2024-07-09 DIAGNOSIS — C67.8 MALIGNANT NEOPLASM OF OVERLAPPING SITES OF BLADDER (HCC): ICD-10-CM

## 2024-07-09 PROCEDURE — G1004 CDSM NDSC: HCPCS

## 2024-07-09 PROCEDURE — 74176 CT ABD & PELVIS W/O CONTRAST: CPT

## 2024-07-17 NOTE — PROGRESS NOTES
Ambulatory Visit  Name: Ning Chambers      : 1940      MRN: [id number]  Encounter Provider: Vinnie Ricardo MD  Encounter Date: 2024   Encounter department: [facility] UROLOGY [place]    Assessment & Plan   1. Malignant neoplasm of overlapping sites of bladder (HCC)  -     Ambulatory Referral to Palliative Care; Future  2. Nephrostomy status (HCC)  3. Goals of care, counseling/discussion  4. Encounter to discuss test results  5. Stage 4 chronic kidney disease (HCC)  6. Chronic renal disease, stage V (HCC)  7. Morbid obesity due to excess calories (HCC)    Ning and I had a productive consultation today, she has worsening retroperitoneal lymphadenopathy significant for progression of disease.  Her ECOG is still reasonable at this time.  She is having some pain in her legs and her back.  I spoke with her about supportive garments for her leg swelling.  I have referred her to palliative care for their help in managing her pain.  We will see her in 6 months.    Her left nephrostomy tube can be removed at the time of the next interventional radiology tube change.  Her right side should be maintained as this is the dominant kidney.  The left side is not putting out much output at this time.          History of Present Illness     Ning Chambers is a 84 y.o. female who presents for follow up of MIBC, no definitive therapy, has bilateral nephrstomy tubes, the left tube does not drain (can be removed by IR at the next tube change).    ECOG 0    The following portions of the patient's history were reviewed and updated as appropriate: allergies, current medications, past family history, past medical history, past social history, past surgical history and problem list.      Review of Systems   Constitutional:  Positive for fatigue.   HENT: Negative.     Eyes: Negative.    Respiratory: Negative.     Cardiovascular:  Positive for leg swelling.   Gastrointestinal: Negative.    Endocrine: Negative.   "  Genitourinary: Negative.    Musculoskeletal:  Positive for back pain.   Skin: Negative.    Allergic/Immunologic: Negative.    Neurological: Negative.    Hematological: Negative.    Psychiatric/Behavioral: Negative.           Objective     /70 (BP Location: Left arm, Patient Position: Sitting, Cuff Size: Standard)   Pulse 70   Ht 5' 2\" (1.575 m)   Wt 84.4 kg (186 lb)   SpO2 98%   BMI 34.02 kg/m²   Physical Exam  Vitals reviewed.   Constitutional:       General: She is not in acute distress.     Appearance: Normal appearance. She is well-developed. She is not ill-appearing, toxic-appearing or diaphoretic.   HENT:      Head: Normocephalic and atraumatic.      Nose: Nose normal.   Eyes:      General: No scleral icterus.        Right eye: No discharge.         Left eye: No discharge.      Pupils: Pupils are equal, round, and reactive to light.   Neck:      Thyroid: No thyromegaly.      Trachea: No tracheal deviation.   Cardiovascular:      Rate and Rhythm: Normal rate and regular rhythm.   Pulmonary:      Effort: Pulmonary effort is normal. No respiratory distress.   Abdominal:      General: There is no distension.      Palpations: There is no mass.      Tenderness: There is no abdominal tenderness.   Musculoskeletal:         General: No deformity or signs of injury.      Cervical back: Normal range of motion and neck supple.   Lymphadenopathy:      Cervical: No cervical adenopathy.   Skin:     Coloration: Skin is not jaundiced or pale.      Findings: No erythema or rash.   Neurological:      Mental Status: She is alert and oriented to person, place, and time.      Cranial Nerves: No cranial nerve deficit.      Sensory: No sensory deficit.      Motor: No abnormal muscle tone.      Coordination: Coordination normal.   Psychiatric:         Mood and Affect: Mood normal.         Behavior: Behavior normal.         Thought Content: Thought content normal.         Judgment: Judgment normal.       Results  No " "results found for: \"PSA\"  Lab Results   Component Value Date    CALCIUM 9.8 07/17/2023    K 4.3 07/17/2023    CO2 22 07/17/2023     (H) 07/17/2023    BUN 57 (H) 07/17/2023    CREATININE 2.99 (H) 07/17/2023     Lab Results   Component Value Date    WBC 9.81 03/08/2023    HGB 11.9 03/08/2023    HCT 37.6 03/08/2023    MCV 89 03/08/2023     03/08/2023       Office Urine Dip  No results found for this or any previous visit (from the past 1 hour(s)).]    Administrative Statements           "

## 2024-07-22 ENCOUNTER — OFFICE VISIT (OUTPATIENT)
Dept: UROLOGY | Facility: CLINIC | Age: 84
End: 2024-07-22
Payer: COMMERCIAL

## 2024-07-22 VITALS
HEIGHT: 62 IN | OXYGEN SATURATION: 98 % | HEART RATE: 70 BPM | SYSTOLIC BLOOD PRESSURE: 144 MMHG | WEIGHT: 186 LBS | BODY MASS INDEX: 34.23 KG/M2 | DIASTOLIC BLOOD PRESSURE: 70 MMHG

## 2024-07-22 DIAGNOSIS — N18.4 STAGE 4 CHRONIC KIDNEY DISEASE (HCC): ICD-10-CM

## 2024-07-22 DIAGNOSIS — Z71.89 GOALS OF CARE, COUNSELING/DISCUSSION: ICD-10-CM

## 2024-07-22 DIAGNOSIS — Z93.6 NEPHROSTOMY STATUS (HCC): ICD-10-CM

## 2024-07-22 DIAGNOSIS — E66.01 MORBID OBESITY DUE TO EXCESS CALORIES (HCC): ICD-10-CM

## 2024-07-22 DIAGNOSIS — N18.5 CHRONIC RENAL DISEASE, STAGE V (HCC): ICD-10-CM

## 2024-07-22 DIAGNOSIS — Z71.2 ENCOUNTER TO DISCUSS TEST RESULTS: ICD-10-CM

## 2024-07-22 DIAGNOSIS — C67.8 MALIGNANT NEOPLASM OF OVERLAPPING SITES OF BLADDER (HCC): Primary | ICD-10-CM

## 2024-07-22 PROCEDURE — 99214 OFFICE O/P EST MOD 30 MIN: CPT | Performed by: UROLOGY

## 2024-07-22 RX ORDER — TRAZODONE HYDROCHLORIDE 50 MG/1
50 TABLET ORAL DAILY
COMMUNITY
Start: 2024-03-19 | End: 2024-09-15

## 2024-07-30 ENCOUNTER — CLINICAL SUPPORT (OUTPATIENT)
Dept: PALLIATIVE MEDICINE | Facility: CLINIC | Age: 84
End: 2024-07-30
Payer: COMMERCIAL

## 2024-07-30 ENCOUNTER — CONSULT (OUTPATIENT)
Dept: PALLIATIVE MEDICINE | Facility: CLINIC | Age: 84
End: 2024-07-30
Payer: COMMERCIAL

## 2024-07-30 VITALS
HEART RATE: 64 BPM | HEIGHT: 62 IN | DIASTOLIC BLOOD PRESSURE: 72 MMHG | OXYGEN SATURATION: 94 % | BODY MASS INDEX: 34.48 KG/M2 | TEMPERATURE: 97.9 F | RESPIRATION RATE: 16 BRPM | SYSTOLIC BLOOD PRESSURE: 138 MMHG | WEIGHT: 187.4 LBS

## 2024-07-30 DIAGNOSIS — Z71.89 ADVANCED DIRECTIVES, COUNSELING/DISCUSSION: ICD-10-CM

## 2024-07-30 DIAGNOSIS — Z51.5 PALLIATIVE CARE BY SPECIALIST: ICD-10-CM

## 2024-07-30 DIAGNOSIS — Z71.89 GOALS OF CARE, COUNSELING/DISCUSSION: ICD-10-CM

## 2024-07-30 DIAGNOSIS — Z71.89 COUNSELING AND COORDINATION OF CARE: Primary | ICD-10-CM

## 2024-07-30 DIAGNOSIS — C67.8 MALIGNANT NEOPLASM OF OVERLAPPING SITES OF BLADDER (HCC): Primary | ICD-10-CM

## 2024-07-30 DIAGNOSIS — M79.604 BILATERAL LEG PAIN: ICD-10-CM

## 2024-07-30 DIAGNOSIS — M79.605 BILATERAL LEG PAIN: ICD-10-CM

## 2024-07-30 DIAGNOSIS — N18.5 CHRONIC RENAL DISEASE, STAGE V (HCC): ICD-10-CM

## 2024-07-30 DIAGNOSIS — R26.2 AMBULATORY DYSFUNCTION: ICD-10-CM

## 2024-07-30 PROBLEM — M89.9 BONE LESION: Status: RESOLVED | Noted: 2021-10-28 | Resolved: 2024-07-30

## 2024-07-30 PROCEDURE — G2211 COMPLEX E/M VISIT ADD ON: HCPCS | Performed by: INTERNAL MEDICINE

## 2024-07-30 PROCEDURE — NC001 PR NO CHARGE

## 2024-07-30 PROCEDURE — 99204 OFFICE O/P NEW MOD 45 MIN: CPT | Performed by: INTERNAL MEDICINE

## 2024-07-30 NOTE — ASSESSMENT & PLAN NOTE
Patient endorses ambulatory dysfunction. She has a cane at home but does not wish to use it. Referring to Physical Therapy.

## 2024-07-30 NOTE — PROGRESS NOTES
Palliative Outpatient Assessment of Need    LSW completed an assessment of need which was completed with (patient, family, or both) in the office or via phone/video conference    Relationship status:    Duration of relationship:   Name of significant other:  Children and Ages: Son-Rodrigo  Pets: None  Other important family information: Family local  Living situation (where and whom): Resides alone    Patient's primary caregiver: Self    Any limitations of caregiver:  Environmental concerns or barriers:   history:  served  Employment history/source of income: Currently working part-time at [employer]   Disability:    Concerns regarding literacy: None  Spirituality/ Yarsanism: Presbyterian   Patient's strengths, social supports, and resources: Supportive family  Cultural information:   Mental Health current or previous: None  Substance use or history: Former smoker  Sleep: Sleep interrupted due to nephrostomy tube management  Exercise: As tolerated due to pain. Dr. Ramirez has placed referral for PT services  Diet/nutrition: No concerns  Durable Medical Equipment needs: None--does not utilize AD  Transportation: Drives Self  Financial concerns: None  Advanced Directive: None. Copy of [facility] HCR/AD document provided at visit today  Other medical or social work providers involved: Urology  Patient/caregiver current level of coping: Pt appears to be coping well emotionally at this time. Reviewed role of [facility] SW for on-going support. [facility] newsletter reviewed and provided to pt.   Understanding: Pt appears understanding of current medical status  Patient/family concerns and areas of need: Leg Pain  Patient's interests: Watching TV     I have spent 20 minutes with Patient and family today in which greater than 50% of this time was spent in counseling/coordination of care.    *All questions may not be answered due to constraints.  Follow-up discussions may need to occur

## 2024-07-30 NOTE — ASSESSMENT & PLAN NOTE
Lab Results   Component Value Date    EGFR 13 07/17/2023    EGFR 15 03/08/2023    EGFR 35 05/06/2022    CREATININE 2.99 (H) 07/17/2023    CREATININE 2.78 (H) 03/08/2023    CREATININE 1.37 (H) 05/06/2022   Informs plan of care and medication management.  Referring to Nephrology for education and potential interventions/management. Patient states she was told by Urology that dialysis would not be recommended for her.

## 2024-07-30 NOTE — ASSESSMENT & PLAN NOTE
"Patient endorses occasional back pain, but more prominent bilateral lower extremity pain from mid calf down through her feet. It is on the anterior and posterior and lateral sides. She is unable to qualify this pain stating simply that \"it hurts\". She states that pain is more notable when she is ambulating, but also states that when she will get up to walk when the pain appears.  Recommend topical OTC products, local application of heat or cold, Tylenol up to 1000mg TID for chronic pain. Do not use heat on top of topical agents. Do not mix topical agents.  Unknown etiology of patient's lower extremity pain while she does have retroperitoneal lymphadenopathy, she not characterizing this pain such that it would resemble nerve impingement/inflammation or other related issue. Would not prescribe narcotics for this just yet. Would avoid NSAIDs considering her renal disease.  Also that MMJ/RMJ, particular topical products, may be helpful (or OTC CBD products).  Commended patient discuss with PCP, for further workup of this pain. PCP had ordered a hip x-ray in 03/2024 but the patient did not get this done.  Referring to Physical Therapy.  "

## 2024-07-30 NOTE — ASSESSMENT & PLAN NOTE
Continue disease-directed cares. 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 She does not feel hospice discussion is appropriate at this time (though hospice was briefly mentioned as a potential line of discussion today).

## 2024-07-30 NOTE — ASSESSMENT & PLAN NOTE
How Severe Is Your Rash?: moderate Time spent introducing the concept of palliative care in general, and the [facility] offering in specific. Assessed patient's understanding of her palliative diagnosis and current plan of treatment.  Emotional / psychosocial support provided today.  Reviewed notes (Urology, Audiology, PCP), labs (7/17/24 Cr 2.99, GFR 13; 3/8/23 Hb 11.9, alb 3.7), imaging + procedures (7/29/24 CTAP). See below for more data.  Return in about 2 months (around 9/30/2024).  Medication safety issues addressed - no driving under the influence of narcotics (including opioids), watch for adverse effects including AMS or respiratory depression (slowed breathing), keep medications stored in a safe/locked environment, do not use alcohol while opioids or other narcotics are in one's system, do not travel with more than the minimum number of tablets or capsules required for the trip.  I have personally queried the patient's controlled substance dispensing history in the Prescription Drug Monitoring Program.   Is This A New Presentation, Or A Follow-Up?: Rash Additional History: Pt went to primary care dr and was treated for shingles and given prednisone. But when the rash didn’t get better cause he was getting the rash on both sides of his trunk and the nurse practitioner gave him betamethasone/ clotimazole cream and that helped to calm to rash but it still comes and goes. Pt states that on a scale from 1-10, 8 is how bad his itching will get. Pt states no change in household products, soaps, shampoo, lotion to detergents, no outside activities. Pt did mention that several months ago he visited his nephew and they had scabies and his nephew son was treated for the scabies.

## 2024-07-30 NOTE — PROGRESS NOTES
"Ambulatory Visit - CONSULT  Name: Ning Chambers      : 1940      MRN: [id number]  Encounter Provider: Stephen Ramirez MD  Encounter Date: 2024   Encounter department: [facility]    Assessment & Plan   1. Malignant neoplasm of overlapping sites of bladder (HCC)  Assessment & Plan:  Bladder cancer w/ progressive retroperitoneal lymphadenopathy who follows with Dr Ricardo (Urology). She is not currently receiving treatment for progressive malignancy. 24 CTAP shows \"multiple enlarging retroperitoneal lymph nodes\".  Orders:  -     Ambulatory Referral to Palliative Care  2. Chronic renal disease, stage V (HCC)  Assessment & Plan:  Lab Results   Component Value Date    EGFR 13 2023    EGFR 15 2023    EGFR 35 2022    CREATININE 2.99 (H) 2023    CREATININE 2.78 (H) 2023    CREATININE 1.37 (H) 2022   Informs plan of care and medication management.  Referring to Nephrology for education and potential interventions/management. Patient states she was told by Urology that dialysis would not be recommended for her.  Orders:  -     Ambulatory Referral to Nephrology; Future; Expected date: 2024  3. Ambulatory dysfunction  Assessment & Plan:  Patient endorses ambulatory dysfunction. She has a cane at home but does not wish to use it. Referring to Physical Therapy.  Orders:  -     Ambulatory referral to Physical Therapy; Future; Expected date: 2024  4. Bilateral leg pain  Assessment & Plan:  Patient endorses occasional back pain, but more prominent bilateral lower extremity pain from mid calf down through her feet. It is on the anterior and posterior and lateral sides. She is unable to qualify this pain stating simply that \"it hurts\". She states that pain is more notable when she is ambulating, but also states that when she will get up to walk when the pain appears.  Recommend topical OTC products, local application of heat or cold, Tylenol up " to 1000mg TID for chronic pain. Do not use heat on top of topical agents. Do not mix topical agents.  Unknown etiology of patient's lower extremity pain while she does have retroperitoneal lymphadenopathy, she not characterizing this pain such that it would resemble nerve impingement/inflammation or other related issue. Would not prescribe narcotics for this just yet. Would avoid NSAIDs considering her renal disease.  Also that MMJ/RMJ, particular topical products, may be helpful (or OTC CBD products).  Commended patient discuss with PCP, for further workup of this pain. PCP had ordered a hip x-ray in 03/2024 but the patient did not get this done.  Referring to Physical Therapy.  Orders:  -     Ambulatory referral to Physical Therapy; Future; Expected date: 07/30/2024  5. Goals of care, counseling/discussion  Assessment & Plan:  Continue disease-directed cares. 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 She does not feel hospice discussion is appropriate at this time (though hospice was briefly mentioned as a potential line of discussion today).  6. Advanced directives, counseling/discussion  Assessment & Plan:  Patient has not completed any advanced healthcare directives. Advanced directive counseling given. She accepts a copy of the [facility] Advanced Directive along with counseling. Reviewed with patient. ACP may be further reviewed next visit.  7. Palliative care by specialist  Assessment & Plan:  Time spent introducing the concept of palliative care in general, and the [facility] offering in specific. Assessed patient's understanding of her palliative diagnosis and current plan of treatment.  Emotional / psychosocial support provided today.  Reviewed notes (Urology, Audiology, PCP), labs (7/17/24 Cr 2.99, GFR 13; 3/8/23 Hb 11.9, alb 3.7), imaging + procedures  "(7/29/24 CTA). See below for more data.  Return in about 2 months (around 9/30/2024).  Medication safety issues addressed - no driving under the influence of narcotics (including opioids), watch for adverse effects including AMS or respiratory depression (slowed breathing), keep medications stored in a safe/locked environment, do not use alcohol while opioids or other narcotics are in one's system, do not travel with more than the minimum number of tablets or capsules required for the trip.  I have personally queried the patient's controlled substance dispensing history in the Prescription Drug Monitoring Program.      Subjective   Chief Complaint   Patient presents with    Consult    Cancer    Counseling    Goals    Leg Pain    Gait Problem        History of Present Illness     Ning Chambers is a 84 y.o. female w/ bladder cancer w/ RP LAD; CKD5, she is s/p b/l nephrostomy tubes. She follows w/ Dr Ricardo (Urology). Not currently receiving treatment for progressive malignancy.    Patient is new to [facility] clinic; referred by Urology for goals/ACP.    Patient endorses pain in her bilateral legs circumferentially from mid calf down to her bilateral feet. This is not a radiating pain, she is unable to characterize it in any significant way (examples of burning pain, dull, aching, sharp, stabbing, etc. provided). \"It just hurts\". She states that the pain is worse with ambulation, but also states that when the pain occurs she will get up and walk around. She is taking Tylenol, but only about once per day. She states she has tried topical products like Biofreeze but the product and the other 1 she tried only provided relief for about \"15 minutes\". Patient had reported having pain to her PCP in 03/2024; at the time it was right hip pain. Her PCP had ordered imaging, but the patient had not gone to get the imaging as the hip pain did not linger.    Patient states her walking is \"terrible\"; she has a cane at home but does not " "wish to use it or use a walker. She denies other significant symptoms. She does endorse chronic hearing loss.    Patient states that systemic treatment for her cancer is not being offered and she would not wish to get chemotherapy or other aggressive interventions for cancer. When asked about her recent CT scan, she initially states she was told \"everything was fine\" but she and her family then did recall that there was some enlarging retroperitoneal lymphadenopathy seen on the scan. Patient is aware she has chronic kidney disease. She states she was told by another provider that hemodialysis would not be recommended. She states she has not been offered a referral to Nephrology for evaluation and/or management of CKD 5; she is interested in talking with such a provider in the near future.    Patient states that she does not wish to get chemotherapy for her cancer, and does not wish to get CPR or receive intubation/ventilation. Otherwise, she does not wish to otherwise limit her cares. She does not wish to limit access to hospitalization or her PCP or specialists.    The following portions of the medical history were reviewed: past medical history, surgical history, problem list, medication list, family history, and social history.    Past Medical History:   Diagnosis Date    Arthritis     right knee    Hay fever     Hyperlipidemia     Hypertension     Myocardial infarction (HCC) 2003    questionable MI during exploratory surgery    Ovarian cancer (HCC) 2003    surgery (did not have chemo or radiation)    Stroke (HCC) 2003    \" mini stroke \" no deficits ; another poss stroke with  left hand weakness    Wears glasses     Wears partial dentures     upper & lower     Past Surgical History:   Procedure Laterality Date    BREAST SURGERY Right     exc. bx. lump (R) breast    DILATION AND CURETTAGE OF UTERUS      HYSTERECTOMY  2003    TAHBSO    IR NEPHROSTOMY TUBE CHECK/CHANGE/REPOSITION/REINSERTION/UPSIZE  5/24/2023    IR " NEPHROSTOMY TUBE CHECK/CHANGE/REPOSITION/REINSERTION/UPSIZE  7/13/2023    IR NEPHROSTOMY TUBE CHECK/CHANGE/REPOSITION/REINSERTION/UPSIZE  8/29/2023    IR NEPHROSTOMY TUBE CHECK/CHANGE/REPOSITION/REINSERTION/UPSIZE  10/11/2023    IR NEPHROSTOMY TUBE CHECK/CHANGE/REPOSITION/REINSERTION/UPSIZE  12/11/2023    IR NEPHROSTOMY TUBE CHECK/CHANGE/REPOSITION/REINSERTION/UPSIZE  2/9/2024    IR NEPHROSTOMY TUBE CHECK/CHANGE/REPOSITION/REINSERTION/UPSIZE  4/8/2024    IR NEPHROSTOMY TUBE CHECK/CHANGE/REPOSITION/REINSERTION/UPSIZE  6/7/2024    IR NEPHROSTOMY TUBE PLACEMENT  4/13/2023    JOINT REPLACEMENT Right     OOPHORECTOMY Bilateral     cancer    ND CYSTO BLADDER W/URETERAL CATHETERIZATION Bilateral 4/4/2022    Procedure: CYSTOSCOPY WITH ATTEMPTED B/L RETROGRADE PYELOGRAM;  Surgeon: Vinnie Ricardo MD;  [facility];  Service: Urology    ND CYSTO W/REMOVAL OF TUMORS SMALL N/A 4/4/2022    Procedure: TRANSURETHRAL RESECTION OF BLADDER (MULTIFOCAL) TUMOR (TURBT);  Surgeon: Vinnie Ricardo MD;  [facility];  Service: Urology    ND CYSTOURETHROSCOPY W/DEST &/RMVL TUMOR LARGE Bilateral 11/11/2021    Procedure: CYSTOSCOPY, TRANSURETHRAL RESECTION OF BLADDER TUMOR (TURBT), EVACUATION OF CLOTS, LARGE TUMOR 7-8CM;  Surgeon: Malcolm Reyes MD;  Location: [facility];  Service: Urology    ND CYSTOURETHROSCOPY W/DEST &/RMVL TUMOR LARGE N/A 3/21/2023    Procedure: TRANSURETHRAL RESECTION OF BLADDER TUMOR (TURBT);  Surgeon: Vinnie Ricardo MD;  [facility];  Service: Urology    ND XCAPSL CTRC RMVL INSJ IO LENS PROSTH W/O ECP Left 7/24/2017    Procedure: EXTRACTION EXTRACAPSULAR CATARACT PHACO INTRAOCULAR LENS (IOL);  Surgeon: Elmer Ocampo MD;  Location: [facility] MAIN OR;  Service: Ophthalmology    TONSILLECTOMY       Social History     Socioeconomic History    Marital status:      Spouse name: None    Number of children: None    Years of education: None    Highest education level: None   Occupational  History    None   Tobacco Use    Smoking status: Former     Current packs/day: 0.00     Average packs/day: 1.5 packs/day for 40.0 years (60.0 ttl pk-yrs)     Types: Cigarettes     Start date:      Quit date: 2000     Years since quittin.5    Smokeless tobacco: Never   Vaping Use    Vaping status: Never Used   Substance and Sexual Activity    Alcohol use: Yes     Comment: occassional, socially    Drug use: No    Sexual activity: Not Currently   Other Topics Concern    None   Social History Narrative    From 24  note:    Relationship status:      Children and Ages: Son-Rodrigo    Pets: None    Other important family information: Family local    Living situation (where and whom): Resides alone                  Patient's primary caregiver: Self       history:  served    Employment history/source of income: Currently working part-time at [employer]     Spirituality/ Cong[employer]: [employer]     Patient's strengths, social supports, and resources: Supportive family    Durable Medical Equipment needs: None--does not utilize AD    Transportation: Drives Self    Financial concerns: None    Patient's interests: Watching TV      Social Determinants of Health     Financial Resource Strain: Low Risk  (1/3/2024)    Received from [facility]    Overall Financial Resource Strain (CARDIA)     Difficulty of Paying Living Expenses: Not very hard   Food Insecurity: No Food Insecurity (1/3/2024)    Received from [facility]    Hunger Vital Sign     Worried About Running Out of Food in the Last Year: Never true     Ran Out of Food in the Last Year: Never true   Transportation Needs: No Transportation Needs (1/3/2024)    Received from [facility]    PRAPARE - Transportation     Lack of Transportation (Medical): No     Lack of Transportation (Non-Medical): No   Physical Activity: Insufficiently Active (10/13/2022)    Received  from [facility]    Exercise Vital Sign     Days of Exercise per Week: 5 days     Minutes of Exercise per Session: 20 min   Stress: No Stress Concern Present (10/13/2022)    Received from [facility]    [place] of Occupational Health - Occupational Stress Questionnaire     Feeling of Stress : Not at all   Social Connections: Moderately Isolated (1/3/2024)    Received from [facility] [employer] [facility]    Social Connection and Isolation Panel [NHANES]     Frequency of Communication with Friends and Family: More than three times a week     Frequency of Social Gatherings with Friends and Family: Once a week     Attends Yarsanism Services: More than 4 times per year     Active Member of Clubs or Organizations: No     Attends Club or Organization Meetings: Never     Marital Status:    Intimate Partner Violence: Not At Risk (1/3/2024)    Received from [facility]    Humiliation, Afraid, Rape, and Kick questionnaire     Fear of Current or Ex-Partner: No     Emotionally Abused: No     Physically Abused: No     Sexually Abused: No   Housing Stability: Unknown (1/3/2024)    Received from [facility]    Housing Stability Vital Sign     Unable to Pay for Housing in the Last Year: No     Number of Places Lived in the Last Year: Not on file     Unstable Housing in the Last Year: Not on file     Family History   Problem Relation Age of Onset    Parkinsonism Mother     Heart disease Father     Cancer Father         leukemia    Heart disease Sister     Diabetes Sister     Diabetes Brother     Diabetes Sister     Melanoma Sister     Melanoma Sister     Diabetes Maternal Aunt     No Known Problems Maternal Aunt     No Known Problems Paternal Aunt        Current Outpatient Medications:     aspirin 81 mg chewable tablet, Chew 81 mg daily, Disp: , Rfl:     atorvastatin (LIPITOR) 40 mg tablet, Take 1 tablet (40 mg total) by mouth daily  "with dinner (Patient taking differently: Take 20 mg by mouth Every two days), Disp: 14 tablet, Rfl: 0    cholecalciferol (VITAMIN D3) 1,000 units tablet, Take 1,000 Units by mouth daily, Disp: , Rfl:     hydrochlorothiazide (HYDRODIURIL) 25 mg tablet, Take 25 mg by mouth every morning, Disp: , Rfl:     metoprolol succinate (TOPROL-XL) 50 mg 24 hr tablet, Take 25 mg by mouth every morning, Disp: , Rfl:     fluticasone (FLONASE) 50 mcg/act nasal spray, , Disp: , Rfl:     sodium chloride, PF, 0.9 %, 10 mL by Intracatheter route daily Intracatheter flushing daily. May substitute prefilled syringe with normal saline 10 mL vials, 10 mL syringes, and 18 g blunt needles, Disp: 900 mL, Rfl: 0    traZODone (DESYREL) 50 mg tablet, Take 50 mg by mouth daily (Patient not taking: Reported on 7/30/2024), Disp: , Rfl:     Objective   /72 (BP Location: Left arm, Patient Position: Sitting, Cuff Size: Standard)   Pulse 64   Temp 97.9 °F (36.6 °C) (Tympanic)   Resp 16   Ht 5' 2\" (1.575 m)   Wt 85 kg (187 lb 6.4 oz)   SpO2 94%   BMI 34.28 kg/m²   Physical Exam  Vitals reviewed.   Constitutional:       General: She is awake. She is not in acute distress.     Appearance: She is well-groomed and overweight. She is not toxic-appearing.   HENT:      Head: Normocephalic and atraumatic.      Right Ear: External ear normal.      Left Ear: External ear normal.   Eyes:      General: No scleral icterus.        Right eye: No discharge.         Left eye: No discharge.      Extraocular Movements: Extraocular movements intact.      Conjunctiva/sclera: Conjunctivae normal.      Pupils: Pupils are equal, round, and reactive to light.   Cardiovascular:      Rate and Rhythm: Normal rate.   Pulmonary:      Effort: Pulmonary effort is normal. No tachypnea, bradypnea, accessory muscle usage or respiratory distress.      Comments: Able to speak comfortably in complete sentences on room air at rest.  Abdominal:      General: There is no " distension.      Tenderness: There is no guarding.   Musculoskeletal:      Cervical back: Normal range of motion.      Right lower leg: No edema.      Left lower leg: No edema.   Skin:     General: Skin is dry.      Coloration: Skin is not pale.   Neurological:      Mental Status: She is alert and oriented to person, place, and time.      Cranial Nerves: No dysarthria or facial asymmetry.      Gait: Gait abnormal (slow gait; using no assistive devices).   Psychiatric:         Attention and Perception: Attention normal.         Mood and Affect: Mood and affect normal.         Speech: Speech normal.         Behavior: Behavior normal. Behavior is cooperative.         Thought Content: Thought content normal.         Cognition and Memory: Cognition and memory normal.         Judgment: Judgment normal.          Recent labs:  Lab Results   Component Value Date/Time    SODIUM 141 07/17/2023 09:49 AM    K 4.3 07/17/2023 09:49 AM    BUN 57 (H) 07/17/2023 09:49 AM    CREATININE 2.99 (H) 07/17/2023 09:49 AM    GLUC 96 05/06/2022 12:41 PM    CALCIUM 9.8 07/17/2023 09:49 AM    AST 12 (L) 03/08/2023 09:43 AM    ALT 9 03/08/2023 09:43 AM    ALB 3.7 03/08/2023 09:43 AM    TP 6.8 03/08/2023 09:43 AM    EGFR 13 07/17/2023 09:49 AM     Lab Results   Component Value Date/Time    HGB 11.9 03/08/2023 09:43 AM    WBC 9.81 03/08/2023 09:43 AM     03/08/2023 09:43 AM    INR 0.95 04/13/2023 12:21 PM    PTT 25 03/08/2023 09:43 AM     Lab Results   Component Value Date/Time    HZH1IMVMOMKP 1.240 04/29/2016 10:09 AM       Recent Imaging:  Procedure: CT abdomen pelvis wo contrast    Result Date: 7/10/2024  Narrative: CT ABDOMEN AND PELVIS WITHOUT IV CONTRAST INDICATION: C67.8: Malignant neoplasm of overlapping sites of bladder. COMPARISON: 3/31/2023 TECHNIQUE: CT examination of the abdomen and pelvis was performed without intravenous contrast. Lack of IV contrast limits the sensitivity for pathology. Multiplanar 2D reformatted images were  created from the source data. This examination, like all CT scans performed in the [facility], was performed utilizing techniques to minimize radiation dose exposure, including the use of iterative reconstruction and automated exposure control. Radiation dose length product (DLP) for this visit: 641.18 mGy-cm Enteric Contrast: Not administered. Lack of oral contrast limits the sensitivity for pathology within the bowel. FINDINGS: ABDOMEN LOWER CHEST: There is a small hiatal hernia. There is a chronic dense calcification involving the left ventricular wall. Coronary artery calcification is present. LIVER/BILIARY TREE: Unremarkable. GALLBLADDER: Cholelithiasis without findings of acute cholecystitis. SPLEEN: Unremarkable. PANCREAS: Unremarkable. ADRENAL GLANDS: Unremarkable. KIDNEYS/URETERS: There are bilateral percutaneous nephrostomy tubes. There is an atrophic left kidney without hydronephrosis. There is a mild right hydronephrosis and hydroureter which is improved since the prior CT scan. STOMACH AND BOWEL: Colonic diverticulosis without findings of acute diverticulitis. APPENDIX: Normal. ABDOMINOPELVIC CAVITY: Within the mesentery, there is a 2 cm nodule (2/71). In retrospect, this was present on prior imaging dating back to 10/28/2021 when it measured 2.4 cm. This measured 2.3 cm on 3/31/2023. Otherwise, retroperitoneal lymph nodes are present measuring up to 1 cm short axis. Retroperitoneal lymph nodes appear larger than prior. There is a 1.2 cm lymph node anterior to the distal aorta (2/83). No ascites. No pneumoperitoneum. VESSELS: Atherosclerosis without abdominal aortic aneurysm. PELVIS REPRODUCTIVE ORGANS: Post hysterectomy. URINARY BLADDER: The bladder is compressed and limited in evaluation. ABDOMINAL WALL/INGUINAL REGIONS: Supraumbilical ventral hernia containing nonobstructed bowel. BONES: No acute fracture or suspicious osseous lesion. Spinal degenerative changes. Grade 1  anterolisthesis of L4 upon L5.     Impression: 1. Multiple enlarging retroperitoneal lymph nodes. 2. There is an approximate 2 cm nodule in the mesentery which in retrospect has been present previously but appears smaller. 3. Bilateral nephrostomy tubes in place with improvement of hydronephrosis. Persistent mild right hydronephrosis. The study was marked in EPIC for significant notification. Workstation performed: [id number]     Procedure: IR nephrostomy tube check/change/reposition/reinsertion/upsize    Result Date: 6/7/2024  Narrative: Bilateral nephrostogram and nephrostomy tube change Clinical History: History of bladder cancer, routine exchange of bilateral percutaneous nephrostomy catheters. Contrast: 25 mL of iohexol (OMNIPAQUE) Fluoro time: 3.3 MIN Number of Images: Multiple Radiation dose: 28 mGy Conscious sedation time: NONE Technique: The patient was brought to the interventional radiology suite and placed prone on the table. The existing bilateral nephrostomy tubes were prepped and draped in the usual sterile fashion. After a  view was obtained, contrast was injected into each existing catheter and multiple images of the urinary tract were obtained to the bladder. Findings: The left nephrostomy catheter is in appropriate position, with the tip in the left renal pelvis. An obstruction is present at the left UVJ, with no flow of contrast seen into the urinary bladder. Right nephrostomy catheter was seen to be retracted, with the tip within a calyx. An obstruction is present at the right UVJ, with no flow of contrast into the urinary bladder. Intervention: The catheter was removed over a heavy-duty straight wire, and a new 10.2 Belarusian nephrostomy tube was advanced, and the loop formed within the renal pelvis. The catheter was then injected, confirming satisfactory position. This process was repeated for the contralateral side.     Impression: Impression: Exchange of bilateral 10.2 [place]ian nephrostomy  tubes under fluoroscopic control. The bilateral distal ureters are obstructed at the level of the UVJ, with no flow of contrast into the urinary bladder. Workstation performed: [id number]     Procedure: IR nephrostomy tube check/change/reposition/reinsertion/upsize    Result Date: 4/8/2024  Narrative: IR NEPHROSTOMY TUBE CHECK/CHANGE/REPOSITION/REINSERTION/UPSIZE PROCEDURE: Bilateral routine percutaneous nephrostomy tube exchange CLINICAL INDICATION: Bladder carcinoma with chronic bilateral hydronephrosis and indwelling nephrostomy tubes requiring routine change every 2 months COMPARISON: 2/9/2024 PERFORMING PHYSICIAN: David Fall MD ASSISTANT PHYSICIAN: None MEDICATIONS: 1% lidocaine (local). SEDATION TIME: n/a CONTRAST: 10 mL of iohexol (OMNIPAQUE) FLUOROSCOPY TIME: 1.0 minutes RADIATION DOSE: 9 mGy   (air Kerma). NUMBER OF IMAGES: TECHNIQUE: The patient was brought to the procedure room and a time-out was performed utilizing universal protocol. The patient was identified verbally and via wrist band. The patient was placed prone on the procedure table and both flanks were prepped and draped in the usual sterile fashion. LEFT side performed initially. The skin and subcutaneous tissues were infiltrated with local anesthetic. Contrast injected in the nephrostomy tube opacifies a decompressed collecting system. Catheter and retention suture were cut and the catheter was exchanged out over an Amplatz wire with replacement using a 10 Polish APDL catheter with the locking loop formed in the renal pelvis confirmed by contrast injection. Catheter secured to the skin with 0 Prolene suture and connected to an external drainage bag. Site dressed and bandaged. The procedure was then repeated in identical fashion on the contralateral RIGHT side. The patient tolerated the procedure well without difficulty or complication encountered and left the section in satisfactory stable condition. FINDINGS: As above.  "    Impression: Uneventful routine bilateral percutaneous nephrostomy tube change. The patient's next change will be scheduled in 2 months. Workstation performed: [id number]      45+ minutes total time spent on 7/30/2024 in caring for this patient including obtaining/reviewing history, symptom assessment and management, medication review / adjustment, psychosocial support, reviewing / ordering tests, medicines, imaging, procedures, advanced directives, goals of care, medical marijuana, supportive listening, anticipatory guidance, DME, and documenting in the medical record. All patient/family questions were answered during this discussion.    Stephen Ramirez MD  [facility]  [phone]    Portions of this document may have been created using dictation software and as such some \"sound alike\" terms may have been generated by the system. Do not hesitate to contact me with any questions or clarifications.  "

## 2024-07-30 NOTE — ASSESSMENT & PLAN NOTE
"Bladder cancer w/ progressive retroperitoneal lymphadenopathy who follows with Dr Ricardo (Urology). She is not currently receiving treatment for progressive malignancy. 7/9/24 CTAP shows \"multiple enlarging retroperitoneal lymph nodes\".  "

## 2024-07-30 NOTE — PATIENT INSTRUCTIONS
It was good to see you today. Thank you for coming in.    Consider calling your PCP to ask for imaning and workup of your leg pain.  A copy of the [facility] Advanced Directive was provided; please review and discuss w/ your family. We can discuss it at our next visit. When complete, you may bring it in to any [facility] appointment where staff can witness your signature and scan it in to the system.  Referring to physical therapy as we discussed. Referring to a kidney doctor to see if there are things you can do to slow decline in kidney function and prepare for the future.  For leg pain:  Tylenol, 1000mg three times per day every day, can be a safe and effective way to reduce chronic pain.  You may consider topical products for pain as well (over-the-counter lidocaine, CBD, capsaicin +/- menthol, diclofenac). Brand names include Salonpas, Biofreeze, Aspercreme, Icy Hot, Voltaren, etc. These can be patches, creams, lotions, or roll-on gels.  Try a heating pad or ice pack (you can alternate these about 30 minutes apart) for neck and back pain. Do not use a heating pad for more than 20 minutes out of any single hour.  Do not use topical product under a heating pad; ensure skin is clean and dry.  Return in about 2 months (around 9/30/2024).  Call us for refills on medications that we supply, as needed.   If something changes and you need to come in sooner, please call our office.    PRESCRIPTION REFILL REMINDER:  All medication refills should be requested prior to Noon on Friday. Any refill requests after noon on Friday would be addressed the following Monday.    MEDICATION SAFETY ISSUES:   Do not drive under the influence of narcotics (including opioids), watch for adverse effects including confusion / altered mental status / respiratory depression (slowed breathing), keep medications stored in a safe/locked environment, do not use alcohol while opioids or other narcotics are in your system. Do not travel with more than  the minimum number of tablets or capsules required for the trip.

## 2024-07-30 NOTE — ASSESSMENT & PLAN NOTE
Patient has not completed any advanced healthcare directives. Advanced directive counseling given. She accepts a copy of the [facility] Advanced Directive along with counseling. Reviewed with patient. ACP may be further reviewed next visit.

## 2024-07-31 ENCOUNTER — TELEPHONE (OUTPATIENT)
Age: 84
End: 2024-07-31

## 2024-07-31 NOTE — TELEPHONE ENCOUNTER
Patient calling to schedule consult with nephrology after being referred by Dr. Ramirez for CKD stage 5. No appts within 7-10 days. Patient only willing to go to [place]  or [place] but would prefer [place]. Earliest appt was at [place] location for 9/17. Patient took appt and was put on the waitlist.

## 2024-08-07 ENCOUNTER — HOSPITAL ENCOUNTER (OUTPATIENT)
Dept: NON INVASIVE DIAGNOSTICS | Facility: HOSPITAL | Age: 84
Discharge: HOME | End: 2024-08-07
Attending: INTERNAL MEDICINE | Admitting: RADIOLOGY
Payer: COMMERCIAL

## 2024-08-07 DIAGNOSIS — Z93.6 NEPHROSTOMY STATUS (HCC): ICD-10-CM

## 2024-08-07 PROCEDURE — 50435 EXCHANGE NEPHROSTOMY CATH: CPT | Performed by: RADIOLOGY

## 2024-08-07 PROCEDURE — C1769 GUIDE WIRE: HCPCS

## 2024-08-07 PROCEDURE — C1729 CATH, DRAINAGE: HCPCS

## 2024-08-07 PROCEDURE — 50435 EXCHANGE NEPHROSTOMY CATH: CPT

## 2024-08-07 RX ORDER — LIDOCAINE WITH 8.4% SOD BICARB 0.9%(10ML)
SYRINGE (ML) INJECTION AS NEEDED
Status: COMPLETED | OUTPATIENT
Start: 2024-08-07 | End: 2024-08-07

## 2024-08-07 RX ADMIN — IOHEXOL 12 ML: 350 INJECTION, SOLUTION INTRAVENOUS at 12:16

## 2024-08-07 RX ADMIN — Medication 5 ML: at 12:01

## 2024-08-07 NOTE — SEDATION DOCUMENTATION
Pt arrived for B/L PCN exchange. Tolerated well. Pt educated and discharged to home. Verbalized understanding.

## 2024-08-07 NOTE — DISCHARGE INSTRUCTIONS
Nephrostomy Tube Care     WHAT YOU NEED TO KNOW:   A nephrostomy tube is a catheter (thin plastic tube) that is inserted through your skin and into your kidney. The nephrostomy tube drains urine from your kidney into a collecting bag outside your body. You may need a nephrostomy tube when something is blocking the normal flow of urine. A nephrostomy tube may be used for a short or a long period of time. The nephrostomy tube comes out of your back, so you will need someone to help care for your nephrostomy tube.          DISCHARGE INSTRUCTIONS:      How to clean the skin around the nephrostomy tube and change the bandage:  Since the nephrostomy tube comes out of your back, you will not be able to care for it by yourself. Ask someone to follow the general directions below to check and care for your nephrostomy tube.   Gather the items you will need.          Disposable (single use) under-pad, and a clean washcloth  Plain soap, warm water, and new medical gloves  Sterile gauze bandages  Clear adhesive dressing or medical tape  Skin barrier  Protective skin film  Trash bag  Remove the old bandage, and check the tube entry site.    Have the patient lie on his side with the nephrostomy tube entry site facing up. Place the under-pad where it will catch drainage as you are working with the nephrostomy tube.   Wash your hands with soap and water. Put on new medical gloves.  Gently remove the old bandage, without pulling on the tube. Do this by holding the skin beside the tube with one hand. With the other hand, gently remove sticky tape and the skin barrier by pulling in the same direction as hair growth. Do not touch the side of the bandage that is placed over or around the tube. Throw the bandage and skin barrier away in a trash bag.  Look for signs of infection, such as skin redness and swelling. Report any skin changes to healthcare providers.  Clean the tube entry site.    Hold the tube in place to keep it from  being pulled out while you are cleaning around it.  You will need to clean the area twice. For the first cleaning, wet a new gauze bandage with soap and water.  Begin at the entry site of the tube. Wipe the skin in circles, moving away from the entry site. Remove blood and any other material with the gauze. Do this as often as needed. Use a new gauze bandage each time you clean the area, moving away from the entry site.   For the second cleaning, wet a new gauze bandage with water. Begin at the entry site of the tube. Wipe the skin in circles, moving away from the entry site. Use a new gauze bandage each time you clean the area, moving away from the entry site.   Gently pat the skin with a clean washcloth to dry it.    Apply the skin barrier and bandages.    Roll up a bandage to make it thick, and place it under  the place where the tube enters the skin. Place it to support the tube, and stop it from kinking or bending. Tape the bandage in place, and apply more bandages if directed by a healthcare provider.   Bring the tubing forward to the front and tape it to the skin. Do not stretch the tube tight, because this may pull the nephrostomy tube out.  How often to change the bandage.  Change the bandage around the tube, every other day. If your bandages  get dirty or wet, change them right away, and as often as needed. If your nephrostomy tube is to be used for a long period of time, the tube needs to be changed every 2 to 3 months. Healthcare providers will tell you when you need to make an appointment to have your tube changed.     How to care for the urine drainage bag:   Ask if you need to measure and write down how much urine is in the bag before you empty it. Drain urine out of the drainage bag when it is ½ to ? full. Open the spout at the bottom of the bag to empty the urine into the toilet.   You may need to detach the drainage bag from the nephrostomy tube to change it.. If so, attach a new drainage bag  tightly to the nephrostomy tube.     How to prevent problems with your nephrostomy tube:   Change bandages, directed.  This helps to prevent infection. Throw away or clean your drainage bag as directed by your healthcare provider.    Wipe the connecting ends of the drainage bag with alcohol before you reconnect the bag to the tube.  This helps prevent infection.     Keep the tube taped to your skin and connected to a drainage bag placed below the level of your kidneys.  This helps prevent urine from backing up into your kidneys. You may wear a small drainage bag strapped to your leg to let you move around more easily.    Check the catheter to be sure it is in place after you change your clothes or do other activities.  Do not wear tight clothing over the tube. Place the tubing over your thigh rather than under it when you are sitting down. Be sure that nothing is pulling on the nephrostomy tube when you move around.    Change positions if you see little or no urine in your drainage bag.  Check to see if the urine tube is twisted or bent. Be sure that you are not sitting or lying on the tube. If there are no kinks and there is little or no urine in the drainage bag, tell your healthcare provider.    Flush out the tube as directed. Some tubes get flushed one time a day with 10 mls of NSS You will be given a prescription for the flushes.  To flush the nephrostomy tube, clean both connections with alcohol swap. Twist off the drainage bag tube and twist the saline syringe into the nephrostomy tube and flush briskly. Remove the syringe and twist the drainage bag tube back into the nephrostomy tube.  Keep the site covered while you shower.  Tape a piece of clear adhesive plastic over the dressing to keep it dry while you shower. Do not take tub baths.    Contact Interventional Radiology at [phone] ([facility] PATIENTS: Contact Interventional Radiology at [phone]) ([facility] PATIENTS: Contact Interventional Radiology at  [phone]) if:  The skin around the nephrostomy tube is red, swollen, itches, or has a rash.   You have a fever greater than 101 or chills.  You have lower back or hip pain.  There are changes in how your urine looks or smells.  You have little or no urine draining from the nephrostomy tube.   You have nausea and are vomiting.  The black arleth on your tube has moved, or the tube is longer than when it was put in.   You have questions or concerns about your condition or care.  The nephrostomy tube comes out completely.   There is blood, pus, or a bad smell coming from the place where the tube enters your skin.  Urine is leaking around the tube.        The following pharmacies carry the flush syringes.       [facility]                     [address]                     [address] [facility]                    [phone]  [place]                       [place]  Phone [phone]            Phone [phone]                 [facility]                                                                                                   [phone]  [facility]             [facility]                             [address] [place], [place] [address] [facility]   [place]                                 [phone]  Phone [phone]            Phone [phone]    [facility]                                                                         [facility] [phone]  Dayna Bansal.  [place]   Phone [phone]

## 2024-08-14 ENCOUNTER — EVALUATION (OUTPATIENT)
Dept: PHYSICAL THERAPY | Facility: CLINIC | Age: 84
End: 2024-08-14
Payer: COMMERCIAL

## 2024-08-14 DIAGNOSIS — M79.604 BILATERAL LEG PAIN: ICD-10-CM

## 2024-08-14 DIAGNOSIS — R26.2 AMBULATORY DYSFUNCTION: ICD-10-CM

## 2024-08-14 DIAGNOSIS — M79.605 BILATERAL LEG PAIN: ICD-10-CM

## 2024-08-14 PROCEDURE — 97162 PT EVAL MOD COMPLEX 30 MIN: CPT

## 2024-08-14 PROCEDURE — 97110 THERAPEUTIC EXERCISES: CPT

## 2024-08-14 NOTE — PROGRESS NOTES
PT Evaluation     Today's date: 2024  Patient name: Ning Chambers  : 1940  MRN: [id number]  Referring provider: Stephen Ramirez MD  Dx:   Encounter Diagnosis     ICD-10-CM    1. Ambulatory dysfunction  R26.2 Ambulatory referral to Physical Therapy      2. Bilateral leg pain  M79.604 Ambulatory referral to Physical Therapy    M79.605                      Assessment  Impairments: abnormal gait, abnormal or restricted ROM, activity intolerance, impaired balance, impaired physical strength, lacks appropriate home exercise program, pain with function, safety issue, poor posture  and poor body mechanics    Assessment details: Ning Chambers is a 84 y.o. female who presents with pain, decreased strength, decreased ROM, ambulatory dysfunction, postural dysfunction, and balance dysfunction. Due to these impairments, patient has difficulty performing ADL's, recreational activities, engaging in social activities, ambulation, stair negotiation, lifting/carrying, transfers. Patient's clinical presentation is consistent with their referring diagnosis of Ambulatory dysfunction and Bilateral leg pain. Suspect lumbar component secondary to patient reports of symptoms moving location in LLE. Mechanical assessment demo unclear results at this time due to lack of pain during evaluation. Patient given RFISit as HEP to further assess when pain is increased at home. Patient demo understanding of edu on centralization and peripheralization and when to stop vs continue based on results. Patient has been educated in home exercise program and plan of care. Patient would benefit from skilled physical therapy services to address their aforementioned functional limitations and progress towards prior level of function and independence with home exercise program.     Understanding of Dx/Px/POC: good     Prognosis: good    Goals  Short term goals to be accomplished in 4 weeks:  STG 1: Pt will demo independence with postural  management  STG 2: Pt will demo I with HEP to maximize progress between therapy sessions  STG 3: Pt will reports pain dec freq and intensity 50%  STG 4: Pt will demo 1/2 MMT grade in B/L Hips to improve stability with functional challenges        Long term goals to be accomplished in 12 weeks:  LTG 1: Pt will demo good body mech with >75% functional challenges to prevent reinjury  LTG 2: Pt will be able to return to walking for 20 minutes or more pain free as per PLOF to improve community ambulation  LTG 3: Pt will demo Good strength core stabilizers to promote carryover with body mech and posture  LTG4: Pt will deny sleep disturbance secondary to pain   LTG5: Pt will demo L/S AROM < or = min loss throughout to promote improved functional mobility and body mechanics      Plan  Patient would benefit from: PT eval and skilled physical therapy  Planned modality interventions: cryotherapy and thermotherapy: hydrocollator packs    Planned therapy interventions: balance/weight bearing training, coordination, gait training, home exercise program, therapeutic exercise, therapeutic activities, strengthening, patient education, neuromuscular re-education, abdominal trunk stabilization, flexibility, functional ROM exercises, IASTM, joint mobilization, kinesiology taping and manual therapy    Frequency: 2-3x week  Plan of Care beginning date: 8/14/2024  Plan of Care expiration date: 11/6/2024  Treatment plan discussed with: patient  Plan details: HEP development, stretching, strengthening, A/AA/PROM, joint mobilizations, posture education, STM/MI as needed to reduce muscle tension, muscle reeducation, PLOC discussed and agreed upon with patient      Subjective Evaluation    History of Present Illness  Mechanism of injury: Patient reports to PT with complaints of B/L LE pain (L>R) as well as some difficulty with ambulation. Patients chief complaint is the leg pain and feels the balance is just age related and is not an issue  consistently. Patient reports the L leg pain changes, occasionally it is just the top of her foot or just the shin. It recently started to shift into the hip and thigh as well. Patient only has pain in groin on R side. Patient used to walk but over the last year has not been able to do this as much.   Patient Goals  Patient goals for therapy: decreased pain, improved balance, increased motion, increased strength, independence with ADLs/IADLs and return to sport/leisure activities    Pain  Current pain ratin  At worst pain ratin  Location: L shin/foot occasionally into hip and thigh  Quality: dull ache, radiating and throbbing    Social Support  Steps to enter house: yes  2  Stairs in house: no   Lives in: one-story house  Lives with: alone      Diagnostic Tests  No diagnostic tests performed      Objective    Posture: Lumbar lordosis is slightly decreased in standing. There is no lateral shift.      Lumbar AROM limitations:  (*=  Pain)  Lumbar flexion: Min hooper  Lumbar extension: Mod hooper  R side glide:  Min hooper  L side glide:  Min hooper    Mechanical Asessment: pre-test symtpoms include: pain free at this time: test repeated motion with no increase in pain. Patient to perform RFISit as HEP when symptoms increase to assess response.   Repeated Flexion in sitting (RFISit): 1x10 = NE   Repeated Extension in Standing (KOSTAS): NT  Repeated Extension in Lying (REIL): NT      Strength:  Core strength: Fair      Right  Left  Knee ext  4/5  4-/5  Knee flex  4-/5  4-/5  Ankle DF  5/5  5/5  Ankle PF  5/5  5/5      Hip flexion(sit): 4/5  4/5  Hip abduction(sit) 4+/5  4+/5  Hip adduction(sit) 4+/5  4+/5      Tenderness/Palpation: mild TTP in R hip       Flexibility:  Mod hooper in HS flexibility noted during lumbar flexion ROM      Function:   Gait: noted poor R foot clearance with decreased step length B/L and slow gait speed.                 Precautions: medical tape allergy, NEPHROSTOMY TUBES   Past Medical History:  "  Diagnosis Date   • Arthritis     right knee   • Hay fever    • Hyperlipidemia    • Hypertension    • Myocardial infarction (HCC) 2003    questionable MI during exploratory surgery   • Ovarian cancer (HCC) 2003    surgery (did not have chemo or radiation)   • Stroke (HCC) 2003    \" mini stroke \" no deficits ; another poss stroke with  left hand weakness   • Wears glasses    • Wears partial dentures     upper & lower     SOC: 8/14/2024  FOTO: 8/14/2024  POC Expiration: 11/6/2024  Daily Treatment Log:  Date 8/14/2024       Visit # 1       Auth         Auth exp        Manual                        There Exer 10'        Lumbar pball stretch         Seated figure 4 stretch         Seated HS stretch         Std marches         LAQ         Seated HS curls                 HEP Created and discussed with [facility] on centralization vs peripheralization        There Activ                                                        NMReed        Step taps         FTEO on foam         FSU         Side stepping         Monster walks         RFISit                         Modalities        NO STIM - ACTIVE CANCER AND NEPHROSTOMY TUBES                         HEP:   Access Code: [email or web address]  URL: [email or web address]  Date: 08/14/2024  Prepared by: Zainab Garzon    Exercises  - Seated Lumbar Flexion Stretch  - 5 x daily - 7 x weekly - 1 sets - 10 reps       "

## 2024-08-19 ENCOUNTER — OFFICE VISIT (OUTPATIENT)
Dept: PHYSICAL THERAPY | Facility: CLINIC | Age: 84
End: 2024-08-19
Payer: COMMERCIAL

## 2024-08-19 ENCOUNTER — APPOINTMENT (OUTPATIENT)
Dept: PHYSICAL THERAPY | Facility: CLINIC | Age: 84
End: 2024-08-19
Payer: COMMERCIAL

## 2024-08-19 DIAGNOSIS — R26.2 AMBULATORY DYSFUNCTION: Primary | ICD-10-CM

## 2024-08-19 DIAGNOSIS — M79.605 BILATERAL LEG PAIN: ICD-10-CM

## 2024-08-19 DIAGNOSIS — M79.604 BILATERAL LEG PAIN: ICD-10-CM

## 2024-08-19 PROCEDURE — 97110 THERAPEUTIC EXERCISES: CPT

## 2024-08-19 NOTE — PROGRESS NOTES
"Daily Note     Today's date: 2024  Patient name: Ning Chambers  : 1940  MRN: [id number]  Referring provider: Stephen Ramirez MD  Dx:   Encounter Diagnosis     ICD-10-CM    1. Ambulatory dysfunction  R26.2       2. Bilateral leg pain  M79.604     M79.605                      Subjective: Patient reports her back is a bit better and her leg felt good all weekend until the rain today.       Objective: See treatment diary below      Assessment: Tolerated treatment well with difficulty performing figure 4 stretch due to limited ROM. Patient able to tolerate AROM ER and IR with no pain. HEP update with patient to report back on tolerance and any concerns next session. Patient exhibited good technique with therapeutic exercises and would benefit from continued PT      Plan: Continue per plan of care.      Precautions: hx of R knee replacement, medical tape allergy, NEPHROSTOMY TUBES   Past Medical History:   Diagnosis Date    Arthritis     right knee    Hay fever     Hyperlipidemia     Hypertension     Myocardial infarction (HCC)     questionable MI during exploratory surgery    Ovarian cancer (HCC)     surgery (did not have chemo or radiation)    Stroke ([facility])     \" mini stroke \" no deficits ; another poss stroke with  left hand weakness    Wears glasses     Wears partial dentures     upper & lower     SOC: 2024  FOTO: 2024  POC Expiration: 2024  Daily Treatment Log:  Date 2024      Visit # 1 2      Auth         Auth exp        Manual                        There Exer 10'  35'      Lumbar pball stretch   5\" x10       Seated figure 4 stretch   Unable to get into positions.     AROM hip IR/ER 2x10 R/L       Seated HS stretch         Std marches         LAQ   1x10       Seated HS curls   RTB 1x10 R/L               HEP Created and discussed with [facility] on centralization vs peripheralization  Updated and discussed       There Activ                                             "            NMReed        Step taps         FTEO on foam         FSU         Side stepping         Monster walks         RFISit   1x10 to end                       Modalities        NO STIM - ACTIVE CANCER AND NEPHROSTOMY TUBES                         HEP:   Access Code: [email or web address]  URL: [email or web address]  Date: 08/19/2024  Prepared by: Zainab Garzon    Exercises  - Seated Lumbar Flexion Stretch  - 5 x daily - 7 x weekly - 1 sets - 10 reps  - Seated Hip External Rotation AROM  - 1 x daily - 7 x weekly - 1 sets - 10 reps  - Seated Long Arc Quad  - 1 x daily - 7 x weekly - 1 sets - 10 reps

## 2024-08-23 ENCOUNTER — OFFICE VISIT (OUTPATIENT)
Dept: PHYSICAL THERAPY | Facility: CLINIC | Age: 84
End: 2024-08-23
Payer: COMMERCIAL

## 2024-08-23 ENCOUNTER — TELEPHONE (OUTPATIENT)
Dept: INTERVENTIONAL RADIOLOGY/VASCULAR | Facility: CLINIC | Age: 84
End: 2024-08-23

## 2024-08-23 DIAGNOSIS — R26.2 AMBULATORY DYSFUNCTION: Primary | ICD-10-CM

## 2024-08-23 DIAGNOSIS — Z93.6 NEPHROSTOMY STATUS (HCC): Primary | ICD-10-CM

## 2024-08-23 DIAGNOSIS — M79.605 BILATERAL LEG PAIN: ICD-10-CM

## 2024-08-23 DIAGNOSIS — M79.604 BILATERAL LEG PAIN: ICD-10-CM

## 2024-08-23 PROCEDURE — 97110 THERAPEUTIC EXERCISES: CPT

## 2024-08-23 NOTE — PROGRESS NOTES
"Daily Note     Today's date: 2024  Patient name: Ning Chambers  : 1940  MRN: [id number]  Referring provider: Stephen Ramirez MD  Dx:   Encounter Diagnosis     ICD-10-CM    1. Ambulatory dysfunction  R26.2       2. Bilateral leg pain  M79.604     M79.605                      Subjective: Patient reports she has been running back and forth from the hospital for her sister. Patient has increased pain however appears to be more muscle soreness than radicular pain.       Objective: See treatment diary below  Noted decreased knee flexion with ambulation and poor foot clearance on RLE    Assessment: Tolerated treatment well with noted gait deviations. Given knee flexion stretch to address limited ROM affecting gait pattern. Patient verbalized understanding of updated HEP and not to over do it as she has been sore and appropriate rest is important. Patient exhibited good technique with therapeutic exercises and would benefit from continued PT      Plan: Continue per plan of care.      Precautions: hx of R knee replacement, medical tape allergy, NEPHROSTOMY TUBES   Past Medical History:   Diagnosis Date    Arthritis     right knee    Hay fever     Hyperlipidemia     Hypertension     Myocardial infarction (HCC)     questionable MI during exploratory surgery    Ovarian cancer ([facility]) 2003    surgery (did not have chemo or radiation)    Stroke ([facility])     \" mini stroke \" no deficits ; another poss stroke with  left hand weakness    Wears glasses     Wears partial dentures     upper & lower     SOC: 2024  FOTO: 2024  POC Expiration: 2024  Daily Treatment Log:  Date 2024     Visit # 1 2 3     Auth         Auth exp        Manual                        There Exer 10'  35' 40'     Lumbar pball stretch   5\" x10  5\" 2x10      Seated figure 4 stretch   Unable to get into positions.     AROM hip IR/ER 2x10 R/L  AROM hip IR/ER 2x10 R/L      Seated HS stretch    5\" x10 R/L    " "  Std marches         LAQ   1x10  1x10      Seated HS curls   RTB 1x10 R/L  RTB 1x10 R/L      Seated knee flexion with opp LE OP    5\" x10 R      HEP Created and discussed with edu on centralization vs peripheralization  Updated and discussed  Updated and discussed      There Activ                                                        NMReed        Step taps         FTEO on foam         FSU         Side stepping         Monster walks         RFISit   1x10 to end  2x10                      Modalities        NO STIM - ACTIVE CANCER AND NEPHROSTOMY TUBES                         HEP:   Access Code: [email or web address]  URL: [email or web address]  Date: 08/23/2024  Prepared by: Zainab Garzon    Exercises  - Seated Lumbar Flexion Stretch  - 5 x daily - 7 x weekly - 1 sets - 10 reps  - Seated Hip External Rotation AROM  - 1 x daily - 7 x weekly - 1 sets - 10 reps  - Seated Long Arc Quad  - 1 x daily - 7 x weekly - 1 sets - 10 reps  - Seated Knee Flexion AAROM  - 1 x daily - 7 x weekly - 1 sets - 10 reps - 5 sec hold       "

## 2024-08-28 ENCOUNTER — APPOINTMENT (OUTPATIENT)
Dept: PHYSICAL THERAPY | Facility: CLINIC | Age: 84
End: 2024-08-28
Payer: COMMERCIAL

## 2024-08-28 ENCOUNTER — TELEPHONE (OUTPATIENT)
Dept: NEPHROLOGY | Facility: CLINIC | Age: 84
End: 2024-08-28

## 2024-08-29 ENCOUNTER — OFFICE VISIT (OUTPATIENT)
Dept: PHYSICAL THERAPY | Facility: CLINIC | Age: 84
End: 2024-08-29
Payer: COMMERCIAL

## 2024-08-29 DIAGNOSIS — M79.605 BILATERAL LEG PAIN: ICD-10-CM

## 2024-08-29 DIAGNOSIS — M79.604 BILATERAL LEG PAIN: ICD-10-CM

## 2024-08-29 DIAGNOSIS — R26.2 AMBULATORY DYSFUNCTION: Primary | ICD-10-CM

## 2024-08-29 PROCEDURE — 97110 THERAPEUTIC EXERCISES: CPT

## 2024-08-29 NOTE — PROGRESS NOTES
"Daily Note     Today's date: 2024  Patient name: Ning Chambers  : 1940  MRN: [id number]  Referring provider: Stephen Ramirez MD  Dx:   Encounter Diagnosis     ICD-10-CM    1. Ambulatory dysfunction  R26.2       2. Bilateral leg pain  M79.604     M79.605                      Subjective: Patient had no issues with knee flexion stretch. Does note difficulty with hip ER AROM on R compared to L. Patient has been doing AAROM (assist from UE) seated marches at home to move the RLE more and build strength. Patient noted the LLE is feeling better and she was able to get on a bar stool with it. RLE is still limited but patient was edu that with history of knee surgery this may take longer to improved than LLE.       Objective: See treatment diary below  Note decreased knee flexion with ambulation and poor foot clearance on RLE    Assessment: Tolerated treatment well with mild irritation in WB on R knee however patient stated was toelrable. Will continue to progress R knee ROM and strength to minimize gait deviations and improve balance. Patient continues to respond well with RFISit for low back and LLE symptoms. Patient exhibited good technique with therapeutic exercises and would benefit from continued PT      Plan: Continue per plan of care.      Precautions: hx of R knee replacement, medical tape allergy, NEPHROSTOMY TUBES   Past Medical History:   Diagnosis Date    Arthritis     right knee    Hay fever     Hyperlipidemia     Hypertension     Myocardial infarction (HCC)     questionable MI during exploratory surgery    Ovarian cancer (HCC)     surgery (did not have chemo or radiation)    Stroke ([facility])     \" mini stroke \" no deficits ; another poss stroke with  left hand weakness    Wears glasses     Wears partial dentures     upper & lower     SOC: 2024  FOTO: 2024  POC Expiration: 2024  Daily Treatment Log:  Date 2024    Visit # 1 2 3 4    Auth   " "      Auth exp        Manual                        There Exer 10'  35' 40' 35'     Lumbar pball stretch   5\" x10  5\" 2x10  5\" 2x10     Seated figure 4 stretch   Unable to get into positions.     AROM hip IR/ER 2x10 R/L  AROM hip IR/ER 2x10 R/L  AROM hip IR/ER 2x5 R/L    Seated HS stretch    5\" x10 R/L  5\" x10 R/L     Std hip ext    2x5 R/L alt     LAQ   1x10  1x10  2x5  R/L     Seated HS curls   RTB 1x10 R/L  RTB 1x10 R/L  RTB 1x10 R/L     Seated knee flexion with opp LE OP    5\" x10 R  5\" x10 R     Knee flexion stretch on step     5\" x10 R     HEP Created and discussed with edu on centralization vs peripheralization  Updated and discussed  Updated and discussed      There Activ                                                        NMReed        Step taps         FTEO on foam         FSU         Side stepping         Monster walks         RFISit   1x10 to end  2x10  2x10                     Modalities        NO STIM - ACTIVE CANCER AND NEPHROSTOMY TUBES                         HEP:   Access Code: [email or web address]  URL: [email or web address]  Date: 08/23/2024  Prepared by: Zainab Garzon    Exercises  - Seated Lumbar Flexion Stretch  - 5 x daily - 7 x weekly - 1 sets - 10 reps  - Seated Hip External Rotation AROM  - 1 x daily - 7 x weekly - 1 sets - 10 reps  - Seated Long Arc Quad  - 1 x daily - 7 x weekly - 1 sets - 10 reps  - Seated Knee Flexion AAROM  - 1 x daily - 7 x weekly - 1 sets - 10 reps - 5 sec hold       "

## 2024-09-05 ENCOUNTER — OFFICE VISIT (OUTPATIENT)
Dept: PHYSICAL THERAPY | Facility: CLINIC | Age: 84
End: 2024-09-05
Payer: COMMERCIAL

## 2024-09-05 DIAGNOSIS — M79.604 BILATERAL LEG PAIN: ICD-10-CM

## 2024-09-05 DIAGNOSIS — M79.605 BILATERAL LEG PAIN: ICD-10-CM

## 2024-09-05 DIAGNOSIS — R26.2 AMBULATORY DYSFUNCTION: Primary | ICD-10-CM

## 2024-09-05 PROCEDURE — 97112 NEUROMUSCULAR REEDUCATION: CPT

## 2024-09-05 PROCEDURE — 97110 THERAPEUTIC EXERCISES: CPT

## 2024-09-05 NOTE — PROGRESS NOTES
"Daily Note     Today's date: 2024  Patient name: Ning Chambers  : 1940  MRN: [id number]  Referring provider: Stephen Ramirez MD  Dx:   Encounter Diagnosis     ICD-10-CM    1. Ambulatory dysfunction  R26.2       2. Bilateral leg pain  M79.604     M79.605                      Subjective: Patient reports she is tired today after not sleeping well last night      Objective: See treatment diary below      Assessment: Tolerated treatment well with additional hip strengthening performed. Will continue to progress global hip strength and R knee ROM and strength to minimize gait deviations and improve balance. Patient continues to respond well with RFISit for low back and LLE symptoms. Patient exhibited good technique with therapeutic exercises and would benefit from continued PT      Plan: Continue per plan of care.      Precautions: hx of R knee replacement, medical tape allergy, NEPHROSTOMY TUBES   Past Medical History:   Diagnosis Date    Arthritis     right knee    Hay fever     Hyperlipidemia     Hypertension     Myocardial infarction (HCC)     questionable MI during exploratory surgery    Ovarian cancer (HCC)     surgery (did not have chemo or radiation)    Stroke (HCC)     \" mini stroke \" no deficits ; another poss stroke with  left hand weakness    Wears glasses     Wears partial dentures     upper & lower     SOC: 2024  FOTO: 2024  POC Expiration: 2024  Daily Treatment Log:  Date 2024   Visit # 1 2 3 4 5   Auth         Auth exp        Manual                        There Exer 10'  35' 40' 35'  30'    Lumbar pball stretch   5\" x10  5\" 2x10  5\" 2x10  5\" 2x10    Seated figure 4 stretch   Unable to get into positions.     AROM hip IR/ER 2x10 R/L  AROM hip IR/ER 2x10 R/L  AROM hip IR/ER 2x5 R/L AROM hip IR/ER 1x10 R/L   Seated HS stretch    5\" x10 R/L  5\" x10 R/L  5\" x10 R/L    Std hip ext    2x5 R/L alt  2x5 R/L alt    Std hip abd      " "2x5 R/L alt    LAQ   1x10  1x10  2x5  R/L  2x5 R/L    Seated HS curls   RTB 1x10 R/L  RTB 1x10 R/L  RTB 1x10 R/L  RTB 1x10 R/L    Seated knee flexion with opp LE OP    5\" x10 R  5\" x10 R  5\" x10 R    Knee flexion stretch on step     5\" x10 R     HEP Created and discussed with edu on centralization vs peripheralization  Updated and discussed  Updated and discussed      There Activ                                                        NMReed     10'   Step taps         FTEO on foam         FSU      4\" step 2x5 R/L    Side stepping         Monster walks         RFISit   1x10 to end  2x10  2x10  2x10                    Modalities        NO STIM - ACTIVE CANCER AND NEPHROSTOMY TUBES                         HEP:   Access Code: [email or web address]  URL: [email or web address]  Date: 08/23/2024  Prepared by: Zainab Garzon    Exercises  - Seated Lumbar Flexion Stretch  - 5 x daily - 7 x weekly - 1 sets - 10 reps  - Seated Hip External Rotation AROM  - 1 x daily - 7 x weekly - 1 sets - 10 reps  - Seated Long Arc Quad  - 1 x daily - 7 x weekly - 1 sets - 10 reps  - Seated Knee Flexion AAROM  - 1 x daily - 7 x weekly - 1 sets - 10 reps - 5 sec hold       "

## 2024-09-06 ENCOUNTER — OFFICE VISIT (OUTPATIENT)
Dept: PHYSICAL THERAPY | Facility: CLINIC | Age: 84
End: 2024-09-06
Payer: COMMERCIAL

## 2024-09-06 DIAGNOSIS — M79.605 BILATERAL LEG PAIN: ICD-10-CM

## 2024-09-06 DIAGNOSIS — R26.2 AMBULATORY DYSFUNCTION: Primary | ICD-10-CM

## 2024-09-06 DIAGNOSIS — M79.604 BILATERAL LEG PAIN: ICD-10-CM

## 2024-09-06 PROCEDURE — 97112 NEUROMUSCULAR REEDUCATION: CPT

## 2024-09-06 PROCEDURE — 97110 THERAPEUTIC EXERCISES: CPT

## 2024-09-06 NOTE — PROGRESS NOTES
"Daily Note     Today's date: 2024  Patient name: Ning Chambers  : 1940  MRN: [id number]  Referring provider: Stephen Ramirez MD  Dx:   Encounter Diagnosis     ICD-10-CM    1. Ambulatory dysfunction  R26.2       2. Bilateral leg pain  M79.604     M79.605                      Subjective: pt reports yesterday was not a good day, she was hurting when she came in and was hurting when she left. Today is better, no noted pain on arrival.       Objective: See treatment diary below      Assessment: Tolerated treatment well. Pt dem mild L LE pain during standing marching that does not last after this activity. Pt cont with RFIS. Tolerated increased reps today w/o complaint. Patient would benefit from continued PT      Plan: Continue per plan of care.      Precautions: hx of R knee replacement, medical tape allergy, NEPHROSTOMY TUBES   Past Medical History:   Diagnosis Date    Arthritis     right knee    Hay fever     Hyperlipidemia     Hypertension     Myocardial infarction (HCC)     questionable MI during exploratory surgery    Ovarian cancer ([facility])     surgery (did not have chemo or radiation)    Stroke ([facility])     \" mini stroke \" no deficits ; another poss stroke with  left hand weakness    Wears glasses     Wears partial dentures     upper & lower     SOC: 2024  FOTO: 2024  POC Expiration: 2024  Daily Treatment Log:  Date 2024   Visit # 6  3 4 5   Auth         Auth exp        Manual                        There Exer 30'   40' 35'  30'    Lumbar pball stretch  5\"x20  5\" 2x10  5\" 2x10  5\" 2x10    Seated AROM hip IR/ER  AROM hip IR/ER 1x10 R/L   AROM hip IR/ER 2x10 R/L  AROM hip IR/ER 2x5 R/L AROM hip IR/ER 1x10 R/L   Seated HS stretch  10\"x5 R/L   5\" x10 R/L  5\" x10 R/L  5\" x10 R/L    Std hip ext 2x5 R/L alt     2x5 R/L alt  2x5 R/L alt    Std hip abd  2x5 R/L alt     2x5 R/L alt    LAQ  1x10 R/L   1x10  2x5  R/L  2x5 R/L    Seated HS curls  RTB 1x10 " "R/L   RTB 1x10 R/L  RTB 1x10 R/L  RTB 1x10 R/L    STS elevated mat  1x10; 1x5        Seated knee flexion with opp LE OP    5\" x10 R  5\" x10 R  5\" x10 R    Knee flexion stretch on step     5\" x10 R     HEP   Updated and discussed      There Activ                                                        NMReed 10'     10'   Step taps         FTEO on foam         FSU      4\" step 2x5 R/L    Marching at rail 1 UE support  1x10 R/L        Side stepping         Monster walks         RFISit  1x10 to start and end   2x10  2x10  2x10                    Modalities        NO STIM - ACTIVE CANCER AND NEPHROSTOMY TUBES                         HEP:   Access Code: [email or web address]  URL: [email or web address]  Date: 08/23/2024  Prepared by: Zainab Garzon    Exercises  - Seated Lumbar Flexion Stretch  - 5 x daily - 7 x weekly - 1 sets - 10 reps  - Seated Hip External Rotation AROM  - 1 x daily - 7 x weekly - 1 sets - 10 reps  - Seated Long Arc Quad  - 1 x daily - 7 x weekly - 1 sets - 10 reps  - Seated Knee Flexion AAROM  - 1 x daily - 7 x weekly - 1 sets - 10 reps - 5 sec hold         "

## 2024-09-09 ENCOUNTER — OFFICE VISIT (OUTPATIENT)
Dept: PHYSICAL THERAPY | Facility: CLINIC | Age: 84
End: 2024-09-09
Payer: COMMERCIAL

## 2024-09-09 DIAGNOSIS — M79.604 BILATERAL LEG PAIN: ICD-10-CM

## 2024-09-09 DIAGNOSIS — R26.2 AMBULATORY DYSFUNCTION: Primary | ICD-10-CM

## 2024-09-09 DIAGNOSIS — M79.605 BILATERAL LEG PAIN: ICD-10-CM

## 2024-09-09 PROCEDURE — 97112 NEUROMUSCULAR REEDUCATION: CPT

## 2024-09-09 PROCEDURE — 97110 THERAPEUTIC EXERCISES: CPT

## 2024-09-09 NOTE — PROGRESS NOTES
"Daily Note     Today's date: 2024  Patient name: Ning Chambers  : 1940  MRN: [id number]  Referring provider: Stephen Ramirez MD  Dx:   Encounter Diagnosis     ICD-10-CM    1. Ambulatory dysfunction  R26.2       2. Bilateral leg pain  M79.604     M79.605                        Subjective: patient reports she is better today than last week \"pretty good yesterday\" \"I was able to walk around the mall\". Patient has \"little back ache from work\" \"I feel like the R knee is getting better but isnt 100% yet\"       Objective: See treatment diary below      Assessment: Tolerated treatment well with improved control noted on FSU today with RLE. Pt cont with RFIS as this continues to limit LBP And B/L LE pain. Patient would benefit from continued PT      Plan: Continue per plan of care.      Precautions: hx of R knee replacement, medical tape allergy, NEPHROSTOMY TUBES   Past Medical History:   Diagnosis Date    Arthritis     right knee    Hay fever     Hyperlipidemia     Hypertension     Myocardial infarction (HCC)     questionable MI during exploratory surgery    Ovarian cancer ([facility])     surgery (did not have chemo or radiation)    Stroke ([facility])     \" mini stroke \" no deficits ; another poss stroke with  left hand weakness    Wears glasses     Wears partial dentures     upper & lower     SOC: 2024  FOTO: 2024  POC Expiration: 2024  Daily Treatment Log:  Date 2024   Visit # 6 7  4 5   Auth         Auth exp        Manual                        There Exer 30'  30'  35'  30'    Lumbar pball stretch  5\"x20 5\"x20  5\" 2x10  5\" 2x10    Seated AROM hip IR/ER  AROM hip IR/ER 1x10 R/L  AROM hip IR/ER 1x10 R/L   AROM hip IR/ER 2x5 R/L AROM hip IR/ER 1x10 R/L   Seated HS stretch  10\"x5 R/L    5\" x10 R/L  5\" x10 R/L    Std hip ext 2x5 R/L alt   2x5 R/L alt   2x5 R/L alt  2x5 R/L alt    Std hip abd  2x5 R/L alt  2x5 R/L alt    2x5 R/L alt    LAQ  1x10 R/L  1x10 R/L alt  " " 2x5  R/L  2x5 R/L    Seated HS curls  RTB 1x10 R/L  RTB 1x10 R/L   RTB 1x10 R/L  RTB 1x10 R/L    STS elevated mat  1x10; 1x5        Seated knee flexion with opp LE OP   5\" x10 R   5\" x10 R  5\" x10 R    Knee flexion stretch on step     5\" x10 R     HEP  Updated and discussed       There Activ                                                        NMReed 10'  8'   10'   Step taps         FTEO on foam         FSU    4\" step 2x5 R/L   4\" step 2x5 R/L    Marching at rail 1 UE support  1x10 R/L  1x10 R/L       Side stepping         Monster walks         RFISit  1x10 to start and end  1x10 to start and end   2x10  2x10                    Modalities        NO STIM - ACTIVE CANCER AND NEPHROSTOMY TUBES                         HEP:   Access Code: [email or web address]  URL: [email or web address]  Date: 09/09/2024  Prepared by: Zainab Garzon    Exercises  - Seated Lumbar Flexion Stretch  - 5 x daily - 7 x weekly - 1 sets - 10 reps  - Seated Hip External Rotation AROM  - 1 x daily - 7 x weekly - 2 sets - 5 reps  - Seated Long Arc Quad  - 1 x daily - 7 x weekly - 2 sets - 5 reps  - Seated Knee Flexion AAROM  - 1 x daily - 7 x weekly - 1 sets - 10 reps - 5 sec hold  - Standing Hip Abduction with Counter Support  - 1 x daily - 7 x weekly - 2 sets - 5 reps  - Standing Hip Extension with Counter Support  - 1 x daily - 7 x weekly - 2 sets - 5 reps         "

## 2024-09-10 ENCOUNTER — TELEPHONE (OUTPATIENT)
Dept: NEPHROLOGY | Facility: CLINIC | Age: 84
End: 2024-09-10

## 2024-09-10 ENCOUNTER — TELEPHONE (OUTPATIENT)
Dept: PALLIATIVE MEDICINE | Facility: CLINIC | Age: 84
End: 2024-09-10

## 2024-09-10 NOTE — TELEPHONE ENCOUNTER
Left message for the Pt to call us back to recshedule her sept 30th appt as provider will be out of office.

## 2024-09-12 ENCOUNTER — OFFICE VISIT (OUTPATIENT)
Dept: PHYSICAL THERAPY | Facility: CLINIC | Age: 84
End: 2024-09-12
Payer: COMMERCIAL

## 2024-09-12 DIAGNOSIS — R26.2 AMBULATORY DYSFUNCTION: Primary | ICD-10-CM

## 2024-09-12 DIAGNOSIS — M79.605 BILATERAL LEG PAIN: ICD-10-CM

## 2024-09-12 DIAGNOSIS — M79.604 BILATERAL LEG PAIN: ICD-10-CM

## 2024-09-12 PROCEDURE — 97112 NEUROMUSCULAR REEDUCATION: CPT

## 2024-09-12 PROCEDURE — 97110 THERAPEUTIC EXERCISES: CPT

## 2024-09-16 ENCOUNTER — OFFICE VISIT (OUTPATIENT)
Dept: AUDIOLOGY | Facility: CLINIC | Age: 84
End: 2024-09-16
Payer: COMMERCIAL

## 2024-09-16 DIAGNOSIS — H90.3 SENSORINEURAL HEARING LOSS, BILATERAL: Primary | ICD-10-CM

## 2024-09-16 PROCEDURE — 92567 TYMPANOMETRY: CPT | Performed by: AUDIOLOGIST

## 2024-09-16 PROCEDURE — 92557 COMPREHENSIVE HEARING TEST: CPT | Performed by: AUDIOLOGIST

## 2024-09-16 NOTE — PROGRESS NOTES
Diagnostic Hearing Evaluation    Name:  Ning Chambers  :  1940  Age:  84 y.o.   MRN:  [id number]  Date of Evaluation: 24     HISTORY:     Reason for visit: Annual Hearing Test    Ning Chambers is being seen today at the request of Dr. Lin for an annual evaluation of hearing. The patient reports no significant changes to medical or otologic history since previous evaluation on.   EVALUATION:    Otoscopic Evaluation:   Right Ear: Unremarkable, canal clear   Left Ear: Unremarkable, canal clear    Tympanometry:   Right Ear: Type A; normal middle ear pressure and static compliance    Left Ear: Type A; normal middle ear pressure and static compliance     Speech Audiometry:  Speech Reception (SRT)    Right Ear: 85 dB HL    Left Ear: 25 dB HL    Word Recognition Scores (WRS):  Right Ear: DNT due to high stimulation required for speech understanding and risk of overmasking   Left Ear: excellent (90 % correct)    Stimuli: [id number]    Pure Tone Audiometry:  Conventional pure tone audiometry from 250 - 8000 Hz  was obtained with good reliability and revealed the following:     Right Ear: Moderately severe sloping to severe sensorineural hearing loss (SNHL)    Left Ear: Normal sloping to severe sensorineural hearing loss (SNHL)     *see attached audiogram    IMPRESSIONS:   No significant change in hearing since previous evaluation on 22    RECOMMENDATIONS:  Annual hearing eval  Routine Hearing aid care    PATIENT EDUCATION:   The results of today's results and recommendations were reviewed with the patient and her hearing thresholds were explained at length. Treatment options, including amplification and communication strategies, were discussed as appropriate. The patient voiced understanding of her test results. Questions were addressed and the patient was encouraged to contact our department should concerns arise.      Kaylin Cruz, CCC-A  Clinical Audiologist  [facility] [place]  [facility]  [address] [address]

## 2024-09-16 NOTE — PROGRESS NOTES
[facility] AUDIOLOGY HEARING AID CHECK    Ning Chambers was seen today (9/16/2024) for a hearing aid check of her monaural hearing aid. Ms. Chambers stated that she was doing ok with one on one conversations but struggles in group settings. She states that she doesn't feel she is hearing well from the device recently.    Device Information       Right Device Left Device   Hearing Aid Make: N/A Oticon   Hearing Aid Model: N/A More 3 miniRITE   Serial Number: N/A [id number]   Repair Warranty Expiration Date: N/A 09/28/2022   Loss/Damage Warranty Expiration Date:  N/A 09/28/2022    Length: N/A 2    Output: N/A 100   Wax System: N/A Pro Wax miniFIT   Dome Size/Style: N/A 6mm power   Battery: N/A Lithium-ion Rechargeable   Earmold Serial Number: N/A N/A   Earmold Warranty Date:  N/A N/A      Serial Number:  [id number]  Accessories: N/A    Visual inspection of the device(s) revealed no noticeable damage or defects.     A listening check revealed good sound quality from the device(s).    - Hearing aid form factor cleaned, microphone ports vacuumed   - Placed in ReDux dryer and 0.5 uL of moisture was removed.  - New audiogram was put into device to keep up to date  - Discussed CROS device when she is ready to upgrade next.      Recommendations & Follow-up    Hearing aids(s) are in good working condition. Cleaning and maintenance was reviewed. Ms. Chambers will follow-up in 3 months for recheck and a new hearing test.     Kaylin Cruz, CCC-A  Clinical Audiologist    [facility] PROFESSIONAL [facility] AUDIOLOGY  [address] [id number]

## 2024-09-17 ENCOUNTER — CONSULT (OUTPATIENT)
Dept: NEPHROLOGY | Facility: CLINIC | Age: 84
End: 2024-09-17
Payer: COMMERCIAL

## 2024-09-17 DIAGNOSIS — N18.5 STAGE 5 CHRONIC KIDNEY DISEASE NOT ON CHRONIC DIALYSIS (HCC): Primary | ICD-10-CM

## 2024-09-17 DIAGNOSIS — Z93.6 NEPHROSTOMY STATUS (HCC): ICD-10-CM

## 2024-09-17 DIAGNOSIS — E66.09 CLASS 1 OBESITY DUE TO EXCESS CALORIES WITH SERIOUS COMORBIDITY AND BODY MASS INDEX (BMI) OF 34.0 TO 34.9 IN ADULT: ICD-10-CM

## 2024-09-17 DIAGNOSIS — N13.9 OBSTRUCTIVE UROPATHY: ICD-10-CM

## 2024-09-17 DIAGNOSIS — E83.9 CHRONIC KIDNEY DISEASE-MINERAL BONE DISORDER (CKD-MBD) WITH STAGE 5 CHRONIC KIDNEY DISEASE, NOT ON CHRONIC DIALYSIS (HCC): ICD-10-CM

## 2024-09-17 DIAGNOSIS — C67.8 MALIGNANT NEOPLASM OF OVERLAPPING SITES OF BLADDER (HCC): ICD-10-CM

## 2024-09-17 DIAGNOSIS — I12.0 BENIGN HYPERTENSION WITH CHRONIC KIDNEY DISEASE, STAGE V (HCC): ICD-10-CM

## 2024-09-17 DIAGNOSIS — M89.9 CHRONIC KIDNEY DISEASE-MINERAL BONE DISORDER (CKD-MBD) WITH STAGE 5 CHRONIC KIDNEY DISEASE, NOT ON CHRONIC DIALYSIS (HCC): ICD-10-CM

## 2024-09-17 DIAGNOSIS — N18.5 CHRONIC KIDNEY DISEASE-MINERAL BONE DISORDER (CKD-MBD) WITH STAGE 5 CHRONIC KIDNEY DISEASE, NOT ON CHRONIC DIALYSIS (HCC): ICD-10-CM

## 2024-09-17 DIAGNOSIS — N18.5 BENIGN HYPERTENSION WITH CHRONIC KIDNEY DISEASE, STAGE V (HCC): ICD-10-CM

## 2024-09-17 PROBLEM — E66.811 CLASS 1 OBESITY DUE TO EXCESS CALORIES WITH SERIOUS COMORBIDITY AND BODY MASS INDEX (BMI) OF 34.0 TO 34.9 IN ADULT: Status: ACTIVE | Noted: 2024-09-17

## 2024-09-17 PROCEDURE — 99204 OFFICE O/P NEW MOD 45 MIN: CPT | Performed by: STUDENT IN AN ORGANIZED HEALTH CARE EDUCATION/TRAINING PROGRAM

## 2024-09-17 NOTE — ASSESSMENT & PLAN NOTE
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  Patient has metastatic bladder cancer and as per our conversation she does not want to pursue dialysis and I agree with that decision  Will monitor kidney function closely with BMP    Orders:    Basic metabolic panel; Future    Ambulatory Referral to Nephrology    CBC and Platelet; Future

## 2024-09-17 NOTE — ASSESSMENT & PLAN NOTE
Secondary to bladder cancer  Status post bilateral nephrostomy   recommend exchange every 2 to 3 months as per urologist  Follow-up with urology

## 2024-09-17 NOTE — PATIENT INSTRUCTIONS
Thank you for coming to your visit today. As we discussed you kidney function is abnormal due to the obstruction in the kidneys, your creatinine is 2.96 Your electrolytes are normal. Please follow the recommendations below       Recommend low sodium (salt) food    Avoid nonsteroidal anti-inflammatory drugs such as Naprosyn, ibuprofen, Aleve, Advil, Celebrex, Meloxicam (Mobic) etc.  You can use Tylenol as needed if you do not have any liver condition to be concerned about    Try to avoid medications such as pantoprazole or  Protonix/Nexium or Esomeprazole)/Prilosec or omeprazole/Prevacid or lansoprazole/AcipHex or Rabeprazole.  If you are able to, use Pepcid as this is safer for your kidneys.    Next Visit in 3 months with results   If you need to see us earlier we can change the appointment for you      Joselyn Reyes Bahamonde, MD  Nephrology Attending

## 2024-09-17 NOTE — PROGRESS NOTES
Ambulatory Visit  Name: Ning Chambers      : 1940      MRN: [id number]  Encounter Provider: Joselyn Reyes Bahamonde, MD  Encounter Date: 2024   Encounter department: [facility] [place]    Assessment & Plan  Stage 5 chronic kidney disease 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)  Lab Results   Component Value Date    EGFR 13 2023    EGFR 15 2023    EGFR 35 2022    CREATININE 2.99 (H) 2023    CREATININE 2.78 (H) 2023    CREATININE 1.37 (H) 2022   We discussed extensively the etiology of CKD due to obstruction  Recommend avoid NSAIDs  No urgent indication of dialysis today's time.  Patient has metastatic bladder cancer and as per our conversation she does not want to pursue dialysis and I agree with that decision  Will monitor kidney function closely with BMP    Orders:    Basic metabolic panel; Future    Ambulatory Referral to Nephrology    CBC and Platelet; Future    Benign hypertension with chronic kidney disease, stage V ([facility])  Lab Results   Component Value Date    EGFR 13 2023    EGFR 15 2023    EGFR 35 2022    CREATININE 2.99 (H) 2023    CREATININE 2.78 (H) 2023    CREATININE 1.37 (H) 2022   Low sodium diet   Recommend goal of less than 150/90  Continue with hydrochlorothiazide 25 mg  Continue with metoprolol 50 mg daily         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, 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)  Lab Results   Component Value Date    EGFR 13 2023    EGFR 15 2023    EGFR 35 2022    CREATININE 2.99 (H) 2023    CREATININE 2.78 (H) 2023    CREATININE 1.37 (H) 2022   Serum calcium at goal  Will check phosphorus and PTH  No indication of phosphorus binders or calcitriol at this time    Orders:    PTH, intact; Future    Phosphorus; Future    Obstructive uropathy  Secondary to bladder cancer  Status post bilateral nephrostomy   recommend  exchange every 2 to 3 months as per urologist  Follow-up with urology       Nephrostomy status (HCC)  Nephrostomies are permeable, right>> left  Next exchange in a month       Malignant neoplasm of overlapping sites of bladder (HCC)  Not on any directed treatment at this time  Status post nephrostomy due to obstruction as above  Continue follow-up with urology/oncology       Class 1 obesity due to excess calories with serious comorbidity and body mass index (BMI) of 34.0 to 34.9 in adult  #BMI 33.84  Recommend weight loss , heathy diet  Lifestyle modification             History of Present Illness     Ning Chambers is a 84 y.o. female with PMH of stroke, hearing loss, CKD G4/5, Bladder cancer w/ progressive retroperitoneal lymphadenopathy with bilateral nephrostomy tubes, who follows with Dr Ricardo (Urology), ambulatory dysfunction, obesity, who presents PKD    History obtained from : patient  Review of Systems   Constitutional:  Negative for activity change and appetite change.   HENT:  Negative for congestion and dental problem.    Eyes:  Negative for discharge.   Respiratory:  Negative for apnea and chest tightness.    Cardiovascular:  Negative for chest pain and leg swelling.   Gastrointestinal:  Negative for abdominal distention and abdominal pain.   Endocrine: Negative for cold intolerance.   Genitourinary:  Negative for dysuria.   Musculoskeletal:  Negative for arthralgias.   Skin:  Negative for color change and pallor.   Neurological:  Negative for dizziness.   Psychiatric/Behavioral:  Negative for agitation.      Pertinent Medical History     #CKD G4/5  Baseline creatinine: 1.3 to 1.4 mg/dL  Current creatinine: 2.9 mg/dL  Etiology: Likely secondary to obstructive uropathy with left atrophic kidney  Imaging: atrophic left kidney without hydronephrosis. There is a mild right hydronephrosis and hydroureter      #Volume/hypertension:  Volume: Euvolemic  Blood pressure: Normotensive, /80    #Acid base  "disorder  serum HCO3 22mmol/l  At goal    #CKD-MBD  Calcium 9.78mg/dL      #Secondary Hyperparathyroidism   Will check iPTH  guidelines levels KDIGO 2017      #Anemia:  Current hemoglobin:11.9mg/dL  At goal      # Obstructive uropathy/bilateral nephrostomy  Secondary to bladder cancer  Status post bilateral nephrostomy    #Metastatic bladder cancer  Not on any treatment at this time    Medical History Reviewed by provider this encounter:       Past Medical History   Past Medical History:   Diagnosis Date    Arthritis     right knee    Hay fever     Hyperlipidemia     Hypertension     Myocardial infarction (HCC) 2003    questionable MI during exploratory surgery    Ovarian cancer (HCC) 2003    surgery (did not have chemo or radiation)    Stroke (HCC) 2003    \" mini stroke \" no deficits ; another poss stroke with  left hand weakness    Wears glasses     Wears partial dentures     upper & lower     Past Surgical History:   Procedure Laterality Date    BREAST SURGERY Right     exc. bx. lump (R) breast    DILATION AND CURETTAGE OF UTERUS      HYSTERECTOMY  2003    TAHBSO    IR NEPHROSTOMY TUBE CHECK/CHANGE/REPOSITION/REINSERTION/UPSIZE  5/24/2023    IR NEPHROSTOMY TUBE CHECK/CHANGE/REPOSITION/REINSERTION/UPSIZE  7/13/2023    IR NEPHROSTOMY TUBE CHECK/CHANGE/REPOSITION/REINSERTION/UPSIZE  8/29/2023    IR NEPHROSTOMY TUBE CHECK/CHANGE/REPOSITION/REINSERTION/UPSIZE  10/11/2023    IR NEPHROSTOMY TUBE CHECK/CHANGE/REPOSITION/REINSERTION/UPSIZE  12/11/2023    IR NEPHROSTOMY TUBE CHECK/CHANGE/REPOSITION/REINSERTION/UPSIZE  2/9/2024    IR NEPHROSTOMY TUBE CHECK/CHANGE/REPOSITION/REINSERTION/UPSIZE  4/8/2024    IR NEPHROSTOMY TUBE CHECK/CHANGE/REPOSITION/REINSERTION/UPSIZE  6/7/2024    IR NEPHROSTOMY TUBE CHECK/CHANGE/REPOSITION/REINSERTION/UPSIZE  8/7/2024    IR NEPHROSTOMY TUBE PLACEMENT  4/13/2023    JOINT REPLACEMENT Right     OOPHORECTOMY Bilateral     cancer    WI CYSTO BLADDER W/URETERAL CATHETERIZATION Bilateral 4/4/2022 "    Procedure: CYSTOSCOPY WITH ATTEMPTED B/L RETROGRADE PYELOGRAM;  Surgeon: Vinnie Ricardo MD;  [facility];  Service: Urology    UT CYSTO W/REMOVAL OF TUMORS SMALL N/A 4/4/2022    Procedure: TRANSURETHRAL RESECTION OF BLADDER (MULTIFOCAL) TUMOR (TURBT);  Surgeon: Vinnie Ricardo MD;  [facility];  Service: Urology    UT CYSTOURETHROSCOPY W/DEST &/RMVL TUMOR LARGE Bilateral 11/11/2021    Procedure: CYSTOSCOPY, TRANSURETHRAL RESECTION OF BLADDER TUMOR (TURBT), EVACUATION OF CLOTS, LARGE TUMOR 7-8CM;  Surgeon: Malcolm Reyes MD;  Location: [facility];  Service: Urology    UT CYSTOURETHROSCOPY W/DEST &/RMVL TUMOR LARGE N/A 3/21/2023    Procedure: TRANSURETHRAL RESECTION OF BLADDER TUMOR (TURBT);  Surgeon: Vinnie Ricardo MD;  [facility];  Service: Urology    UT XCAPSL CTRC RMVL INSJ IO LENS PROSTH W/O ECP Left 7/24/2017    Procedure: EXTRACTION EXTRACAPSULAR CATARACT PHACO INTRAOCULAR LENS (IOL);  Surgeon: Elmer Ocampo MD;  Location: [facility];  Service: Ophthalmology    TONSILLECTOMY       Family History   Problem Relation Age of Onset    Parkinsonism Mother     Heart disease Father     Cancer Father         leukemia    Heart disease Sister     Diabetes Sister     Diabetes Brother     Diabetes Sister     Melanoma Sister     Melanoma Sister     Diabetes Maternal Aunt     No Known Problems Maternal Aunt     No Known Problems Paternal Aunt      Current Outpatient Medications on File Prior to Visit   Medication Sig Dispense Refill    aspirin 81 mg chewable tablet Chew 81 mg daily      atorvastatin (LIPITOR) 40 mg tablet Take 1 tablet (40 mg total) by mouth daily with dinner (Patient taking differently: Take 20 mg by mouth Every two days) 14 tablet 0    cholecalciferol (VITAMIN D3) 1,000 units tablet Take 1,000 Units by mouth daily      fluticasone (FLONASE) 50 mcg/act nasal spray  (Patient not taking: Reported on 7/30/2024)      hydrochlorothiazide (HYDRODIURIL) 25 mg tablet Take 25  mg by mouth every morning      metoprolol succinate (TOPROL-XL) 50 mg 24 hr tablet Take 25 mg by mouth every morning      sodium chloride, PF, 0.9 % 10 mL by Intracatheter route daily Intracatheter flushing daily. May substitute prefilled syringe with normal saline 10 mL vials, 10 mL syringes, and 18 g blunt needles 900 mL 0     No current facility-administered medications on file prior to visit.     Allergies   Allergen Reactions    Percocet [Oxycodone-Acetaminophen] GI Intolerance    Vicodin [Hydrocodone-Acetaminophen] GI Intolerance     Also darvocet    Medical Tape Rash    Propoxyphene Other (See Comments)      Current Outpatient Medications on File Prior to Visit   Medication Sig Dispense Refill    aspirin 81 mg chewable tablet Chew 81 mg daily      atorvastatin (LIPITOR) 40 mg tablet Take 1 tablet (40 mg total) by mouth daily with dinner (Patient taking differently: Take 20 mg by mouth Every two days) 14 tablet 0    cholecalciferol (VITAMIN D3) 1,000 units tablet Take 1,000 Units by mouth daily      fluticasone (FLONASE) 50 mcg/act nasal spray  (Patient not taking: Reported on 2024)      hydrochlorothiazide (HYDRODIURIL) 25 mg tablet Take 25 mg by mouth every morning      metoprolol succinate (TOPROL-XL) 50 mg 24 hr tablet Take 25 mg by mouth every morning      sodium chloride, PF, 0.9 % 10 mL by Intracatheter route daily Intracatheter flushing daily. May substitute prefilled syringe with normal saline 10 mL vials, 10 mL syringes, and 18 g blunt needles 900 mL 0     No current facility-administered medications on file prior to visit.      Social History     Tobacco Use    Smoking status: Former     Current packs/day: 0.00     Average packs/day: 1.5 packs/day for 40.0 years (60.0 ttl pk-yrs)     Types: Cigarettes     Start date:      Quit date: 2000     Years since quittin.7    Smokeless tobacco: Never   Vaping Use    Vaping status: Never Used   Substance and Sexual Activity    Alcohol use: Yes      Comment: occassional, socially    Drug use: No    Sexual activity: Not Currently         Objective     There were no vitals taken for this visit.    Physical Exam  General:  no acute distress at this time  Skin:  No acute rash  Eyes:  No scleral icterus and noninjected  ENT:  mucous membranes moist  Neck:  no carotid bruits  Chest:  Clear to auscultation percussion, good respiratory effort, no use of accessory respiratory muscles  CVS:  Regular rate and rhythm without rub   Abdomen:  soft and nontender   Extremities: lower extremity edema  Neuro:  No gross focality  Psych:  Alert , cooperative   Ur: blateral nephrostomy

## 2024-09-17 NOTE — ASSESSMENT & PLAN NOTE
Lab Results   Component Value Date    EGFR 13 07/17/2023    EGFR 15 03/08/2023    EGFR 35 05/06/2022    CREATININE 2.99 (H) 07/17/2023    CREATININE 2.78 (H) 03/08/2023    CREATININE 1.37 (H) 05/06/2022   Low sodium diet   Recommend goal of less than 150/90  Continue with hydrochlorothiazide 25 mg  Continue with metoprolol 50 mg daily

## 2024-09-17 NOTE — ASSESSMENT & PLAN NOTE
Not on any directed treatment at this time  Status post nephrostomy due to obstruction as above  Continue follow-up with urology/oncology

## 2024-09-17 NOTE — ASSESSMENT & PLAN NOTE
Lab Results   Component Value Date    EGFR 13 07/17/2023    EGFR 15 03/08/2023    EGFR 35 05/06/2022    CREATININE 2.99 (H) 07/17/2023    CREATININE 2.78 (H) 03/08/2023    CREATININE 1.37 (H) 05/06/2022   Serum calcium at goal  Will check phosphorus and PTH  No indication of phosphorus binders or calcitriol at this time    Orders:    PTH, intact; Future    Phosphorus; Future

## 2024-09-18 ENCOUNTER — OFFICE VISIT (OUTPATIENT)
Dept: PHYSICAL THERAPY | Facility: CLINIC | Age: 84
End: 2024-09-18
Payer: COMMERCIAL

## 2024-09-18 VITALS
SYSTOLIC BLOOD PRESSURE: 130 MMHG | BODY MASS INDEX: 34.04 KG/M2 | HEIGHT: 62 IN | WEIGHT: 185 LBS | DIASTOLIC BLOOD PRESSURE: 80 MMHG

## 2024-09-18 DIAGNOSIS — M79.604 BILATERAL LEG PAIN: ICD-10-CM

## 2024-09-18 DIAGNOSIS — R26.2 AMBULATORY DYSFUNCTION: Primary | ICD-10-CM

## 2024-09-18 DIAGNOSIS — M79.605 BILATERAL LEG PAIN: ICD-10-CM

## 2024-09-18 PROCEDURE — 97112 NEUROMUSCULAR REEDUCATION: CPT

## 2024-09-18 PROCEDURE — 97110 THERAPEUTIC EXERCISES: CPT

## 2024-09-18 NOTE — PROGRESS NOTES
"Daily Note     Today's date: 2024  Patient name: Ning Chambers  : 1940  MRN: [id number]  Referring provider: Stephen Ramirez MD  Dx:   Encounter Diagnosis     ICD-10-CM    1. Ambulatory dysfunction  R26.2       2. Bilateral leg pain  M79.604     M79.605                      Subjective: pt reports that she is tired today but overall doing well, its too early to have any complaints just yet.       Objective: See treatment diary below      Assessment: Tolerated treatment well. Pt tolerated TE well despite reports of being tired. No increased pain during or post session. Pt does need cueing for proper reps. Patient would benefit from continued PT      Plan: Continue per plan of care.      Precautions: hx of R knee replacement, medical tape allergy, NEPHROSTOMY TUBES   Past Medical History:   Diagnosis Date    Arthritis     right knee    Hay fever     Hyperlipidemia     Hypertension     Myocardial infarction (HCC)     questionable MI during exploratory surgery    Ovarian cancer (HCC)     surgery (did not have chemo or radiation)    Stroke (HCC)     \" mini stroke \" no deficits ; another poss stroke with  left hand weakness    Wears glasses     Wears partial dentures     upper & lower     SOC: 2024  FOTO: 2024  POC Expiration: 2024  Daily Treatment Log:  Date 2024    Visit # 6 7 8 9     Auth         Auth exp        Manual                        There Exer 30'  30' 30' 30'     Lumbar pball stretch  5\"x20 5\"x20 5\"x20 5\"x20     Seated AROM hip IR/ER  AROM hip IR/ER 1x10 R/L  AROM hip IR/ER 1x10 R/L  AROM hip IR/ER 1x10 R/L  AROM hip IR/ER 1x10 R/L      Seated HS stretch  10\"x5 R/L   10\"x5 R/L  10\"x5 R/L     Std hip ext 2x5 R/L alt   2x5 R/L alt  4x5 R/L alt  2x10 R/L     Std hip abd  2x5 R/L alt  2x5 R/L alt  4x5 R/L alt  2x10 R/L     LAQ  1x10 R/L  1x10 R/L alt  4x5 R/L 1.5 lbs 1.5# 2x10 R/L     Seated HS curls  RTB 1x10 R/L  RTB 1x10 R/L  RTB 4x5 " "R/L RTB  2x10 R/L     STS elevated mat  1x10; 1x5   2x10 2x10     Seated knee flexion with opp LE OP   5\" x10 R       Knee flexion stretch on step         HEP  Updated and discussed       There Activ                                                        NMReed 10'  8' 10' 10'     Step taps         FTEO on foam    Tandem 30\" x 2     FSU    4\" step 2x5 R/L 4\" step 2x5 R/L     Marching at rail 1 UE support  1x10 R/L  1x10 R/L  1x10 R/L  1x10 R/L     Side stepping    4 laps     Monster walks         RFISit  1x10 to start and end  1x10 to start and end  1x10 to start and end  1x15 to end                     Modalities        NO STIM - ACTIVE CANCER AND NEPHROSTOMY TUBES                         HEP:   Access Code: [email or web address]  URL: [email or web address]  Date: 09/09/2024  Prepared by: Zainab Garzon    Exercises  - Seated Lumbar Flexion Stretch  - 5 x daily - 7 x weekly - 1 sets - 10 reps  - Seated Hip External Rotation AROM  - 1 x daily - 7 x weekly - 2 sets - 5 reps  - Seated Long Arc Quad  - 1 x daily - 7 x weekly - 2 sets - 5 reps  - Seated Knee Flexion AAROM  - 1 x daily - 7 x weekly - 1 sets - 10 reps - 5 sec hold  - Standing Hip Abduction with Counter Support  - 1 x daily - 7 x weekly - 2 sets - 5 reps  - Standing Hip Extension with Counter Support  - 1 x daily - 7 x weekly - 2 sets - 5 reps           "

## 2024-09-20 ENCOUNTER — EVALUATION (OUTPATIENT)
Dept: PHYSICAL THERAPY | Facility: CLINIC | Age: 84
End: 2024-09-20
Payer: COMMERCIAL

## 2024-09-20 DIAGNOSIS — R26.2 AMBULATORY DYSFUNCTION: Primary | ICD-10-CM

## 2024-09-20 DIAGNOSIS — M79.605 BILATERAL LEG PAIN: ICD-10-CM

## 2024-09-20 DIAGNOSIS — M79.604 BILATERAL LEG PAIN: ICD-10-CM

## 2024-09-20 PROCEDURE — 97110 THERAPEUTIC EXERCISES: CPT

## 2024-09-20 PROCEDURE — 97112 NEUROMUSCULAR REEDUCATION: CPT

## 2024-09-20 NOTE — PROGRESS NOTES
PT Re-Evaluation     Today's date: 2024  Patient name: Ning Chambers  : 1940  MRN: [id number]  Referring provider: Stephen Ramirez MD  Dx:   Encounter Diagnosis     ICD-10-CM    1. Ambulatory dysfunction  R26.2       2. Bilateral leg pain  M79.604     M79.605                        Assessment  Impairments: abnormal gait, abnormal or restricted ROM, activity intolerance, impaired balance, impaired physical strength, pain with function, safety issue and poor posture     Assessment details: Ning Chambers is a 84 y.o. female who presents with improvements in LE pain, LE strength, ambulation, and balance however deficits are still present. Due to these impairments, patient has difficulty performing ADL's, recreational activities, engaging in social activities, ambulation, stair negotiation, lifting/carrying, transfers. Patient's clinical presentation is consistent with their referring diagnosis of Ambulatory dysfunction and Bilateral leg pain. Mechanical assessment demo good response with flexion based movements and will continue with RFISit as part of HEP to manage LE pain. Patient has been educated in home exercise program and plan of care. Patient would benefit from skilled physical therapy services to address their aforementioned functional limitations and progress towards prior level of function and independence with home exercise program.     Understanding of Dx/Px/POC: good     Prognosis: good    Goals  Short term goals to be accomplished in 4 weeks:  STG 1: Pt will demo independence with postural management---MET  STG 2: Pt will demo I with HEP to maximize progress between therapy sessions---MET  STG 3: Pt will reports pain dec freq and intensity 50% ---MET  STG 4: Pt will demo 1/2 MMT grade in B/L Hips to improve stability with functional challenges---Partially MET         Long term goals to be accomplished in 12 weeks: ---progressing towards all   LTG 1: Pt will demo good body mech with >75%  functional challenges to prevent reinjury  LTG 2: Pt will be able to return to walking for 20 minutes or more pain free as per PLOF to improve community ambulation  LTG 3: Pt will demo Good strength core stabilizers to promote carryover with body mech and posture  LTG4: Pt will deny sleep disturbance secondary to pain   LTG5: Pt will demo L/S AROM < or = min loss throughout to promote improved functional mobility and body mechanics      Plan  Patient would benefit from: PT eval and skilled physical therapy  Planned modality interventions: cryotherapy and thermotherapy: hydrocollator packs    Planned therapy interventions: balance/weight bearing training, coordination, gait training, home exercise program, therapeutic exercise, therapeutic activities, strengthening, patient education, neuromuscular re-education, abdominal trunk stabilization, flexibility, functional ROM exercises, IASTM, joint mobilization, kinesiology taping and manual therapy    Frequency: 2-3x week  Plan of Care beginning date: 2024  Plan of Care expiration date: 2024  Treatment plan discussed with: patient  Plan details: HEP development, stretching, strengthening, A/AA/PROM, joint mobilizations, posture education, STM/MI as needed to reduce muscle tension, muscle reeducation, PLOC discussed and agreed upon with patient        Subjective Evaluation    History of Present Illness  Mechanism of injury: Patient reports to PT for re-eval of B/L LE pain (L>R) as well as some difficulty with ambulation. Patients reports she feels she is doing better with PT with knee motion. Patient reports the leg pain has been much better. Patient still limited by decreased LE strength and R knee ROM.   Patient Goals  Patient goals for therapy: decreased pain, improved balance, increased motion, increased strength, independence with ADLs/IADLs and return to sport/leisure activities    Pain  Current pain ratin  At best pain ratin  At worst pain rating:  "3  Location: L shin/foot occasionally into hip and thigh  Quality: dull ache and throbbing    Social Support  Steps to enter house: yes  2  Stairs in house: no   Lives in: one-story house  Lives with: alone      Diagnostic Tests  No diagnostic tests performed        Objective    Posture: Lumbar lordosis is slightly decreased in standing. There is no lateral shift.      Lumbar AROM limitations:  (*=  Pain)  Lumbar flexion: WNL  Lumbar extension: Mod hooper  R side glide:  Min hooper  L side glide:  Min hooper    Mechanical Asessment: continues to respond well to RFISit as needed with HEP       Strength:  Core strength: Fair      Right  Left  Knee ext  4/5  4/5  Knee flex  4/5  4/5  Ankle DF  5/5  5/5  Ankle PF  5/5  5/5      Hip flexion(sit): 4/5  4/5  Hip abduction(sit) 4+/5  4+/5  Hip adduction(sit) 4+/5  4+/5      ROM: deg    Right  Left   Knee flexion   100  115  Knee extension  -2  0      Tenderness/Palpation: No TTP noted today       Flexibility:  Mod hooper in HS flexibility noted during lumbar flexion ROM  Mod hooper in hip flexors noted       Function:   Gait: noted poor R foot clearance with decreased step length B/L and slow gait speed. However improved compared to IE especially foot clearance. Continues to demo decreased step length and gait speed.                 Precautions: medical tape allergy, NEPHROSTOMY TUBES   Past Medical History:   Diagnosis Date    Arthritis     right knee    Hay fever     Hyperlipidemia     Hypertension     Myocardial infarction (HCC) 2003    questionable MI during exploratory surgery    Ovarian cancer ([facility]) 2003    surgery (did not have chemo or radiation)    Stroke ([facility]) 2003    \" mini stroke \" no deficits ; another poss stroke with  left hand weakness    Wears glasses     Wears partial dentures     upper & lower     SOC: 8/14/2024  FOTO: 8/14/2024  POC Expiration: 11/6/2024  Daily Treatment Log:  Date 9/6/2024 9/9/2024 9/12/2024 9/18/2024 9/20/2024   Visit # 6 7 8 9  10 (RE/FOTO)    Auth  " "       Auth exp        Manual                        There Exer 30'  30' 30' 30'  30'   Lumbar pball stretch  5\"x20 5\"x20 5\"x20 5\"x20  5\" x20   Seated AROM hip IR/ER  AROM hip IR/ER 1x10 R/L  AROM hip IR/ER 1x10 R/L  AROM hip IR/ER 1x10 R/L  AROM hip IR/ER 1x10 R/L   AROM hip IR/ER 1x10 R/L     Seated HS stretch  10\"x5 R/L   10\"x5 R/L  10\"x5 R/L     Std hip ext 2x5 R/L alt   2x5 R/L alt  4x5 R/L alt  2x10 R/L  2x10 R/L    Std hip abd  2x5 R/L alt  2x5 R/L alt  4x5 R/L alt  2x10 R/L  2x10 R/L    LAQ  1x10 R/L  1x10 R/L alt  4x5 R/L 1.5 lbs 1.5# 2x10 R/L  1.5# 2x10 R/L    Seated HS curls  RTB 1x10 R/L  RTB 1x10 R/L  RTB 4x5 R/L RTB  2x10 R/L  RTB 2x10    STS elevated mat  1x10; 1x5   2x10 2x10     Seated knee flexion with opp LE OP   5\" x10 R       Knee flexion stretch on step      6\" step 5\" 1x10    Objective measures      EG   HEP  Updated and discussed       There Activ                                                        NMReed 10'  8' 10' 10'  15'   Step taps      6\" step 1x10 R/L alt    FTEO on foam    Tandem 30\" x 2     FSU    4\" step 2x5 R/L 4\" step 2x5 R/L  6\" step 1x10    Marching at rail 1 UE support  1x10 R/L  1x10 R/L  1x10 R/L  1x10 R/L     Side stepping    4 laps     Monster walks         RFISit  1x10 to start and end  1x10 to start and end  1x10 to start and end  1x15 to end  1x10 to start and end    HR     1x10            Modalities        NO STIM - ACTIVE CANCER AND NEPHROSTOMY TUBES                         HEP:   Access Code: [email or web address]  URL: [email or web address]  Date: 09/09/2024  Prepared by: Zainab Garzon    Exercises  - Seated Lumbar Flexion Stretch  - 5 x daily - 7 x weekly - 1 sets - 10 reps  - Seated Hip External Rotation AROM  - 1 x daily - 7 x weekly - 2 sets - 5 reps  - Seated Long Arc Quad  - 1 x daily - 7 x weekly - 2 sets - 5 reps  - Seated Knee Flexion AAROM  - 1 x daily - 7 x weekly - 1 sets - 10 reps - 5 sec hold  - Standing Hip Abduction with Counter Support  - 1 x " daily - 7 x weekly - 2 sets - 5 reps  - Standing Hip Extension with Counter Support  - 1 x daily - 7 x weekly - 2 sets - 5 reps

## 2024-09-23 ENCOUNTER — APPOINTMENT (OUTPATIENT)
Dept: PHYSICAL THERAPY | Facility: CLINIC | Age: 84
End: 2024-09-23
Payer: COMMERCIAL

## 2024-09-24 ENCOUNTER — OFFICE VISIT (OUTPATIENT)
Dept: PHYSICAL THERAPY | Facility: CLINIC | Age: 84
End: 2024-09-24
Payer: COMMERCIAL

## 2024-09-24 DIAGNOSIS — M79.605 BILATERAL LEG PAIN: ICD-10-CM

## 2024-09-24 DIAGNOSIS — R26.2 AMBULATORY DYSFUNCTION: Primary | ICD-10-CM

## 2024-09-24 DIAGNOSIS — M79.604 BILATERAL LEG PAIN: ICD-10-CM

## 2024-09-24 PROCEDURE — 97110 THERAPEUTIC EXERCISES: CPT

## 2024-09-24 PROCEDURE — 97112 NEUROMUSCULAR REEDUCATION: CPT

## 2024-09-25 ENCOUNTER — OFFICE VISIT (OUTPATIENT)
Dept: PHYSICAL THERAPY | Facility: CLINIC | Age: 84
End: 2024-09-25
Payer: COMMERCIAL

## 2024-09-25 DIAGNOSIS — M79.604 BILATERAL LEG PAIN: ICD-10-CM

## 2024-09-25 DIAGNOSIS — M79.605 BILATERAL LEG PAIN: ICD-10-CM

## 2024-09-25 DIAGNOSIS — R26.2 AMBULATORY DYSFUNCTION: Primary | ICD-10-CM

## 2024-09-25 PROCEDURE — 97112 NEUROMUSCULAR REEDUCATION: CPT

## 2024-09-25 PROCEDURE — 97110 THERAPEUTIC EXERCISES: CPT

## 2024-09-25 NOTE — PROGRESS NOTES
"Daily Note     Today's date: 2024  Patient name: Ning Chambers  : 1940  MRN: [id number]  Referring provider: Stephen Ramirez MD  Dx:   Encounter Diagnosis     ICD-10-CM    1. Ambulatory dysfunction  R26.2       2. Bilateral leg pain  M79.604     M79.605             Start Time: 1055  Stop Time: 1140  Total time in clinic (min): 45 minutes    Subjective: Pt reports no new complaints since last treatment session.       Objective: See treatment diary below      Assessment: Pt notes occasionally stiffness into R LE, reports she feels better after therapy sessions.       Plan: Continue per plan of care.      Precautions: medical tape allergy, NEPHROSTOMY TUBES   Past Medical History:   Diagnosis Date    Arthritis     right knee    Hay fever     Hyperlipidemia     Hypertension     Myocardial infarction (HCC)     questionable MI during exploratory surgery    Ovarian cancer (HCC)     surgery (did not have chemo or radiation)    Stroke ([facility])     \" mini stroke \" no deficits ; another poss stroke with  left hand weakness    Wears glasses     Wears partial dentures     upper & lower     SOC: 2024  FOTO: 2024  POC Expiration: 2024  Daily Treatment Log:  Date 2024   Visit # 11 12  9  10 (RE/FOTO)    Auth         Auth exp        Manual                        There Exer 30'  30'  30'  30'   Lumbar pball stretch  5\"x20 5\"x20  5\"x20  5\" x20   Seated AROM hip IR/ER  AROM hip IR/ER 1x10 R/L w/ ball  AROM hip IR/ER 1x10 R/L w/ ball   AROM hip IR/ER 1x10 R/L   AROM hip IR/ER 1x10 R/L     Seated Marching  1x10 1.5#      Seated HS stretch     10\"x5 R/L     Std hip ext 2x10 R/L alt   2x10 R/L alt    2x10 R/L  2x10 R/L    Std hip abd  2x10 R/L alt  2x10 R/L alt    2x10 R/L  2x10 R/L    LAQ  1.5# 2x10 R/L  1.5# 2x10 R/L   1.5# 2x10 R/L  1.5# 2x10 R/L    Seated HS curls  RTB 2x10 R/L  RTB 2x10 R/L   RTB  2x10 R/L  RTB 2x10    STS elevated mat  1x10;2x 1x10;2x  2x10   " "  Seated knee flexion with opp LE OP         Knee flexion stretch on step   6\" step 5\" 1x10    6\" step 5\" 1x10    Objective measures      EG   HEP        There Activ                                                        NMReed 10'  10'  10'  15'   Step taps      6\" step 1x10 R/L alt    FTEO on foam         FSU   6\" step 1x10 R/L 6\" step 2x10 R/L   6\" step 1x10    Marching at rail 1 UE support  1x10 R/L  1x10 R/L   1x10 R/L     Side stepping   2 laps       Monster walks         RFISit  1x15 to end  1x15 to end   1x15 to end  1x10 to start and end    HR     1x10            Modalities        NO STIM - ACTIVE CANCER AND NEPHROSTOMY TUBES                         HEP:   Access Code: [email or web address]  URL: [email or web address]  Date: 09/09/2024  Prepared by: Zainab Garzon    Exercises  - Seated Lumbar Flexion Stretch  - 5 x daily - 7 x weekly - 1 sets - 10 reps  - Seated Hip External Rotation AROM  - 1 x daily - 7 x weekly - 2 sets - 5 reps  - Seated Long Arc Quad  - 1 x daily - 7 x weekly - 2 sets - 5 reps  - Seated Knee Flexion AAROM  - 1 x daily - 7 x weekly - 1 sets - 10 reps - 5 sec hold  - Standing Hip Abduction with Counter Support  - 1 x daily - 7 x weekly - 2 sets - 5 reps  - Standing Hip Extension with Counter Support  - 1 x daily - 7 x weekly - 2 sets - 5 reps       "

## 2024-10-01 ENCOUNTER — OFFICE VISIT (OUTPATIENT)
Dept: PHYSICAL THERAPY | Facility: CLINIC | Age: 84
End: 2024-10-01
Payer: COMMERCIAL

## 2024-10-01 DIAGNOSIS — M79.604 BILATERAL LEG PAIN: ICD-10-CM

## 2024-10-01 DIAGNOSIS — R26.2 AMBULATORY DYSFUNCTION: Primary | ICD-10-CM

## 2024-10-01 DIAGNOSIS — M79.605 BILATERAL LEG PAIN: ICD-10-CM

## 2024-10-01 PROCEDURE — 97112 NEUROMUSCULAR REEDUCATION: CPT

## 2024-10-01 PROCEDURE — 97110 THERAPEUTIC EXERCISES: CPT

## 2024-10-01 NOTE — PROGRESS NOTES
"Daily Note     Today's date: 10/1/2024  Patient name: Ning Chambers  : 1940  MRN: [id number]  Referring provider: Stephen Ramirez MD  Dx:   Encounter Diagnosis     ICD-10-CM    1. Ambulatory dysfunction  R26.2       2. Bilateral leg pain  M79.604     M79.605                      Subjective: Patient reports she did not do HEP today. PT stated this was fine and she does not need to do them on days she is in PT.       Objective: See treatment diary below      Assessment: Tolerated treatment well with no pain reported t/o session. Patient noted the R knee feels like it is moving better during LAQ. HEP updated and RTB given for resistance with LAQ and HS curl. Patient would benefit from continued PT      Plan: Continue per plan of care.      Precautions: medical tape allergy, NEPHROSTOMY TUBES   Past Medical History:   Diagnosis Date    Arthritis     right knee    Hay fever     Hyperlipidemia     Hypertension     Myocardial infarction (HCC)     questionable MI during exploratory surgery    Ovarian cancer (HCC)     surgery (did not have chemo or radiation)    Stroke (HCC)     \" mini stroke \" no deficits ; another poss stroke with  left hand weakness    Wears glasses     Wears partial dentures     upper & lower     SOC: 2024  RE: 2024  FOTO: 2024  POC Expiration: 2024  Daily Treatment Log:  Date 2024 2024 10/1/2024     Visit # 11 12 13     Auth         Auth exp        Manual                        There Exer 30'  30' 30'     Lumbar pball stretch  5\"x20 5\"x20 5\" x20      Seated AROM hip IR/ER  AROM hip IR/ER 1x10 R/L w/ ball  AROM hip IR/ER 1x10 R/L w/ ball  AROM hip IR/ER 1x10 R/L w/ ball     Seated Marching  1x10 1.5# 1.5# 1x10 R/L      Seated HS stretch    10\"x5 R/L      Std hip ext 2x10 R/L alt   2x10 R/L alt   2x10 R/L      Std hip abd  2x10 R/L alt  2x10 R/L alt   2x10 R/L      LAQ  1.5# 2x10 R/L  1.5# 2x10 R/L  1.5# 2x10 R/L      Seated HS curls  RTB 2x10 R/L  RTB " "2x10 R/L  RTB 2x10 R/L      STS elevated mat  1x10;2x 1x10;2x 1x10      Knee flexion stretch on step   6\" step 5\" 1x10  6\" step 5\" 1x10      Objective measures         HEP        There Activ                                                        NMReed 10'  10' 10'      Step taps         FTEO on foam         FSU   6\" step 1x10 R/L 6\" step 2x10 R/L 6\" step 2x10 R/L     Marching at rail 1 UE support  1x10 R/L  1x10 R/L       Side stepping   2 laps       Monster walks         RFISit  1x15 to end  1x15 to end  2x10 to end      HR                Modalities        NO STIM - ACTIVE CANCER AND NEPHROSTOMY TUBES                         HEP:   Access Code: [email or web address]  URL: [email or web address]  Date: 10/01/2024  Prepared by: Zainab Garzon    Exercises  - Seated Lumbar Flexion Stretch  - 5 x daily - 7 x weekly - 1 sets - 10 reps  - Seated Hip External Rotation AROM  - 1 x daily - 7 x weekly - 2 sets - 5 reps  - Seated Long Arc Quad  - 1 x daily - 7 x weekly - 2 sets - 5 reps  - Seated Hamstring Curls with Resistance  - 1 x daily - 7 x weekly - 2 sets - 10 reps  - Seated Knee Flexion AAROM  - 1 x daily - 7 x weekly - 1 sets - 10 reps - 5 sec hold  - Standing Hip Abduction with Counter Support  - 1 x daily - 7 x weekly - 2 sets - 5 reps  - Standing Hip Extension with Counter Support  - 1 x daily - 7 x weekly - 2 sets - 5 reps         "

## 2024-10-03 ENCOUNTER — OFFICE VISIT (OUTPATIENT)
Dept: PHYSICAL THERAPY | Facility: CLINIC | Age: 84
End: 2024-10-03
Payer: COMMERCIAL

## 2024-10-03 DIAGNOSIS — M79.605 BILATERAL LEG PAIN: ICD-10-CM

## 2024-10-03 DIAGNOSIS — M79.604 BILATERAL LEG PAIN: ICD-10-CM

## 2024-10-03 DIAGNOSIS — R26.2 AMBULATORY DYSFUNCTION: Primary | ICD-10-CM

## 2024-10-03 PROCEDURE — 97112 NEUROMUSCULAR REEDUCATION: CPT

## 2024-10-03 PROCEDURE — 97110 THERAPEUTIC EXERCISES: CPT

## 2024-10-03 NOTE — PROGRESS NOTES
"Daily Note     Today's date: 10/3/2024  Patient name: Ning Chambers  : 1940  MRN: [id number]  Referring provider: Stephen Ramirez MD  Dx:   Encounter Diagnosis     ICD-10-CM    1. Ambulatory dysfunction  R26.2       2. Bilateral leg pain  M79.604     M79.605                      Subjective: pt has no new complaints on arrival to session. She does cont w/ intermittent leg pain but it does not last long       Objective: See treatment diary below      Assessment: Tolerated treatment well. Tolerated additional resistance for proximal hip strengthening. Pt has no noted increased LE pain during or post session and cont to respond to flex based movements w/ RFISit.Patient would benefit from continued PT      Plan: Continue per plan of care.      Precautions: medical tape allergy, NEPHROSTOMY TUBES   Past Medical History:   Diagnosis Date    Arthritis     right knee    Hay fever     Hyperlipidemia     Hypertension     Myocardial infarction (HCC)     questionable MI during exploratory surgery    Ovarian cancer (HCC)     surgery (did not have chemo or radiation)    Stroke (HCC)     \" mini stroke \" no deficits ; another poss stroke with  left hand weakness    Wears glasses     Wears partial dentures     upper & lower     SOC: 2024  RE: 2024  FOTO: 2024  POC Expiration: 2024  Daily Treatment Log:  Date 2024 2024 10/1/2024 10/3/2024    Visit # 11 12 13     Auth         Auth exp        Manual                        There Exer 30'  30' 30' 30'     Lumbar pball stretch  5\"x20 5\"x20 5\" x20  5\"x20     Seated AROM hip IR/ER  AROM hip IR/ER 1x10 R/L w/ ball  AROM hip IR/ER 1x10 R/L w/ ball  AROM hip IR/ER 1x10 R/L w/ ball     Seated Marching  1x10 1.5# 1.5# 1x10 R/L  1.5# 1x10 R/L     Seated HS stretch    10\"x5 R/L  10\"x5 R/L     Std hip ext 2x10 R/L alt   2x10 R/L alt   2x10 R/L  YTB @ knees 2x10 R/L     Std hip abd  2x10 R/L alt  2x10 R/L alt   2x10 R/L  YTB @ knees 2x10 R/L   " "  LAQ  1.5# 2x10 R/L  1.5# 2x10 R/L  1.5# 2x10 R/L  1.5# 2x10 R/L     Seated HS curls  RTB 2x10 R/L  RTB 2x10 R/L  RTB 2x10 R/L  RTB 2x10 R/L     STS elevated mat  1x10;2x 1x10;2x 1x10  1x10; 2x     Knee flexion stretch on step   6\" step 5\" 1x10  6\" step 5\" 1x10      Objective measures         HEP        There Activ                                                        NMReed 10'  10' 10'  10'     Step taps         FTEO on foam         FSU   6\" step 1x10 R/L 6\" step 2x10 R/L 6\" step 2x10 R/L     Marching at rail 1 UE support  1x10 R/L  1x10 R/L       Side stepping   2 laps   YTB @ knees 2 laps at rail     Monster walks         RFISit  1x15 to end  1x15 to end  2x10 to end  15x to end     HR                Modalities        NO STIM - ACTIVE CANCER AND NEPHROSTOMY TUBES                         HEP:   Access Code: [email or web address]  URL: [email or web address]  Date: 10/01/2024  Prepared by: Zainab Garzon    Exercises  - Seated Lumbar Flexion Stretch  - 5 x daily - 7 x weekly - 1 sets - 10 reps  - Seated Hip External Rotation AROM  - 1 x daily - 7 x weekly - 2 sets - 5 reps  - Seated Long Arc Quad  - 1 x daily - 7 x weekly - 2 sets - 5 reps  - Seated Hamstring Curls with Resistance  - 1 x daily - 7 x weekly - 2 sets - 10 reps  - Seated Knee Flexion AAROM  - 1 x daily - 7 x weekly - 1 sets - 10 reps - 5 sec hold  - Standing Hip Abduction with Counter Support  - 1 x daily - 7 x weekly - 2 sets - 5 reps  - Standing Hip Extension with Counter Support  - 1 x daily - 7 x weekly - 2 sets - 5 reps           "

## 2024-10-07 ENCOUNTER — HOSPITAL ENCOUNTER (OUTPATIENT)
Dept: NON INVASIVE DIAGNOSTICS | Facility: HOSPITAL | Age: 84
Discharge: HOME | End: 2024-10-07
Attending: STUDENT IN AN ORGANIZED HEALTH CARE EDUCATION/TRAINING PROGRAM | Admitting: RADIOLOGY
Payer: COMMERCIAL

## 2024-10-07 DIAGNOSIS — C67.8 MALIGNANT NEOPLASM OF OVERLAPPING SITES OF BLADDER (HCC): ICD-10-CM

## 2024-10-07 DIAGNOSIS — Z93.6 NEPHROSTOMY STATUS (HCC): ICD-10-CM

## 2024-10-07 DIAGNOSIS — N13.9 OBSTRUCTIVE UROPATHY: Primary | ICD-10-CM

## 2024-10-07 PROCEDURE — C1729 CATH, DRAINAGE: HCPCS | Performed by: RADIOLOGY

## 2024-10-07 PROCEDURE — C1769 GUIDE WIRE: HCPCS | Performed by: RADIOLOGY

## 2024-10-07 PROCEDURE — 50435 EXCHANGE NEPHROSTOMY CATH: CPT

## 2024-10-07 RX ORDER — LIDOCAINE WITH 8.4% SOD BICARB 0.9%(10ML)
SYRINGE (ML) INJECTION AS NEEDED
Status: COMPLETED | OUTPATIENT
Start: 2024-10-07 | End: 2024-10-07

## 2024-10-07 RX ADMIN — Medication 5 ML: at 11:35

## 2024-10-07 RX ADMIN — Medication 5 ML: at 11:41

## 2024-10-07 RX ADMIN — IOHEXOL 8 ML: 350 INJECTION, SOLUTION INTRAVENOUS at 12:04

## 2024-10-07 NOTE — DISCHARGE INSTRUCTIONS
Nephrostomy Tube Care     WHAT YOU NEED TO KNOW:   A nephrostomy tube is a catheter (thin plastic tube) that is inserted through your skin and into your kidney. The nephrostomy tube drains urine from your kidney into a collecting bag outside your body. You may need a nephrostomy tube when something is blocking the normal flow of urine. A nephrostomy tube may be used for a short or a long period of time. The nephrostomy tube comes out of your back, so you will need someone to help care for your nephrostomy tube.          DISCHARGE INSTRUCTIONS:      How to clean the skin around the nephrostomy tube and change the bandage:  Since the nephrostomy tube comes out of your back, you will not be able to care for it by yourself. Ask someone to follow the general directions below to check and care for your nephrostomy tube.   Gather the items you will need.          Disposable (single use) under-pad, and a clean washcloth  Plain soap, warm water, and new medical gloves  Sterile gauze bandages  Clear adhesive dressing or medical tape  Skin barrier  Protective skin film  Trash bag  Remove the old bandage, and check the tube entry site.    Have the patient lie on his side with the nephrostomy tube entry site facing up. Place the under-pad where it will catch drainage as you are working with the nephrostomy tube.   Wash your hands with soap and water. Put on new medical gloves.  Gently remove the old bandage, without pulling on the tube. Do this by holding the skin beside the tube with one hand. With the other hand, gently remove sticky tape and the skin barrier by pulling in the same direction as hair growth. Do not touch the side of the bandage that is placed over or around the tube. Throw the bandage and skin barrier away in a trash bag.  Look for signs of infection, such as skin redness and swelling. Report any skin changes to healthcare providers.  Clean the tube entry site.    Hold the tube in place to keep it from  being pulled out while you are cleaning around it.  You will need to clean the area twice. For the first cleaning, wet a new gauze bandage with soap and water.  Begin at the entry site of the tube. Wipe the skin in circles, moving away from the entry site. Remove blood and any other material with the gauze. Do this as often as needed. Use a new gauze bandage each time you clean the area, moving away from the entry site.   For the second cleaning, wet a new gauze bandage with water. Begin at the entry site of the tube. Wipe the skin in circles, moving away from the entry site. Use a new gauze bandage each time you clean the area, moving away from the entry site.   Gently pat the skin with a clean washcloth to dry it.    Apply the skin barrier and bandages.    Roll up a bandage to make it thick, and place it under  the place where the tube enters the skin. Place it to support the tube, and stop it from kinking or bending. Tape the bandage in place, and apply more bandages if directed by a healthcare provider.   Bring the tubing forward to the front and tape it to the skin. Do not stretch the tube tight, because this may pull the nephrostomy tube out.  How often to change the bandage.  Change the bandage around the tube, every other day. If your bandages  get dirty or wet, change them right away, and as often as needed. If your nephrostomy tube is to be used for a long period of time, the tube needs to be changed every 2 to 3 months. Healthcare providers will tell you when you need to make an appointment to have your tube changed.     How to care for the urine drainage bag:   Ask if you need to measure and write down how much urine is in the bag before you empty it. Drain urine out of the drainage bag when it is ½ to ? full. Open the spout at the bottom of the bag to empty the urine into the toilet.   You may need to detach the drainage bag from the nephrostomy tube to change it.. If so, attach a new drainage bag  tightly to the nephrostomy tube.     How to prevent problems with your nephrostomy tube:   Change bandages, directed.  This helps to prevent infection. Throw away or clean your drainage bag as directed by your healthcare provider.    Wipe the connecting ends of the drainage bag with alcohol before you reconnect the bag to the tube.  This helps prevent infection.     Keep the tube taped to your skin and connected to a drainage bag placed below the level of your kidneys.  This helps prevent urine from backing up into your kidneys. You may wear a small drainage bag strapped to your leg to let you move around more easily.    Check the catheter to be sure it is in place after you change your clothes or do other activities.  Do not wear tight clothing over the tube. Place the tubing over your thigh rather than under it when you are sitting down. Be sure that nothing is pulling on the nephrostomy tube when you move around.    Change positions if you see little or no urine in your drainage bag.  Check to see if the urine tube is twisted or bent. Be sure that you are not sitting or lying on the tube. If there are no kinks and there is little or no urine in the drainage bag, tell your healthcare provider.    Flush out the tube as directed. Some tubes get flushed one time a day with 10 mls of NSS You will be given a prescription for the flushes.  To flush the nephrostomy tube, clean both connections with alcohol swap. Twist off the drainage bag tube and twist the saline syringe into the nephrostomy tube and flush briskly. Remove the syringe and twist the drainage bag tube back into the nephrostomy tube.  Keep the site covered while you shower.  Tape a piece of clear adhesive plastic over the dressing to keep it dry while you shower. Do not take tub baths.    Contact Interventional Radiology at [phone] ([facility] PATIENTS: Contact Interventional Radiology at [phone]) ([facility] PATIENTS: Contact Interventional Radiology at  [phone]) if:  The skin around the nephrostomy tube is red, swollen, itches, or has a rash.   You have a fever greater than 101 or chills.  You have lower back or hip pain.  There are changes in how your urine looks or smells.  You have little or no urine draining from the nephrostomy tube.   You have nausea and are vomiting.  The black arleth on your tube has moved, or the tube is longer than when it was put in.   You have questions or concerns about your condition or care.  The nephrostomy tube comes out completely.   There is blood, pus, or a bad smell coming from the place where the tube enters your skin.  Urine is leaking around the tube.        The following pharmacies carry the flush syringes.       [facility]                     [address]                     [address] [facility]                    [phone]  [place]                       [place]  Phone [phone]            Phone [phone]                 [facility]                                                                                                   [phone]  [facility]             [facility]                             [address] [place], [place] [address] [facility]   [place]                                 [phone]  Phone [phone]            Phone [phone]    [facility]                                                                         [facility] [phone]  Dayna Bansal.  [place]   Phone [phone]

## 2024-10-07 NOTE — BRIEF OP NOTE (RAD/CATH)
INTERVENTIONAL RADIOLOGY PROCEDURE NOTE    Date: 10/7/2024    Procedure:   Procedure Summary       Date: 10/07/24 Room / Location: [facility] Cardiac Cath Lab    Anesthesia Start:  Anesthesia Stop:     Procedure: IR NEPHROSTOMY TUBE CHECK/CHANGE/REPOSITION/REINSERTION/UPSIZE Diagnosis:       Nephrostomy status (HCC)      (Routine)    Scheduled Providers: David Fall MD Responsible Provider:     Anesthesia Type: Not recorded ASA Status: Not recorded            Preoperative diagnosis:   1. Nephrostomy status (HCC)         Postoperative diagnosis: Same.    Surgeon: David Fall MD     Assistant: None. No qualified resident was available.    Blood loss: None    Specimens: None     Findings: Uneventful bilateral 10F PCN exchange. Patient has been having q 2 month tube changes, though no apparent issues with tube dislodgement or encrustration, so will schedule for 3 month exchange.    Complications: None immediate.    Anesthesia: local

## 2024-10-07 NOTE — SEDATION DOCUMENTATION
Procedure ended. Bilateral nephrostomy tubes exchanged by Dr. Fall. New dressings and drainage bags applied.

## 2024-10-10 ENCOUNTER — OFFICE VISIT (OUTPATIENT)
Dept: PHYSICAL THERAPY | Facility: CLINIC | Age: 84
End: 2024-10-10
Payer: COMMERCIAL

## 2024-10-10 DIAGNOSIS — M79.604 BILATERAL LEG PAIN: ICD-10-CM

## 2024-10-10 DIAGNOSIS — M79.605 BILATERAL LEG PAIN: ICD-10-CM

## 2024-10-10 DIAGNOSIS — R26.2 AMBULATORY DYSFUNCTION: Primary | ICD-10-CM

## 2024-10-10 PROCEDURE — 97110 THERAPEUTIC EXERCISES: CPT

## 2024-10-10 PROCEDURE — 97112 NEUROMUSCULAR REEDUCATION: CPT

## 2024-10-10 NOTE — PROGRESS NOTES
"Daily Note     Today's date: 10/10/2024  Patient name: Ning Chambers  : 1940  MRN: [id number]  Referring provider: Stephen Ramirez MD  Dx:   Encounter Diagnosis     ICD-10-CM    1. Ambulatory dysfunction  R26.2       2. Bilateral leg pain  M79.604     M79.605                      Subjective: Pt reports today is not a good day, she had her tubes changed yesterday and she was having norm horses all night in her R lower leg and this kept her up. Due to this she has not performed any of her HEP the last couple days.       Objective: See treatment diary below      Assessment: Tolerated treatment well. Pt cont to dem good tolerance to TE w/o increased pain during or post session.  Patient would benefit from continued PT      Plan: Continue per plan of care.      Precautions: medical tape allergy, NEPHROSTOMY TUBES   Past Medical History:   Diagnosis Date    Arthritis     right knee    Hay fever     Hyperlipidemia     Hypertension     Myocardial infarction (HCC)     questionable MI during exploratory surgery    Ovarian cancer (HCC)     surgery (did not have chemo or radiation)    Stroke (HCC)     \" mini stroke \" no deficits ; another poss stroke with  left hand weakness    Wears glasses     Wears partial dentures     upper & lower     SOC: 2024  RE: 2024  FOTO: 2024  POC Expiration: 2024  Daily Treatment Log:  Date 2024 2024 10/1/2024 10/3/2024 10/10/2024   Visit # 11 12 13 14 15 FOTO   Auth         Auth exp        Manual                        There Exer 30'  30' 30' 30'  30'    Lumbar pball stretch  5\"x20 5\"x20 5\" x20  5\"x20  5\"x20    Seated AROM hip IR/ER  AROM hip IR/ER 1x10 R/L w/ ball  AROM hip IR/ER 1x10 R/L w/ ball  AROM hip IR/ER 1x10 R/L w/ ball     Seated Marching  1x10 1.5# 1.5# 1x10 R/L  1.5# 1x10 R/L     Seated HS stretch    10\"x5 R/L  10\"x5 R/L  10\"x5 R/L    Std hip ext 2x10 R/L alt   2x10 R/L alt   2x10 R/L  YTB @ knees 2x10 R/L  No TB 2x10 R/L    Std " "hip abd  2x10 R/L alt  2x10 R/L alt   2x10 R/L  YTB @ knees 2x10 R/L  No TB 2x10 R/L    LAQ  1.5# 2x10 R/L  1.5# 2x10 R/L  1.5# 2x10 R/L  1.5# 2x10 R/L  1.5# 2x10 R/L     Seated HS curls  RTB 2x10 R/L  RTB 2x10 R/L  RTB 2x10 R/L  RTB 2x10 R/L     STS elevated mat  1x10;2x 1x10;2x 1x10  1x10; 2x  1x10; 2x    Knee flexion stretch on step   6\" step 5\" 1x10  6\" step 5\" 1x10   6\" step 5\" 1x10     Objective measures         HEP        There Activ                                                        NMReed 10'  10' 10'  10'  10'    Step taps         FTEO on foam         FSU   6\" step 1x10 R/L 6\" step 2x10 R/L 6\" step 2x10 R/L  6\" step 2x10 R/L    Marching at rail 1 UE support  1x10 R/L  1x10 R/L       Side stepping   2 laps   YTB @ knees 2 laps at rail  No TB 2 laps at rail    Monster walks         RFISit  1x15 to end  1x15 to end  2x10 to end  15x to end  15x to end    HR                Modalities        NO STIM - ACTIVE CANCER AND NEPHROSTOMY TUBES                         HEP:   Access Code: [email or web address]  URL: [email or web address]  Date: 10/01/2024  Prepared by: Zainab Garzon    Exercises  - Seated Lumbar Flexion Stretch  - 5 x daily - 7 x weekly - 1 sets - 10 reps  - Seated Hip External Rotation AROM  - 1 x daily - 7 x weekly - 2 sets - 5 reps  - Seated Long Arc Quad  - 1 x daily - 7 x weekly - 2 sets - 5 reps  - Seated Hamstring Curls with Resistance  - 1 x daily - 7 x weekly - 2 sets - 10 reps  - Seated Knee Flexion AAROM  - 1 x daily - 7 x weekly - 1 sets - 10 reps - 5 sec hold  - Standing Hip Abduction with Counter Support  - 1 x daily - 7 x weekly - 2 sets - 5 reps  - Standing Hip Extension with Counter Support  - 1 x daily - 7 x weekly - 2 sets - 5 reps             "

## 2024-10-15 ENCOUNTER — OFFICE VISIT (OUTPATIENT)
Dept: PHYSICAL THERAPY | Facility: CLINIC | Age: 84
End: 2024-10-15
Payer: COMMERCIAL

## 2024-10-15 DIAGNOSIS — R26.2 AMBULATORY DYSFUNCTION: Primary | ICD-10-CM

## 2024-10-15 DIAGNOSIS — M79.605 BILATERAL LEG PAIN: ICD-10-CM

## 2024-10-15 DIAGNOSIS — M79.604 BILATERAL LEG PAIN: ICD-10-CM

## 2024-10-15 PROCEDURE — 97112 NEUROMUSCULAR REEDUCATION: CPT

## 2024-10-15 PROCEDURE — 97110 THERAPEUTIC EXERCISES: CPT

## 2024-10-15 NOTE — PROGRESS NOTES
"Daily Note     Today's date: 10/15/2024  Patient name: Ning Chambers  : 1940  MRN: [id number]  Referring provider: Stephen Ramirez MD  Dx:   Encounter Diagnosis     ICD-10-CM    1. Ambulatory dysfunction  R26.2       2. Bilateral leg pain  M79.604     M79.605                      Subjective: Pt reports today she is feeling better than last session. Feels ready to DC to HEP next week knowing she can return should symptoms worsen/return.       Objective: See treatment diary below      Assessment: Tolerated treatment well. Pt cont to dem good tolerance to TE w/o increased pain during or post session. Patient is appropriate for DC next week. Patient would benefit from continued PT      Plan: Continue per plan of care.      Precautions: medical tape allergy, NEPHROSTOMY TUBES   Past Medical History:   Diagnosis Date    Arthritis     right knee    Hay fever     Hyperlipidemia     Hypertension     Myocardial infarction (HCC)     questionable MI during exploratory surgery    Ovarian cancer (HCC)     surgery (did not have chemo or radiation)    Stroke (HCC)     \" mini stroke \" no deficits ; another poss stroke with  left hand weakness    Wears glasses     Wears partial dentures     upper & lower     SOC: 2024  RE: 2024  FOTO: 2024  POC Expiration: 2024  Daily Treatment Log:  Date 10/14/2024    10/10/2024   Visit # 16    15 FOTO   Auth         Auth exp        Manual                        There Exer 25'    30'    Lumbar pball stretch  5\"x20     5\"x20    Seated HS stretch  10\" x5    10\"x5 R/L    Std hip ext 2x10 R/L     No TB 2x10 R/L    Std hip abd  2x10 R/L     No TB 2x10 R/L    LAQ  1.5# 2x10 R/L     1.5# 2x10 R/L     Seated HS curls         STS elevated mat      1x10; 2x    Knee flexion stretch on step  6\" step 5\" 1x10      6\" step 5\" 1x10     Objective measures         HEP        There Activ                                                        NMReed 14'    10'    Step taps     " "    FTEO on foam         FSU  6\" 2x10 R/L    6\" step 2x10 R/L    Marching at rail 1 UE support  1x10 R/L        Side stepping      No TB 2 laps at rail    Monster walks         RFISit  20x to end     15x to end    HR                Modalities        NO STIM - ACTIVE CANCER AND NEPHROSTOMY TUBES                         HEP:   Access Code: [email or web address]  URL: [email or web address]  Date: 10/01/2024  Prepared by: Zainab Garzon    Exercises  - Seated Lumbar Flexion Stretch  - 5 x daily - 7 x weekly - 1 sets - 10 reps  - Seated Hip External Rotation AROM  - 1 x daily - 7 x weekly - 2 sets - 5 reps  - Seated Long Arc Quad  - 1 x daily - 7 x weekly - 2 sets - 5 reps  - Seated Hamstring Curls with Resistance  - 1 x daily - 7 x weekly - 2 sets - 10 reps  - Seated Knee Flexion AAROM  - 1 x daily - 7 x weekly - 1 sets - 10 reps - 5 sec hold  - Standing Hip Abduction with Counter Support  - 1 x daily - 7 x weekly - 2 sets - 5 reps  - Standing Hip Extension with Counter Support  - 1 x daily - 7 x weekly - 2 sets - 5 reps             "

## 2024-10-17 ENCOUNTER — OFFICE VISIT (OUTPATIENT)
Dept: PHYSICAL THERAPY | Facility: CLINIC | Age: 84
End: 2024-10-17
Payer: COMMERCIAL

## 2024-10-17 DIAGNOSIS — M79.604 BILATERAL LEG PAIN: ICD-10-CM

## 2024-10-17 DIAGNOSIS — M79.605 BILATERAL LEG PAIN: ICD-10-CM

## 2024-10-17 DIAGNOSIS — R26.2 AMBULATORY DYSFUNCTION: Primary | ICD-10-CM

## 2024-10-17 PROCEDURE — 97110 THERAPEUTIC EXERCISES: CPT

## 2024-10-17 PROCEDURE — 97112 NEUROMUSCULAR REEDUCATION: CPT

## 2024-10-17 NOTE — PROGRESS NOTES
"Daily Note     Today's date: 10/17/2024  Patient name: Ning Chambers  : 1940  MRN: [id number]  Referring provider: Stephen Ramirez MD  Dx:   Encounter Diagnosis     ICD-10-CM    1. Ambulatory dysfunction  R26.2       2. Bilateral leg pain  M79.604     M79.605                      Subjective: pt has no new complaints, she had a little L knee pain yesterday but it has since resolved.       Objective: See treatment diary below      Assessment: Tolerated treatment well. Pt dem indep w/ current POC and ability to dec pain w/ RFIS, appropriate for DC to HEP next visit. Patient exhibited good technique with therapeutic exercises      Plan:  Pt to DC to HEP next visit.      Precautions: medical tape allergy, NEPHROSTOMY TUBES   Past Medical History:   Diagnosis Date    Arthritis     right knee    Hay fever     Hyperlipidemia     Hypertension     Myocardial infarction (HCC)     questionable MI during exploratory surgery    Ovarian cancer (HCC)     surgery (did not have chemo or radiation)    Stroke ([facility])     \" mini stroke \" no deficits ; another poss stroke with  left hand weakness    Wears glasses     Wears partial dentures     upper & lower     SOC: 2024  RE: 2024  FOTO: 2024  POC Expiration: 2024  Daily Treatment Log:  Date 10/14/2024 10/17/2024   10/10/2024   Visit # 16 17    15 FOTO   Auth         Auth exp        Manual                        There Exer 25' 25'   30'    Lumbar pball stretch  5\"x20  5\"x20    5\"x20    Seated HS stretch  10\" x5 10\" x5   10\"x5 R/L    Std hip ext 2x10 R/L  YTB @ shins 2x10 R/L    No TB 2x10 R/L    Std hip abd  2x10 R/L  YTB @ shins 2x10 R/L    No TB 2x10 R/L    LAQ  1.5# 2x10 R/L  1.5# 2x10 R/L    1.5# 2x10 R/L     Seated HS curls         STS elevated mat   1x10; 2x    1x10; 2x    Knee flexion stretch on step  6\" step 5\" 1x10   6\" step 10\"x5 R/L    6\" step 5\" 1x10     Objective measures         HEP        There Activ                                  " "                      NMReed 14' 15'   10'    Step taps         FTEO on foam         FSU  6\" 2x10 R/L 6\" 2x10 R/L    6\" step 2x10 R/L    Marching at rail 1 UE support  1x10 R/L        Side stepping   YTB @ shins 2 laps at rail    No TB 2 laps at rail    Monster walks         RFISit  20x to end     15x to end    HR                Modalities        NO STIM - ACTIVE CANCER AND NEPHROSTOMY TUBES                         HEP:   Access Code: [email or web address]  URL: [email or web address]  Date: 10/01/2024  Prepared by: Zainab Garzon    Exercises  - Seated Lumbar Flexion Stretch  - 5 x daily - 7 x weekly - 1 sets - 10 reps  - Seated Hip External Rotation AROM  - 1 x daily - 7 x weekly - 2 sets - 5 reps  - Seated Long Arc Quad  - 1 x daily - 7 x weekly - 2 sets - 5 reps  - Seated Hamstring Curls with Resistance  - 1 x daily - 7 x weekly - 2 sets - 10 reps  - Seated Knee Flexion AAROM  - 1 x daily - 7 x weekly - 1 sets - 10 reps - 5 sec hold  - Standing Hip Abduction with Counter Support  - 1 x daily - 7 x weekly - 2 sets - 5 reps  - Standing Hip Extension with Counter Support  - 1 x daily - 7 x weekly - 2 sets - 5 reps               "

## 2024-10-22 ENCOUNTER — OFFICE VISIT (OUTPATIENT)
Dept: PHYSICAL THERAPY | Facility: CLINIC | Age: 84
End: 2024-10-22
Payer: COMMERCIAL

## 2024-10-22 DIAGNOSIS — M79.605 BILATERAL LEG PAIN: ICD-10-CM

## 2024-10-22 DIAGNOSIS — R26.2 AMBULATORY DYSFUNCTION: Primary | ICD-10-CM

## 2024-10-22 DIAGNOSIS — M79.604 BILATERAL LEG PAIN: ICD-10-CM

## 2024-10-22 PROCEDURE — 97112 NEUROMUSCULAR REEDUCATION: CPT

## 2024-10-22 PROCEDURE — 97110 THERAPEUTIC EXERCISES: CPT

## 2024-10-22 NOTE — PROGRESS NOTES
PT Re-Evaluation     Today's date: 10/22/2024  Patient name: Ning Chambers  : 1940  MRN: [id number]  Referring provider: Stephen Ramirez MD  Dx:   Encounter Diagnosis     ICD-10-CM    1. Ambulatory dysfunction  R26.2       2. Bilateral leg pain  M79.604     M79.605                          Assessment  Impairments: abnormal gait, abnormal or restricted ROM, impaired balance and impaired physical strength    Assessment details: Ning Chambers is a 84 y.o. female who presents with improvements in LE pain, LE strength, ambulation, and balance with minor deficits still present. Patient feels confident in ability to continue with HEP only and understands she may call to return should symptoms return. Mechanical assessment continues to demo good response with flexion based movements and will continue with RFISit as part of HEP to manage LE pain. Patient has been educated in home exercise program and plan of care. Patient is appropriate to DC at this time and may call to return should symptoms change.   Understanding of Dx/Px/POC: good     Prognosis: good    Goals  Short term goals to be accomplished in 4 weeks:  STG 1: Pt will demo independence with postural management---MET  STG 2: Pt will demo I with HEP to maximize progress between therapy sessions---MET  STG 3: Pt will reports pain dec freq and intensity 50% ---MET  STG 4: Pt will demo 1/2 MMT grade in B/L Hips to improve stability with functional challenges---Partially MET         Long term goals to be accomplished in 12 weeks:   LTG 1: Pt will demo good body mech with >75% functional challenges to prevent reinjury---MET  LTG 2: Pt will be able to return to walking for 20 minutes or more pain free as per PLOF to improve community ambulation---Partially MET   LTG 3: Pt will demo Good strength core stabilizers to promote carryover with body mech and posture---MET  LTG4: Pt will deny sleep disturbance secondary to pain ---MET  LTG5: Pt will demo L/S AROM < or  = min loss throughout to promote improved functional mobility and body mechanics---Partially MET       Plan    Planned therapy interventions: home exercise program and patient education    Plan of Care beginning date: 2024  Plan of Care expiration date: 2024  Treatment plan discussed with: patient  Plan details: Patient to DC to HEP         Subjective Evaluation    History of Present Illness  Mechanism of injury: Patient reports to PT for re-eval of B/L LE pain (L>R) as well as some difficulty with ambulation. Patient denies any pain into B/L LE with only mild ache in low back at times. Patients reports she feels she is doing better and would like to DC to [facility]. Patient is tired from walking a lot the last 2 days.   Pain  Current pain ratin  At best pain ratin  At worst pain ratin  Location: low back  Quality: dull ache    Social Support  Steps to enter house: yes  2  Stairs in house: no   Lives in: one-story house  Lives with: alone      Diagnostic Tests  No diagnostic tests performed        Objective    Posture: Lumbar lordosis is slightly decreased in standing. There is no lateral shift.      Lumbar AROM limitations:  (*=  Pain)  Lumbar flexion: WNL  Lumbar extension: Min-Mod hooper  R side glide:  Min hooper  L side glide:  Min hooper    Mechanical Asessment: continues to respond well to RFISit as needed with HEP       Strength:  Core strength: Fair      Right  Left  Knee ext  4+/5  4+/5  Knee flex  4+/5  4+/5  Ankle DF  5/5  5/5  Ankle PF  5/5  5/5      Hip flexion(sit): 4/5  4+/5  Hip abduction(sit) 4+/5  4+/5  Hip adduction(sit) 4+/5  4+/5      ROM: deg    Right  Left   Knee flexion   105  115  Knee extension  0  0      Tenderness/Palpation: No TTP noted today       Flexibility:  Min hooper in HS flexibility noted during lumbar flexion ROM  Mod hooper in hip flexors noted       Function:   Gait: noted improved R foot clearance however still dem decreased step length B/L and slow gait speed.           "       Precautions: medical tape allergy, NEPHROSTOMY TUBES   Past Medical History:   Diagnosis Date    Arthritis     right knee    Hay fever     Hyperlipidemia     Hypertension     Myocardial infarction (HCC) 2003    questionable MI during exploratory surgery    Ovarian cancer (HCC) 2003    surgery (did not have chemo or radiation)    Stroke (HCC) 2003    \" mini stroke \" no deficits ; another poss stroke with  left hand weakness    Wears glasses     Wears partial dentures     upper & lower     SOC: 8/14/2024  RE: 9/20/2024  FOTO: 8/14/2024  POC Expiration: 11/6/2024  Daily Treatment Log:  Date 10/14/2024 10/17/2024 10/22/2024     Visit # 16 17  18 (DC /FOTO)      Auth         Auth exp        Manual                        There Exer 25' 25' 20'     Lumbar pball stretch  5\"x20  5\"x20  5\"x20      Seated HS stretch  10\" x5 10\" x5 10\" x5      Std hip ext 2x10 R/L  YTB @ shins 2x10 R/L  YTB @ shins 2x10 R/L      Std hip abd  2x10 R/L  YTB @ shins 2x10 R/L  YTB @ shins 2x10 R/L      LAQ  1.5# 2x10 R/L  1.5# 2x10 R/L  1.5# 2x10 R/L      Seated HS curls         STS elevated mat   1x10; 2x       Knee flexion stretch on step  6\" step 5\" 1x10   6\" step 10\"x5 R/L  6\" step 10\"x5 R/L      Objective measures    EG     HEP   Updated and Discussed      There Activ                                                        NMReed 14' 15' 15'     Step taps         FTEO on foam         FSU  6\" 2x10 R/L 6\" 2x10 R/L  6\" 2x10 R/L      Marching at rail 1 UE support  1x10 R/L        Side stepping   YTB @ shins 2 laps at rail       Monster walks         RFISit  20x to end   20x to end      HR   1x10              Modalities        NO STIM - ACTIVE CANCER AND NEPHROSTOMY TUBES                         HEP:   Access Code: [email or web address]  URL: [email or web address]  Date: 10/22/2024  Prepared by: Zainab Garzon    Exercises  - Seated Lumbar Flexion Stretch  - 5 x daily - 7 x weekly - 1 sets - 10 reps  - Seated Long Arc Quad  - 1 x daily - 7 x " weekly - 2 sets - 5 reps  - Seated Hamstring Stretch  - 1 x daily - 7 x weekly - 5 reps - 10 sec hold  - Seated Knee Flexion AAROM  - 1 x daily - 7 x weekly - 1 sets - 10 reps - 5 sec hold  - Seated Hip External Rotation AROM  - 1 x daily - 4 x weekly - 2 sets - 5 reps  - Seated Hamstring Curls with Resistance  - 1 x daily - 4 x weekly - 2 sets - 10 reps  - Standing Hip Abduction with Counter Support  - 1 x daily - 4 x weekly - 2 sets - 5 reps  - Standing Hip Extension with Counter Support  - 1 x daily - 4 x weekly - 2 sets - 5 reps

## 2024-10-28 ENCOUNTER — OFFICE VISIT (OUTPATIENT)
Dept: PALLIATIVE MEDICINE | Facility: CLINIC | Age: 84
End: 2024-10-28
Payer: COMMERCIAL

## 2024-10-28 VITALS
HEIGHT: 62 IN | BODY MASS INDEX: 33.84 KG/M2 | TEMPERATURE: 98.6 F | OXYGEN SATURATION: 98 % | SYSTOLIC BLOOD PRESSURE: 140 MMHG | DIASTOLIC BLOOD PRESSURE: 80 MMHG | HEART RATE: 59 BPM

## 2024-10-28 DIAGNOSIS — R26.2 AMBULATORY DYSFUNCTION: ICD-10-CM

## 2024-10-28 DIAGNOSIS — C67.8 MALIGNANT NEOPLASM OF OVERLAPPING SITES OF BLADDER (HCC): Primary | ICD-10-CM

## 2024-10-28 DIAGNOSIS — N18.5 STAGE 5 CHRONIC KIDNEY DISEASE NOT ON CHRONIC DIALYSIS (HCC): ICD-10-CM

## 2024-10-28 DIAGNOSIS — M79.604 BILATERAL LEG PAIN: ICD-10-CM

## 2024-10-28 DIAGNOSIS — Z71.89 ADVANCED DIRECTIVES, COUNSELING/DISCUSSION: ICD-10-CM

## 2024-10-28 DIAGNOSIS — Z51.5 PALLIATIVE CARE BY SPECIALIST: ICD-10-CM

## 2024-10-28 DIAGNOSIS — M79.605 BILATERAL LEG PAIN: ICD-10-CM

## 2024-10-28 PROCEDURE — 99213 OFFICE O/P EST LOW 20 MIN: CPT | Performed by: INTERNAL MEDICINE

## 2024-10-28 PROCEDURE — G2211 COMPLEX E/M VISIT ADD ON: HCPCS | Performed by: INTERNAL MEDICINE

## 2024-10-28 NOTE — ASSESSMENT & PLAN NOTE
Last visit, ERIKA provided patient with advanced care planning and counseling as well as copy of the [facility] Advanced Directive. Patient states that she has since met with an  and completed financial and healthcare advanced directives. Asked patient to bring in copies to be scanned into the chart. Patient's daughter-in-law Marti states that she has been named as the patient's power of .

## 2024-10-28 NOTE — ASSESSMENT & PLAN NOTE
Lab Results   Component Value Date    EGFR 13 07/17/2023    EGFR 15 03/08/2023    EGFR 35 05/06/2022    CREATININE 2.99 (H) 07/17/2023    CREATININE 2.78 (H) 03/08/2023    CREATININE 1.37 (H) 05/06/2022   Appreciate Nephrology consult. Patient is not interested in pursuing hemodialysis.

## 2024-10-28 NOTE — ASSESSMENT & PLAN NOTE
Patient reports that she has no significant gait deficit and also reports she was not recommended for DME by Physical Therapy. Counseled that using DME such as a cane or walker could reduce her fall risk, could also reduce pain.

## 2024-10-28 NOTE — ASSESSMENT & PLAN NOTE
Patient states that bilateral leg pain, which was a prominent issue last visit, has improved with physical therapy. She also takes Tylenol appropriately to help with pain.  Patient's daughter-in-law Marti reports privately to this provider that she is concerned that the patient may be under representing her pain and possible other symptoms. The patient declines offer of additional medication or intervention for pain.  Recommend topical OTC products, local application of heat or cold, Tylenol up to 1000mg TID for chronic pain. Do not use heat on top of topical agents. Do not mix topical agents.

## 2024-10-28 NOTE — ASSESSMENT & PLAN NOTE
Reviewed notes (PCP, PT, IR, Nephrology, Audiology), labs (3/8/24 Cr 2.36, alb 4.3, Hb 11.6; 7/17/23 Cr 2.99, GFR 13), imaging + procedures (8/7/24 + 10/7/24 IR nephrostomy tube exchange). See below for more data.  Return in about 2 months (around 12/28/2024).  Medication safety issues addressed - no driving under the influence of narcotics (including opioids), watch for adverse effects including AMS or respiratory depression (slowed breathing), keep medications stored in a safe/locked environment, do not use alcohol while opioids or other narcotics are in one's system, do not travel with more than the minimum number of tablets or capsules required for the trip.  I have personally queried the patient's controlled substance dispensing history in the Prescription Drug Monitoring Program in compliance with regulations before I have prescribed any controlled substances. The prescription history is consistent with prescribed therapy and our practice policies.

## 2024-10-28 NOTE — ASSESSMENT & PLAN NOTE
Bladder cancer w/ retroperitoneal lymphadenopathy, not currently receiving treatment for progressive malignancy. No desire to initiate treatment, not yet wanting to pursue comfort cares as she remains fairly independent w/ PPS 70% .

## 2024-10-28 NOTE — PROGRESS NOTES
Ambulatory Visit  Name: Ning Chambers      : 1940      MRN: [id number]  Encounter Provider: Stephen Ramirez MD  Encounter Date: 10/28/2024   Encounter department: [facility]    Assessment & Plan   1. Malignant neoplasm of overlapping sites of bladder (HCC)  Assessment & Plan:  Bladder cancer w/ retroperitoneal lymphadenopathy, not currently receiving treatment for progressive malignancy. No desire to initiate treatment, not yet wanting to pursue comfort cares as she remains fairly independent w/ PPS 70% .  2. Stage 5 chronic kidney disease 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)  Assessment & Plan:  Lab Results   Component Value Date    EGFR 13 2023    EGFR 15 2023    EGFR 35 2022    CREATININE 2.99 (H) 2023    CREATININE 2.78 (H) 2023    CREATININE 1.37 (H) 2022   Appreciate Nephrology consult. Patient is not interested in pursuing hemodialysis.  3. Ambulatory dysfunction  Assessment & Plan:  Patient reports that she has no significant gait deficit and also reports she was not recommended for DME by Physical Therapy. Counseled that using DME such as a cane or walker could reduce her fall risk, could also reduce pain.  4. Bilateral leg pain  Assessment & Plan:  Patient states that bilateral leg pain, which was a prominent issue last visit, has improved with physical therapy. She also takes Tylenol appropriately to help with pain.  Patient's daughter-in-law Marti reports privately to this provider that she is concerned that the patient may be under representing her pain and possible other symptoms. The patient declines offer of additional medication or intervention for pain.  Recommend topical OTC products, local application of heat or cold, Tylenol up to 1000mg TID for chronic pain. Do not use heat on top of topical agents. Do not mix topical agents.  5. Advanced directives, counseling/discussion  Assessment & Plan:  Last visit, ERIKA provided patient with  advanced care planning and counseling as well as copy of the [facility] Advanced Directive. Patient states that she has since met with an  and completed financial and healthcare advanced directives. Asked patient to bring in copies to be scanned into the chart. Patient's daughter-in-law Marti states that she has been named as the patient's power of .  6. Palliative care by specialist  Assessment & Plan:  Reviewed notes (PCP, PT, IR, Nephrology, Audiology), labs (3/8/24 Cr 2.36, alb 4.3, Hb 11.6; 7/17/23 Cr 2.99, GFR 13), imaging + procedures (8/7/24 + 10/7/24 IR nephrostomy tube exchange). See below for more data.  Return in about 2 months (around 12/28/2024).  Medication safety issues addressed - no driving under the influence of narcotics (including opioids), watch for adverse effects including AMS or respiratory depression (slowed breathing), keep medications stored in a safe/locked environment, do not use alcohol while opioids or other narcotics are in one's system, do not travel with more than the minimum number of tablets or capsules required for the trip.  I have personally queried the patient's controlled substance dispensing history in the Prescription Drug Monitoring Program in compliance with regulations before I have prescribed any controlled substances. The prescription history is consistent with prescribed therapy and our practice policies.        Subjective   Chief Complaint   Patient presents with    Cancer    Gait Problem    Counseling    Follow-up    Chronic Kidney Disease        History of Present Illness     Ning Chambers is a 84 y.o. female w/ bladder cancer w/ RP LAD; CKD5; she is s/p b/l nephrostomy tubes. She follows w/ Dr Ricardo (Urology). Not currently receiving treatment for progressive malignancy.    Patient denies significant pain at time of visit today. She feels that her work with PT has significantly reduced her pain. She states she will occasionally take doses of Tylenol  "and her pain is more moderate or severe, otherwise she has not taking anything for pain.    Patient denies dysphoric mood. She states she has had some worry recently about her sister's health issues. She feels well supported. She states her sleep is \"lousy\" as she has to wake several times a night to empty her urine collection bag.    Patient denies nausea, denies vomiting, denies bowel issues. Her daughter-in-law Marti states that since last visit, patient did have a significant portion of time where she had bowel incontinence and diarrhea (now resolved).    Patient (and family) report that the patient has not had any issues with memory recently. Family reports the patient has been experiencing some fatigue, which is understandable considering patient's chronic illness.      Current Outpatient Medications:     acetaminophen (TYLENOL) 500 mg tablet, Take 500 mg by mouth, Disp: , Rfl:     aspirin 81 mg chewable tablet, Chew 81 mg daily, Disp: , Rfl:     atorvastatin (LIPITOR) 40 mg tablet, Take 1 tablet (40 mg total) by mouth daily with dinner (Patient taking differently: Take 20 mg by mouth Every two days), Disp: 14 tablet, Rfl: 0    cholecalciferol (VITAMIN D3) 1,000 units tablet, Take 1,000 Units by mouth daily, Disp: , Rfl:     hydrochlorothiazide (HYDRODIURIL) 25 mg tablet, Take 25 mg by mouth every morning, Disp: , Rfl:     metoprolol succinate (TOPROL-XL) 50 mg 24 hr tablet, Take 25 mg by mouth every morning, Disp: , Rfl:     fluticasone (FLONASE) 50 mcg/act nasal spray, , Disp: , Rfl:     sodium chloride, PF, 0.9 %, 10 mL by Intracatheter route daily Intracatheter flushing daily. May substitute prefilled syringe with normal saline 10 mL vials, 10 mL syringes, and 18 g blunt needles, Disp: 900 mL, Rfl: 0    Objective   /80 (BP Location: Left arm, Patient Position: Sitting, Cuff Size: Standard)   Pulse 59   Temp 98.6 °F (37 °C) (Temporal)   Ht 5' 2\" (1.575 m)   SpO2 98%   BMI 33.84 kg/m²   Physical " Exam  Vitals reviewed.   Constitutional:       General: She is awake. She is not in acute distress.     Appearance: She is well-developed and overweight. She is not toxic-appearing.   HENT:      Head: Normocephalic and atraumatic.      Right Ear: External ear normal.      Left Ear: External ear normal.   Eyes:      General: No scleral icterus.        Right eye: No discharge.         Left eye: No discharge.      Extraocular Movements: Extraocular movements intact.      Conjunctiva/sclera: Conjunctivae normal.      Pupils: Pupils are equal, round, and reactive to light.   Cardiovascular:      Rate and Rhythm: Bradycardia present.   Pulmonary:      Effort: Pulmonary effort is normal. No tachypnea, bradypnea, accessory muscle usage or respiratory distress.      Comments: Able to speak comfortably in complete sentences on room air at rest.  Abdominal:      General: There is no distension.      Tenderness: There is no guarding.   Musculoskeletal:      Cervical back: Normal range of motion.      Right lower leg: No edema.      Left lower leg: No edema.   Skin:     General: Skin is dry.      Coloration: Skin is not pale.   Neurological:      Mental Status: She is alert and oriented to person, place, and time.      Cranial Nerves: No dysarthria or facial asymmetry.      Comments: Using no assistive devices for ambulation.   Psychiatric:         Attention and Perception: Attention normal.         Mood and Affect: Mood and affect normal.         Speech: Speech normal.         Behavior: Behavior normal. Behavior is cooperative.         Thought Content: Thought content normal.         Cognition and Memory: Cognition and memory normal.         Judgment: Judgment normal.          Recent labs:  Lab Results   Component Value Date/Time    SODIUM 141 07/17/2023 09:49 AM    K 4.3 07/17/2023 09:49 AM    BUN 57 (H) 07/17/2023 09:49 AM    CREATININE 2.99 (H) 07/17/2023 09:49 AM    GLUC 96 05/06/2022 12:41 PM    CALCIUM 9.8 07/17/2023  09:49 AM    AST 12 (L) 03/08/2023 09:43 AM    ALT 9 03/08/2023 09:43 AM    ALB 3.7 03/08/2023 09:43 AM    TP 6.8 03/08/2023 09:43 AM    EGFR 13 07/17/2023 09:49 AM     Lab Results   Component Value Date/Time    HGB 11.9 03/08/2023 09:43 AM    WBC 9.81 03/08/2023 09:43 AM     03/08/2023 09:43 AM    INR 0.95 04/13/2023 12:21 PM    PTT 25 03/08/2023 09:43 AM     Lab Results   Component Value Date/Time    [id number] 1.240 04/29/2016 10:09 AM       Recent Imaging:  Procedure: IR nephrostomy tube check/change/reposition/reinsertion/upsize    Result Date: 10/7/2024  Narrative: IR NEPHROSTOMY TUBE CHECK/CHANGE/REPOSITION/REINSERTION/UPSIZE PROCEDURE: Routine bilateral percutaneous nephrostomy tube exchange CLINICAL INDICATION: History of bladder cancer with hydronephrosis status post bilateral nephrostomy tube placement originally 4/13/2023 for routine 2-month exchange COMPARISON: 8/7/2024 PERFORMING PHYSICIAN: David Fall MD ASSISTANT PHYSICIAN: None MEDICATIONS: 1% lidocaine (local). SEDATION TIME: NA CONTRAST: 8 mL of iohexol (OMNIPAQUE) FLUOROSCOPY TIME: 1.8 min RADIATION DOSE: 19 mGy   (air Kerma). NUMBER OF IMAGES: 5 TECHNIQUE: The patient was brought to the procedure room and a time-out was performed utilizing universal protocol. The patient was identified verbally and via wrist band. The patient was placed prone on the procedure table and both flanks were prepped and draped in the usual sterile fashion. All elements of maximal sterile barrier technique were followed (cap, mask, sterile gown, sterile gloves, large sterile sheet, hand hygiene, and 2% chlorhexidine for cutaneous antisepsis). Sterile ultrasound technique wtih sterile gel and sterile probe cover was also utilized. Initially, the LEFT side was changed. The skin and subcutaneous tissues were infiltrated with local anesthetic. Contrast injected through the nephrostomy tube opacifies a decompressed collecting system and ureter without  opacification of the bladder. Existing tube was cut and exchanged out over an Amplatz wire with replacement of a new 10 Amharic APDL formed within the renal pelvis confirmed with contrast injection. Catheter was secured to the skin with 0 Prolene suture and connected to an external drainage bag. Procedure was then performed in identical fashion on the RIGHT side. Both sides were dressed and bandaged. The patient tolerated the procedure well without difficulty or complication encountered and left the section in satisfactory stable condition. Patient inquired about possibility of increasing the interval between tube changes and as she has no evidence for tube encrustation or history of tube dislodgment, we we'll schedule the patient for her next routine exchange in 3 months. FINDINGS: As above.     Impression: Uneventful bilateral percutaneous nephrostomy tube exchange. Next exchange in 3 months. Workstation performed: [id number]     Procedure: IR nephrostomy tube check/change/reposition/reinsertion/upsize    Result Date: 8/7/2024  Narrative: Bilateral nephrostogram and nephrostomy tube change Clinical History: Bladder cancer with urinary obstruction Contrast: 12 mL of iohexol (OMNIPAQUE) Fluoro time: 1.0 MIN Number of Images: 5 Radiation dose: 9 mGy Conscious sedation time: NONE Technique: The patient was brought to the interventional radiology suite and placed prone on the table. The existing bilateral nephrostomy tubes was prepped and draped in the usual sterile fashion. After a  view was obtained, contrast was injected into the existing catheters and multiple images of the urinary tract were obtained to the bladder. Findings: Both catheters are in good position with persistent distal ureteral obstruction. Intervention: Lidocaine was administered to the skin on the left. The left sided catheter was removed over a heavy-duty straight wire, and a new 10 Amharic nephrostomy tube was advanced, and the loop formed  within the renal pelvis. The catheter was then injected, confirming satisfactory position. The catheter was sutured in place and connected to gravity bag. The same procedure was repeated on the right.     Impression: Impression: 1. Nephrostogram demonstrates catheter is in good position with persistent distal ureteral obstruction. 2. Successful exchange of bilateral 10 Chinese nephrostomy tubes under fluoroscopic control. Workstation performed: [id number]     Procedure: CT abdomen pelvis wo contrast    Result Date: 7/10/2024  Narrative: CT ABDOMEN AND PELVIS WITHOUT IV CONTRAST INDICATION: C67.8: Malignant neoplasm of overlapping sites of bladder. COMPARISON: 3/31/2023 TECHNIQUE: CT examination of the abdomen and pelvis was performed without intravenous contrast. Lack of IV contrast limits the sensitivity for pathology. Multiplanar 2D reformatted images were created from the source data. This examination, like all CT scans performed in the [facility], was performed utilizing techniques to minimize radiation dose exposure, including the use of iterative reconstruction and automated exposure control. Radiation dose length product (DLP) for this visit: 641.18 mGy-cm Enteric Contrast: Not administered. Lack of oral contrast limits the sensitivity for pathology within the bowel. FINDINGS: ABDOMEN LOWER CHEST: There is a small hiatal hernia. There is a chronic dense calcification involving the left ventricular wall. Coronary artery calcification is present. LIVER/BILIARY TREE: Unremarkable. GALLBLADDER: Cholelithiasis without findings of acute cholecystitis. SPLEEN: Unremarkable. PANCREAS: Unremarkable. ADRENAL GLANDS: Unremarkable. KIDNEYS/URETERS: There are bilateral percutaneous nephrostomy tubes. There is an atrophic left kidney without hydronephrosis. There is a mild right hydronephrosis and hydroureter which is improved since the prior CT scan. STOMACH AND BOWEL: Colonic diverticulosis without  findings of acute diverticulitis. APPENDIX: Normal. ABDOMINOPELVIC CAVITY: Within the mesentery, there is a 2 cm nodule (2/71). In retrospect, this was present on prior imaging dating back to 10/28/2021 when it measured 2.4 cm. This measured 2.3 cm on 3/31/2023. Otherwise, retroperitoneal lymph nodes are present measuring up to 1 cm short axis. Retroperitoneal lymph nodes appear larger than prior. There is a 1.2 cm lymph node anterior to the distal aorta (2/83). No ascites. No pneumoperitoneum. VESSELS: Atherosclerosis without abdominal aortic aneurysm. PELVIS REPRODUCTIVE ORGANS: Post hysterectomy. URINARY BLADDER: The bladder is compressed and limited in evaluation. ABDOMINAL WALL/INGUINAL REGIONS: Supraumbilical ventral hernia containing nonobstructed bowel. BONES: No acute fracture or suspicious osseous lesion. Spinal degenerative changes. Grade 1 anterolisthesis of L4 upon L5.     Impression: 1. Multiple enlarging retroperitoneal lymph nodes. 2. There is an approximate 2 cm nodule in the mesentery which in retrospect has been present previously but appears smaller. 3. Bilateral nephrostomy tubes in place with improvement of hydronephrosis. Persistent mild right hydronephrosis. The study was marked in EPIC for significant notification. Workstation performed: [id number]      29 minutes total time spent on 10/28/2024 in caring for this patient including obtaining/reviewing history, symptom assessment and management, medication review / adjustment, psychosocial support, reviewing / ordering tests, medicines, imaging, procedures, advanced directives, goals of care, supportive listening, anticipatory guidance, patient/family education, risk factor reduction, and documenting in the medical record. All patient/family questions were answered during this discussion.    Stephen Ramirez MD  [facility]  [phone]    Portions of this document may have been created using dictation software  "and as such some \"sound alike\" terms may have been generated by the system. Do not hesitate to contact me with any questions or clarifications.     This note was not shared with the patient due to privacy exception: note includes other individuals  "

## 2024-10-28 NOTE — PATIENT INSTRUCTIONS
"It was good to see you today. Thank you for coming in.    Please bring any completed advanced directives (POLST, Living Will, and/or healthcare Power of  documents, etc) in to any [facility] facility; staff can scan it into your chart.  Please ask your Urology team about getting an \"overnight bag\" for urine collection, so that you may be able to sleep better without need to wake up as often to change the bag.  Return in about 2 months (around 12/28/2024). If something changes and you need to come in sooner, please call our office.    PRESCRIPTION REFILL REMINDER:  All medication refills should be requested prior to Noon on Friday. Any refill requests after noon on Friday would be addressed the following Monday.    MEDICATION SAFETY ISSUES:   Do not drive under the influence of narcotics (including opioids), watch for adverse effects including confusion / altered mental status / respiratory depression (slowed breathing), keep medications stored in a safe/locked environment, do not use alcohol while opioids or other narcotics are in your system. Do not travel with more than the minimum number of tablets or capsules required for the trip.   "

## 2024-11-05 ENCOUNTER — APPOINTMENT (OUTPATIENT)
Dept: LAB | Facility: CLINIC | Age: 84
End: 2024-11-05
Payer: COMMERCIAL

## 2024-11-05 DIAGNOSIS — N18.5 CHRONIC KIDNEY DISEASE-MINERAL BONE DISORDER (CKD-MBD) WITH STAGE 5 CHRONIC KIDNEY DISEASE, NOT ON CHRONIC DIALYSIS (HCC): ICD-10-CM

## 2024-11-05 DIAGNOSIS — M89.9 CHRONIC KIDNEY DISEASE-MINERAL BONE DISORDER (CKD-MBD) WITH STAGE 5 CHRONIC KIDNEY DISEASE, NOT ON CHRONIC DIALYSIS (HCC): ICD-10-CM

## 2024-11-05 DIAGNOSIS — E83.9 CHRONIC KIDNEY DISEASE-MINERAL BONE DISORDER (CKD-MBD) WITH STAGE 5 CHRONIC KIDNEY DISEASE, NOT ON CHRONIC DIALYSIS (HCC): ICD-10-CM

## 2024-11-05 DIAGNOSIS — N18.5 STAGE 5 CHRONIC KIDNEY DISEASE NOT ON CHRONIC DIALYSIS (HCC): ICD-10-CM

## 2024-11-05 LAB
ERYTHROCYTE [DISTWIDTH] IN BLOOD BY AUTOMATED COUNT: 12.7 % (ref 11.6–15.1)
HCT VFR BLD AUTO: 35.2 % (ref 34.8–46.1)
HGB BLD-MCNC: 11.4 G/DL (ref 11.5–15.4)
MCH RBC QN AUTO: 29.2 PG (ref 26.8–34.3)
MCHC RBC AUTO-ENTMCNC: 32.4 G/DL (ref 31.4–37.4)
MCV RBC AUTO: 90 FL (ref 82–98)
PHOSPHATE SERPL-MCNC: 3.4 MG/DL (ref 2.3–4.1)
PLATELET # BLD AUTO: 273 THOUSANDS/UL (ref 149–390)
PMV BLD AUTO: 11.6 FL (ref 8.9–12.7)
PTH-INTACT SERPL-MCNC: 142.4 PG/ML (ref 12–88)
RBC # BLD AUTO: 3.91 MILLION/UL (ref 3.81–5.12)
WBC # BLD AUTO: 6.67 THOUSAND/UL (ref 4.31–10.16)

## 2024-11-05 PROCEDURE — 85027 COMPLETE CBC AUTOMATED: CPT

## 2024-11-05 PROCEDURE — 84100 ASSAY OF PHOSPHORUS: CPT

## 2024-11-05 PROCEDURE — 83970 ASSAY OF PARATHORMONE: CPT

## 2024-11-05 PROCEDURE — 36415 COLL VENOUS BLD VENIPUNCTURE: CPT

## 2024-11-06 ENCOUNTER — TELEPHONE (OUTPATIENT)
Dept: PALLIATIVE MEDICINE | Facility: CLINIC | Age: 84
End: 2024-11-06

## 2024-11-06 NOTE — TELEPHONE ENCOUNTER
Called Pt and left VM to reshedule the appt which is scheduled for 12/30/24 as provider will be out of office.

## 2024-11-14 ENCOUNTER — OFFICE VISIT (OUTPATIENT)
Dept: AUDIOLOGY | Facility: CLINIC | Age: 84
End: 2024-11-14

## 2024-11-14 DIAGNOSIS — H90.3 SENSORINEURAL HEARING LOSS, BILATERAL: Primary | ICD-10-CM

## 2024-11-15 NOTE — PROGRESS NOTES
Hearing Aid Visit:    Name:  Ning Chambers  :  1940  Age:  84 y.o.  MRN:  [id number]  Date of Evaluation: 2024    HISTORY:    Ning Chambers was seen today (2024) for a(n) in-warranty hearing aid check of her bilateral hearing aids. Today, Ning was picking up her hearing aid from lab repair.    Most recent Audiogram: 2024    DEVICE INFORMATION:      Left Device   Hearing Aid Make: Oticon   Hearing Aid Model: More 3 miniRITE   Serial Number: [id number]   Repair Warranty Expiration Date: 2025   Loss/Damage Warranty Expiration Date:  2025    Length: 2    Output: 85   Wax System: Pro Wax miniFIT   Dome Size/Style: 6mm power   Battery: Lithium-ion Rechargeable     ACTION/ADJUSTMENTS:  Placed into software for addition of serial number and to reinstall settings     RECOMMENDATIONS:   RTO scheduled for 2025 for HA maintenance     Kaylin Jama, CCC-A  Clinical Audiologist  [id number]  [facility]  [facility] AUDIOLOGY  [address] [address]

## 2024-12-12 ENCOUNTER — TELEPHONE (OUTPATIENT)
Dept: PALLIATIVE MEDICINE | Facility: CLINIC | Age: 84
End: 2024-12-12

## 2024-12-17 ENCOUNTER — OFFICE VISIT (OUTPATIENT)
Dept: PALLIATIVE MEDICINE | Facility: CLINIC | Age: 84
End: 2024-12-17

## 2024-12-17 VITALS
SYSTOLIC BLOOD PRESSURE: 134 MMHG | TEMPERATURE: 98.3 F | DIASTOLIC BLOOD PRESSURE: 60 MMHG | WEIGHT: 185.8 LBS | HEIGHT: 62 IN | OXYGEN SATURATION: 100 % | BODY MASS INDEX: 34.19 KG/M2 | HEART RATE: 63 BPM

## 2024-12-17 DIAGNOSIS — N18.5 STAGE 5 CHRONIC KIDNEY DISEASE NOT ON CHRONIC DIALYSIS (HCC): ICD-10-CM

## 2024-12-17 DIAGNOSIS — R26.2 AMBULATORY DYSFUNCTION: ICD-10-CM

## 2024-12-17 DIAGNOSIS — M79.604 BILATERAL LEG PAIN: ICD-10-CM

## 2024-12-17 DIAGNOSIS — C67.8 MALIGNANT NEOPLASM OF OVERLAPPING SITES OF BLADDER (HCC): Primary | ICD-10-CM

## 2024-12-17 DIAGNOSIS — Z93.6 NEPHROSTOMY STATUS (HCC): ICD-10-CM

## 2024-12-17 DIAGNOSIS — Z51.5 PALLIATIVE CARE BY SPECIALIST: ICD-10-CM

## 2024-12-17 DIAGNOSIS — M79.605 BILATERAL LEG PAIN: ICD-10-CM

## 2024-12-17 NOTE — ASSESSMENT & PLAN NOTE
Lab Results   Component Value Date    EGFR 13 07/17/2023    EGFR 15 03/08/2023    EGFR 35 05/06/2022    CREATININE 2.99 (H) 07/17/2023    CREATININE 2.78 (H) 03/08/2023    CREATININE 1.37 (H) 05/06/2022   Informs plan of care and prognosis. Patient has not been interested in pursuing hemodialysis, nor has she had symptoms necessitating dialysis as of yet.

## 2024-12-17 NOTE — PATIENT INSTRUCTIONS
It was good to see you today. Thank you for coming in.    In your next visit with Dr. Ricardo on 1/6/25, please ask about getting an overnight collection bag for your urine. This may help you sleep more than 4 hours, as this type of bag has a larger volume that it can collect. Or call that office beforehand.  Return in about 4 months for the next scheduled follow up w/ Dr John in our [place] office.  Call us for refills on medications that we supply, as needed.   If something changes and you need to come in sooner, please call our office.    PRESCRIPTION REFILL REMINDER:  All medication refills should be requested prior to Noon on Friday. Any refill requests after noon on Friday would be addressed the following Monday.    MEDICATION SAFETY ISSUES:   Do not drive under the influence of narcotics (including opioids), watch for adverse effects including confusion / altered mental status / respiratory depression (slowed breathing), keep medications stored in a safe/locked environment, do not use alcohol while opioids or other narcotics are in your system. Do not travel with more than the minimum number of tablets or capsules required for the trip.

## 2024-12-17 NOTE — PROGRESS NOTES
Ambulatory Visit  Name: Ning Chambers      : 1940      MRN: [id number]  Encounter Provider: Stephen Ramirez MD  Encounter Date: 2024   Encounter department: [facility] PALLIATIVE CARE [place]    Assessment & Plan   1. Malignant neoplasm of overlapping sites of bladder (HCC)  Assessment & Plan:  Bladder cancer w/ retroperitoneal lymphadenopathy, not currently receiving treatment for progressive malignancy. Patient has stated her desire to avoid systemic treatment for her invasive bladder cancer. She is doing fairly well without treatment with minimal symptom burden at this time. She does not yet wish to initiate comfort cares. PPS about 70%.  2. Stage 5 chronic kidney disease 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)  Assessment & Plan:  Lab Results   Component Value Date    EGFR 13 2023    EGFR 15 2023    EGFR 35 2022    CREATININE 2.99 (H) 2023    CREATININE 2.78 (H) 2023    CREATININE 1.37 (H) 2022   Informs plan of care and prognosis. Patient has not been interested in pursuing hemodialysis, nor has she had symptoms necessitating dialysis as of yet.  3. Nephrostomy status (HCC)  Assessment & Plan:  Management per urology. Patient states sleep continues to be fragmented due to need to change her urine collection bag. Asked patient to discuss options for overnight collection bags with Urology in the coming weeks.  4. Bilateral leg pain  Assessment & Plan:  Patient states her chronic leg pain improved significantly with PT. She is not interested in receiving strong medications for pain at this time.  [facility] has recommended topical OTC products, local application of heat or cold, Tylenol up to 1000mg TID for chronic pain. Do not use heat on top of topical agents. Do not mix topical agents.  5. Ambulatory dysfunction  Assessment & Plan:  Patient reports ambulation improved with PT, and she does exercises she was shown, on her own. Counseled to please use DME as appropriate to  reduce fall risk.  6. Palliative care by specialist  Assessment & Plan:  Emotional / psychosocial support provided today.  ACP: In a prior visit, patient stated she had met with an  and completed financial and healthcare advanced directives. Patient brought in a copy to be scanned in to the EMR today. Daughter Marti is primary surrogate healthcare decision-maker.  Reviewed notes (Audiology), labs (11/5/24 Hb 11.4, .4; 3/8/24 Cr 2.36, alb 4.3, Hb 11.6; 7/17/23 Cr 2.99, GFR 13), imaging + procedures (10/7/24 IR nephrostomy tube exchange). See below for more data.  Return in about 4 months (around 4/17/2025) for next scheduled follow up w/ Dr John in our [place] office.  Medication safety issues addressed - no driving under the influence of narcotics (including opioids), watch for adverse effects including AMS or respiratory depression (slowed breathing), keep medications stored in a safe/locked environment, do not use alcohol while opioids or other narcotics are in one's system, do not travel with more than the minimum number of tablets or capsules required for the trip.  I have personally queried the patient's controlled substance dispensing history in the Prescription Drug Monitoring Program in compliance with regulations before I have prescribed any controlled substances. The prescription history is consistent with prescribed therapy and our practice policies.        Subjective   Chief Complaint   Patient presents with    Follow-up    Cancer    Chronic Kidney Disease    Counseling    Pain    Gait Problem        History of Present Illness     Ning Chambers is a 84 y.o. female w/ bladder cancer w/ RP LAD; CKD5; hydronephrosis s/p b/l nephrostomy tubes. She follows w/ Dr Ricardo (Urology). Not currently receiving treatment for malignancy.    Patient states that she has been in her usual state of health in recent weeks. She states that she did work with physical therapy and that the PT sessions helped  "with her ambulation, and reduce her pain. She denies significant pain at time of visit today. She states she is doing exercises on her own, that were shown to her by PT.    Patient's primary concern revolves around issues with her urine collection bag. She states she can only get a few hours of sleep at night before she needs to empty the bag. She feels this can cause her to feel somewhat tired the next day, and she is now taking naps in the afternoon.    Patient feels her appetite is adequate. She denies nausea, denies vomiting, denies bowel issues.      Current Outpatient Medications:     acetaminophen (TYLENOL) 500 mg tablet, Take 500 mg by mouth, Disp: , Rfl:     aspirin 81 mg chewable tablet, Chew 81 mg daily, Disp: , Rfl:     atorvastatin (LIPITOR) 40 mg tablet, Take 1 tablet (40 mg total) by mouth daily with dinner (Patient taking differently: Take 20 mg by mouth Every two days), Disp: 14 tablet, Rfl: 0    cholecalciferol (VITAMIN D3) 1,000 units tablet, Take 1,000 Units by mouth daily, Disp: , Rfl:     hydrochlorothiazide (HYDRODIURIL) 25 mg tablet, Take 25 mg by mouth every morning, Disp: , Rfl:     metoprolol succinate (TOPROL-XL) 50 mg 24 hr tablet, Take 25 mg by mouth every morning, Disp: , Rfl:     fluticasone (FLONASE) 50 mcg/act nasal spray, , Disp: , Rfl:     sodium chloride, PF, 0.9 %, 10 mL by Intracatheter route daily Intracatheter flushing daily. May substitute prefilled syringe with normal saline 10 mL vials, 10 mL syringes, and 18 g blunt needles, Disp: 900 mL, Rfl: 0    Objective   /60 (BP Location: Left arm, Patient Position: Sitting, Cuff Size: Large)   Pulse 63   Temp 98.3 °F (36.8 °C) (Temporal)   Ht 5' 2\" (1.575 m)   Wt 84.3 kg (185 lb 12.8 oz)   SpO2 100%   BMI 33.98 kg/m²   Physical Exam  Vitals reviewed.   Constitutional:       General: She is awake. She is not in acute distress.     Appearance: She is well-groomed and overweight. She is not toxic-appearing.   HENT:      " Head: Normocephalic and atraumatic.      Right Ear: External ear normal.      Left Ear: External ear normal.   Eyes:      General: No scleral icterus.        Right eye: No discharge.         Left eye: No discharge.      Extraocular Movements: Extraocular movements intact.      Conjunctiva/sclera: Conjunctivae normal.      Pupils: Pupils are equal, round, and reactive to light.   Cardiovascular:      Rate and Rhythm: Normal rate.   Pulmonary:      Effort: Pulmonary effort is normal. No tachypnea, bradypnea, accessory muscle usage or respiratory distress.      Comments: Able to speak comfortably in complete sentences on room air at rest.  Abdominal:      General: There is no distension.      Tenderness: There is no guarding.   Musculoskeletal:      Cervical back: Normal range of motion.      Right lower leg: No edema.      Left lower leg: No edema.   Skin:     General: Skin is dry.      Coloration: Skin is not pale.   Neurological:      Mental Status: She is alert and oriented to person, place, and time.      Cranial Nerves: No dysarthria or facial asymmetry.      Comments: Using no assistive devices for ambulation.   Psychiatric:         Attention and Perception: Attention normal.         Mood and Affect: Mood and affect normal.         Speech: Speech normal.         Behavior: Behavior normal. Behavior is cooperative.         Thought Content: Thought content normal.         Cognition and Memory: Cognition and memory normal.         Judgment: Judgment normal.          Recent labs:  Lab Results   Component Value Date/Time    SODIUM 141 07/17/2023 09:49 AM    K 4.3 07/17/2023 09:49 AM    BUN 57 (H) 07/17/2023 09:49 AM    CREATININE 2.99 (H) 07/17/2023 09:49 AM    GLUC 96 05/06/2022 12:41 PM    CALCIUM 9.8 07/17/2023 09:49 AM    AST 12 (L) 03/08/2023 09:43 AM    ALT 9 03/08/2023 09:43 AM    ALB 3.7 03/08/2023 09:43 AM    TP 6.8 03/08/2023 09:43 AM    EGFR 13 07/17/2023 09:49 AM     Lab Results   Component Value Date/Time     HGB 11.4 (L) 11/05/2024 10:40 AM    WBC 6.67 11/05/2024 10:40 AM     11/05/2024 10:40 AM    INR 0.95 04/13/2023 12:21 PM    PTT 25 03/08/2023 09:43 AM     Lab Results   Component Value Date/Time    PBD6JJBFGKNZ 1.240 04/29/2016 10:09 AM       Recent Imaging:  Procedure: IR nephrostomy tube check/change/reposition/reinsertion/upsize  Result Date: 10/7/2024  Narrative: IR NEPHROSTOMY TUBE CHECK/CHANGE/REPOSITION/REINSERTION/UPSIZE PROCEDURE: Routine bilateral percutaneous nephrostomy tube exchange CLINICAL INDICATION: History of bladder cancer with hydronephrosis status post bilateral nephrostomy tube placement originally 4/13/2023 for routine 2-month exchange COMPARISON: 8/7/2024 PERFORMING PHYSICIAN: David Fall MD ASSISTANT PHYSICIAN: None MEDICATIONS: 1% lidocaine (local). SEDATION TIME: NA CONTRAST: 8 mL of iohexol (OMNIPAQUE) FLUOROSCOPY TIME: 1.8 min RADIATION DOSE: 19 mGy   (air Kerma). NUMBER OF IMAGES: 5 TECHNIQUE: The patient was brought to the procedure room and a time-out was performed utilizing universal protocol. The patient was identified verbally and via wrist band. The patient was placed prone on the procedure table and both flanks were prepped and draped in the usual sterile fashion. All elements of maximal sterile barrier technique were followed (cap, mask, sterile gown, sterile gloves, large sterile sheet, hand hygiene, and 2% chlorhexidine for cutaneous antisepsis). Sterile ultrasound technique wtih sterile gel and sterile probe cover was also utilized. Initially, the LEFT side was changed. The skin and subcutaneous tissues were infiltrated with local anesthetic. Contrast injected through the nephrostomy tube opacifies a decompressed collecting system and ureter without opacification of the bladder. Existing tube was cut and exchanged out over an Amplatz wire with replacement of a new 10 Russian APDL formed within the renal pelvis confirmed with contrast injection. Catheter was  "secured to the skin with 0 Prolene suture and connected to an external drainage bag. Procedure was then performed in identical fashion on the RIGHT side. Both sides were dressed and bandaged. The patient tolerated the procedure well without difficulty or complication encountered and left the section in satisfactory stable condition. Patient inquired about possibility of increasing the interval between tube changes and as she has no evidence for tube encrustation or history of tube dislodgment, we we'll schedule the patient for her next routine exchange in 3 months. FINDINGS: As above.     Impression: Uneventful bilateral percutaneous nephrostomy tube exchange. Next exchange in 3 months. Workstation performed: [id number]       23 minutes total time spent on 12/17/2024 in caring for this patient including obtaining/reviewing history, symptom assessment and management, medication review / adjustment, psychosocial support, reviewing / ordering tests, medicines, imaging, procedures, advanced directives, prognosis, supportive listening, anticipatory guidance, risk factor reduction, and documenting in the medical record. All patient/family questions were answered during this discussion.    Stephen Ramirez MD  [facility] Support[facility]  [phone]    Portions of this document may have been created using dictation software and as such some \"sound alike\" terms may have been generated by the system. Do not hesitate to contact me with any questions or clarifications.   "

## 2024-12-17 NOTE — ASSESSMENT & PLAN NOTE
Patient reports ambulation improved with PT, and she does exercises she was shown, on her own. Counseled to please use DME as appropriate to reduce fall risk.

## 2024-12-17 NOTE — ASSESSMENT & PLAN NOTE
Management per urology. Patient states sleep continues to be fragmented due to need to change her urine collection bag. Asked patient to discuss options for overnight collection bags with Urology in the coming weeks.

## 2024-12-17 NOTE — ASSESSMENT & PLAN NOTE
Patient states her chronic leg pain improved significantly with PT. She is not interested in receiving strong medications for pain at this time.  [facility] has recommended topical OTC products, local application of heat or cold, Tylenol up to 1000mg TID for chronic pain. Do not use heat on top of topical agents. Do not mix topical agents.

## 2024-12-17 NOTE — ASSESSMENT & PLAN NOTE
Bladder cancer w/ retroperitoneal lymphadenopathy, not currently receiving treatment for progressive malignancy. Patient has stated her desire to avoid systemic treatment for her invasive bladder cancer. She is doing fairly well without treatment with minimal symptom burden at this time. She does not yet wish to initiate comfort cares. PPS about 70%.

## 2024-12-17 NOTE — ASSESSMENT & PLAN NOTE
Emotional / psychosocial support provided today.  ACP: In a prior visit, patient stated she had met with an  and completed financial and healthcare advanced directives. Patient brought in a copy to be scanned in to the EMR today. Daughter Marti is primary surrogate healthcare decision-maker.  Reviewed notes (Audiology), labs (11/5/24 Hb 11.4, .4; 3/8/24 Cr 2.36, alb 4.3, Hb 11.6; 7/17/23 Cr 2.99, GFR 13), imaging + procedures (10/7/24 IR nephrostomy tube exchange). See below for more data.  Return in about 4 months (around 4/17/2025) for next scheduled follow up w/ Dr John in our [place] office.  Medication safety issues addressed - no driving under the influence of narcotics (including opioids), watch for adverse effects including AMS or respiratory depression (slowed breathing), keep medications stored in a safe/locked environment, do not use alcohol while opioids or other narcotics are in one's system, do not travel with more than the minimum number of tablets or capsules required for the trip.  I have personally queried the patient's controlled substance dispensing history in the Prescription Drug Monitoring Program in compliance with regulations before I have prescribed any controlled substances. The prescription history is consistent with prescribed therapy and our practice policies.

## 2024-12-30 DIAGNOSIS — I12.0 BENIGN HYPERTENSION WITH CHRONIC KIDNEY DISEASE, STAGE V (HCC): ICD-10-CM

## 2024-12-30 DIAGNOSIS — N18.5 STAGE 5 CHRONIC KIDNEY DISEASE NOT ON CHRONIC DIALYSIS (HCC): Primary | ICD-10-CM

## 2024-12-30 DIAGNOSIS — Z93.6 NEPHROSTOMY STATUS (HCC): ICD-10-CM

## 2024-12-30 DIAGNOSIS — N18.5 BENIGN HYPERTENSION WITH CHRONIC KIDNEY DISEASE, STAGE V (HCC): ICD-10-CM

## 2025-01-01 ENCOUNTER — APPOINTMENT (EMERGENCY)
Dept: RADIOLOGY | Facility: HOSPITAL | Age: 85
End: 2025-01-01
Payer: COMMERCIAL

## 2025-01-01 ENCOUNTER — HOSPITAL ENCOUNTER (INPATIENT)
Facility: HOSPITAL | Age: 85
LOS: 1 days | End: 2025-07-07
Attending: STUDENT IN AN ORGANIZED HEALTH CARE EDUCATION/TRAINING PROGRAM | Admitting: INTERNAL MEDICINE
Payer: COMMERCIAL

## 2025-01-01 VITALS
HEART RATE: 73 BPM | OXYGEN SATURATION: 93 % | SYSTOLIC BLOOD PRESSURE: 94 MMHG | WEIGHT: 129 LBS | HEIGHT: 60 IN | DIASTOLIC BLOOD PRESSURE: 42 MMHG | RESPIRATION RATE: 24 BRPM | BODY MASS INDEX: 25.32 KG/M2 | TEMPERATURE: 96.7 F

## 2025-01-01 DIAGNOSIS — R79.89 ELEVATED LACTIC ACID LEVEL: ICD-10-CM

## 2025-01-01 DIAGNOSIS — D72.829 LEUKOCYTOSIS: ICD-10-CM

## 2025-01-01 DIAGNOSIS — Z51.5 COMFORT MEASURES ONLY STATUS: ICD-10-CM

## 2025-01-01 DIAGNOSIS — R65.21 SEPTIC SHOCK (HCC): Primary | ICD-10-CM

## 2025-01-01 DIAGNOSIS — A41.9 SEPTIC SHOCK (HCC): Primary | ICD-10-CM

## 2025-01-01 LAB
ALBUMIN SERPL BCG-MCNC: 2.3 G/DL (ref 3.5–5)
ALP SERPL-CCNC: 136 U/L (ref 34–104)
ALT SERPL W P-5'-P-CCNC: 10 U/L (ref 7–52)
ANION GAP SERPL CALCULATED.3IONS-SCNC: 20 MMOL/L (ref 4–13)
ANISOCYTOSIS BLD QL SMEAR: PRESENT
APTT PPP: 28 SECONDS (ref 23–34)
AST SERPL W P-5'-P-CCNC: 21 U/L (ref 13–39)
ATRIAL RATE: 65 BPM
BASE EX.OXY STD BLDV CALC-SCNC: 53 % (ref 60–80)
BASE EXCESS BLDV CALC-SCNC: -20 MMOL/L
BASOPHILS # BLD MANUAL: 0 THOUSAND/UL (ref 0–0.1)
BASOPHILS NFR MAR MANUAL: 0 % (ref 0–1)
BILIRUB SERPL-MCNC: 0.58 MG/DL (ref 0.2–1)
BUN SERPL-MCNC: 81 MG/DL (ref 5–25)
BURR CELLS BLD QL SMEAR: PRESENT
CALCIUM ALBUM COR SERPL-MCNC: 10.6 MG/DL (ref 8.3–10.1)
CALCIUM SERPL-MCNC: 9.2 MG/DL (ref 8.4–10.2)
CARDIAC TROPONIN I PNL SERPL HS: 40 NG/L (ref ?–50)
CHLORIDE SERPL-SCNC: 100 MMOL/L (ref 96–108)
CO2 SERPL-SCNC: 15 MMOL/L (ref 21–32)
CREAT SERPL-MCNC: 3.83 MG/DL (ref 0.6–1.3)
EOSINOPHIL # BLD MANUAL: 0 THOUSAND/UL (ref 0–0.4)
EOSINOPHIL NFR BLD MANUAL: 0 % (ref 0–6)
ERYTHROCYTE [DISTWIDTH] IN BLOOD BY AUTOMATED COUNT: 14.4 % (ref 11.6–15.1)
GFR SERPL CREATININE-BSD FRML MDRD: 10 ML/MIN/1.73SQ M
GLUCOSE SERPL-MCNC: 143 MG/DL (ref 65–140)
GLUCOSE SERPL-MCNC: 170 MG/DL (ref 65–140)
HCO3 BLDV-SCNC: 11.2 MMOL/L (ref 24–30)
HCT VFR BLD AUTO: 39.6 % (ref 34.8–46.1)
HGB BLD-MCNC: 12 G/DL (ref 11.5–15.4)
INR PPP: 1.1 (ref 0.85–1.19)
LACTATE SERPL-SCNC: 9 MMOL/L (ref 0.5–2)
LACTATE SERPL-SCNC: 9.8 MMOL/L (ref 0.5–2)
LYMPHOCYTES # BLD AUTO: 1.4 THOUSAND/UL (ref 0.6–4.47)
LYMPHOCYTES # BLD AUTO: 4 % (ref 14–44)
MCH RBC QN AUTO: 28.3 PG (ref 26.8–34.3)
MCHC RBC AUTO-ENTMCNC: 30.3 G/DL (ref 31.4–37.4)
MCV RBC AUTO: 93 FL (ref 82–98)
MONOCYTES # BLD AUTO: 1.05 THOUSAND/UL (ref 0–1.22)
MONOCYTES NFR BLD: 3 % (ref 4–12)
MYELOCYTE ABSOLUTE CT: 0.35 THOUSAND/UL (ref 0–0.1)
MYELOCYTES NFR BLD MANUAL: 1 % (ref 0–1)
NEUTROPHILS # BLD MANUAL: 32.15 THOUSAND/UL (ref 1.85–7.62)
NEUTS BAND NFR BLD MANUAL: 2 % (ref 0–8)
NEUTS SEG NFR BLD AUTO: 90 % (ref 43–75)
O2 CT BLDV-SCNC: 8.5 ML/DL
P AXIS: 49 DEGREES
PCO2 BLDV: 49.4 MM HG (ref 42–50)
PH BLDV: 6.97 [PH] (ref 7.3–7.4)
PLATELET # BLD AUTO: 607 THOUSANDS/UL (ref 149–390)
PLATELET BLD QL SMEAR: ABNORMAL
PMV BLD AUTO: 10.8 FL (ref 8.9–12.7)
PO2 BLDV: 45.4 MM HG (ref 35–45)
POLYCHROMASIA BLD QL SMEAR: PRESENT
POTASSIUM SERPL-SCNC: 4.9 MMOL/L (ref 3.5–5.3)
PR INTERVAL: 180 MS
PROCALCITONIN SERPL-MCNC: 0.7 NG/ML
PROT SERPL-MCNC: 6.6 G/DL (ref 6.4–8.4)
PROTHROMBIN TIME: 14.7 SECONDS (ref 12.3–15)
QRS AXIS: -39 DEGREES
QRSD INTERVAL: 122 MS
QT INTERVAL: 500 MS
QTC INTERVAL: 520 MS
RBC # BLD AUTO: 4.24 MILLION/UL (ref 3.81–5.12)
RBC MORPH BLD: PRESENT
SODIUM SERPL-SCNC: 135 MMOL/L (ref 135–147)
T WAVE AXIS: -83 DEGREES
VENTRICULAR RATE: 65 BPM
WBC # BLD AUTO: 34.95 THOUSAND/UL (ref 4.31–10.16)

## 2025-01-01 PROCEDURE — 99285 EMERGENCY DEPT VISIT HI MDM: CPT

## 2025-01-01 PROCEDURE — 36415 COLL VENOUS BLD VENIPUNCTURE: CPT | Performed by: STUDENT IN AN ORGANIZED HEALTH CARE EDUCATION/TRAINING PROGRAM

## 2025-01-01 PROCEDURE — 96365 THER/PROPH/DIAG IV INF INIT: CPT

## 2025-01-01 PROCEDURE — 93005 ELECTROCARDIOGRAM TRACING: CPT

## 2025-01-01 PROCEDURE — 85007 BL SMEAR W/DIFF WBC COUNT: CPT | Performed by: STUDENT IN AN ORGANIZED HEALTH CARE EDUCATION/TRAINING PROGRAM

## 2025-01-01 PROCEDURE — 99239 HOSP IP/OBS DSCHRG MGMT >30: CPT

## 2025-01-01 PROCEDURE — 87040 BLOOD CULTURE FOR BACTERIA: CPT | Performed by: STUDENT IN AN ORGANIZED HEALTH CARE EDUCATION/TRAINING PROGRAM

## 2025-01-01 PROCEDURE — 71045 X-RAY EXAM CHEST 1 VIEW: CPT

## 2025-01-01 PROCEDURE — 83605 ASSAY OF LACTIC ACID: CPT | Performed by: STUDENT IN AN ORGANIZED HEALTH CARE EDUCATION/TRAINING PROGRAM

## 2025-01-01 PROCEDURE — 85730 THROMBOPLASTIN TIME PARTIAL: CPT | Performed by: STUDENT IN AN ORGANIZED HEALTH CARE EDUCATION/TRAINING PROGRAM

## 2025-01-01 PROCEDURE — 82948 REAGENT STRIP/BLOOD GLUCOSE: CPT

## 2025-01-01 PROCEDURE — 96366 THER/PROPH/DIAG IV INF ADDON: CPT

## 2025-01-01 PROCEDURE — 99291 CRITICAL CARE FIRST HOUR: CPT | Performed by: STUDENT IN AN ORGANIZED HEALTH CARE EDUCATION/TRAINING PROGRAM

## 2025-01-01 PROCEDURE — 99292 CRITICAL CARE ADDL 30 MIN: CPT | Performed by: STUDENT IN AN ORGANIZED HEALTH CARE EDUCATION/TRAINING PROGRAM

## 2025-01-01 PROCEDURE — 74176 CT ABD & PELVIS W/O CONTRAST: CPT

## 2025-01-01 PROCEDURE — 87077 CULTURE AEROBIC IDENTIFY: CPT | Performed by: STUDENT IN AN ORGANIZED HEALTH CARE EDUCATION/TRAINING PROGRAM

## 2025-01-01 PROCEDURE — 99223 1ST HOSP IP/OBS HIGH 75: CPT | Performed by: INTERNAL MEDICINE

## 2025-01-01 PROCEDURE — 82805 BLOOD GASES W/O2 SATURATION: CPT | Performed by: STUDENT IN AN ORGANIZED HEALTH CARE EDUCATION/TRAINING PROGRAM

## 2025-01-01 PROCEDURE — 85027 COMPLETE CBC AUTOMATED: CPT | Performed by: STUDENT IN AN ORGANIZED HEALTH CARE EDUCATION/TRAINING PROGRAM

## 2025-01-01 PROCEDURE — 84145 PROCALCITONIN (PCT): CPT | Performed by: STUDENT IN AN ORGANIZED HEALTH CARE EDUCATION/TRAINING PROGRAM

## 2025-01-01 PROCEDURE — 80053 COMPREHEN METABOLIC PANEL: CPT | Performed by: STUDENT IN AN ORGANIZED HEALTH CARE EDUCATION/TRAINING PROGRAM

## 2025-01-01 PROCEDURE — 96361 HYDRATE IV INFUSION ADD-ON: CPT

## 2025-01-01 PROCEDURE — RECHECK

## 2025-01-01 PROCEDURE — 70450 CT HEAD/BRAIN W/O DYE: CPT

## 2025-01-01 PROCEDURE — 71250 CT THORAX DX C-: CPT

## 2025-01-01 PROCEDURE — 96367 TX/PROPH/DG ADDL SEQ IV INF: CPT

## 2025-01-01 PROCEDURE — 85610 PROTHROMBIN TIME: CPT | Performed by: STUDENT IN AN ORGANIZED HEALTH CARE EDUCATION/TRAINING PROGRAM

## 2025-01-01 PROCEDURE — 84484 ASSAY OF TROPONIN QUANT: CPT | Performed by: STUDENT IN AN ORGANIZED HEALTH CARE EDUCATION/TRAINING PROGRAM

## 2025-01-01 RX ORDER — ONDANSETRON 2 MG/ML
4 INJECTION INTRAMUSCULAR; INTRAVENOUS EVERY 4 HOURS PRN
Status: DISCONTINUED | OUTPATIENT
Start: 2025-01-01 | End: 2025-01-01 | Stop reason: HOSPADM

## 2025-01-01 RX ORDER — GLYCOPYRROLATE 0.2 MG/ML
0.2 INJECTION INTRAMUSCULAR; INTRAVENOUS EVERY 4 HOURS PRN
Status: DISCONTINUED | OUTPATIENT
Start: 2025-01-01 | End: 2025-01-01 | Stop reason: HOSPADM

## 2025-01-01 RX ORDER — ALBUMIN HUMAN 50 G/1000ML
25 SOLUTION INTRAVENOUS ONCE
Status: COMPLETED | OUTPATIENT
Start: 2025-01-01 | End: 2025-01-01

## 2025-01-01 RX ORDER — HYDROMORPHONE HCL/PF 1 MG/ML
0.5 SYRINGE (ML) INJECTION EVERY 2 HOUR PRN
Status: DISCONTINUED | OUTPATIENT
Start: 2025-01-01 | End: 2025-01-01 | Stop reason: HOSPADM

## 2025-01-01 RX ORDER — LORAZEPAM 2 MG/ML
1 INJECTION INTRAMUSCULAR
Status: DISCONTINUED | OUTPATIENT
Start: 2025-01-01 | End: 2025-01-01 | Stop reason: HOSPADM

## 2025-01-01 RX ORDER — GLYCOPYRROLATE 0.2 MG/ML
0.1 INJECTION INTRAMUSCULAR; INTRAVENOUS EVERY 4 HOURS PRN
Status: DISCONTINUED | OUTPATIENT
Start: 2025-01-01 | End: 2025-01-01

## 2025-01-01 RX ORDER — MIRTAZAPINE 7.5 MG/1
7.5 TABLET, FILM COATED ORAL
COMMUNITY
Start: 2025-01-01 | End: 2025-01-01 | Stop reason: CLARIF

## 2025-01-01 RX ORDER — CEFEPIME HYDROCHLORIDE 2 G/50ML
2000 INJECTION, SOLUTION INTRAVENOUS ONCE
Status: COMPLETED | OUTPATIENT
Start: 2025-01-01 | End: 2025-01-01

## 2025-01-01 RX ADMIN — SODIUM CHLORIDE 1000 ML: 0.9 INJECTION, SOLUTION INTRAVENOUS at 10:36

## 2025-01-01 RX ADMIN — ALBUMIN (HUMAN) 25 G: 12.5 INJECTION, SOLUTION INTRAVENOUS at 12:52

## 2025-01-01 RX ADMIN — NOREPINEPHRINE BITARTRATE 2 MCG/MIN: 1 INJECTION INTRAVENOUS at 10:39

## 2025-01-01 RX ADMIN — VANCOMYCIN HYDROCHLORIDE 1500 MG: 10 INJECTION, POWDER, LYOPHILIZED, FOR SOLUTION INTRAVENOUS at 11:20

## 2025-01-01 RX ADMIN — HYDROMORPHONE HYDROCHLORIDE 0.5 MG: 1 INJECTION, SOLUTION INTRAMUSCULAR; INTRAVENOUS; SUBCUTANEOUS at 06:08

## 2025-01-01 RX ADMIN — SODIUM CHLORIDE 1000 ML: 0.9 INJECTION, SOLUTION INTRAVENOUS at 11:16

## 2025-01-01 RX ADMIN — CEFEPIME HYDROCHLORIDE 2000 MG: 2 INJECTION, SOLUTION INTRAVENOUS at 10:46

## 2025-01-02 ENCOUNTER — TELEPHONE (OUTPATIENT)
Dept: UROLOGY | Facility: CLINIC | Age: 85
End: 2025-01-02

## 2025-01-02 NOTE — TELEPHONE ENCOUNTER
LVM for pt to confirm appt on 1/27 at 3pm with Dr. Ricardo. Asked to .[phone] to confirm message and new appt is good.

## 2025-01-02 NOTE — TELEPHONE ENCOUNTER
LVM for pt to let her know we needed to change the appt time on 1/6. Asked her to [phone] to confirm message

## 2025-01-02 NOTE — TELEPHONE ENCOUNTER
Pt calling in regards to new appt scheduled for 1/27/25 at 3pm with Dr Ricardo. Appt for 1/6 cancelled.

## 2025-01-02 NOTE — TELEPHONE ENCOUNTER
Appt changed to end of January. Please call and confirm new appt. If she confirm please cancel appt on Monday

## 2025-01-02 NOTE — TELEPHONE ENCOUNTER
Age 65 Or Greater ([facility]-10): Yes Upcoming appt time next week had to be moved to earlier in the morning. Please call and confirm new time    Prior History Of Falls Within 3 Months - An Unintentional Change In Position Resulting In Coming To Rest On The Ground Or At A Lower Level ([facility]-10): No

## 2025-01-02 NOTE — TELEPHONE ENCOUNTER
Pt called back and stated she cannot come in for a morning appointment and this change does not work for her. She needs a Monday or Tuesday afternoon appointment. No availability. Please review.     Call back: [phone]

## 2025-01-06 ENCOUNTER — TELEPHONE (OUTPATIENT)
Dept: NEPHROLOGY | Facility: CLINIC | Age: 85
End: 2025-01-06

## 2025-01-07 ENCOUNTER — HOSPITAL ENCOUNTER (OUTPATIENT)
Dept: NON INVASIVE DIAGNOSTICS | Facility: HOSPITAL | Age: 85
Discharge: HOME | End: 2025-01-07
Attending: RADIOLOGY
Payer: COMMERCIAL

## 2025-01-07 DIAGNOSIS — Z93.6 NEPHROSTOMY STATUS (HCC): ICD-10-CM

## 2025-01-07 DIAGNOSIS — C67.8 MALIGNANT NEOPLASM OF OVERLAPPING SITES OF BLADDER (HCC): ICD-10-CM

## 2025-01-07 DIAGNOSIS — N13.9 OBSTRUCTIVE UROPATHY: Primary | ICD-10-CM

## 2025-01-07 PROCEDURE — C1729 CATH, DRAINAGE: HCPCS

## 2025-01-07 PROCEDURE — 50435 EXCHANGE NEPHROSTOMY CATH: CPT

## 2025-01-07 PROCEDURE — 49424 ASSESS CYST CONTRAST INJECT: CPT

## 2025-01-07 PROCEDURE — 50435 EXCHANGE NEPHROSTOMY CATH: CPT | Performed by: RADIOLOGY

## 2025-01-07 PROCEDURE — C1769 GUIDE WIRE: HCPCS

## 2025-01-07 PROCEDURE — 50387 CHANGE NEPHROURETERAL CATH: CPT

## 2025-01-07 RX ORDER — LIDOCAINE WITH 8.4% SOD BICARB 0.9%(10ML)
SYRINGE (ML) INJECTION AS NEEDED
Status: COMPLETED | OUTPATIENT
Start: 2025-01-07 | End: 2025-01-07

## 2025-01-07 RX ADMIN — IOHEXOL 10 ML: 350 INJECTION, SOLUTION INTRAVENOUS at 11:32

## 2025-01-07 RX ADMIN — Medication 5 ML: at 11:28

## 2025-01-07 RX ADMIN — Medication 5 ML: at 11:24

## 2025-01-07 NOTE — SEDATION DOCUMENTATION
Bilateral nephrostomy exchanges complete by Dr. Jones. Patient tolerated procedure well. Dry dressing to both sites. Instructions given to patient and daughter. Both verbalized understanding.

## 2025-01-07 NOTE — DISCHARGE INSTRUCTIONS
Nephrostomy Tube Care     WHAT YOU NEED TO KNOW:   A nephrostomy tube is a catheter (thin plastic tube) that is inserted through your skin and into your kidney. The nephrostomy tube drains urine from your kidney into a collecting bag outside your body. You may need a nephrostomy tube when something is blocking the normal flow of urine. A nephrostomy tube may be used for a short or a long period of time. The nephrostomy tube comes out of your back, so you will need someone to help care for your nephrostomy tube.          DISCHARGE INSTRUCTIONS:      How to clean the skin around the nephrostomy tube and change the bandage:  Since the nephrostomy tube comes out of your back, you will not be able to care for it by yourself. Ask someone to follow the general directions below to check and care for your nephrostomy tube.   Gather the items you will need.          Disposable (single use) under-pad, and a clean washcloth  Plain soap, warm water, and new medical gloves  Sterile gauze bandages  Clear adhesive dressing or medical tape  Skin barrier  Protective skin film  Trash bag  Remove the old bandage, and check the tube entry site.    Have the patient lie on his side with the nephrostomy tube entry site facing up. Place the under-pad where it will catch drainage as you are working with the nephrostomy tube.   Wash your hands with soap and water. Put on new medical gloves.  Gently remove the old bandage, without pulling on the tube. Do this by holding the skin beside the tube with one hand. With the other hand, gently remove sticky tape and the skin barrier by pulling in the same direction as hair growth. Do not touch the side of the bandage that is placed over or around the tube. Throw the bandage and skin barrier away in a trash bag.  Look for signs of infection, such as skin redness and swelling. Report any skin changes to healthcare providers.  Clean the tube entry site.    Hold the tube in place to keep it from  being pulled out while you are cleaning around it.  You will need to clean the area twice. For the first cleaning, wet a new gauze bandage with soap and water.  Begin at the entry site of the tube. Wipe the skin in circles, moving away from the entry site. Remove blood and any other material with the gauze. Do this as often as needed. Use a new gauze bandage each time you clean the area, moving away from the entry site.   For the second cleaning, wet a new gauze bandage with water. Begin at the entry site of the tube. Wipe the skin in circles, moving away from the entry site. Use a new gauze bandage each time you clean the area, moving away from the entry site.   Gently pat the skin with a clean washcloth to dry it.    Apply the skin barrier and bandages.    Roll up a bandage to make it thick, and place it under  the place where the tube enters the skin. Place it to support the tube, and stop it from kinking or bending. Tape the bandage in place, and apply more bandages if directed by a healthcare provider.   Bring the tubing forward to the front and tape it to the skin. Do not stretch the tube tight, because this may pull the nephrostomy tube out.  How often to change the bandage.  Change the bandage around the tube, every other day. If your bandages  get dirty or wet, change them right away, and as often as needed. If your nephrostomy tube is to be used for a long period of time, the tube needs to be changed every 2 to 3 months. Healthcare providers will tell you when you need to make an appointment to have your tube changed.     How to care for the urine drainage bag:   Ask if you need to measure and write down how much urine is in the bag before you empty it. Drain urine out of the drainage bag when it is ½ to ? full. Open the spout at the bottom of the bag to empty the urine into the toilet.   You may need to detach the drainage bag from the nephrostomy tube to change it.. If so, attach a new drainage bag  tightly to the nephrostomy tube.     How to prevent problems with your nephrostomy tube:   Change bandages, directed.  This helps to prevent infection. Throw away or clean your drainage bag as directed by your healthcare provider.    Wipe the connecting ends of the drainage bag with alcohol before you reconnect the bag to the tube.  This helps prevent infection.     Keep the tube taped to your skin and connected to a drainage bag placed below the level of your kidneys.  This helps prevent urine from backing up into your kidneys. You may wear a small drainage bag strapped to your leg to let you move around more easily.    Check the catheter to be sure it is in place after you change your clothes or do other activities.  Do not wear tight clothing over the tube. Place the tubing over your thigh rather than under it when you are sitting down. Be sure that nothing is pulling on the nephrostomy tube when you move around.    Change positions if you see little or no urine in your drainage bag.  Check to see if the urine tube is twisted or bent. Be sure that you are not sitting or lying on the tube. If there are no kinks and there is little or no urine in the drainage bag, tell your healthcare provider.    Flush out the tube as directed. Some tubes get flushed one time a day with 10 mls of NSS You will be given a prescription for the flushes.  To flush the nephrostomy tube, clean both connections with alcohol swap. Twist off the drainage bag tube and twist the saline syringe into the nephrostomy tube and flush briskly. Remove the syringe and twist the drainage bag tube back into the nephrostomy tube.  Keep the site covered while you shower.  Tape a piece of clear adhesive plastic over the dressing to keep it dry while you shower. Do not take tub baths.    Contact Interventional Radiology at [phone] ([facility] PATIENTS: Contact Interventional Radiology at [phone]) ([facility] PATIENTS: Contact Interventional Radiology at  [phone]) if:  The skin around the nephrostomy tube is red, swollen, itches, or has a rash.   You have a fever greater than 101 or chills.  You have lower back or hip pain.  There are changes in how your urine looks or smells.  You have little or no urine draining from the nephrostomy tube.   You have nausea and are vomiting.  The black arleth on your tube has moved, or the tube is longer than when it was put in.   You have questions or concerns about your condition or care.  The nephrostomy tube comes out completely.   There is blood, pus, or a bad smell coming from the place where the tube enters your skin.  Urine is leaking around the tube.        The following pharmacies carry the flush syringes.       [facility]                     [address]                     [address] [facility]                    [phone]  [place]                       [place]  Phone [phone]            Phone [phone]                 [facility]                                                                                                   [phone]  [facility]             [facility]                             [address] [place], [place] [address] [facility]   [place]                                 [phone]  Phone [phone]            Phone [phone]    [facility]                                                                         [facility] [phone]  Dayna Bansal.  [place]   Phone [phone]

## 2025-01-08 RX ORDER — SODIUM CHLORIDE 9 MG/ML
10 INJECTION, SOLUTION INTRAMUSCULAR; INTRAVENOUS; SUBCUTANEOUS DAILY
Qty: 300 ML | Refills: 3 | Status: CANCELLED
Start: 2025-01-08 | End: 2025-05-08

## 2025-01-10 NOTE — TELEPHONE ENCOUNTER
Called patient reminding to please complete labwork prior to 1/14 appointment with Dr. Reyes.    
ELVIRA for patient reminding  to go for lab work before their appt on 1/14 with Dr. Reyes.  Asked them to call back if they have any questions.   
ELVIRA for patient reminding  to go for lab work before their appt on 1/14 with Dr. Reyes.  Asked them to call back if they have any questions.    
Name band;

## 2025-01-13 ENCOUNTER — APPOINTMENT (OUTPATIENT)
Dept: LAB | Facility: CLINIC | Age: 85
End: 2025-01-13
Payer: COMMERCIAL

## 2025-01-13 DIAGNOSIS — N18.5 STAGE 5 CHRONIC KIDNEY DISEASE NOT ON CHRONIC DIALYSIS (HCC): ICD-10-CM

## 2025-01-13 DIAGNOSIS — I12.0 BENIGN HYPERTENSION WITH CHRONIC KIDNEY DISEASE, STAGE V (HCC): ICD-10-CM

## 2025-01-13 DIAGNOSIS — Z93.6 NEPHROSTOMY STATUS (HCC): ICD-10-CM

## 2025-01-13 DIAGNOSIS — N18.5 BENIGN HYPERTENSION WITH CHRONIC KIDNEY DISEASE, STAGE V (HCC): ICD-10-CM

## 2025-01-13 LAB
ANION GAP SERPL CALCULATED.3IONS-SCNC: 7 MMOL/L (ref 4–13)
BUN SERPL-MCNC: 51 MG/DL (ref 5–25)
CALCIUM SERPL-MCNC: 9.6 MG/DL (ref 8.4–10.2)
CHLORIDE SERPL-SCNC: 106 MMOL/L (ref 96–108)
CO2 SERPL-SCNC: 26 MMOL/L (ref 21–32)
CREAT SERPL-MCNC: 2.03 MG/DL (ref 0.6–1.3)
ERYTHROCYTE [DISTWIDTH] IN BLOOD BY AUTOMATED COUNT: 12.3 % (ref 11.6–15.1)
GFR SERPL CREATININE-BSD FRML MDRD: 21 ML/MIN/1.73SQ M
GLUCOSE P FAST SERPL-MCNC: 114 MG/DL (ref 65–99)
HCT VFR BLD AUTO: 35.2 % (ref 34.8–46.1)
HGB BLD-MCNC: 11.3 G/DL (ref 11.5–15.4)
MCH RBC QN AUTO: 28.5 PG (ref 26.8–34.3)
MCHC RBC AUTO-ENTMCNC: 32.1 G/DL (ref 31.4–37.4)
MCV RBC AUTO: 89 FL (ref 82–98)
PHOSPHATE SERPL-MCNC: 3.4 MG/DL (ref 2.3–4.1)
PLATELET # BLD AUTO: 338 THOUSANDS/UL (ref 149–390)
PMV BLD AUTO: 11.3 FL (ref 8.9–12.7)
POTASSIUM SERPL-SCNC: 3.9 MMOL/L (ref 3.5–5.3)
PTH-INTACT SERPL-MCNC: 162.6 PG/ML (ref 12–88)
RBC # BLD AUTO: 3.97 MILLION/UL (ref 3.81–5.12)
SODIUM SERPL-SCNC: 139 MMOL/L (ref 135–147)
WBC # BLD AUTO: 6.91 THOUSAND/UL (ref 4.31–10.16)

## 2025-01-13 PROCEDURE — 36415 COLL VENOUS BLD VENIPUNCTURE: CPT

## 2025-01-13 PROCEDURE — 84100 ASSAY OF PHOSPHORUS: CPT

## 2025-01-13 PROCEDURE — 83970 ASSAY OF PARATHORMONE: CPT

## 2025-01-13 PROCEDURE — 85027 COMPLETE CBC AUTOMATED: CPT

## 2025-01-13 PROCEDURE — 80048 BASIC METABOLIC PNL TOTAL CA: CPT

## 2025-01-14 ENCOUNTER — OFFICE VISIT (OUTPATIENT)
Dept: NEPHROLOGY | Facility: CLINIC | Age: 85
End: 2025-01-14
Payer: COMMERCIAL

## 2025-01-14 VITALS — BODY MASS INDEX: 33.98 KG/M2 | SYSTOLIC BLOOD PRESSURE: 130 MMHG | HEIGHT: 62 IN | DIASTOLIC BLOOD PRESSURE: 80 MMHG

## 2025-01-14 DIAGNOSIS — M89.9 CHRONIC KIDNEY DISEASE-MINERAL BONE DISORDER (CKD-MBD) WITH STAGE 4 CHRONIC KIDNEY DISEASE (HCC): ICD-10-CM

## 2025-01-14 DIAGNOSIS — N18.5 STAGE 5 CHRONIC KIDNEY DISEASE NOT ON CHRONIC DIALYSIS (HCC): ICD-10-CM

## 2025-01-14 DIAGNOSIS — I12.9 BENIGN HYPERTENSION WITH CHRONIC KIDNEY DISEASE, STAGE IV (HCC): ICD-10-CM

## 2025-01-14 DIAGNOSIS — N18.4 STAGE 4 CHRONIC KIDNEY DISEASE (HCC): Primary | ICD-10-CM

## 2025-01-14 DIAGNOSIS — N18.4 BENIGN HYPERTENSION WITH CHRONIC KIDNEY DISEASE, STAGE IV (HCC): ICD-10-CM

## 2025-01-14 DIAGNOSIS — N18.4 CHRONIC KIDNEY DISEASE-MINERAL BONE DISORDER (CKD-MBD) WITH STAGE 4 CHRONIC KIDNEY DISEASE (HCC): ICD-10-CM

## 2025-01-14 DIAGNOSIS — E66.811 CLASS 1 OBESITY DUE TO EXCESS CALORIES WITH SERIOUS COMORBIDITY AND BODY MASS INDEX (BMI) OF 34.0 TO 34.9 IN ADULT: ICD-10-CM

## 2025-01-14 DIAGNOSIS — N13.9 OBSTRUCTIVE UROPATHY: ICD-10-CM

## 2025-01-14 DIAGNOSIS — N25.81 SECONDARY HYPERPARATHYROIDISM OF RENAL ORIGIN (HCC): ICD-10-CM

## 2025-01-14 DIAGNOSIS — E66.01 MORBID OBESITY DUE TO EXCESS CALORIES (HCC): ICD-10-CM

## 2025-01-14 DIAGNOSIS — E66.09 CLASS 1 OBESITY DUE TO EXCESS CALORIES WITH SERIOUS COMORBIDITY AND BODY MASS INDEX (BMI) OF 34.0 TO 34.9 IN ADULT: ICD-10-CM

## 2025-01-14 DIAGNOSIS — E83.9 CHRONIC KIDNEY DISEASE-MINERAL BONE DISORDER (CKD-MBD) WITH STAGE 4 CHRONIC KIDNEY DISEASE (HCC): ICD-10-CM

## 2025-01-14 DIAGNOSIS — C67.8 MALIGNANT NEOPLASM OF OVERLAPPING SITES OF BLADDER (HCC): ICD-10-CM

## 2025-01-14 PROCEDURE — 99214 OFFICE O/P EST MOD 30 MIN: CPT | Performed by: STUDENT IN AN ORGANIZED HEALTH CARE EDUCATION/TRAINING PROGRAM

## 2025-01-14 NOTE — PROGRESS NOTES
Name: Ning Chambers      : 1940      MRN: [id number]  Encounter Provider: Joselyn Reyes Bahamonde, MD  Encounter Date: 2025   Encounter department: [facility]  :  Assessment & Plan  Stage 4 chronic kidney disease (HCC)  Lab Results   Component Value Date    EGFR 2025    EGFR 13 2023    EGFR 15 2023    CREATININE 2.03 (H) 2025    CREATININE 2.99 (H) 2023    CREATININE 2.78 (H) 2023   Baseline creatinine: 2.7 to 3 mg/dL in   Current creatinine 2.03 mg/dL, below baseline  UA with microhematuria, leukocyturia from nephrostomy   No indication of dialysis at this time. During our previous conversation patient expressed her wishes not to proceed with dialysis   Will monitor kidney function closely with BMP          Benign hypertension with chronic kidney disease, stage IV (HCC)  Lab Results   Component Value Date    EGFR 2025    EGFR 2023    EGFR 15 2023    CREATININE 2.03 (H) 2025    CREATININE 2.99 (H) 2023    CREATININE 2.78 (H) 2023   Euvolemic on exam   Blood pressure : normotensive 130/80  Continue with low-sodium diet   Recommend goal less than 150/90  Continue with hydrochlorothiazide 25 mg daily  Metoprolol 25 mg daily       Malignant neoplasm of overlapping sites of bladder (HCC)  Status post nephrostomy due to obstruction   Continue follow-up with urology/oncology       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)  Lab Results   Component Value Date    EGFR 2025    EGFR 13 2023    EGFR 15 2023    CREATININE 2.03 (H) 2025    CREATININE 2.99 (H) 2023    CREATININE 2.78 (H) 2023   Serum calcium 9.6 mg/dL   phosphorus 3.4 mg/dL, goal less than 5.5   No indication of binders at this time      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 female PMH of stroke, hearing loss, CKD G4/5, Bladder cancer w/ progressive retroperitoneal lymphadenopathy with bilateral nephrostomy tubes, who follows with Dr Ricardo (Urology), ambulatory dysfunction, obesity. Patien is here for follow-up of CKDt   History obtained from: patient    Review of Systems  Pertinent Medical History     #CKD G4    #Volume/hypertension:    #Acid base disorder    #CKD-MBD  #Secondary Hyperparathyroidism   #Anemia:    # Obstructive uropathy/bilateral nephrostomy    #Metastatic bladder cancer    Medical History Reviewed by provider this encounter:           Medical History Reviewed by provider this encounter:     .  Current Outpatient Medications on File Prior to Visit   Medication Sig Dispense Refill    acetaminophen (TYLENOL) 500 mg tablet Take 500 mg by mouth      aspirin 81 mg chewable tablet Chew 81 mg daily      atorvastatin (LIPITOR) 40 mg tablet Take 1 tablet (40 mg total) by mouth daily with dinner (Patient taking differently: Take 20 mg by mouth Every two days) 14 tablet 0    cholecalciferol (VITAMIN D3) 1,000 units tablet Take 1,000 Units by mouth daily      fluticasone (FLONASE) 50 mcg/act nasal spray  (Patient not taking: Reported on 7/30/2024)      hydrochlorothiazide (HYDRODIURIL) 25 mg tablet Take 25 mg by mouth every morning      metoprolol succinate (TOPROL-XL) 50 mg 24 hr tablet Take 25 mg by mouth every morning      sodium chloride, PF, 0.9 % 10 mL by Intracatheter route daily Intracatheter flushing daily. May substitute prefilled syringe with normal saline 10  mL vials, 10 mL syringes, and 18 g blunt needles 900 mL 0     No current facility-administered medications on file prior to visit.      Social History     Tobacco Use    Smoking status: Former     Current packs/day: 0.00     Average packs/day: 1.5 packs/day for 40.0 years (60.0 ttl pk-yrs)     Types: Cigarettes     Start date:      Quit date:      Years since quittin.0    Smokeless tobacco: Never   Vaping Use    Vaping status: Never Used   Substance and Sexual Activity    Alcohol use: Yes     Comment: occassional, socially    Drug use: No    Sexual activity: Not Currently        Objective   There were no vitals taken for this visit.     Physical Exam  General:  no acute distress at this time  Skin:  No acute rash  Eyes:  No scleral icterus and noninjected  ENT:  mucous membranes moist  Neck:  no carotid bruits  Chest:  Clear to auscultation percussion, good respiratory effort, no use of accessory respiratory muscles  CVS:  Regular rate and rhythm without rub   Abdomen:  soft and nontender   Extremities: no significant lower extremity edema  Neuro:  No gross focality  Psych:  Alert , cooperative

## 2025-01-14 NOTE — ASSESSMENT & PLAN NOTE
Lab Results   Component Value Date    EGFR 21 01/13/2025    EGFR 13 07/17/2023    EGFR 15 03/08/2023    CREATININE 2.03 (H) 01/13/2025    CREATININE 2.99 (H) 07/17/2023    CREATININE 2.78 (H) 03/08/2023   Serum calcium 9.6 mg/dL   phosphorus 3.4 mg/dL, goal less than 5.5   No indication of binders at this time

## 2025-01-14 NOTE — PATIENT INSTRUCTIONS
Thank you for coming to your visit today. As we discussed you kidney function has improved , your creatinine is 2. Your electrolytes are normal. Please follow the recommendations below       Recommend low sodium (salt) food    Avoid nonsteroidal anti-inflammatory drugs such as Naprosyn, ibuprofen, Aleve, Advil, Celebrex, Meloxicam (Mobic) etc.  You can use Tylenol as needed if you do not have any liver condition to be concerned about      Try to exercise at least 30 minutes 3 days a week to begin with with an ultimate goal of 5 days a     Next Visit in 6 months with results   If you need to see us earlier we can change the appointment for you      Joselyn Reyes Bahamonde, MD  Nephrology Attending

## 2025-01-14 NOTE — ASSESSMENT & PLAN NOTE
Lab Results   Component Value Date    EGFR 21 01/13/2025    EGFR 13 07/17/2023    EGFR 15 03/08/2023    CREATININE 2.03 (H) 01/13/2025    CREATININE 2.99 (H) 07/17/2023    CREATININE 2.78 (H) 03/08/2023   Euvolemic on exam   Blood pressure : normotensive 130/80  Continue with low-sodium diet   Recommend goal less than 150/90  Continue with hydrochlorothiazide 25 mg daily  Metoprolol 25 mg daily

## 2025-01-22 ENCOUNTER — HOSPITAL ENCOUNTER (INPATIENT)
Facility: HOSPITAL | Age: 85
LOS: 8 days | Discharge: NURSING, REHAB OR LONG-TERM CARE | DRG: 872 | End: 2025-01-30
Admitting: FAMILY MEDICINE
Payer: COMMERCIAL

## 2025-01-22 ENCOUNTER — APPOINTMENT (EMERGENCY)
Dept: RADIOLOGY | Facility: HOSPITAL | Age: 85
DRG: 872 | End: 2025-01-22
Payer: COMMERCIAL

## 2025-01-22 DIAGNOSIS — A41.9 SEPSIS (HCC): ICD-10-CM

## 2025-01-22 DIAGNOSIS — L03.90 CELLULITIS: ICD-10-CM

## 2025-01-22 DIAGNOSIS — L03.319 CELLULITIS OF FLANK: ICD-10-CM

## 2025-01-22 DIAGNOSIS — R21 RASH: ICD-10-CM

## 2025-01-22 DIAGNOSIS — N18.4 STAGE 4 CHRONIC KIDNEY DISEASE (HCC): ICD-10-CM

## 2025-01-22 DIAGNOSIS — Z93.6 NEPHROSTOMY STATUS (HCC): ICD-10-CM

## 2025-01-22 DIAGNOSIS — R65.20 SEVERE SEPSIS (HCC): ICD-10-CM

## 2025-01-22 DIAGNOSIS — N39.0 UTI (URINARY TRACT INFECTION): ICD-10-CM

## 2025-01-22 DIAGNOSIS — N17.9 AKI (ACUTE KIDNEY INJURY) (HCC): Primary | ICD-10-CM

## 2025-01-22 DIAGNOSIS — A41.9 SEVERE SEPSIS (HCC): ICD-10-CM

## 2025-01-22 PROBLEM — R82.90 ABNORMAL URINALYSIS: Status: ACTIVE | Noted: 2025-01-22

## 2025-01-22 PROBLEM — I10 BENIGN HYPERTENSION: Status: ACTIVE | Noted: 2024-09-17

## 2025-01-22 PROBLEM — E87.29 HIGH ANION GAP METABOLIC ACIDOSIS: Status: ACTIVE | Noted: 2025-01-22

## 2025-01-22 PROBLEM — E87.20 METABOLIC ACIDOSIS: Status: ACTIVE | Noted: 2025-01-22

## 2025-01-22 PROBLEM — N18.9 ACUTE KIDNEY INJURY SUPERIMPOSED ON CHRONIC KIDNEY DISEASE  (HCC): Status: ACTIVE | Noted: 2025-01-22

## 2025-01-22 LAB
ALBUMIN SERPL BCG-MCNC: 3.4 G/DL (ref 3.5–5)
ALP SERPL-CCNC: 49 U/L (ref 34–104)
ALT SERPL W P-5'-P-CCNC: 27 U/L (ref 7–52)
AMORPH URATE CRY URNS QL MICRO: ABNORMAL
ANION GAP SERPL CALCULATED.3IONS-SCNC: 21 MMOL/L (ref 4–13)
ANISOCYTOSIS BLD QL SMEAR: PRESENT
AST SERPL W P-5'-P-CCNC: 51 U/L (ref 13–39)
BACTERIA UR QL AUTO: ABNORMAL /HPF
BASOPHILS # BLD MANUAL: 0.18 THOUSAND/UL (ref 0–0.1)
BASOPHILS NFR MAR MANUAL: 1 % (ref 0–1)
BILIRUB SERPL-MCNC: 0.63 MG/DL (ref 0.2–1)
BILIRUB UR QL STRIP: NEGATIVE
BUN SERPL-MCNC: 119 MG/DL (ref 5–25)
CALCIUM ALBUM COR SERPL-MCNC: 9.6 MG/DL (ref 8.3–10.1)
CALCIUM SERPL-MCNC: 9.1 MG/DL (ref 8.4–10.2)
CHLORIDE SERPL-SCNC: 101 MMOL/L (ref 96–108)
CLARITY UR: ABNORMAL
CO2 SERPL-SCNC: 16 MMOL/L (ref 21–32)
COLOR UR: YELLOW
CREAT SERPL-MCNC: 5.11 MG/DL (ref 0.6–1.3)
EOSINOPHIL # BLD MANUAL: 0 THOUSAND/UL (ref 0–0.4)
EOSINOPHIL NFR BLD MANUAL: 0 % (ref 0–6)
ERYTHROCYTE [DISTWIDTH] IN BLOOD BY AUTOMATED COUNT: 12.6 % (ref 11.6–15.1)
FLUAV AG UPPER RESP QL IA.RAPID: NEGATIVE
FLUBV AG UPPER RESP QL IA.RAPID: NEGATIVE
GFR SERPL CREATININE-BSD FRML MDRD: 7 ML/MIN/1.73SQ M
GLUCOSE SERPL-MCNC: 103 MG/DL (ref 65–140)
GLUCOSE UR STRIP-MCNC: NEGATIVE MG/DL
HCT VFR BLD AUTO: 40.9 % (ref 34.8–46.1)
HGB BLD-MCNC: 13.5 G/DL (ref 11.5–15.4)
HGB UR QL STRIP.AUTO: ABNORMAL
KETONES UR STRIP-MCNC: ABNORMAL MG/DL
LACTATE SERPL-SCNC: 2 MMOL/L (ref 0.5–2)
LEUKOCYTE ESTERASE UR QL STRIP: ABNORMAL
LG PLATELETS BLD QL SMEAR: PRESENT
LYMPHOCYTES # BLD AUTO: 1.44 THOUSAND/UL (ref 0.6–4.47)
LYMPHOCYTES # BLD AUTO: 7 % (ref 14–44)
MAGNESIUM SERPL-MCNC: 2.4 MG/DL (ref 1.9–2.7)
MCH RBC QN AUTO: 28 PG (ref 26.8–34.3)
MCHC RBC AUTO-ENTMCNC: 33 G/DL (ref 31.4–37.4)
MCV RBC AUTO: 85 FL (ref 82–98)
MICROCYTES BLD QL AUTO: PRESENT
MONOCYTES # BLD AUTO: 0.18 THOUSAND/UL (ref 0–1.22)
MONOCYTES NFR BLD: 1 % (ref 4–12)
MYELOCYTE ABSOLUTE CT: 0.18 THOUSAND/UL (ref 0–0.1)
MYELOCYTES NFR BLD MANUAL: 1 % (ref 0–1)
NEUTROPHILS # BLD MANUAL: 16.04 THOUSAND/UL (ref 1.85–7.62)
NEUTS BAND NFR BLD MANUAL: 1 % (ref 0–8)
NEUTS SEG NFR BLD AUTO: 88 % (ref 43–75)
NITRITE UR QL STRIP: NEGATIVE
NON-SQ EPI CELLS URNS QL MICRO: ABNORMAL /HPF
OVALOCYTES BLD QL SMEAR: PRESENT
PH UR STRIP.AUTO: 6 [PH]
PHOSPHATE SERPL-MCNC: 5.1 MG/DL (ref 2.3–4.1)
PLATELET # BLD AUTO: 298 THOUSANDS/UL (ref 149–390)
PLATELET BLD QL SMEAR: ADEQUATE
PLATELET CLUMP BLD QL SMEAR: PRESENT
PMV BLD AUTO: 11.3 FL (ref 8.9–12.7)
POLYCHROMASIA BLD QL SMEAR: PRESENT
POTASSIUM SERPL-SCNC: 3.9 MMOL/L (ref 3.5–5.3)
PROCALCITONIN SERPL-MCNC: 2.69 NG/ML
PROT SERPL-MCNC: 6.9 G/DL (ref 6.4–8.4)
PROT UR STRIP-MCNC: ABNORMAL MG/DL
RBC # BLD AUTO: 4.83 MILLION/UL (ref 3.81–5.12)
RBC #/AREA URNS AUTO: ABNORMAL /HPF
RBC MORPH BLD: PRESENT
SARS-COV+SARS-COV-2 AG RESP QL IA.RAPID: NEGATIVE
SODIUM SERPL-SCNC: 138 MMOL/L (ref 135–147)
SP GR UR STRIP.AUTO: >=1.03 (ref 1–1.03)
UROBILINOGEN UR STRIP-ACNC: 4 MG/DL
VARIANT LYMPHS # BLD AUTO: 1 %
WBC # BLD AUTO: 18.02 THOUSAND/UL (ref 4.31–10.16)
WBC #/AREA URNS AUTO: ABNORMAL /HPF

## 2025-01-22 PROCEDURE — 99222 1ST HOSP IP/OBS MODERATE 55: CPT | Performed by: PHYSICIAN ASSISTANT

## 2025-01-22 PROCEDURE — 96365 THER/PROPH/DIAG IV INF INIT: CPT

## 2025-01-22 PROCEDURE — 71045 X-RAY EXAM CHEST 1 VIEW: CPT

## 2025-01-22 PROCEDURE — 99285 EMERGENCY DEPT VISIT HI MDM: CPT

## 2025-01-22 PROCEDURE — 87804 INFLUENZA ASSAY W/OPTIC: CPT

## 2025-01-22 PROCEDURE — 84145 PROCALCITONIN (PCT): CPT

## 2025-01-22 PROCEDURE — 84100 ASSAY OF PHOSPHORUS: CPT

## 2025-01-22 PROCEDURE — 87086 URINE CULTURE/COLONY COUNT: CPT | Performed by: PHYSICIAN ASSISTANT

## 2025-01-22 PROCEDURE — 36415 COLL VENOUS BLD VENIPUNCTURE: CPT

## 2025-01-22 PROCEDURE — 87811 SARS-COV-2 COVID19 W/OPTIC: CPT

## 2025-01-22 PROCEDURE — 87040 BLOOD CULTURE FOR BACTERIA: CPT

## 2025-01-22 PROCEDURE — 85007 BL SMEAR W/DIFF WBC COUNT: CPT

## 2025-01-22 PROCEDURE — 80053 COMPREHEN METABOLIC PANEL: CPT

## 2025-01-22 PROCEDURE — 81001 URINALYSIS AUTO W/SCOPE: CPT

## 2025-01-22 PROCEDURE — 83735 ASSAY OF MAGNESIUM: CPT

## 2025-01-22 PROCEDURE — 85027 COMPLETE CBC AUTOMATED: CPT

## 2025-01-22 PROCEDURE — 83605 ASSAY OF LACTIC ACID: CPT

## 2025-01-22 PROCEDURE — 96361 HYDRATE IV INFUSION ADD-ON: CPT

## 2025-01-22 PROCEDURE — 93005 ELECTROCARDIOGRAM TRACING: CPT

## 2025-01-22 RX ORDER — CEFEPIME HYDROCHLORIDE 1 G/50ML
1000 INJECTION, SOLUTION INTRAVENOUS EVERY 24 HOURS
Status: DISCONTINUED | OUTPATIENT
Start: 2025-01-23 | End: 2025-01-23

## 2025-01-22 RX ORDER — ASPIRIN 81 MG/1
81 TABLET, CHEWABLE ORAL DAILY
Status: DISCONTINUED | OUTPATIENT
Start: 2025-01-23 | End: 2025-01-30 | Stop reason: HOSPADM

## 2025-01-22 RX ORDER — METOPROLOL SUCCINATE 25 MG/1
25 TABLET, EXTENDED RELEASE ORAL EVERY MORNING
Status: DISCONTINUED | OUTPATIENT
Start: 2025-01-23 | End: 2025-01-30 | Stop reason: HOSPADM

## 2025-01-22 RX ORDER — ATORVASTATIN CALCIUM 40 MG/1
40 TABLET, FILM COATED ORAL
Status: DISCONTINUED | OUTPATIENT
Start: 2025-01-23 | End: 2025-01-30 | Stop reason: HOSPADM

## 2025-01-22 RX ORDER — HEPARIN SODIUM 5000 [USP'U]/ML
5000 INJECTION, SOLUTION INTRAVENOUS; SUBCUTANEOUS EVERY 8 HOURS SCHEDULED
Status: DISCONTINUED | OUTPATIENT
Start: 2025-01-22 | End: 2025-01-30 | Stop reason: HOSPADM

## 2025-01-22 RX ORDER — ACETAMINOPHEN 325 MG/1
650 TABLET ORAL EVERY 6 HOURS PRN
Status: DISCONTINUED | OUTPATIENT
Start: 2025-01-22 | End: 2025-01-30 | Stop reason: HOSPADM

## 2025-01-22 RX ORDER — VANCOMYCIN HYDROCHLORIDE 500 MG/100ML
10 INJECTION, SOLUTION INTRAVENOUS DAILY PRN
Status: DISCONTINUED | OUTPATIENT
Start: 2025-01-23 | End: 2025-01-23

## 2025-01-22 RX ORDER — HYDROCHLOROTHIAZIDE 25 MG/1
25 TABLET ORAL EVERY MORNING
Status: DISCONTINUED | OUTPATIENT
Start: 2025-01-23 | End: 2025-01-28

## 2025-01-22 RX ORDER — CEFEPIME HYDROCHLORIDE 2 G/50ML
2000 INJECTION, SOLUTION INTRAVENOUS ONCE
Status: COMPLETED | OUTPATIENT
Start: 2025-01-22 | End: 2025-01-22

## 2025-01-22 RX ADMIN — SODIUM BICARBONATE 100 ML/HR: 84 INJECTION, SOLUTION INTRAVENOUS at 21:12

## 2025-01-22 RX ADMIN — VANCOMYCIN HYDROCHLORIDE 1500 MG: 1 INJECTION, POWDER, LYOPHILIZED, FOR SOLUTION INTRAVENOUS at 21:12

## 2025-01-22 RX ADMIN — SODIUM CHLORIDE 1000 ML: 0.9 INJECTION, SOLUTION INTRAVENOUS at 17:33

## 2025-01-22 RX ADMIN — CEFEPIME HYDROCHLORIDE 2000 MG: 2 INJECTION, SOLUTION INTRAVENOUS at 17:25

## 2025-01-22 RX ADMIN — SODIUM CHLORIDE 1000 ML: 0.9 INJECTION, SOLUTION INTRAVENOUS at 16:52

## 2025-01-22 RX ADMIN — HEPARIN SODIUM 5000 UNITS: 5000 INJECTION, SOLUTION INTRAVENOUS; SUBCUTANEOUS at 21:24

## 2025-01-22 NOTE — ED PROVIDER NOTES
Time reflects when diagnosis was documented in both MDM as applicable and the Disposition within this note       Time User Action Codes Description Comment    1/22/2025  6:33 PM Yokubaitis, Josué Add [N17.9] MARTY (acute kidney injury) (HCC)     1/22/2025  6:33 PM Yokubaitis, Josué Add [A41.9] Sepsis (HCC)     1/22/2025  6:33 PM Yokubaitis, Josué Add [L03.90] Cellulitis     1/22/2025  6:33 PM Yokubaitis, Josué Add [N39.0] UTI (urinary tract infection)     1/22/2025  8:06 PM VilanovaLauraElinor Add [N18.4] Stage 4 chronic kidney disease (HCC)     1/22/2025  8:13 PM VilanovaLauraElinor Add [L03.319] Cellulitis of flank     1/22/2025  8:13 PM VilanovaLauraElinor Add [A41.9,  R65.20] Severe sepsis (HCC)     1/23/2025  1:36 PM RevNata mcgarry Add [R21] Rash     1/25/2025 11:43 AM Nata Valentin Add [Z93.6] Nephrostomy status ([facility])           ED Disposition       ED Disposition   Admit    Condition   Stable    Date/Time   Wed Jan 22, 2025  6:33 PM    Comment   Case was discussed with angelica and the patient's admission status was agreed to be Admission Status: inpatient status to the service of angelica .               Assessment & Plan       Medical Decision Making  84-year-old female present emergency department due to generalized weakness, diarrhea, rash.  Concerning for infectious etiology.  Workup obtained and showing MARTY as well as findings consistent with systemic infection response.  Started antibiotics.  Due to comorbidities, inability to ambulate in the department, and findings on examination, patient would benefit from further inpatient management.  Discussed case with internal medicine who are agreeable with plan.    Amount and/or Complexity of Data Reviewed  Labs: ordered. Decision-making details documented in ED Course.  Radiology: ordered and independent interpretation performed.    Risk  Prescription drug management.  Decision regarding hospitalization.        ED Course as of 01/26/25 1155   Wed Jan 22, 2025   1704  "WBC(!): 18.02   1729 Procalcitonin(!): 2.69       Medications   sodium chloride 0.9 % bolus 1,000 mL (1,000 mL Intravenous New Bag 1/22/25 1652)   cefepime (MAXIPIME) IVPB (premix in dextrose) 2,000 mg 50 mL (0 mg Intravenous Stopped 1/22/25 1755)   sodium chloride 0.9 % bolus 1,000 mL (1,000 mL Intravenous New Bag 1/22/25 1733)       ED Risk Strat Scores                                              History of Present Illness       Chief Complaint   Patient presents with    Weakness - Generalized     Patient c/o diarrhea x 5-6 days, generalized weakness x 3-4 days that got progressively worse and was unable to get up and walk today.      Diarrhea       Past Medical History:   Diagnosis Date    Arthritis     right knee    Hay fever     Hyperlipidemia     Hypertension     Myocardial infarction (HCC) 2003    questionable MI during exploratory surgery    Ovarian cancer (HCC) 2003    surgery (did not have chemo or radiation)    Stroke (HCC) 2003    \" mini stroke \" no deficits ; another poss stroke with  left hand weakness    Wears glasses     Wears partial dentures     upper & lower      Past Surgical History:   Procedure Laterality Date    BREAST SURGERY Right     exc. bx. lump (R) breast    DILATION AND CURETTAGE OF UTERUS      HYSTERECTOMY  2003    TAHBSO    IR NEPHROSTOMY TUBE CHECK/CHANGE/REPOSITION/REINSERTION/UPSIZE  5/24/2023    IR NEPHROSTOMY TUBE CHECK/CHANGE/REPOSITION/REINSERTION/UPSIZE  7/13/2023    IR NEPHROSTOMY TUBE CHECK/CHANGE/REPOSITION/REINSERTION/UPSIZE  8/29/2023    IR NEPHROSTOMY TUBE CHECK/CHANGE/REPOSITION/REINSERTION/UPSIZE  10/11/2023    IR NEPHROSTOMY TUBE CHECK/CHANGE/REPOSITION/REINSERTION/UPSIZE  12/11/2023    IR NEPHROSTOMY TUBE CHECK/CHANGE/REPOSITION/REINSERTION/UPSIZE  2/9/2024    IR NEPHROSTOMY TUBE CHECK/CHANGE/REPOSITION/REINSERTION/UPSIZE  4/8/2024    IR NEPHROSTOMY TUBE CHECK/CHANGE/REPOSITION/REINSERTION/UPSIZE  6/7/2024    IR NEPHROSTOMY TUBE " CHECK/CHANGE/REPOSITION/REINSERTION/UPSIZE  8/7/2024    IR NEPHROSTOMY TUBE CHECK/CHANGE/REPOSITION/REINSERTION/UPSIZE  10/7/2024    IR NEPHROSTOMY TUBE CHECK/CHANGE/REPOSITION/REINSERTION/UPSIZE  1/7/2025    IR NEPHROSTOMY TUBE PLACEMENT  4/13/2023    JOINT REPLACEMENT Right     OOPHORECTOMY Bilateral     cancer    AL CYSTO W/REMOVAL OF TUMORS SMALL N/A 4/4/2022    Procedure: TRANSURETHRAL RESECTION OF BLADDER (MULTIFOCAL) TUMOR (TURBT);  Surgeon: Vinnie Ricardo MD;  [facility];  Service: Urology    AL CYSTOURETHROSCOPY W/DEST &/RMVL TUMOR LARGE Bilateral 11/11/2021    Procedure: CYSTOSCOPY, TRANSURETHRAL RESECTION OF BLADDER TUMOR (TURBT), EVACUATION OF CLOTS, LARGE TUMOR 7-8CM;  Surgeon: Malcolm Reyes MD;  Location: [facility];  Service: Urology    AL CYSTOURETHROSCOPY W/DEST &/RMVL TUMOR LARGE N/A 3/21/2023    Procedure: TRANSURETHRAL RESECTION OF BLADDER TUMOR (TURBT);  Surgeon: Vinnie Ricardo MD;  [facility];  Service: Urology    AL CYSTOURETHROSCOPY W/URETERAL CATHETERIZATION Bilateral 4/4/2022    Procedure: CYSTOSCOPY WITH ATTEMPTED B/L RETROGRADE PYELOGRAM;  Surgeon: Vinnie Ricardo MD;  [facility];  Service: Urology    AL XCAPSL CTRC RMVL INSJ IO LENS PROSTH W/O ECP Left 7/24/2017    Procedure: EXTRACTION EXTRACAPSULAR CATARACT PHACO INTRAOCULAR LENS (IOL);  Surgeon: Elmer Ocampo MD;  Location: [facility] MAIN OR;  Service: Ophthalmology    TONSILLECTOMY        Family History   Problem Relation Age of Onset    Parkinsonism Mother     Heart disease Father     Cancer Father         leukemia    Heart disease Sister     Diabetes Sister     Diabetes Brother     Diabetes Sister     Melanoma Sister     Melanoma Sister     Diabetes Maternal Aunt     No Known Problems Maternal Aunt     No Known Problems Paternal Aunt       Social History     Tobacco Use    Smoking status: Former     Current packs/day: 0.00     Average packs/day: 1.5 packs/day for 40.0 years (60.0 ttl pk-yrs)      Types: Cigarettes     Start date:      Quit date:      Years since quittin.0    Smokeless tobacco: Never   Vaping Use    Vaping status: Never Used   Substance Use Topics    Alcohol use: Yes     Comment: occassional, socially    Drug use: No      E-Cigarette/Vaping    E-Cigarette Use Never User       E-Cigarette/Vaping Substances    Nicotine No     THC No     CBD No     Flavoring No     Other No     Unknown No       I have reviewed and agree with the history as documented.     84-year-old female presenting to the emergency department due to 5 to 6 days of diarrhea, weakness, and inability to get out of bed today.  Has history of bladder cancer with bilateral nephrostomies.  Notes that after the left 1 was changed, she developed a rash around it that was initially thought to be reaction to the tape.  This has continued to spread, blister, and leave eschars. Patient denies any purulence, or other associated issues with it.  Denies other complaints at this time.  Has had ongoing output from her nephrostomy tubes.        Review of Systems   All other systems reviewed and are negative.          Objective       ED Triage Vitals [25 1601]   Temperature Pulse Blood Pressure Respirations SpO2 Patient Position - Orthostatic VS   99.7 °F (37.6 °C) (!) 109 133/84 20 94 % Lying      Temp Source Heart Rate Source BP Location FiO2 (%) Pain Score    Tympanic Monitor Left arm -- No Pain      Vitals      Date and Time Temp Pulse SpO2 Resp BP Pain Score FACES Pain Rating User   25 09 -- 88 -- -- 118/77 -- -- RK   25 2244 97.7 °F (36.5 °C) 79 97 % 18 110/56 No Pain -- DC   25 -- -- -- -- -- No Pain -- DC   25 1648 97.4 °F (36.3 °C) 78 99 % 18 128/56 -- -- ANISH   25 0955 98 °F (36.7 °C) 77 98 % 18 117/52 No Pain -- KD   25 0900 -- -- -- -- -- No Pain -- KD   25 2330 98.3 °F (36.8 °C) 76 97 % 18 121/54 -- -- JM   25 2100 -- -- -- -- -- No Pain -- MATIAS   25 1601 --  -- -- -- -- No Pain -- JJM   01/24/25 0900 97.3 °F (36.3 °C) 72 98 % 16 125/58 No Pain -- AT   01/24/25 0005 98.2 °F (36.8 °C) 73 95 % 16 102/51 No Pain -- DB   01/23/25 2017 97.5 °F (36.4 °C) 76 97 % 20 110/57 No Pain -- IE   01/23/25 1500 97.3 °F (36.3 °C) 73 98 % -- 111/52 -- -- AR   01/23/25 0900 -- -- -- -- -- No Pain -- AT   01/23/25 0900 98.3 °F (36.8 °C) -- -- -- -- -- -- MM   01/23/25 0700 -- 88 96 % -- 124/94 -- -- MM   01/23/25 0300 98.1 °F (36.7 °C) 89 95 % 17 136/63 -- -- JT   01/22/25 2100 98.1 °F (36.7 °C) 96 96 % 15 144/66 -- -- BD   01/22/25 2001 -- -- -- -- -- No Pain -- KR   01/22/25 1800 -- 113 94 % 22 121/85 -- -- CS   01/22/25 1700 -- 101 94 % 23 125/68 -- -- CS   01/22/25 1601 99.7 °F (37.6 °C) 109 94 % 20 133/84 No Pain -- AC            Physical Exam  Constitutional:       General: She is not in acute distress.     Appearance: Normal appearance. She is not ill-appearing or toxic-appearing.   HENT:      Head: Normocephalic and atraumatic.      Mouth/Throat:      Mouth: Mucous membranes are moist.   Eyes:      Extraocular Movements: Extraocular movements intact.   Cardiovascular:      Rate and Rhythm: Regular rhythm. Tachycardia present.   Pulmonary:      Effort: Pulmonary effort is normal.   Abdominal:      Palpations: Abdomen is soft.      Tenderness: There is no abdominal tenderness.      Comments: Nephrostomy tubes with yellow-brownish drainage.    Skin:     General: Skin is warm.      Comments: Erythematous, indurated, warm rash on left flank with scattered eschars   Neurological:      Mental Status: She is alert.   Psychiatric:         Mood and Affect: Mood normal.         Results Reviewed       Procedure Component Value Units Date/Time    Blood culture #1 [[id number]] Collected: 01/22/25 1637    Lab Status: Preliminary result Specimen: Blood from Arm, Left Updated: 01/25/25 2202     Blood Culture No Growth at 72 hrs.    Blood culture #2 [[id number]] Collected: 01/22/25 1648    Lab Status:  Preliminary result Specimen: Blood from Arm, Right Updated: 01/25/25 2202     Blood Culture No Growth at 72 hrs.    Urine culture [[id number]] Collected: 01/22/25 1046    Lab Status: Final result Specimen: Urine, Clean Catch Updated: 01/24/25 0718     Urine Culture <10,000 cfu/ml    Narrative:      Resembling mixed skin and/or urogenital clarice     Urine Microscopic [[id number]]  (Abnormal) Collected: 01/22/25 1822    Lab Status: Final result Specimen: Urine, Right Nephrostomy Updated: 01/22/25 1834     RBC, UA None Seen /hpf      WBC, UA 4-10 /hpf      Epithelial Cells Occasional /hpf      Bacteria, UA Innumerable /hpf      Amorphous Crystals, UA Moderate    UA w Reflex to Microscopic w Reflex to Culture [[id number]]  (Abnormal) Collected: 01/22/25 1822    Lab Status: Final result Specimen: Urine, Right Nephrostomy Updated: 01/22/25 1827     Color, UA Yellow     Clarity, UA Cloudy     Specific Gravity, UA >=1.030     pH, UA 6.0     Leukocytes, UA Large     Nitrite, UA Negative     Protein,  (2+) mg/dl      Glucose, UA Negative mg/dl      Ketones, UA Trace mg/dl      Urobilinogen, UA 4.0 mg/dl      Bilirubin, UA Negative     Occult Blood, UA Moderate    RBC Morphology Reflex Test [[id number]] Collected: 01/22/25 1648    Lab Status: Final result Specimen: Blood from Arm, Right Updated: 01/22/25 1801    CBC and differential [[id number]]  (Abnormal) Collected: 01/22/25 1648    Lab Status: Final result Specimen: Blood from Arm, Right Updated: 01/22/25 1759     WBC 18.02 Thousand/uL      RBC 4.83 Million/uL      Hemoglobin 13.5 g/dL      Hematocrit 40.9 %      MCV 85 fL      MCH 28.0 pg      MCHC 33.0 g/dL      RDW 12.6 %      MPV 11.3 fL      Platelets 298 Thousands/uL     Narrative:      This is an appended report.  These results have been appended to a previously verified report.    Manual Differential(PHLEBS Do Not Order) [[id number]]  (Abnormal) Collected: 01/22/25 1648    Lab Status: Final result Specimen: Blood  from Arm, Right Updated: 01/22/25 1759     Segmented % 88 %      Bands % 1 %      Lymphocytes % 7 %      Monocytes % 1 %      Eosinophils % 0 %      Basophils % 1 %      Myelocytes % 1 %      Atypical Lymphocytes % 1 %      Absolute Neutrophils 16.04 Thousand/uL      Absolute Lymphocytes 1.44 Thousand/uL      Absolute Monocytes 0.18 Thousand/uL      Absolute Eosinophils 0.00 Thousand/uL      Absolute Basophils 0.18 Thousand/uL      Absolute Myelocytes 0.18 Thousand/uL      Total Counted --     RBC Morphology Present     Platelet Estimate Adequate     Clumped Platelets Present     Large Platelet Present     Anisocytosis Present     Microcytes Present     Ovalocytes Present     Polychromasia Present    Procalcitonin [[id number]]  (Abnormal) Collected: 01/22/25 1648    Lab Status: Final result Specimen: Blood from Arm, Right Updated: 01/22/25 1728     Procalcitonin 2.69 ng/ml     Lactic acid, plasma (w/reflex if result > 2.0) [[id number]]  (Normal) Collected: 01/22/25 1648    Lab Status: Final result Specimen: Blood from Arm, Right Updated: 01/22/25 1720     LACTIC ACID 2.0 mmol/L     Narrative:      Result may be elevated if tourniquet was used during collection.    Comprehensive metabolic panel [[id number]]  (Abnormal) Collected: 01/22/25 1648    Lab Status: Final result Specimen: Blood from Arm, Right Updated: 01/22/25 1719     Sodium 138 mmol/L      Potassium 3.9 mmol/L      Chloride 101 mmol/L      CO2 16 mmol/L      ANION GAP 21 mmol/L       mg/dL      Creatinine 5.11 mg/dL      Glucose 103 mg/dL      Calcium 9.1 mg/dL      Corrected Calcium 9.6 mg/dL      AST 51 U/L      ALT 27 U/L      Alkaline Phosphatase 49 U/L      Total Protein 6.9 g/dL      Albumin 3.4 g/dL      Total Bilirubin 0.63 mg/dL      eGFR 7 ml/min/1.73sq m     Narrative:      National Kidney Disease Foundation guidelines for Chronic Kidney Disease (CKD):     Stage 1 with normal or high GFR (GFR > 90 mL/min/1.73 square meters)    Stage 2 Mild  CKD (GFR = 60-89 mL/min/1.73 square meters)    Stage 3A Moderate CKD (GFR = 45-59 mL/min/1.73 square meters)    Stage 3B Moderate CKD (GFR = 30-44 mL/min/1.73 square meters)    Stage 4 Severe CKD (GFR = 15-29 mL/min/1.73 square meters)    Stage 5 End Stage CKD (GFR <15 mL/min/1.73 square meters)  Note: GFR calculation is accurate only with a steady state creatinine    Magnesium [[id number]]  (Normal) Collected: 01/22/25 1648    Lab Status: Final result Specimen: Blood from Arm, Right Updated: 01/22/25 1719     Magnesium 2.4 mg/dL     Phosphorus [[id number]]  (Abnormal) Collected: 01/22/25 1648    Lab Status: Final result Specimen: Blood from Arm, Right Updated: 01/22/25 1719     Phosphorus 5.1 mg/dL     FLU/COVID Rapid Antigen (30 min. TAT) - Preferred screening test in ED [[id number]]  (Normal) Collected: 01/22/25 1648    Lab Status: Final result Specimen: Nares from Nose Updated: 01/22/25 1715     SARS COV Rapid Antigen Negative     Influenza A Rapid Antigen Negative     Influenza B Rapid Antigen Negative    Narrative:      This test has been performed using the [employer] [facility] 2 FLU+SARS Antigen test under the Emergency Use Authorization (EUA). This test has been validated by the  and verified by the performing laboratory. The Bonita uses lateral flow immunofluorescent sandwich assay to detect SARS-COV, Influenza A and Influenza B Antigen.     The [employer]idel Bon[facility] 2 SARS Antigen test does not differentiate between SARS-CoV and SARS-CoV-2.     Negative results are presumptive and may be confirmed with a molecular assay, if necessary, for patient management. Negative results do not rule out SARS-CoV-2 or influenza infection and should not be used as the sole basis for treatment or patient management decisions. A negative test result may occur if the level of antigen in a sample is below the limit of detection of this test.     Positive results are indicative of the presence of viral antigens, but do not rule out  bacterial infection or co-infection with other viruses.     All test results should be used as an adjunct to clinical observations and other information available to the provider.    FOR PEDIATRIC PATIENTS - copy/paste COVID Guidelines URL to browser: [email or web address]            CT abdomen pelvis wo contrast   Final Interpretation by Ziggy Lomeli MD (01/23 1231)      1.  Interval resolution of the right hydronephrosis. However, there is increased, mild dilation of the left renal pelvis and left ureter, despite nurse nephrostomy tube placement.   2.  Mild interval enlargement of the retroperitoneal lymphadenopathy, favoring reactive changes.      Workstation performed: [id number]         XR chest 1 view portable   ED Interpretation by Josué Baeza MD (01/22 1759)   No acute cardiopulmonary disease      Final Interpretation by David Fall MD (01/22 2236)      No acute cardiopulmonary disease.            Workstation performed: [id number]             Procedures    ED Medication and Procedure Management   Prior to Admission Medications   Prescriptions Last Dose Informant Patient Reported? Taking?   acetaminophen (TYLENOL) 500 mg tablet Past Week Self Yes Yes   Sig: Take 500 mg by mouth   aspirin 81 mg chewable tablet Past Week Self Yes Yes   Sig: Chew 81 mg daily   atorvastatin (LIPITOR) 40 mg tablet Past Week Self No Yes   Sig: Take 1 tablet (40 mg total) by mouth daily with dinner   cholecalciferol (VITAMIN D3) 1,000 units tablet Past Week Self Yes Yes   Sig: Take 1,000 Units by mouth daily   hydrochlorothiazide (HYDRODIURIL) 25 mg tablet Past Week Self Yes Yes   Sig: Take 25 mg by mouth every morning   metoprolol succinate (TOPROL-XL) 50 mg 24 hr tablet Past Week Self Yes Yes   Sig: Take 25 mg by mouth every morning   sodium chloride, PF, 0.9 %  Self No No   Sig: 10 mL by Intracatheter route daily Intracatheter flushing daily. May  substitute prefilled syringe with normal saline 10 mL vials, 10 mL syringes, and 18 g blunt needles      Facility-Administered Medications: None     Current Discharge Medication List        CONTINUE these medications which have NOT CHANGED    Details   acetaminophen (TYLENOL) 500 mg tablet Take 500 mg by mouth      aspirin 81 mg chewable tablet Chew 81 mg daily      atorvastatin (LIPITOR) 40 mg tablet Take 1 tablet (40 mg total) by mouth daily with dinner  Qty: 14 tablet, Refills: 0    Associated Diagnoses: Stroke (HCC)      cholecalciferol (VITAMIN D3) 1,000 units tablet Take 1,000 Units by mouth daily      hydrochlorothiazide (HYDRODIURIL) 25 mg tablet Take 25 mg by mouth every morning      metoprolol succinate (TOPROL-XL) 50 mg 24 hr tablet Take 25 mg by mouth every morning      sodium chloride, PF, 0.9 % 10 mL by Intracatheter route daily Intracatheter flushing daily. 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  ED SEPSIS DOCUMENTATION   Time reflects when diagnosis was documented in both MDM as applicable and the Disposition within this note       Time User Action Codes Description Comment    1/22/2025  6:33 PM Yokubaitis, Josué Add [N17.9] MARTY (acute kidney injury) ([facility])     1/22/2025  6:33 PM Yokubaitis, Josué Add [A41.9] Sepsis ([facility])     1/22/2025  6:33 PM Yokubaitis, Josué Add [L03.90] Cellulitis     1/22/2025  6:33 PM Yokubaitis, Josué Add [N39.0] UTI (urinary tract infection)     1/22/2025  8:06 PM VilanovaLauraElinor Add [N18.4] Stage 4 chronic kidney disease (HCC)     1/22/2025  8:13 PM VilanovaLauraElinor Add [L03.319] Cellulitis of flank     1/22/2025  8:13 PM VilanovaMadhuriElinor Add [A41.9,  R65.20] Severe sepsis ([facility])     1/23/2025  1:36 PM RevbhumikaarNata Add [R21] Rash     1/25/2025 11:43  AM Nata Valentin Add [Z93.6] Nephrostomy status (HCC)                  Josué Baeza MD  01/26/25 3814

## 2025-01-23 ENCOUNTER — APPOINTMENT (INPATIENT)
Dept: RADIOLOGY | Facility: HOSPITAL | Age: 85
DRG: 872 | End: 2025-01-23
Payer: COMMERCIAL

## 2025-01-23 LAB
ANION GAP SERPL CALCULATED.3IONS-SCNC: 11 MMOL/L (ref 4–13)
ANION GAP SERPL CALCULATED.3IONS-SCNC: 14 MMOL/L (ref 4–13)
BASOPHILS # BLD AUTO: 0.06 THOUSANDS/ΜL (ref 0–0.1)
BUN SERPL-MCNC: 108 MG/DL (ref 5–25)
BUN SERPL-MCNC: 110 MG/DL (ref 5–25)
CALCIUM SERPL-MCNC: 8.5 MG/DL (ref 8.4–10.2)
CALCIUM SERPL-MCNC: 8.5 MG/DL (ref 8.4–10.2)
CHLORIDE SERPL-SCNC: 106 MMOL/L (ref 96–108)
CHLORIDE SERPL-SCNC: 110 MMOL/L (ref 96–108)
CO2 SERPL-SCNC: 14 MMOL/L (ref 21–32)
CO2 SERPL-SCNC: 19 MMOL/L (ref 21–32)
CREAT SERPL-MCNC: 4.27 MG/DL (ref 0.6–1.3)
CREAT SERPL-MCNC: 4.28 MG/DL (ref 0.6–1.3)
EOSINOPHIL # BLD AUTO: 0.02 THOUSAND/ΜL (ref 0–0.61)
ERYTHROCYTE [DISTWIDTH] IN BLOOD BY AUTOMATED COUNT: 12.6 % (ref 11.6–15.1)
GFR SERPL CREATININE-BSD FRML MDRD: 8 ML/MIN/1.73SQ M
GFR SERPL CREATININE-BSD FRML MDRD: 8 ML/MIN/1.73SQ M
GLUCOSE SERPL-MCNC: 85 MG/DL (ref 65–140)
GLUCOSE SERPL-MCNC: 86 MG/DL (ref 65–140)
HCT VFR BLD AUTO: 36 % (ref 34.8–46.1)
HGB BLD-MCNC: 11.6 G/DL (ref 11.5–15.4)
IMM GRANULOCYTES # BLD AUTO: 0.08 THOUSAND/UL (ref 0–0.2)
LYMPHOCYTES # BLD AUTO: 1.59 THOUSANDS/ΜL (ref 0.6–4.47)
MAGNESIUM SERPL-MCNC: 2.3 MG/DL (ref 1.9–2.7)
MCH RBC QN AUTO: 27.9 PG (ref 26.8–34.3)
MCHC RBC AUTO-ENTMCNC: 32.2 G/DL (ref 31.4–37.4)
MCV RBC AUTO: 87 FL (ref 82–98)
MONOCYTES # BLD AUTO: 0.47 THOUSAND/ΜL (ref 0.17–1.22)
NEUTROPHILS # BLD AUTO: 11.05 THOUSANDS/ΜL (ref 1.85–7.62)
NRBC BLD AUTO-RTO: 0 /100 WBCS
PHOSPHATE SERPL-MCNC: 4.2 MG/DL (ref 2.3–4.1)
PLATELET # BLD AUTO: 253 THOUSANDS/UL (ref 149–390)
PMV BLD AUTO: 11.4 FL (ref 8.9–12.7)
POTASSIUM SERPL-SCNC: 3.2 MMOL/L (ref 3.5–5.3)
POTASSIUM SERPL-SCNC: 3.6 MMOL/L (ref 3.5–5.3)
PROCALCITONIN SERPL-MCNC: 2.27 NG/ML
RBC # BLD AUTO: 4.16 MILLION/UL (ref 3.81–5.12)
SODIUM SERPL-SCNC: 136 MMOL/L (ref 135–147)
SODIUM SERPL-SCNC: 138 MMOL/L (ref 135–147)
VANCOMYCIN SERPL-MCNC: 16.3 UG/ML (ref 10–20)
WBC # BLD AUTO: 13.27 THOUSAND/UL (ref 4.31–10.16)

## 2025-01-23 PROCEDURE — 88341 IMHCHEM/IMCYTCHM EA ADD ANTB: CPT | Performed by: STUDENT IN AN ORGANIZED HEALTH CARE EDUCATION/TRAINING PROGRAM

## 2025-01-23 PROCEDURE — 88313 SPECIAL STAINS GROUP 2: CPT | Performed by: STUDENT IN AN ORGANIZED HEALTH CARE EDUCATION/TRAINING PROGRAM

## 2025-01-23 PROCEDURE — 88342 IMHCHEM/IMCYTCHM 1ST ANTB: CPT | Performed by: STUDENT IN AN ORGANIZED HEALTH CARE EDUCATION/TRAINING PROGRAM

## 2025-01-23 PROCEDURE — 99223 1ST HOSP IP/OBS HIGH 75: CPT | Performed by: STUDENT IN AN ORGANIZED HEALTH CARE EDUCATION/TRAINING PROGRAM

## 2025-01-23 PROCEDURE — 80202 ASSAY OF VANCOMYCIN: CPT | Performed by: PHYSICIAN ASSISTANT

## 2025-01-23 PROCEDURE — NC001 PR NO CHARGE: Performed by: DERMATOLOGY

## 2025-01-23 PROCEDURE — 87076 CULTURE ANAEROBE IDENT EACH: CPT | Performed by: PHYSICIAN ASSISTANT

## 2025-01-23 PROCEDURE — NC001 PR NO CHARGE: Performed by: PHYSICIAN ASSISTANT

## 2025-01-23 PROCEDURE — 99223 1ST HOSP IP/OBS HIGH 75: CPT | Performed by: INTERNAL MEDICINE

## 2025-01-23 PROCEDURE — 87070 CULTURE OTHR SPECIMN AEROBIC: CPT | Performed by: PHYSICIAN ASSISTANT

## 2025-01-23 PROCEDURE — 11106 INCAL BX SKN SINGLE LES: CPT | Performed by: PHYSICIAN ASSISTANT

## 2025-01-23 PROCEDURE — 99233 SBSQ HOSP IP/OBS HIGH 50: CPT | Performed by: FAMILY MEDICINE

## 2025-01-23 PROCEDURE — G0545 PR INHERENT VISIT TO INPT: HCPCS | Performed by: STUDENT IN AN ORGANIZED HEALTH CARE EDUCATION/TRAINING PROGRAM

## 2025-01-23 PROCEDURE — 84100 ASSAY OF PHOSPHORUS: CPT | Performed by: PHYSICIAN ASSISTANT

## 2025-01-23 PROCEDURE — 88312 SPECIAL STAINS GROUP 1: CPT | Performed by: STUDENT IN AN ORGANIZED HEALTH CARE EDUCATION/TRAINING PROGRAM

## 2025-01-23 PROCEDURE — 80048 BASIC METABOLIC PNL TOTAL CA: CPT | Performed by: PHYSICIAN ASSISTANT

## 2025-01-23 PROCEDURE — 87205 SMEAR GRAM STAIN: CPT | Performed by: PHYSICIAN ASSISTANT

## 2025-01-23 PROCEDURE — 88305 TISSUE EXAM BY PATHOLOGIST: CPT | Performed by: STUDENT IN AN ORGANIZED HEALTH CARE EDUCATION/TRAINING PROGRAM

## 2025-01-23 PROCEDURE — 84145 PROCALCITONIN (PCT): CPT | Performed by: PHYSICIAN ASSISTANT

## 2025-01-23 PROCEDURE — 87798 DETECT AGENT NOS DNA AMP: CPT | Performed by: STUDENT IN AN ORGANIZED HEALTH CARE EDUCATION/TRAINING PROGRAM

## 2025-01-23 PROCEDURE — 85027 COMPLETE CBC AUTOMATED: CPT | Performed by: PHYSICIAN ASSISTANT

## 2025-01-23 PROCEDURE — 83735 ASSAY OF MAGNESIUM: CPT | Performed by: PHYSICIAN ASSISTANT

## 2025-01-23 PROCEDURE — 74176 CT ABD & PELVIS W/O CONTRAST: CPT

## 2025-01-23 PROCEDURE — 0JBM3ZX EXCISION OF LEFT UPPER LEG SUBCUTANEOUS TISSUE AND FASCIA, PERCUTANEOUS APPROACH, DIAGNOSTIC: ICD-10-PCS | Performed by: PHYSICIAN ASSISTANT

## 2025-01-23 PROCEDURE — 80048 BASIC METABOLIC PNL TOTAL CA: CPT | Performed by: INTERNAL MEDICINE

## 2025-01-23 RX ORDER — CEFAZOLIN SODIUM 1 G/50ML
1000 SOLUTION INTRAVENOUS EVERY 24 HOURS
Status: DISCONTINUED | OUTPATIENT
Start: 2025-01-23 | End: 2025-01-27

## 2025-01-23 RX ORDER — VALACYCLOVIR HYDROCHLORIDE 500 MG/1
500 TABLET, FILM COATED ORAL EVERY 24 HOURS
Status: DISCONTINUED | OUTPATIENT
Start: 2025-01-23 | End: 2025-01-27

## 2025-01-23 RX ORDER — POTASSIUM CHLORIDE 1500 MG/1
40 TABLET, EXTENDED RELEASE ORAL ONCE
Status: COMPLETED | OUTPATIENT
Start: 2025-01-23 | End: 2025-01-23

## 2025-01-23 RX ORDER — SODIUM CHLORIDE 9 MG/ML
10 INJECTION INTRAVENOUS DAILY
Status: DISCONTINUED | OUTPATIENT
Start: 2025-01-23 | End: 2025-01-30 | Stop reason: HOSPADM

## 2025-01-23 RX ORDER — SACCHAROMYCES BOULARDII 250 MG
250 CAPSULE ORAL 2 TIMES DAILY
Status: DISCONTINUED | OUTPATIENT
Start: 2025-01-23 | End: 2025-01-30 | Stop reason: HOSPADM

## 2025-01-23 RX ORDER — LIDOCAINE HYDROCHLORIDE 10 MG/ML
10 INJECTION, SOLUTION EPIDURAL; INFILTRATION; INTRACAUDAL; PERINEURAL ONCE
Status: COMPLETED | OUTPATIENT
Start: 2025-01-23 | End: 2025-01-23

## 2025-01-23 RX ADMIN — ATORVASTATIN CALCIUM 40 MG: 40 TABLET, FILM COATED ORAL at 16:59

## 2025-01-23 RX ADMIN — HEPARIN SODIUM 5000 UNITS: 5000 INJECTION, SOLUTION INTRAVENOUS; SUBCUTANEOUS at 21:57

## 2025-01-23 RX ADMIN — CEFAZOLIN SODIUM 1000 MG: 1 SOLUTION INTRAVENOUS at 14:44

## 2025-01-23 RX ADMIN — ASPIRIN 81 MG CHEWABLE TABLET 81 MG: 81 TABLET CHEWABLE at 09:09

## 2025-01-23 RX ADMIN — POTASSIUM CHLORIDE 40 MEQ: 1500 TABLET, EXTENDED RELEASE ORAL at 22:49

## 2025-01-23 RX ADMIN — METOPROLOL SUCCINATE 25 MG: 25 TABLET, FILM COATED, EXTENDED RELEASE ORAL at 09:09

## 2025-01-23 RX ADMIN — HEPARIN SODIUM 5000 UNITS: 5000 INJECTION, SOLUTION INTRAVENOUS; SUBCUTANEOUS at 14:35

## 2025-01-23 RX ADMIN — SODIUM CHLORIDE, PRESERVATIVE FREE 10 ML: 5 INJECTION INTRAVENOUS at 11:03

## 2025-01-23 RX ADMIN — Medication 250 MG: at 20:11

## 2025-01-23 RX ADMIN — LIDOCAINE HYDROCHLORIDE 10 ML: 10 INJECTION, SOLUTION EPIDURAL; INFILTRATION; INTRACAUDAL; PERINEURAL at 16:06

## 2025-01-23 RX ADMIN — HEPARIN SODIUM 5000 UNITS: 5000 INJECTION, SOLUTION INTRAVENOUS; SUBCUTANEOUS at 06:02

## 2025-01-23 RX ADMIN — VALACYCLOVIR HYDROCHLORIDE 500 MG: 500 TABLET, FILM COATED ORAL at 14:44

## 2025-01-23 NOTE — ASSESSMENT & PLAN NOTE
UA with minimal pyuria and she has no symptoms of a urinary tract infection.  Innumerable bacteria present but this is not alone indicative of infection and is common with indwelling nephrostomy tubes.  No evidence of UTI at this time.

## 2025-01-23 NOTE — PROCEDURES
Biopsy    Date/Time: 1/23/2025 3:29 PM    Performed by: Emigdio Del Angel PA-C  Authorized by: Emigdio Del Angel PA-C  Universal Protocol:  Consent: Verbal consent obtained.  Risks and benefits: risks, benefits and alternatives were discussed  Consent given by: patient  Timeout called at: 1/23/2025 3:30 PM.  Patient understanding: patient states understanding of the procedure being performed  Patient identity confirmed: verbally with patient and arm band    Procedure Details - Lesion Biopsy:     Body area:  Lower extremity    Lower extremity location:  L hip    Biopsy method: wedge biopsy      Biopsy tissue type: skin and subcutaneous    Initial size (mm):  0    Final defect size (mm):  30    Malignancy: malignancy unknown       Presumed benign    7cc lidocaine 1% injected  Sutured with 5-0 nylon   No dressing/adhesive applied due to friability of the epidermis  Patient tolerated very well  Son updated at bedside     Sent for tissue culture and tissue exam and swab sent for varicella zoster PCR

## 2025-01-23 NOTE — ASSESSMENT & PLAN NOTE
Anion gap 21, bicarb 16 in the setting of MARTY on CKD, dehydration, severe sepsis   Continue bicarb drip.  Repeat lab work in a.m.

## 2025-01-23 NOTE — ASSESSMENT & PLAN NOTE
UA with large leuks, 4-10 WBC, innumerable bacteria  Patient with indwelling bilateral percutaneous nephrostomy tubes  IV Cefepime   Follow up urine culture

## 2025-01-23 NOTE — ASSESSMENT & PLAN NOTE
Lab Results   Component Value Date    EGFR 8 01/23/2025    EGFR 7 01/22/2025    EGFR 21 01/13/2025    CREATININE 4.27 (H) 01/23/2025    CREATININE 5.11 (H) 01/22/2025    CREATININE 2.03 (H) 01/13/2025     Cr 5.11 on admission, baseline 2.5-3 (CKD IV) follows with nephrology as outpatient; has expressed wishes to not proceed with dialysis in the past  Patient endorses minimal oral intake over the past week  Continue IV sodium bicarbonate gtt due to high anion gap metabolic acidosis per nephrology with repeat BMP   Follow-up CT A/P for further evaluation of MARTY with bilateral indwelling nephrostomy tubes    Hold HCTZ   Repeat lab work in a.m.  Nephrology consult appreciated

## 2025-01-23 NOTE — ASSESSMENT & PLAN NOTE
Anion gap 21, bicarb 16 in the setting of MARTY on CKD, dehydration, severe sepsis   IV sodium bicarb gtt for 8 hours, repeat BMP in the AM, pending results may switch to isolyte

## 2025-01-23 NOTE — CONSULTS
Consultation - Infectious Disease   Name: Ning Chambers 84 y.o. female I MRN: [id number]  Unit/Bed#: 3 [place] [address] I Date of Admission: 1/22/2025   Date of Service: 1/23/2025 I Hospital Day: 1   Inpatient consult to Infectious Diseases  Consult performed by: Verenice Murray MD  Consult ordered by: Elinor Richard PA-C        Physician Requesting Evaluation: Nata Valentin DO   Reason for Evaluation / Principal Problem: Sepsis, rash    Assessment & Plan  Sepsis (HCC)  Leukocytosis, tachycardia, tachypnea.  Concern for left flank/abdominal cellulitis complicated possible herpes zoster.  Lab and vital sign derangements may also be reactive due to dehydration from poor p.o. intake.  UA shows 4-10 WBCs and she has no symptoms of urinary infection so no evidence of a UTI.   -Stop IV cefepime and vancomycin   -Start IV cefazolin 1 g every 24 hours, renally dose adjusted   -Start p.o. Valtrex 500 mg every 24 hours   -Follow-up CT A/P   -Follow-up blood cultures and urine culture, although bacterial colonization of the urine is possible due to her nephrostomy tubes so would not treat this unless the patient has localizing signs of a urinary source which she does not at this time   -Repeat CBC tomorrow to monitor treatment response   -Monitor fever curve, vital signs  Cellulitis of flank  The patient reports a rash over the left buttocks extending to the anterior abdomen and mons pubis.  There is erythema of the area as well as hemorrhagic lesions, some denuded vesicles.  Consideration for herpes zoster with superimposed bacterial cellulitis.   -Start Valtrex 500 mg every 24 hours   -Cefazolin as above   -Monitor rash   -Recommend wound care consult   -Dermatology consulted   -swab blister for vzv PCR  Malignant neoplasm of overlapping sites of bladder (HCC)  Patient has history of bladder cancer with retroperitoneal lymphadenopathy and is not currently receiving any cancer directed treatment.  She does follow with  palliative care as an outpatient.   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  Follows with urology, nephrology, palliative care.  Nephrostomy tubes exchanged 1/7.  Continue to monitor urine output.  Acute kidney injury superimposed on chronic kidney disease  (HCC)  Lab Results   Component Value Date    EGFR 8 01/23/2025    EGFR 7 01/22/2025    EGFR 21 01/13/2025    CREATININE 4.27 (H) 01/23/2025    CREATININE 5.11 (H) 01/22/2025    CREATININE 2.03 (H) 01/13/2025   Creatinine elevated to 5.11 from baseline of 2.7-3.  Likely prerenal due to poor p.o. intake.  Follows with nephrology as an outpatient   -Dose adjust antimicrobials for creatinine clearance   -Nephrology consulted   -Monitor creatinine  Abnormal urinalysis  UA with minimal pyuria and she has no symptoms of a urinary tract infection.  Innumerable bacteria present but this is not alone indicative of infection and is common with indwelling nephrostomy tubes.  No evidence of UTI at this time.  Above management plan to adjust antimicrobials discussed with Dr. Valentin.  Discussed with the patient at bedside.  ID will follow.    Antibiotics:  Vancomycin/cefepime    History of Present Illness   Ning Chambers is an 84 y.o. woman with a history of invasive bladder cancer complicated by retroperitoneal lymphadenopathy status post bilateral nephrostomy tubes, CKD 4, hypertension who presented to [facility] with generalized weakness, poor oral intake and a left flank/lower abdominal rash for the last 2 weeks.  She has also reported diarrhea and difficulty taking care of herself at home.  1/7 the patient underwent nephrostomy tube exchange with IR and reports rash developed after this.  The rash is somewhat itchy but not painful.  Temperature on admission was  99.7 and the patient was tachycardic.  Labs were notable for MARTY with creatinine of 5.11, white blood cell count of 18.02.  UA showed 4-10 WBCs and innumerable bacteria.  COVID/flu antigens were negative.  The patient was started on IV vancomycin and cefepime.  ID is consulted for further evaluation.    A complete review of systems is negative other than that noted in the HPI.    I have reviewed the patient's PMH, PSH, Social History, Family History, Meds, and Allergies    Objective :  Temp:  [98.1 °F (36.7 °C)-99.7 °F (37.6 °C)] 98.3 °F (36.8 °C)  HR:  [] 88  BP: (121-144)/(63-94) 124/94  Resp:  [15-23] 17  SpO2:  [94 %-96 %] 96 %  O2 Device: None (Room air)    General: Chronically ill-appearing but no acute distress  Psychiatric:  Awake and alert  Cardiac: RRR, no murmurs  Pulmonary:  Normal respiratory excursion without accessory muscle use  Abdomen:  Soft, nontender, nondistended  Back: Bilateral percutaneous nephrostomy tubes in place  Extremities:  No edema  Skin: Rash extending from left flank and buttocks over anterior abdomen to the mons pubis.  There are scattered lesions which appear hemorrhagic/vesicular and some areas of denuded skin.  Erythema surrounding the area      Lab Results: I have reviewed the following results:  Results from last 7 days   Lab Units 01/23/25  0559 01/22/25  1648   WBC Thousand/uL 13.27* 18.02*   HEMOGLOBIN g/dL 11.6 13.5   PLATELETS Thousands/uL 253 298     Results from last 7 days   Lab Units 01/23/25  0559 01/22/25  1648   SODIUM mmol/L 138 138   POTASSIUM mmol/L 3.6 3.9   CHLORIDE mmol/L 110* 101   CO2 mmol/L 14* 16*   BUN mg/dL 110* 119*   CREATININE mg/dL 4.27* 5.11*   EGFR ml/min/1.73sq m 8 7   CALCIUM mg/dL 8.5 9.1   AST U/L  --  51*   ALT U/L  --  27   ALK PHOS U/L  --  49   ALBUMIN g/dL  --  3.4*     Results from last 7 days   Lab Units 01/22/25  1648 01/22/25  1637   BLOOD CULTURE  Received in Microbiology Lab. Culture in Progress. Received in Microbiology Lab.  Culture in Progress.     Results from last 7 days   Lab Units 01/23/25  0559 01/22/25  1648   PROCALCITONIN ng/ml 2.27* 2.69*     Imaging:  Chest x-ray personally reviewed without consolidations

## 2025-01-23 NOTE — ASSESSMENT & PLAN NOTE
Hx of bladder cancer with retroperitoneal lymphadenopathy, not currently receiving treatment as patient wishes to avoid systemic treatment for her invasive bladder cancer.   Follows with palliative medicine as outpatient  Given evidence of worsening kidney function, patient does not wish to undergo dialysis per prior GOC discussions, may require repeat goals of care discussion if no improvement in kidney function.

## 2025-01-23 NOTE — ASSESSMENT & PLAN NOTE
Hx of obstructive uropathy 2/2 invasive bladder cancer s/p bilateral indwelling percutaneous nephrostomy tubes  Follows with urology, nephrology, palliative medicine  Undergoes exchanges every 3 months, last exchange 01/07/25  Follow-up CT A/P

## 2025-01-23 NOTE — ASSESSMENT & PLAN NOTE
UA with large leuks, 4-10 WBC, innumerable bacteria  Patient with indwelling bilateral percutaneous nephrostomy tubes  Per ID no evidence of UTI

## 2025-01-23 NOTE — ASSESSMENT & PLAN NOTE
Lab Results   Component Value Date    EGFR 7 01/22/2025    EGFR 21 01/13/2025    EGFR 13 07/17/2023    CREATININE 5.11 (H) 01/22/2025    CREATININE 2.03 (H) 01/13/2025    CREATININE 2.99 (H) 07/17/2023     Cr 5.11 on admission, baseline 2.7-3 (CKD IV) follows with nephrology as outpatient; has expressed wishes to not proceed with dialysis in the past  Patient endorses minimal oral intake over the past week  IVF-- IV sodium bicarbonate gtt for 8 hours with repeat BMP due to high anion gap metabolic acidosis   Obtain CT A/P for further evaluation of MARTY with bilateral indwelling nephrostomy tubes    Hold HCTZ   Patient states she would not want dialysis  Nephrology consult appreciated

## 2025-01-23 NOTE — ASSESSMENT & PLAN NOTE
Hx of bladder cancer with retroperitoneal lymphadenopathy, not currently receiving treatment as patient wishes to avoid systemic treatment for her invasive bladder cancer.   Follows with palliative medicine as outpatient  Given evidence of worsening kidney function, patient does not wish to undergo dialysis per prior GOC discussions, may require repeat goals of care discussion if no improvement in kidney function

## 2025-01-23 NOTE — ASSESSMENT & PLAN NOTE
"Patient with hx of invasive bladder cancer not undergoing treatment, CKD IV, indwelling bilateral nephrostomy tubes, presents with generalized weakness, and new onset \"rash\" on her left flank and lower abdomen which patient states started after her last nephrostomy tube exchange on 01/07  Noted to have leukocytosis, tachycardia, tachypnea and MARTY   Per ID sepsis likely due to cellulitis and possible herpes zoster   Discontinued IV Cefepime + Vancomycin and changed to cefazolin with p.o. Valtrex  Follow-up CT A/P for further evaluation   Follow up urine and blood cultures   Monitor fever curve/hemodynamics  ID consult appreciated   "

## 2025-01-23 NOTE — PLAN OF CARE
Problem: Prexisting or High Potential for Compromised Skin Integrity  Goal: Skin integrity is maintained or improved  Description: INTERVENTIONS:  - Identify patients at risk for skin breakdown  - Assess and monitor skin integrity  - Assess and monitor nutrition and hydration status  - Monitor labs   - Assess for incontinence   - Turn and reposition patient  - Assist with mobility/ambulation  - Relieve pressure over bony prominences  - Avoid friction and shearing  - Provide appropriate hygiene as needed including keeping skin clean and dry  - Evaluate need for skin moisturizer/barrier cream  - Collaborate with interdisciplinary team   - Patient/family teaching  - Consider wound care consult   Outcome: Progressing     Problem: PAIN - ADULT  Goal: Verbalizes/displays adequate comfort level or baseline comfort level  Description: Interventions:  - Encourage patient to monitor pain and request assistance  - Assess pain using appropriate pain scale  - Administer analgesics based on type and severity of pain and evaluate response  - Implement non-pharmacological measures as appropriate and evaluate response  - Consider cultural and social influences on pain and pain management  - Notify physician/advanced practitioner if interventions unsuccessful or patient reports new pain  Outcome: Progressing     Problem: INFECTION - ADULT  Goal: Absence or prevention of progression during hospitalization  Description: INTERVENTIONS:  - Assess and monitor for signs and symptoms of infection  - Monitor lab/diagnostic results  - Monitor all insertion sites, i.e. indwelling lines, tubes, and drains  - Monitor endotracheal if appropriate and nasal secretions for changes in amount and color  - [place] appropriate cooling/warming therapies per order  - Administer medications as ordered  - Instruct and encourage patient and family to use good hand hygiene technique  - Identify and instruct in appropriate isolation precautions for  identified infection/condition  Outcome: Progressing  Goal: Absence of fever/infection during neutropenic period  Description: INTERVENTIONS:  - Monitor WBC    Outcome: Progressing     Problem: SAFETY ADULT  Goal: Patient will remain free of falls  Description: INTERVENTIONS:  - Educate patient/family on patient safety including physical limitations  - Instruct patient to call for assistance with activity   - Consult OT/PT to assist with strengthening/mobility   - Keep Call bell within reach  - Keep bed low and locked with side rails adjusted as appropriate  - Keep care items and personal belongings within reach  - Initiate and maintain comfort rounds  - Make Fall Risk Sign visible to staff  - Offer Toileting every 2 Hours, in advance of need  - Initiate/Maintain bed alarm  - Obtain necessary fall risk management equipment: nonskid socks  - Apply yellow socks and bracelet for high fall risk patients  - Consider moving patient to room near nurses station  Outcome: Progressing  Goal: Maintain or return to baseline ADL function  Description: INTERVENTIONS:  -  Assess patient's ability to carry out ADLs; assess patient's baseline for ADL function and identify physical deficits which impact ability to perform ADLs (bathing, care of mouth/teeth, toileting, grooming, dressing, etc.)  - Assess/evaluate cause of self-care deficits   - Assess range of motion  - Assess patient's mobility; develop plan if impaired  - Assess patient's need for assistive devices and provide as appropriate  - Encourage maximum independence but intervene and supervise when necessary  - Involve family in performance of ADLs  - Assess for home care needs following discharge   - Consider OT consult to assist with ADL evaluation and planning for discharge  - Provide patient education as appropriate  Outcome: Progressing  Goal: Maintains/Returns to pre admission functional level  Description: INTERVENTIONS:  - Perform AM-PAC 6 Click Basic Mobility/  Daily Activity assessment daily.  - Set and communicate daily mobility goal to care team and patient/family/caregiver.   - Collaborate with rehabilitation services on mobility goals if consulted  - Perform Range of Motion 3 times a day.  - Reposition patient every 2 hours.  - Dangle patient 3 times a day  - Stand patient 3 times a day  - Ambulate patient 2 times a day  - Out of bed to chair 3 times a day   - Out of bed for meals 3 times a day  - Out of bed for toileting  - Record patient progress and toleration of activity level   Outcome: Progressing     Problem: DISCHARGE PLANNING  Goal: Discharge to home or other facility with appropriate resources  Description: INTERVENTIONS:  - Identify barriers to discharge w/patient and caregiver  - Arrange for needed discharge resources and transportation as appropriate  - Identify discharge learning needs (meds, wound care, etc.)  - Arrange for interpretive services to assist at discharge as needed  - Refer to Case Management Department for coordinating discharge planning if the patient needs post-hospital services based on physician/advanced practitioner order or complex needs related to functional status, cognitive ability, or social support system  Outcome: Progressing     Problem: Knowledge Deficit  Goal: Patient/family/caregiver demonstrates understanding of disease process, treatment plan, medications, and discharge instructions  Description: Complete learning assessment and assess knowledge base.  Interventions:  - Provide teaching at level of understanding  - Provide teaching via preferred learning methods  Outcome: Progressing     Problem: GASTROINTESTINAL - ADULT  Goal: Minimal or absence of nausea and/or vomiting  Description: INTERVENTIONS:  - Administer IV fluids if ordered to ensure adequate hydration  - Maintain NPO status until nausea and vomiting are resolved  - Nasogastric tube if ordered  - Administer ordered antiemetic medications as needed  - Provide  nonpharmacologic comfort measures as appropriate  - Advance diet as tolerated, if ordered  - Consider nutrition services referral to assist patient with adequate nutrition and appropriate food choices  Outcome: Progressing  Goal: Maintains or returns to baseline bowel function  Description: INTERVENTIONS:  - Assess bowel function  - Encourage oral fluids to ensure adequate hydration  - Administer IV fluids if ordered to ensure adequate hydration  - Administer ordered medications as needed  - Encourage mobilization and activity  - Consider nutritional services referral to assist patient with adequate nutrition and appropriate food choices  Outcome: Progressing  Goal: Maintains adequate nutritional intake  Description: INTERVENTIONS:  - Monitor percentage of each meal consumed  - Identify factors contributing to decreased intake, treat as appropriate  - Assist with meals as needed  - Monitor I&O, weight, and lab values if indicated  - Obtain nutrition services referral as needed  Outcome: Progressing  Goal: Establish and maintain optimal ostomy function  Description: INTERVENTIONS:  - Assess bowel function  - Encourage oral fluids to ensure adequate hydration  - Administer IV fluids if ordered to ensure adequate hydration   - Administer ordered medications as needed  - Encourage mobilization and activity  - Nutrition services referral to assist patient with appropriate food choices  - Assess stoma site  - Consider wound care consult   Outcome: Progressing  Goal: Oral mucous membranes remain intact  Description: INTERVENTIONS  - Assess oral mucosa and hygiene practices  - Implement preventative oral hygiene regimen  - Implement oral medicated treatments as ordered  - Initiate Nutrition services referral as needed  Outcome: Progressing     Problem: GENITOURINARY - ADULT  Goal: Maintains or returns to baseline urinary function  Description: INTERVENTIONS:  - Assess urinary function  - Encourage oral fluids to ensure  adequate hydration if ordered  - Administer IV fluids as ordered to ensure adequate hydration  - Administer ordered medications as needed  - Offer frequent toileting  - Follow urinary retention protocol if ordered  Outcome: Progressing  Goal: Absence of urinary retention  Description: INTERVENTIONS:  - Assess patient’s ability to void and empty bladder  - Monitor I/O  - Bladder scan as needed  - Discuss with physician/AP medications to alleviate retention as needed  - Discuss catheterization for long term situations as appropriate  Outcome: Progressing  Goal: Urinary catheter remains patent  Description: INTERVENTIONS:  - Assess patency of urinary catheter  - If patient has a chronic posadas, consider changing catheter if non-functioning  - Follow guidelines for intermittent irrigation of non-functioning urinary catheter  Outcome: Progressing

## 2025-01-23 NOTE — PROGRESS NOTES
Ning Chambers is a 84 y.o. female who is currently ordered Vancomycin IV with management by the Pharmacy Consult service.  Relevant clinical data and objective / subjective history reviewed.  Vancomycin Assessment:  Indication and Goal AUC/Trough: Urinary tract infection (goal -600, trough >10); Soft tissue (goal -600, trough >10)  Clinical Status:  new  Micro:   pending  Renal Function:  SCr: 5.11 mg/dL  CrCl: 7.8 mL/min  Renal replacement: Not on dialysis  Days of Therapy: 1  Current Dose: 1500mg IV once (LD 25mg/kg)  Vancomycin Plan:  New Dosinmg IV daily prn t<15    Estimated Trough: <15 mcg/mL  Next Level: 25 @ 0600  Renal Function Monitoring: Daily BMP and UOP  Pharmacy will continue to follow closely for s/sx of nephrotoxicity, infusion reactions and appropriateness of therapy.  BMP and CBC will be ordered per protocol. We will continue to follow the patient’s culture results and clinical progress daily.    Alyce Masters, Pharmacist

## 2025-01-23 NOTE — ASSESSMENT & PLAN NOTE
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  Follows with urology, nephrology, palliative care.  Nephrostomy tubes exchanged 1/7.  Continue to monitor urine output.

## 2025-01-23 NOTE — ASSESSMENT & PLAN NOTE
The patient reports a rash over the left buttocks extending to the anterior abdomen and mons pubis.  There is erythema of the area as well as hemorrhagic lesions, some denuded vesicles.  Consideration for herpes zoster with superimposed bacterial cellulitis.   -Start Valtrex 500 mg every 24 hours   -Cefazolin as above   -Monitor rash   -Recommend wound care consult   -Dermatology consulted   -swab blister for vzv PCR

## 2025-01-23 NOTE — CONSULTS
Vancomycin IV Pharmacy-to-Dose Consultation     Vancomycin has been discontinued.  Pharmacy will sign off.  Please contact or re-consult with questions.    Li Gabriel, Pharmacist

## 2025-01-23 NOTE — CONSULTS
NEPHROLOGY HOSPITAL CONSULTATION   Ning Chambers 84 y.o. female MRN: [id number]  Unit/Bed#: [address] [place] [address] Encounter: [id number]    Brief History of Admission -  84 y.o. female with a past medical history of CKD stage IV baseline creatinine 2.73 mg/dL, hypertension,bladder neoplasm with bilateral nephrostomy tubes, obstructive uropathy, elevated BMI, who was admitted to [facility] after presenting with abdominal rash. A renal consultation is requested today for assistance in the management of acute on chronic kidney injury.  Patient was in usual state of health until.  She had nephrostomy tube change January 7.  She states for 2 weeks developed a rash in lower abdomen.  She denies fevers, chills, nausea, vomiting, diarrhea    Assessment & Plan  Acute kidney injury superimposed on chronic kidney disease  (HCC)  -Prior baseline creatinine was approximately 2.5 to 3 mg/dL.  Most recent creatinine January 13 as outpatient was 2.03 mg/dL  - Outpatient nephrologist Dr. Reyes  - Admission creatinine January 22 was 5.1 mg/dL improving to 4.3 mg/dL after volume expansion  - Volume state-patient clinically appears volume depleted.  Has baseline hemoglobin 11 and on admission was 13 supporting concentrated state.  - Is on home diuretic which is currently held  - Blood pressures relatively appropriate    Renal imaging-  - Urinalysis with innumerable bacteria, 4-10 WBC, no RBC, concentrated specimen    Discussion-patient has significant MARTY on CKD.  Appears to be volume depleted.  Creatinine is improving with volume expansion.  Also has acidosis with elevated anion gap.  Lactic acid was 2.  Initially was started on treatment for possible sepsis due to leukocytosis, new rash, history of PCN and workup was started for infectious etiology.  Cultures were sent.  Infectious disease was brought on board by primary team.    Plan  - Restart bicarbonate based fluids  - Noted that ID team has started renally adjusted Valtrex due to  concern for zoster rash  -  check lab work this evening  - Trend phosphorus  - Check CT scan without contrast  - I/os; avoid nephrotoxic agents  - Avoid hypotension  - Lab work in a.m.  - Spoke to primary team attending.  Dermatology will also be consulted for the rash.  Update-dermatology has provided primary team with evaluation of rash.  On their differential is potential calciphylaxis versus bullae versus other.  For now I have recommended to primary team to avoid sodium thiosulfate today until acidosis can correct given risk of sodium thiosulfate and acidosis  - PTH in AM  High anion gap metabolic acidosis  -Initially with elevated anion gap metabolic acidosis.  Likely multifactorial due to MARTY/azotemia  Severe sepsis (HCC)  -Source evaluation in progress by primary and infectious disease team  Malignant neoplasm of overlapping sites of bladder (HCC)  -Per urology as outpatient.  - Patient confirms that if dialysis were needed she would not  Nephrostomy status (HCC)  -Bilateral nephrostomy tube management per primary team  Benign hypertension  -Hold home diuretic  Abnormal urinalysis  -Follow-up urine culture    I have reviewed the nephrology recommendations including continue bicarbonate fluids,, CT scan without IV contrast further workup per primary team regarding rash, with primary team attending, and we are in agreement with renal plan including the information outlined above.    HISTORY OF PRESENT ILLNESS:  Requesting Physician: Nata Valentin DO  Reason for Consult: Acute kidney injury    Ning Chambers is a 84 y.o. female with a past medical history of CKD stage IV baseline creatinine 2.73 mg/dL, hypertension,bladder neoplasm with bilateral nephrostomy tubes, obstructive uropathy, elevated BMI, who was admitted to [facility] after presenting with abdominal rash. A renal consultation is requested today for assistance in the management of acute on chronic kidney injury.  Patient was in usual state of  "health until.  She had nephrostomy tube change January 7.  She states for 2 weeks developed a rash in lower abdomen.  She denies fevers, chills, nausea, vomiting, diarrhea    PAST MEDICAL HISTORY:  Past Medical History:   Diagnosis Date    Arthritis     right knee    Hay fever     Hyperlipidemia     Hypertension     Myocardial infarction (HCC) 2003    questionable MI during exploratory surgery    Ovarian cancer (HCC) 2003    surgery (did not have chemo or radiation)    Stroke (HCC) 2003    \" mini stroke \" no deficits ; another poss stroke with  left hand weakness    Wears glasses     Wears partial dentures     upper & lower       PAST SURGICAL HISTORY:  Past Surgical History:   Procedure Laterality Date    BREAST SURGERY Right     exc. bx. lump (R) breast    DILATION AND CURETTAGE OF UTERUS      HYSTERECTOMY  2003    TAHBSO    IR NEPHROSTOMY TUBE CHECK/CHANGE/REPOSITION/REINSERTION/UPSIZE  5/24/2023    IR NEPHROSTOMY TUBE CHECK/CHANGE/REPOSITION/REINSERTION/UPSIZE  7/13/2023    IR NEPHROSTOMY TUBE CHECK/CHANGE/REPOSITION/REINSERTION/UPSIZE  8/29/2023    IR NEPHROSTOMY TUBE CHECK/CHANGE/REPOSITION/REINSERTION/UPSIZE  10/11/2023    IR NEPHROSTOMY TUBE CHECK/CHANGE/REPOSITION/REINSERTION/UPSIZE  12/11/2023    IR NEPHROSTOMY TUBE CHECK/CHANGE/REPOSITION/REINSERTION/UPSIZE  2/9/2024    IR NEPHROSTOMY TUBE CHECK/CHANGE/REPOSITION/REINSERTION/UPSIZE  4/8/2024    IR NEPHROSTOMY TUBE CHECK/CHANGE/REPOSITION/REINSERTION/UPSIZE  6/7/2024    IR NEPHROSTOMY TUBE CHECK/CHANGE/REPOSITION/REINSERTION/UPSIZE  8/7/2024    IR NEPHROSTOMY TUBE CHECK/CHANGE/REPOSITION/REINSERTION/UPSIZE  10/7/2024    IR NEPHROSTOMY TUBE CHECK/CHANGE/REPOSITION/REINSERTION/UPSIZE  1/7/2025    IR NEPHROSTOMY TUBE PLACEMENT  4/13/2023    JOINT REPLACEMENT Right     OOPHORECTOMY Bilateral     cancer    HI CYSTO W/REMOVAL OF TUMORS SMALL N/A 4/4/2022    Procedure: TRANSURETHRAL RESECTION OF BLADDER (MULTIFOCAL) TUMOR (TURBT);  Surgeon: Vinnie Ricardo MD;  " Location: [facility];  Service: Urology    NH CYSTOURETHROSCOPY W/DEST &/RMVL TUMOR LARGE Bilateral 2021    Procedure: CYSTOSCOPY, TRANSURETHRAL RESECTION OF BLADDER TUMOR (TURBT), EVACUATION OF CLOTS, LARGE TUMOR 7-8CM;  Surgeon: Malcolm Reyes MD;  Location: [facility];  Service: Urology    NH CYSTOURETHROSCOPY W/DEST &/RMVL TUMOR LARGE N/A 3/21/2023    Procedure: TRANSURETHRAL RESECTION OF BLADDER TUMOR (TURBT);  Surgeon: Vinnie Ricardo MD;  Location: [facility];  Service: Urology    NH CYSTOURETHROSCOPY W/URETERAL CATHETERIZATION Bilateral 2022    Procedure: CYSTOSCOPY WITH ATTEMPTED B/L RETROGRADE PYELOGRAM;  Surgeon: Vinnie Ricardo MD;  Location: [facility];  Service: Urology    NH XCAPSL CTRC RMVL INSJ IO LENS PROSTH W/O ECP Left 2017    Procedure: EXTRACTION EXTRACAPSULAR CATARACT PHACO INTRAOCULAR LENS (IOL);  Surgeon: Elmer Ocampo MD;  Location: [facility] MAIN OR;  Service: Ophthalmology    TONSILLECTOMY         ALLERGIES:  Allergies   Allergen Reactions    Percocet [Oxycodone-Acetaminophen] GI Intolerance    Vicodin [Hydrocodone-Acetaminophen] GI Intolerance     Also darvocet    Medical Tape Rash    Propoxyphene Other (See Comments)       SOCIAL HISTORY:  Social History     Substance and Sexual Activity   Alcohol Use Yes    Comment: occassional, socially     Social History     Substance and Sexual Activity   Drug Use No     Social History     Tobacco Use   Smoking Status Former    Current packs/day: 0.00    Average packs/day: 1.5 packs/day for 40.0 years (60.0 ttl pk-yrs)    Types: Cigarettes    Start date:     Quit date: 2000    Years since quittin.0   Smokeless Tobacco Never       FAMILY HISTORY:  Family History   Problem Relation Age of Onset    Parkinsonism Mother     Heart disease Father     Cancer Father         leukemia    Heart disease Sister     Diabetes Sister     Diabetes Brother     Diabetes Sister     Melanoma Sister     Melanoma Sister     Diabetes Maternal Aunt   "   No Known Problems Maternal Aunt     No Known Problems Paternal Aunt        MEDICATIONS:    Current Facility-Administered Medications:     acetaminophen (TYLENOL) tablet 650 mg, 650 mg, Oral, Q6H PRN, Elinor Richard PA-C    aspirin chewable tablet 81 mg, 81 mg, Oral, Daily, Elinor Richard PA-C, 81 mg at 01/23/25 0909    atorvastatin (LIPITOR) tablet 40 mg, 40 mg, Oral, Daily With Dinner, Elinor Richard PA-C    ceFAZolin (ANCEF) IVPB (premix in dextrose) 1,000 mg 50 mL, 1,000 mg, Intravenous, Q24H, Verenice Murray MD    heparin (porcine) subcutaneous injection 5,000 Units, 5,000 Units, Subcutaneous, Q8H JD, Elinor Richard PA-C, 5,000 Units at 01/23/25 0602    [Held by provider] hydroCHLOROthiazide tablet 25 mg, 25 mg, Oral, QAM, Elinor Richard PA-C    influenza vaccine, high-dose (Fluzone High-Dose) IM injection 0.5 mL, 0.5 mL, Intramuscular, Prior to discharge, Nata Valentin DO    metoprolol succinate (TOPROL-XL) 24 hr tablet 25 mg, 25 mg, Oral, QAM, Elinor Richard PA-C, 25 mg at 01/23/25 0909    sodium bicarbonate 150 mEq in dextrose 5 % 1,000 mL infusion, 75 mL/hr, Intravenous, Continuous, Mauricio Gabriel MD    sodium chloride (PF) 0.9 % injection 10 mL, 10 mL, Intracatheter, Daily, Elinor Richard PA-C, 10 mL at 01/23/25 1103    valACYclovir (VALTREX) tablet 500 mg, 500 mg, Oral, Q24H, Verenice Murray MD    REVIEW OF SYSTEMS:    All the systems were reviewed and were negative except as documented on the HPI.    PHYSICAL EXAM:  Current Weight: Weight - Scale: 77.9 kg (171 lb 11.8 oz)  First Weight: Weight - Scale: 77.9 kg (171 lb 11.8 oz)  Vitals:    01/22/25 2100 01/23/25 0300 01/23/25 0700 01/23/25 0900   BP: 144/66 136/63 124/94    BP Location: Left arm  Right arm    Pulse: 96 89 88    Resp: 15 17     Temp: 98.1 °F (36.7 °C) 98.1 °F (36.7 °C)  98.3 °F (36.8 °C)   TempSrc: Temporal      SpO2: 96% 95% 96%    Weight: 77.9 kg (171 lb 11.8 oz)      Height: 5' 2\" " "(1.575 m)          Intake/Output Summary (Last 24 hours) at 1/23/2025 1147  Last data filed at 1/23/2025 0201  Gross per 24 hour   Intake 1010 ml   Output 300 ml   Net 710 ml     Physical Exam  General: NAD  Skin: no rash  Eyes: anicteric sclera  ENT: moist mucous membrane  Neck: supple  Chest: CTA b/l, no ronchii, no wheeze, no rubs, no rales  CVS: s1s2, no murmur, no gallop, no rub  Abdomen: Soft, extensive lower abdomen rash with some skin breakdown, 2 to 3 cm multiple areas of necrotic changes, malodorous rash.  Extremities: 1+ edema LE b/l  : no posadas, bilateral PCN with clear yellow urine  Neuro: AAOX3  Psych: normal affect    Invasive Devices:      Lab Results:   Results from last 7 days   Lab Units 01/23/25  0559 01/22/25  1648   WBC Thousand/uL 13.27* 18.02*   HEMOGLOBIN g/dL 11.6 13.5   HEMATOCRIT % 36.0 40.9   PLATELETS Thousands/uL 253 298   POTASSIUM mmol/L 3.6 3.9   CHLORIDE mmol/L 110* 101   CO2 mmol/L 14* 16*   BUN mg/dL 110* 119*   CREATININE mg/dL 4.27* 5.11*   CALCIUM mg/dL 8.5 9.1   MAGNESIUM mg/dL 2.3 2.4   PHOSPHORUS mg/dL 4.2* 5.1*   ALK PHOS U/L  --  49   ALT U/L  --  27   AST U/L  --  51*         Portions of the record may have been created with voice recognition software. Occasional wrong word or \"sound a like\" substitutions may have occurred due to the inherent limitations of voice recognition software. Read the chart carefully and recognize, using context, where substitutions have occurred.If you have any questions, please contact the dictating provider.    "

## 2025-01-23 NOTE — ASSESSMENT & PLAN NOTE
"Patient with hx of invasive bladder cancer not undergoing treatment, CKD IV, indwelling bilateral nephrostomy tubes, presents with generalized weakness, and new onset \"rash\" on her left flank and lower abdomen which patient states started after her last nephrostomy tube exchange on 01/07  Noted to have leukocytosis, tachycardia, tachypnea and MARTY   Source: possible UTI, left flank/lower abdominal cellulitis   IVF   IV Cefepime + Vancomycin empirically   Obtain CT A/P for further evaluation   Follow up urine and blood cultures   Monitor fever curve/hemodynamics  ID consult appreciated   "

## 2025-01-23 NOTE — ASSESSMENT & PLAN NOTE
"Patient endorsing \"rash\" on her left back/flank/buttocks, and bilateral lower abdomen/groin which she states started after her last nephrostomy tube exchange 01/07  On physical exam, patient with large ?hematoma on left flank extending to lower abdomen with surrounding erythema and ulcerations on the skin which patient states is new however appears somewhat chronic in nature -- do not see any prior documentation of such (see media)   Empiric IV Cefepime and Vancomycin   Will obtain CT A/P for further evaluation   ID consult appreciated  "

## 2025-01-23 NOTE — ASSESSMENT & PLAN NOTE
-Prior baseline creatinine was approximately 2.5 to 3 mg/dL.  Most recent creatinine January 13 as outpatient was 2.03 mg/dL  - Outpatient nephrologist Dr. Reyes  - Admission creatinine January 22 was 5.1 mg/dL improving to 4.3 mg/dL after volume expansion  - Volume state-patient clinically appears volume depleted.  Has baseline hemoglobin 11 and on admission was 13 supporting concentrated state.  - Is on home diuretic which is currently held  - Blood pressures relatively appropriate    Renal imaging-  - Urinalysis with innumerable bacteria, 4-10 WBC, no RBC, concentrated specimen    Discussion-patient has significant MARTY on CKD.  Appears to be volume depleted.  Creatinine is improving with volume expansion.  Also has acidosis with elevated anion gap.  Lactic acid was 2.  Initially was started on treatment for possible sepsis due to leukocytosis, new rash, history of PCN and workup was started for infectious etiology.  Cultures were sent.  Infectious disease was brought on board by primary team.    Plan  - Restart bicarbonate based fluids  - Noted that ID team has started renally adjusted Valtrex due to concern for zoster rash  -  check lab work this evening  - Trend phosphorus  - Check CT scan without contrast  - I/os; avoid nephrotoxic agents  - Avoid hypotension  - Lab work in a.m.  - Spoke to primary team attending.  Dermatology will also be consulted for the rash.  Update-dermatology has provided primary team with evaluation of rash.  On their differential is potential calciphylaxis versus bullae versus other.  For now I have recommended to primary team to avoid sodium thiosulfate today until acidosis can correct given risk of sodium thiosulfate and acidosis  - PTH in AM

## 2025-01-23 NOTE — PROGRESS NOTES
"Progress Note - Hospitalist   Name: Ning Chambers 84 y.o. female I MRN: [id number]  Unit/Bed#: [address] [place] [address] I Date of Admission: 1/22/2025   Date of Service: 1/23/2025 I Hospital Day: 1    Assessment & Plan  Sepsis (HCC)  Patient with hx of invasive bladder cancer not undergoing treatment, CKD IV, indwelling bilateral nephrostomy tubes, presents with generalized weakness, and new onset \"rash\" on her left flank and lower abdomen which patient states started after her last nephrostomy tube exchange on 01/07  Noted to have leukocytosis, tachycardia, tachypnea and MARTY   Per ID sepsis likely due to cellulitis and possible herpes zoster   Discontinued IV Cefepime + Vancomycin and changed to cefazolin with p.o. Valtrex  Follow-up CT A/P for further evaluation   Follow up urine and blood cultures   Monitor fever curve/hemodynamics  ID consult appreciated   Cellulitis of flank  Patient endorsing \"rash\" on her left back/flank/buttocks and abdomen/groin which she states started after her last nephrostomy tube exchange 01/07  As noted above continue IV cefazolin and oral Valtrex  Prolonged time spent coordinating plan of care with dermatology, nephrology, ID and also surgery.  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  Plan of care was coordinated with nephrology and we will hold until acidosis improves.  Acute kidney injury superimposed on chronic kidney disease  (HCC)  Lab Results   Component Value Date    EGFR 8 01/23/2025    EGFR 7 01/22/2025    EGFR 21 01/13/2025    CREATININE 4.27 (H) 01/23/2025    CREATININE 5.11 (H) 01/22/2025    CREATININE 2.03 (H) 01/13/2025     Cr 5.11 on admission, baseline 2.5-3 " (CKD IV) follows with nephrology as outpatient; has expressed wishes to not proceed with dialysis in the past  Patient endorses minimal oral intake over the past week  Continue IV sodium bicarbonate gtt due to high anion gap metabolic acidosis per nephrology with repeat BMP   Follow-up CT A/P for further evaluation of MARTY with bilateral indwelling nephrostomy tubes    Hold HCTZ   Repeat lab work in a.m.  Nephrology consult appreciated  Abnormal urinalysis  UA with large leuks, 4-10 WBC, innumerable bacteria  Patient with indwelling bilateral percutaneous nephrostomy tubes  Per ID no evidence of UTI  High anion gap metabolic acidosis  Anion gap 21, bicarb 16 in the setting of MARTY on CKD, dehydration, severe sepsis   Continue bicarb drip.  Repeat lab work in a.m.    Nephrostomy status (HCC)  Hx of obstructive uropathy 2/2 invasive bladder cancer s/p bilateral indwelling percutaneous nephrostomy tubes  Follows with urology, nephrology, palliative medicine  Undergoes exchanges every 3 months, last exchange 01/07/25  Follow-up CT A/P   Malignant neoplasm of overlapping sites of bladder (HCC)  Hx of bladder cancer with retroperitoneal lymphadenopathy, not currently receiving treatment as patient wishes to avoid systemic treatment for her invasive bladder cancer.   Follows with palliative medicine as outpatient  Given evidence of worsening kidney function, patient does not wish to undergo dialysis per prior GOC discussions, may require repeat goals of care discussion if no improvement in kidney function.  Benign hypertension  BP acceptable   Hold HCTZ 2/2 MARTY  Continue Toprol XL with hold parameters.    VTE Pharmacologic Prophylaxis: VTE Score: 6 High Risk (Score >/= 5) - Pharmacological DVT Prophylaxis Ordered: heparin. Sequential Compression Devices Ordered.    Mobility:   Basic Mobility Inpatient Raw Score: 12  -[facility] Goal: 4: Move to chair/commode  -[facility] Achieved: 2: Bed activities/Dependent transfer  -[facility] Goal NOT  achieved. Continue with multidisciplinary rounding and encourage appropriate mobility to improve upon -[facility] goals.    Patient Centered Rounds: I performed bedside rounds with nursing staff today.   Discussions with Specialists or Other Care Team Provider: yes - ID, dermatology, surgery, nephrology    Education and Discussions with Family / Patient: Updated  (son) via phone.    Current Length of Stay: 1 day(s)  Current Patient Status: Inpatient   Certification Statement: The patient will continue to require additional inpatient hospital stay due to sepsis due to cellulitis  Discharge Plan: Anticipate discharge in >72 hrs to discharge location to be determined pending rehab evaluations.    Code Status: Level 3 - DNAR and DNI    Subjective   Patient reports feeling better today.  Reports some pain along the site of the rash    Objective :  Temp:  [98.1 °F (36.7 °C)-99.7 °F (37.6 °C)] 98.3 °F (36.8 °C)  HR:  [] 88  BP: (121-144)/(63-94) 124/94  Resp:  [15-23] 17  SpO2:  [94 %-96 %] 96 %  O2 Device: None (Room air)    Body mass index is 31.41 kg/m².     Input and Output Summary (last 24 hours):     Intake/Output Summary (Last 24 hours) at 1/23/2025 1335  Last data filed at 1/23/2025 0201  Gross per 24 hour   Intake 1010 ml   Output 300 ml   Net 710 ml       Physical Exam  Constitutional:       General: She is not in acute distress.     Appearance: She is ill-appearing (chronically). She is not toxic-appearing.   HENT:      Head: Normocephalic and atraumatic.   Eyes:      General: No scleral icterus.  Cardiovascular:      Rate and Rhythm: Normal rate and regular rhythm.   Pulmonary:      Effort: Pulmonary effort is normal. No respiratory distress.      Breath sounds: Normal breath sounds. No wheezing or rales.   Abdominal:      General: Bowel sounds are normal. There is no distension.      Palpations: Abdomen is soft.      Tenderness: There is no abdominal tenderness.   Skin:     Comments:  Rash/erythema extending from left flank/buttock to the anterior aspect of the abdomen with areas of denuded skin  Bilateral nephrostomy tubes in place   Neurological:      Mental Status: She is alert.           Lines/Drains:  Lines/Drains/Airways       Active Status       Name Placement date Placement time Site Days    Nephrostomy Left 10.2 Fr. 01/07/25  1124  Left  16    Nephrostomy Right 10.2 Fr. 01/07/25  1130  Right  16                            Lab Results: I have reviewed the following results:   Results from last 7 days   Lab Units 01/23/25  0559 01/22/25  1648   WBC Thousand/uL 13.27* 18.02*   HEMOGLOBIN g/dL 11.6 13.5   HEMATOCRIT % 36.0 40.9   PLATELETS Thousands/uL 253 298   BANDS PCT %  --  1   LYMPHO PCT %  --  7*   MONO PCT %  --  1*   EOS PCT %  --  0     Results from last 7 days   Lab Units 01/23/25  0559 01/22/25  1648   SODIUM mmol/L 138 138   POTASSIUM mmol/L 3.6 3.9   CHLORIDE mmol/L 110* 101   CO2 mmol/L 14* 16*   BUN mg/dL 110* 119*   CREATININE mg/dL 4.27* 5.11*   ANION GAP mmol/L 14* 21*   CALCIUM mg/dL 8.5 9.1   ALBUMIN g/dL  --  3.4*   TOTAL BILIRUBIN mg/dL  --  0.63   ALK PHOS U/L  --  49   ALT U/L  --  27   AST U/L  --  51*   GLUCOSE RANDOM mg/dL 86 103                 Results from last 7 days   Lab Units 01/23/25  0559 01/22/25  1648   LACTIC ACID mmol/L  --  2.0   PROCALCITONIN ng/ml 2.27* 2.69*       Recent Cultures (last 7 days):   Results from last 7 days   Lab Units 01/22/25  1648 01/22/25  1637   BLOOD CULTURE  Received in Microbiology Lab. Culture in Progress. Received in Microbiology Lab. Culture in Progress.       Imaging Results Review: I reviewed radiology reports from this admission including: chest xray.  Other Study Results Review: No additional pertinent studies reviewed.    Last 24 Hours Medication List:     Current Facility-Administered Medications:     acetaminophen (TYLENOL) tablet 650 mg, Q6H PRN    aspirin chewable tablet 81 mg, Daily    atorvastatin (LIPITOR)  tablet 40 mg, Daily With Dinner    ceFAZolin (ANCEF) IVPB (premix in dextrose) 1,000 mg 50 mL, Q24H    heparin (porcine) subcutaneous injection 5,000 Units, Q8H JD    [Held by provider] hydroCHLOROthiazide tablet 25 mg, QAM    influenza vaccine, high-dose (Fluzone High-Dose) IM injection 0.5 mL, Prior to discharge    metoprolol succinate (TOPROL-XL) 24 hr tablet 25 mg, QAM    sodium bicarbonate 150 mEq in dextrose 5 % 1,000 mL infusion, Continuous    sodium chloride (PF) 0.9 % injection 10 mL, Daily    valACYclovir (VALTREX) tablet 500 mg, Q24H    Administrative Statements   Today, Patient Was Seen By: Nata Valentin DO      **Please Note: This note may have been constructed using a voice recognition system.**

## 2025-01-23 NOTE — H&P
"H&P - Hospitalist   Name: Ning Chambers 84 y.o. female I MRN: [id number]  Unit/Bed#: [address] [place] [address] Date of Admission: 1/22/2025   Date of Service: 1/22/2025 I Hospital Day: 0     Assessment & Plan  Severe sepsis (HCC)  Patient with hx of invasive bladder cancer not undergoing treatment, CKD IV, indwelling bilateral nephrostomy tubes, presents with generalized weakness, and new onset \"rash\" on her left flank and lower abdomen which patient states started after her last nephrostomy tube exchange on 01/07  Noted to have leukocytosis, tachycardia, tachypnea and MARTY   Source: possible UTI, left flank/lower abdominal cellulitis   IVF   IV Cefepime + Vancomycin empirically   Obtain CT A/P for further evaluation   Follow up urine and blood cultures   Monitor fever curve/hemodynamics  ID consult appreciated   Acute kidney injury superimposed on chronic kidney disease  (HCC)  Lab Results   Component Value Date    EGFR 7 01/22/2025    EGFR 21 01/13/2025    EGFR 13 07/17/2023    CREATININE 5.11 (H) 01/22/2025    CREATININE 2.03 (H) 01/13/2025    CREATININE 2.99 (H) 07/17/2023     Cr 5.11 on admission, baseline 2.7-3 (CKD IV) follows with nephrology as outpatient; has expressed wishes to not proceed with dialysis in the past  Patient endorses minimal oral intake over the past week  IVF-- IV sodium bicarbonate gtt for 8 hours with repeat BMP due to high anion gap metabolic acidosis   Obtain CT A/P for further evaluation of MARTY with bilateral indwelling nephrostomy tubes    Hold HCTZ   Patient states she would not want dialysis  Nephrology consult appreciated  Abnormal urinalysis  UA with large leuks, 4-10 WBC, innumerable bacteria  Patient with indwelling bilateral percutaneous nephrostomy tubes  IV Cefepime   Follow up urine culture   Cellulitis of flank  Patient endorsing \"rash\" on her left back/flank/buttocks, and bilateral lower abdomen/groin which she states started after her last nephrostomy tube exchange 01/07  On " physical exam, patient with large ?hematoma on left flank extending to lower abdomen with surrounding erythema and ulcerations on the skin which patient states is new however appears somewhat chronic in nature -- do not see any prior documentation of such (see media)   Empiric IV Cefepime and Vancomycin   Will obtain CT A/P for further evaluation   ID consult appreciated  High anion gap metabolic acidosis  Anion gap 21, bicarb 16 in the setting of MARTY on CKD, dehydration, severe sepsis   IV sodium bicarb gtt for 8 hours, repeat BMP in the AM, pending results may switch to isolyte    Nephrostomy status (HCC)  Hx of obstructive uropathy 2/2 invasive bladder cancer s/p bilateral indwelling percutaneous nephrostomy tubes  Follows with urology, nephrology, palliative medicine  Undergoes exchanges every 3 months, last exchange 01/07/25  Obtain CT A/P   Malignant neoplasm of overlapping sites of bladder (HCC)  Hx of bladder cancer with retroperitoneal lymphadenopathy, not currently receiving treatment as patient wishes to avoid systemic treatment for her invasive bladder cancer.   Follows with palliative medicine as outpatient  Given evidence of worsening kidney function, patient does not wish to undergo dialysis per prior GOC discussions, may require repeat goals of care discussion if no improvement in kidney function  Benign hypertension  BP acceptable   Hold HCTZ 2/2 MARTY  Continue Toprol XL with hold parameters      VTE Pharmacologic Prophylaxis: VTE Score: 6 High Risk (Score >/= 5) - Pharmacological DVT Prophylaxis Ordered: heparin. Sequential Compression Devices Ordered.   Code Status: Level 3 - DNAR and DNI   Discussion with family: Patient declined call to .     Anticipated Length of Stay: Patient will be admitted on an inpatient basis with an anticipated length of stay of greater than 2 midnights secondary to severe sepsis with MARTY on CKD, left flank/lower abdominal cellulitis.    History of Present  "Illness   Chief Complaint: generalized weakness and poor oral intake x 2 weeks    Ning Chambers is a 84 y.o. female with a PMH of invasive bladder cancer with retroperitoneal LAD s/p bilateral nephrostomy tubes, CKD 4, HTN who presents with generalized weakness and poor oral intake over the past 2 weeks. She states that she has not been feeling well due to a rash that she noticed after her last nephrostomy tube exchange on 01/07. The rash started on her left back and has since spread to her lower abdomen/groin. She reports she had 2 episodes of watery diarrhea, 1 episode today and she was unable to clean herself due to generalized weakness. She is covered in her feces. She lives at home alone but states that she is normally able to walk and perform ADLs without difficulty and not able to do so today due to generalized weakness. She reports chronic decreased urine output from left nephrostomy tube and adequate urine output from the right. She reports the rash is somewhat itchy and painful and was not present prior to her last tube exchange.     Review of Systems   Constitutional:  Positive for activity change, appetite change, chills and fatigue. Negative for fever.   Respiratory:  Negative for shortness of breath.    Cardiovascular:  Negative for chest pain, palpitations and leg swelling.   Gastrointestinal:  Positive for diarrhea. Negative for abdominal pain, nausea and vomiting.   Genitourinary:  Positive for flank pain.   Skin:  Positive for color change, rash and wound.   Neurological:  Positive for weakness.   Psychiatric/Behavioral:  Negative for confusion.         Historical Information   Past Medical History:   Diagnosis Date    Arthritis     right knee    Hay fever     Hyperlipidemia     Hypertension     Myocardial infarction (HCC) 2003    questionable MI during exploratory surgery    Ovarian cancer (HCC) 2003    surgery (did not have chemo or radiation)    Stroke (HCC) 2003    \" mini stroke \" no deficits " ; another poss stroke with  left hand weakness    Wears glasses     Wears partial dentures     upper & lower     Past Surgical History:   Procedure Laterality Date    BREAST SURGERY Right     exc. bx. lump (R) breast    DILATION AND CURETTAGE OF UTERUS      HYSTERECTOMY  2003    TAHBSO    IR NEPHROSTOMY TUBE CHECK/CHANGE/REPOSITION/REINSERTION/UPSIZE  5/24/2023    IR NEPHROSTOMY TUBE CHECK/CHANGE/REPOSITION/REINSERTION/UPSIZE  7/13/2023    IR NEPHROSTOMY TUBE CHECK/CHANGE/REPOSITION/REINSERTION/UPSIZE  8/29/2023    IR NEPHROSTOMY TUBE CHECK/CHANGE/REPOSITION/REINSERTION/UPSIZE  10/11/2023    IR NEPHROSTOMY TUBE CHECK/CHANGE/REPOSITION/REINSERTION/UPSIZE  12/11/2023    IR NEPHROSTOMY TUBE CHECK/CHANGE/REPOSITION/REINSERTION/UPSIZE  2/9/2024    IR NEPHROSTOMY TUBE CHECK/CHANGE/REPOSITION/REINSERTION/UPSIZE  4/8/2024    IR NEPHROSTOMY TUBE CHECK/CHANGE/REPOSITION/REINSERTION/UPSIZE  6/7/2024    IR NEPHROSTOMY TUBE CHECK/CHANGE/REPOSITION/REINSERTION/UPSIZE  8/7/2024    IR NEPHROSTOMY TUBE CHECK/CHANGE/REPOSITION/REINSERTION/UPSIZE  10/7/2024    IR NEPHROSTOMY TUBE CHECK/CHANGE/REPOSITION/REINSERTION/UPSIZE  1/7/2025    IR NEPHROSTOMY TUBE PLACEMENT  4/13/2023    JOINT REPLACEMENT Right     OOPHORECTOMY Bilateral     cancer    HI CYSTO W/REMOVAL OF TUMORS SMALL N/A 4/4/2022    Procedure: TRANSURETHRAL RESECTION OF BLADDER (MULTIFOCAL) TUMOR (TURBT);  Surgeon: Vinnie Ricardo MD;  [facility];  Service: Urology    HI CYSTOURETHROSCOPY W/DEST &/RMVL TUMOR LARGE Bilateral 11/11/2021    Procedure: CYSTOSCOPY, TRANSURETHRAL RESECTION OF BLADDER TUMOR (TURBT), EVACUATION OF CLOTS, LARGE TUMOR 7-8CM;  Surgeon: Malcolm Reyes MD;  Location: [facility];  Service: Urology    HI CYSTOURETHROSCOPY W/DEST &/RMVL TUMOR LARGE N/A 3/21/2023    Procedure: TRANSURETHRAL RESECTION OF BLADDER TUMOR (TURBT);  Surgeon: Vinnie Ricardo MD;  [facility];  Service: Urology    HI CYSTOURETHROSCOPY W/URETERAL CATHETERIZATION  Bilateral 2022    Procedure: CYSTOSCOPY WITH ATTEMPTED B/L RETROGRADE PYELOGRAM;  Surgeon: Vinnie Ricardo MD;  [facility];  Service: Urology    IL XCAPSL CTRC RMVL INSJ IO LENS PROSTH W/O ECP Left 2017    Procedure: EXTRACTION EXTRACAPSULAR CATARACT PHACO INTRAOCULAR LENS (IOL);  Surgeon: Elmer Ocampo MD;  Location: [facility] MAIN OR;  Service: Ophthalmology    TONSILLECTOMY       Social History     Tobacco Use    Smoking status: Former     Current packs/day: 0.00     Average packs/day: 1.5 packs/day for 40.0 years (60.0 ttl pk-yrs)     Types: Cigarettes     Start date:      Quit date:      Years since quittin.0    Smokeless tobacco: Never   Vaping Use    Vaping status: Never Used   Substance and Sexual Activity    Alcohol use: Yes     Comment: occassional, socially    Drug use: No    Sexual activity: Not Currently     E-Cigarette/Vaping    E-Cigarette Use Never User      E-Cigarette/Vaping Substances    Nicotine No     THC No     CBD No     Flavoring No     Other No     Unknown No      Family History   Problem Relation Age of Onset    Parkinsonism Mother     Heart disease Father     Cancer Father         leukemia    Heart disease Sister     Diabetes Sister     Diabetes Brother     Diabetes Sister     Melanoma Sister     Melanoma Sister     Diabetes Maternal Aunt     No Known Problems Maternal Aunt     No Known Problems Paternal Aunt      Social History:  Marital Status:    Patient Pre-hospital Living Situation: Home  Patient Pre-hospital Level of Mobility: walks  Patient Pre-hospital Diet Restrictions: regular    Meds/Allergies   I have reviewed home medications with patient personally.  Prior to Admission medications    Medication Sig Start Date End Date Taking? Authorizing Provider   acetaminophen (TYLENOL) 500 mg tablet Take 500 mg by mouth    Historical Provider, MD   aspirin 81 mg chewable tablet Chew 81 mg daily    Historical Provider, MD   atorvastatin (LIPITOR) 40 mg  tablet Take 1 tablet (40 mg total) by mouth daily with dinner 10/30/21   Oneil Morrell,    cholecalciferol (VITAMIN D3) 1,000 units tablet Take 1,000 Units by mouth daily    Historical Provider, MD   hydrochlorothiazide (HYDRODIURIL) 25 mg tablet Take 25 mg by mouth every morning    Historical Provider, MD   metoprolol succinate (TOPROL-XL) 50 mg 24 hr tablet Take 25 mg by mouth every morning    Historical Provider, MD   sodium chloride, PF, 0.9 % 10 mL by Intracatheter route daily Intracatheter flushing daily. May substitute prefilled syringe with normal saline 10 mL vials, 10 mL syringes, and 18 g blunt needles 4/13/23 7/14/23  Gregorio Ruth MD     Allergies   Allergen Reactions    Percocet [Oxycodone-Acetaminophen] GI Intolerance    Vicodin [Hydrocodone-Acetaminophen] GI Intolerance     Also darvocet    Medical Tape Rash    Propoxyphene Other (See Comments)       Objective :  Temp:  [99.7 °F (37.6 °C)] 99.7 °F (37.6 °C)  HR:  [101-113] 113  BP: (121-133)/(68-85) 121/85  Resp:  [20-23] 22  SpO2:  [94 %] 94 %  O2 Device: None (Room air)    Physical Exam  Constitutional:       General: She is not in acute distress.     Appearance: She is ill-appearing and toxic-appearing. She is not diaphoretic.   HENT:      Mouth/Throat:      Mouth: Mucous membranes are dry.   Eyes:      General: No scleral icterus.  Cardiovascular:      Rate and Rhythm: Normal rate and regular rhythm.      Heart sounds: Normal heart sounds.   Pulmonary:      Effort: Pulmonary effort is normal.      Breath sounds: Normal breath sounds.   Abdominal:      General: Abdomen is flat. Bowel sounds are normal. There is no distension.      Palpations: Abdomen is soft.      Tenderness: There is no abdominal tenderness.   Musculoskeletal:      Right lower leg: No edema.      Left lower leg: No edema.   Skin:     Findings: Bruising, ecchymosis, erythema, lesion, rash and wound present.      Comments: Bilateral percutaneous nephrostomy tubes; evidence of  large ?hematoma on left lower back below nephrostomy tube with surrounding erythema that extends across the lower abdomen/groin with bleeding ulcerations/wounds   Neurological:      General: No focal deficit present.      Mental Status: She is alert and oriented to person, place, and time.   Psychiatric:         Mood and Affect: Mood normal.                Lines/Drains:            Lab Results: I have reviewed the following results:  Results from last 7 days   Lab Units 01/22/25  1648   WBC Thousand/uL 18.02*   HEMOGLOBIN g/dL 13.5   HEMATOCRIT % 40.9   PLATELETS Thousands/uL 298   BANDS PCT % 1   LYMPHO PCT % 7*   MONO PCT % 1*   EOS PCT % 0     Results from last 7 days   Lab Units 01/22/25  1648   SODIUM mmol/L 138   POTASSIUM mmol/L 3.9   CHLORIDE mmol/L 101   CO2 mmol/L 16*   BUN mg/dL 119*   CREATININE mg/dL 5.11*   ANION GAP mmol/L 21*   CALCIUM mg/dL 9.1   ALBUMIN g/dL 3.4*   TOTAL BILIRUBIN mg/dL 0.63   ALK PHOS U/L 49   ALT U/L 27   AST U/L 51*   GLUCOSE RANDOM mg/dL 103             Lab Results   Component Value Date    HGBA1C 5.4 10/30/2021    HGBA1C 5.5 04/29/2016     Results from last 7 days   Lab Units 01/22/25  1648   LACTIC ACID mmol/L 2.0   PROCALCITONIN ng/ml 2.69*       Imaging Results Review: I reviewed radiology reports from this admission including: chest xray.  Other Study Results Review: No additional pertinent studies reviewed.    Administrative Statements   I have spent a total time of 65 minutes in caring for this patient on the day of the visit/encounter including Diagnostic results, Prognosis, Risks and benefits of tx options, Instructions for management, Patient and family education, Importance of tx compliance, Risk factor reductions, Impressions, Counseling / Coordination of care, Documenting in the medical record, Reviewing / ordering tests, medicine, procedures  , and Obtaining or reviewing history  .    ** Please Note: This note has been constructed using a voice recognition system.  **

## 2025-01-23 NOTE — ASSESSMENT & PLAN NOTE
Leukocytosis, tachycardia, tachypnea.  Concern for left flank/abdominal cellulitis complicated possible herpes zoster.  Lab and vital sign derangements may also be reactive due to dehydration from poor p.o. intake.  UA shows 4-10 WBCs and she has no symptoms of urinary infection so no evidence of a UTI.   -Stop IV cefepime and vancomycin   -Start IV cefazolin 1 g every 24 hours, renally dose adjusted   -Start p.o. Valtrex 500 mg every 24 hours   -Follow-up CT A/P   -Follow-up blood cultures and urine culture, although bacterial colonization of the urine is possible due to her nephrostomy tubes so would not treat this unless the patient has localizing signs of a urinary source which she does not at this time   -Repeat CBC tomorrow to monitor treatment response   -Monitor fever curve, vital signs

## 2025-01-23 NOTE — CONSULTS
DERMATOLOGY:  CONSULTATION   Ning Chambers 84 y.o. female MRN: [id number]  Unit/Bed#: [address] [place] [address] Encounter: [id number]          Inpatient consult to Dermatology     Date/Time  1/23/2025 4:34 PM     Performed by  Chiquis Wiley MD   Authorized by  Nata Valentin DO           DERM CONSULT - Attending and resident present VIRTUALLY. Patient photos reviewed. Patient in hospital setting.  Assessment can only be provided based on clinical images received and symptoms conveyed.       Assessment/Recommendations     Patient is an 84-year-old female admitted for sepsis found to have rash over past two week. Dermatology consulted for evaluation of rash and for recommendations. Given history of bladder cancer, there is concern for hypercoagulable state and possible retiform purpura. Given history of advanced CKD, there is also concern for calciphylaxis. Other possible diagnoses include hemorrhagic bullous cellulitis and cryoglobulinemia/vasculitis. Disseminated zoster was considered, however this is considered to be less likely.    Recommendations:  Obtain wedge biopsy (by general surgery) and send for tissue culture/tissue exam with attention to dermpath with differential including retiform purpura vs calciphylaxis vs hemorrhagic bullous cellulitis vs vasculitis  Check hypercoagulable workup including Protein C, Protein S, and cryoglobulins  Start Sodium Thiosulfate infusion 25g over 60 minutes dosed 3 times per week for possible calciphylaxis. Plan to start once noted improvement in acidosis.  Please notify dermatology service when biopsy results return for re-evaluation and recommendations.   If patient were to acutely worsen, would consider transfer to [facility] for in-person evaluation by dermatology.      Please set up discharge follow up by calling our office: [phone])     Thank you for involving me in the care of your patient. Please call with questions, change in clinical status or if tests  "recommended above are abnormal.     Discussed with the primary service.      History:     HISTORY OF PRESENT ILLNESS:    Reason for Consult: rash  HPI: Ning Chambers is a 84 y.o. year old female  with a PMH of invasive bladder cancer with retroperitoneal LAD s/p bilateral nephrostomy tubes, CKD 4, HTN who presented to ED with weakness and poor oral intake for 2 weeks. Found to meet criteria for sepsis and admitted to hospital. During past 2 weeks, patient noted rash that she noticed after her last nephrostomy tube exchange on 01/07. At that time 10cc contrast dye were used. Patient notes rash was initially pruritic, but slowly becoming more painful over the course of last day. Hemorrhagic bullae were present initially, but now eroded.       Review of Systems:  Review of Systems      PAST MEDICAL HISTORY:  Past Medical History:   Diagnosis Date    Arthritis     right knee    Hay fever     Hyperlipidemia     Hypertension     Myocardial infarction (HCC) 2003    questionable MI during exploratory surgery    Ovarian cancer (HCC) 2003    surgery (did not have chemo or radiation)    Stroke ([facility]) 2003    \" mini stroke \" no deficits ; another poss stroke with  left hand weakness    Wears glasses     Wears partial dentures     upper & lower     Past Surgical History:   Procedure Laterality Date    BREAST SURGERY Right     exc. bx. lump (R) breast    DILATION AND CURETTAGE OF UTERUS      HYSTERECTOMY  2003    TAHBSO    IR NEPHROSTOMY TUBE CHECK/CHANGE/REPOSITION/REINSERTION/UPSIZE  5/24/2023    IR NEPHROSTOMY TUBE CHECK/CHANGE/REPOSITION/REINSERTION/UPSIZE  7/13/2023    IR NEPHROSTOMY TUBE CHECK/CHANGE/REPOSITION/REINSERTION/UPSIZE  8/29/2023    IR NEPHROSTOMY TUBE CHECK/CHANGE/REPOSITION/REINSERTION/UPSIZE  10/11/2023    IR NEPHROSTOMY TUBE CHECK/CHANGE/REPOSITION/REINSERTION/UPSIZE  12/11/2023    IR NEPHROSTOMY TUBE CHECK/CHANGE/REPOSITION/REINSERTION/UPSIZE  2/9/2024    IR NEPHROSTOMY TUBE " CHECK/CHANGE/REPOSITION/REINSERTION/UPSIZE  4/8/2024    IR NEPHROSTOMY TUBE CHECK/CHANGE/REPOSITION/REINSERTION/UPSIZE  6/7/2024    IR NEPHROSTOMY TUBE CHECK/CHANGE/REPOSITION/REINSERTION/UPSIZE  8/7/2024    IR NEPHROSTOMY TUBE CHECK/CHANGE/REPOSITION/REINSERTION/UPSIZE  10/7/2024    IR NEPHROSTOMY TUBE CHECK/CHANGE/REPOSITION/REINSERTION/UPSIZE  1/7/2025    IR NEPHROSTOMY TUBE PLACEMENT  4/13/2023    JOINT REPLACEMENT Right     OOPHORECTOMY Bilateral     cancer    WA CYSTO W/REMOVAL OF TUMORS SMALL N/A 4/4/2022    Procedure: TRANSURETHRAL RESECTION OF BLADDER (MULTIFOCAL) TUMOR (TURBT);  Surgeon: Vinnie Ricardo MD;  [facility];  Service: Urology    WA CYSTOURETHROSCOPY W/DEST &/RMVL TUMOR LARGE Bilateral 11/11/2021    Procedure: CYSTOSCOPY, TRANSURETHRAL RESECTION OF BLADDER TUMOR (TURBT), EVACUATION OF CLOTS, LARGE TUMOR 7-8CM;  Surgeon: Malcolm Reyes MD;  [facility];  Service: Urology    WA CYSTOURETHROSCOPY W/DEST &/RMVL TUMOR LARGE N/A 3/21/2023    Procedure: TRANSURETHRAL RESECTION OF BLADDER TUMOR (TURBT);  Surgeon: Vinnie Ricardo MD;  [facility];  Service: Urology    WA CYSTOURETHROSCOPY W/URETERAL CATHETERIZATION Bilateral 4/4/2022    Procedure: CYSTOSCOPY WITH ATTEMPTED B/L RETROGRADE PYELOGRAM;  Surgeon: Vinnie Ricardo MD;  [facility];  Service: Urology    WA XCAPSL CTRC RMVL INSJ IO LENS PROSTH W/O ECP Left 7/24/2017    Procedure: EXTRACTION EXTRACAPSULAR CATARACT PHACO INTRAOCULAR LENS (IOL);  Surgeon: Elmer Ocampo MD;  Location: [facility] MAIN OR;  Service: Ophthalmology    TONSILLECTOMY         FAMILY HISTORY:  Non-contributory    SOCIAL HISTORY:  Social History   Single  Social History     Substance and Sexual Activity   Alcohol Use Yes    Comment: occassional, socially     Social History     Substance and Sexual Activity   Drug Use No     Social History     Tobacco Use   Smoking Status Former    Current packs/day: 0.00    Average packs/day: 1.5  packs/day for 40.0 years (60.0 ttl pk-yrs)    Types: Cigarettes    Start date:     Quit date:     Years since quittin.0   Smokeless Tobacco Never       ALLERGIES:  Allergies   Allergen Reactions    Percocet [Oxycodone-Acetaminophen] GI Intolerance    Vicodin [Hydrocodone-Acetaminophen] GI Intolerance     Also darvocet    Medical Tape Rash    Propoxyphene Other (See Comments)       MEDICATIONS:  All current active medications have been reviewed.       Physical exam:     Temp:  [98.1 °F (36.7 °C)-98.3 °F (36.8 °C)] 98.3 °F (36.8 °C)  HR:  [] 88  Resp:  [15-23] 17  BP: (121-144)/(63-94) 124/94  SpO2:  [94 %-96 %] 96 %  Temp (24hrs), Av.2 °F (36.8 °C), Min:98.1 °F (36.7 °C), Max:98.3 °F (36.8 °C)    Current: Temperature: 98.3 °F (36.8 °C)      PHOTOGRAPHIC SKIN EXAMINATION             Labs, Imaging, & Other Studies     Lab Results:  I have personally reviewed pertinent labs.     Results from last 7 days   Lab Units 25  0559 25  1648   WBC Thousand/uL 13.27* 18.02*   HEMOGLOBIN g/dL 11.6 13.5   PLATELETS Thousands/uL 253 298     Results from last 7 days   Lab Units 25  0559 25  1648   POTASSIUM mmol/L 3.6 3.9   CHLORIDE mmol/L 110* 101   CO2 mmol/L 14* 16*   BUN mg/dL 110* 119*   CREATININE mg/dL 4.27* 5.11*   EGFR ml/min/1.73sq m 8 7   CALCIUM mg/dL 8.5 9.1   AST U/L  --  51*   ALT U/L  --  27   ALK PHOS U/L  --  49     Results from last 7 days   Lab Units 25  1648 25  1637   BLOOD CULTURE  Received in Microbiology Lab. Culture in Progress. Received in Microbiology Lab. Culture in Progress.           Pathology:   I have personally reviewed pertinent reports.       Chiquis Wiley MD  Dermatology PGY-2

## 2025-01-23 NOTE — UTILIZATION REVIEW
"Initial Clinical Review    Admission: Date/Time/Statement:   Admission Orders (From admission, onward)       Ordered        01/22/25 1834  INPATIENT ADMISSION  Once                          Orders Placed This Encounter   Procedures    INPATIENT ADMISSION     Standing Status:   Standing     Number of Occurrences:   1     Level of Care:   Med Surg [16]     Estimated length of stay:   More than 2 Midnights     Certification:   I certify that inpatient services are medically necessary for this patient for a duration of greater than two midnights. See H&P and MD Progress Notes for additional information about the patient's course of treatment.     ED Arrival Information       Expected   -    Arrival   1/22/2025 15:50    Acuity   Urgent              Means of arrival   Ambulance    Escorted by   [facility]    Service   Hospitalist    Admission type   Emergency              Arrival complaint   Weakness             Chief Complaint   Patient presents with    Weakness - Generalized     Patient c/o diarrhea x 5-6 days, generalized weakness x 3-4 days that got progressively worse and was unable to get up and walk today.      Diarrhea       Initial Presentation:   84 yof to ER from home via EMS generalized weakness, poor oral intake and new onset \"rash\" on her left flank and lower abdomen which patient states started after her last nephrostomy tube exchange on 01/07. Hx  invasive bladder cancer with retroperitoneal LAD s/p bilateral nephrostomy tubes, CKD 4, HTN. Presents  febrile, tachycardic, dry mucous membranes. Bilateral percutaneous nephrostomy tubes; evidence of large ?hematoma on left lower back below nephrostomy tube with surrounding erythema that extends across the lower abdomen/groin with bleeding ulcerations/wounds. See images below. Admission CXR neg. Labs: WBC 18.02, CO2 16, anion gap 21, , Cr 5.11, phos 5.1, ast 51, alb 3.4, procalcitonin 2.69, u/a+ketones, blood, prot, urobilinogen, leuk, WBC, " bacteria.  Admitted to inpatient status for severe sepsis 2nd possible UTI, left flank/lower abdominal cellulitis. Plan: IVABT, cultures, renal & ID consulted.                  Anticipated Length of Stay/Certification Statement:   Patient will be admitted on an inpatient basis with an anticipated length of stay of greater than 2 midnights secondary to severe sepsis with MARTY on CKD, left flank/lower abdominal cellulitis.     Date: 1/23/25   Day 2:  IVABT changed by ID to Cefazolin. Started on Valtrex for possible Herpes Zoster. CT a/p ordered. Continue serial rash exams, follow labs    Per ID: left flank/abdominal cellulitis complicated possible herpes zoster.   Stop IV cefepime and vancomycin. Start IV cefazolin 1 g every 24 hours, renally dose adjusted. Start p.o. Valtrex 500 mg every 24 hours. Follow-up CT A/P  Per renal: MARTY  Prior baseline creatinine was approximately 2.5 to 3 mg/dL. Most recent creatinine January 13 as outpatient was 2.03 mg/dL   Plan: Restart bicarbonate based fluids. ID team has started renally adjusted Valtrex due to concern for zoster rash.  Check CT scan without contrast.    Bedside Bx by surgery:  Procedure Details - Lesion Biopsy:     Body area:  Lower extremity    Lower extremity location:  L hip    Biopsy method: wedge biopsy      Biopsy tissue type: skin and subcutaneous    Initial size (mm):  0    Final defect size (mm):  30    Malignancy: malignancy unknown       Presumed benign  7cc lidocaine 1% injected  Sutured with 5-0 nylon   No dressing/adhesive applied due to friability of the epidermis    Date: 1/24/25  Day 3: Has surpassed a 2nd midnight with active treatments and services.  IVABT continued per ID. Rash/erythema extending from left flank/buttock to the anterior aspect of the abdomen with areas of denuded skin. Bilateral nephrostomy tubes in place. Continue serial rash exams, monitor VS, follow labs, follow up bx results.       ED Treatment-Medication Administration from  01/22/2025 1550 to 01/22/2025 1940         Date/Time Order Dose Route Action     01/22/2025 1652 sodium chloride 0.9 % bolus 1,000 mL 1,000 mL Intravenous New Bag     01/22/2025 1725 cefepime (MAXIPIME) IVPB (premix in dextrose) 2,000 mg 50 mL 2,000 mg Intravenous New Bag     01/22/2025 1733 sodium chloride 0.9 % bolus 1,000 mL 1,000 mL Intravenous New Bag            Scheduled Medications:  Medications 01/15 01/16 01/17 01/18 01/19 01/20 01/21 01/22 01/23 01/24   aspirin chewable tablet 81 mg  Dose: 81 mg  Freq: Daily Route: PO  Start: 01/23/25 0900            0909      0900        atorvastatin (LIPITOR) tablet 40 mg  Dose: 40 mg  Freq: Daily with dinner Route: PO  Start: 01/23/25 1630            1659      1630        ceFAZolin (ANCEF) IVPB (premix in dextrose) 1,000 mg 50 mL  Dose: 1,000 mg  Freq: Every 24 hours Route: IV  Last Dose: 1,000 mg (01/23/25 1444)  Start: 01/23/25 1045   Admin Instructions:      Order specific questions:               1444      1045        cefepime (MAXIPIME) IVPB (premix in dextrose) 1,000 mg 50 mL  Dose: 1,000 mg  Freq: Every 24 hours Route: IV  Start: 01/23/25 1700 End: 01/23/25 1031   Order specific questions:               1031-D/C'd       cefepime (MAXIPIME) IVPB (premix in dextrose) 2,000 mg 50 mL  Dose: 2,000 mg  Freq: Once Route: IV  Last Dose: Stopped (01/22/25 1755)  Start: 01/22/25 1730 End: 01/22/25 1755   Order specific questions:              1725     1755          heparin (porcine) subcutaneous injection 5,000 Units  Dose: 5,000 Units  Freq: Every 8 hours scheduled Route: SC  Start: 01/22/25 2200   Admin Instructions:              2021 2036 2124      0602     1435     2157      0626     1400     2200        hydroCHLOROthiazide tablet 25 mg  Dose: 25 mg  Freq: Every morning Route: PO  Start: 01/23/25 0900   Admin Instructions:      Order specific questions:              2014 0900 0900        lidocaine (PF) (XYLOCAINE-MPF) 1 % injection 10 mL  Dose: 10  mL  Freq: Once Route: INFILTRATION  Indications of Use: LOCAL ANESTHESIA  Start: 01/23/25 1515 End: 01/23/25 1606            1606 [C]         metoprolol succinate (TOPROL-XL) 24 hr tablet 25 mg  Dose: 25 mg  Freq: Every morning Route: PO  Start: 01/23/25 0900   Admin Instructions:      Order specific questions:               0909 0900        potassium chloride (Klor-Con M20) CR tablet 40 mEq  Dose: 40 mEq  Freq: Once Route: PO  Start: 01/23/25 2230 End: 01/23/25 2249   Admin Instructions:               2249          potassium chloride 20 mEq IVPB (premix)  Dose: 20 mEq  Freq: Once Route: IV  Start: 01/24/25 1015   Admin Instructions:                1015        Followed by   potassium chloride 20 mEq IVPB (premix)  Dose: 20 mEq  Freq: Once Route: IV  Start: 01/24/25 1215   Admin Instructions:                1215        potassium chloride 40 mEq IVPB (premix)  Dose: 40 mEq  Freq: Once Route: IV  Start: 01/24/25 1015 End: 01/24/25 1012   Admin Instructions:                1012-D/C'd      saccharomyces boulardii (FLORASTOR) capsule 250 mg  Dose: 250 mg  Freq: 2 times daily Route: PO  Start: 01/23/25 1930 2011      0900     1800        sodium chloride (PF) 0.9 % injection 10 mL  Dose: 10 mL  Freq: Daily Route: IK  Start: 01/23/25 0900            1103      0900        sodium chloride 0.9 % bolus 1,000 mL  Dose: 1,000 mL  Freq: Once Route: IV  Last Dose: 1,000 mL (01/22/25 1733)  Start: 01/22/25 1730 End: 01/22/25 1833           1733          sodium chloride 0.9 % bolus 1,000 mL  Dose: 1,000 mL  Freq: Once Route: IV  Last Dose: 1,000 mL (01/22/25 1652)  Start: 01/22/25 1615 End: 01/22/25 1752           1652          valACYclovir (VALTREX) tablet 500 mg  Dose: 500 mg  Freq: Every 24 hours Route: PO  Start: 01/23/25 1045   Admin Instructions:               1444      1045        vancomycin (VANCOCIN) 1500 mg in sodium chloride 0.9% 250 mL IVPB  Dose: 25 mg/kg  Weight Dosing Info: 61.2 kg (Adjusted)  Freq: Once  Route: IV  Last Dose: 1,500 mg (01/22/25 2112)  Start: 01/22/25 2100 End: 01/22/25 2242   Admin Instructions:      Order specific questions:              2112                      Continuous Meds Sorted by Name  for Ning Chambers as of 01/15/25 through 1/24/25  Legend:       Medications 01/15 01/16 01/17 01/18 01/19 01/20 01/21 01/22 01/23 01/24   sodium bicarbonate 150 mEq in dextrose 5 % 1,000 mL infusion  Rate: 75 mL/hr Dose: 75 mL/hr  Freq: Continuous Route: IV  Last Dose: 75 mL/hr (01/24/25 0632)  Start: 01/23/25 1145 End: 01/24/25 1442   Admin Instructions:               1443      0632     1442 [C]     1442-D/C'd      sodium bicarbonate 150 mEq in dextrose 5 % 1,000 mL infusion  Rate: 100 mL/hr Dose: 100 mL/hr  Freq: Continuous Route: IV  Start: 01/23/25 1145 End: 01/23/25 1137   Admin Instructions:               1137-D/C'd       sodium bicarbonate 150 mEq in dextrose 5 % 1,000 mL infusion  Rate: 100 mL/hr Dose: 100 mL/hr  Freq: Continuous Route: IV  Last Dose: Stopped (01/23/25 0536)  Start: 01/22/25 2015 End: 01/23/25 0511   Admin Instructions:              2112      0511-D/C'd  0536 [C]         Legend:       [id number]/1701/1801/1901/2001/2101/2201/2301/24        PRN Meds Sorted by Name  for Ning Chambers as of 01/15/25 through 1/24/25  Legend:       Medications 01/15 01/16 01/17 01/18 01/19 01/20 01/21 01/22 01/23 01/24   acetaminophen (TYLENOL) tablet 650 mg  Dose: 650 mg  Freq: Every 6 hours PRN Route: PO  PRN Reasons: mild pain,headaches,fever  Indications of Use: FEVER,HEADACHE,MILD PAIN  Start: 01/22/25 2007                influenza vaccine, high-dose (Fluzone High-Dose) IM injection 0.5 mL  Dose: 0.5 mL  Freq: Prior to discharge Route: IM  PRN Reason: immunization  Indications of Use: VACCINATION  Start: 01/23/25 0053   Admin Instructions:      Order specific questions:                   vancomycin (VANCOCIN) IVPB (premix in dextrose) 500 mg 100 mL  Dose: 10 mg/kg  Weight Dosing  Info: 61.2 kg (Adjusted)  Freq: Daily PRN Route: IV  PRN Comment: trough <15  Start: 01/23/25 1000 End: 01/23/25 1031   Admin Instructions:      Order specific questions:               1031-D/C'd                       ED Triage Vitals [01/22/25 1601]   Temperature Pulse Respirations Blood Pressure SpO2 Pain Score   99.7 °F (37.6 °C) (!) 109 20 133/84 94 % No Pain     Weight (last 2 days)       Date/Time Weight    01/22/25 2100 77.9 (171.74)    01/22/25 1727 77.9 (171.74)            Vital Signs (last 3 days)       Date/Time Temp Pulse Resp BP MAP (mmHg) SpO2 O2 Device Patient Position - Orthostatic VS Pain    01/24/25 0900 97.3 °F (36.3 °C) 72 16 125/58 83 98 % None (Room air) Sitting No Pain    01/24/25 0005 98.2 °F (36.8 °C) 73 16 102/51 70 95 % None (Room air) Lying No Pain    01/23/25 2017 97.5 °F (36.4 °C) 76 20 110/57 80 97 % None (Room air) Lying No Pain    01/23/25 1500 97.3 °F (36.3 °C) 73 -- 111/52 75 98 % None (Room air) Lying --    01/23/25 0900 98.3 °F (36.8 °C) -- -- -- -- -- -- -- No Pain    01/23/25 0700 -- 88 -- 124/94 -- 96 % None (Room air) -- --    01/23/25 0300 98.1 °F (36.7 °C) 89 17 136/63 91 95 % -- -- --    01/22/25 2100 98.1 °F (36.7 °C) 96 15 144/66 95 96 % -- Lying --    01/22/25 2001 -- -- -- -- -- -- -- -- No Pain    01/22/25 1800 -- 113 22 121/85 95 94 % None (Room air) -- --    01/22/25 1710 -- -- -- -- -- -- None (Room air) -- --    01/22/25 1700 -- 101 23 125/68 91 94 % None (Room air) -- --    01/22/25 1601 99.7 °F (37.6 °C) 109 20 133/84 -- 94 % None (Room air) Lying No Pain              Pertinent Labs/Diagnostic Test Results:   Radiology:  CT abdomen pelvis wo contrast   Final Interpretation by Ziggy Lomeli MD (01/23 1231)      1.  Interval resolution of the right hydronephrosis. However, there is increased, mild dilation of the left renal pelvis and left ureter, despite nurse nephrostomy tube placement.   2.  Mild interval enlargement of the retroperitoneal  "lymphadenopathy, favoring reactive changes.      Workstation performed: [id number]         XR chest 1 view portable   ED Interpretation by Josué Baeza MD (01/22 1759)   No acute cardiopulmonary disease      Final Interpretation by David Fall MD (01/22 2236)      No acute cardiopulmonary disease.            Workstation performed: [id number]           Cardiology:  ECG 12 lead   Final Result by Sebastian Long MD (01/24 0901)   Atrial fibrillation with rapid ventricular response   Right bundle branch block   Left anterior fascicular block    Bifascicular block       Confirmed by Sebasitan Long ([id number]) on 1/24/2025 9:01:40 AM        GI:  No orders to display           Results from last 7 days   Lab Units 01/24/25  0639 01/23/25  0559 01/22/25  1648   WBC Thousand/uL 7.77 13.27* 18.02*   HEMOGLOBIN g/dL 11.5 11.6 13.5   HEMATOCRIT % 34.5* 36.0 40.9   PLATELETS Thousands/uL 229 253 298   TOTAL NEUT ABS Thousands/µL  --  11.05*  --    BANDS PCT %  --   --  1         Results from last 7 days   Lab Units 01/24/25  0639 01/23/25 2026 01/23/25  0559 01/22/25  1648   SODIUM mmol/L 139 136 138 138   POTASSIUM mmol/L 2.8* 3.2* 3.6 3.9   CHLORIDE mmol/L 104 106 110* 101   CO2 mmol/L 21 19* 14* 16*   ANION GAP mmol/L 14* 11 14* 21*   BUN mg/dL 106* 108* 110* 119*   CREATININE mg/dL 3.96* 4.28* 4.27* 5.11*   EGFR ml/min/1.73sq m 9 8 8 7   CALCIUM mg/dL 7.9* 8.5 8.5 9.1   MAGNESIUM mg/dL 2.1  --  2.3 2.4   PHOSPHORUS mg/dL 3.3  --  4.2* 5.1*     Results from last 7 days   Lab Units 01/22/25  1648   AST U/L 51*   ALT U/L 27   ALK PHOS U/L 49   TOTAL PROTEIN g/dL 6.9   ALBUMIN g/dL 3.4*   TOTAL BILIRUBIN mg/dL 0.63         Results from last 7 days   Lab Units 01/24/25  0639 01/23/25 2026 01/23/25  0559 01/22/25  1648   GLUCOSE RANDOM mg/dL 91 85 86 103             No results found for: \"BETA-HYDROXYBUTYRATE\"                                   Results from last 7 days   Lab Units 01/23/25  0559 01/22/25  1648   PROCALCITONIN " "ng/ml 2.27* 2.69*     Results from last 7 days   Lab Units 01/22/25  1648   LACTIC ACID mmol/L 2.0                                                 Results from last 7 days   Lab Units 01/22/25  1822   CLARITY UA  Cloudy   COLOR UA  Yellow   SPEC GRAV UA  >=1.030   PH UA  6.0   GLUCOSE UA mg/dl Negative   KETONES UA mg/dl Trace*   BLOOD UA  Moderate*   PROTEIN UA mg/dl 100 (2+)*   NITRITE UA  Negative   BILIRUBIN UA  Negative   UROBILINOGEN UA (BE) mg/dl 4.0*   LEUKOCYTES UA  Large*   WBC UA /hpf 4-10*   RBC UA /hpf None Seen   BACTERIA UA /hpf Innumerable*   EPITHELIAL CELLS WET PREP /hpf Occasional                                 Results from last 7 days   Lab Units 01/23/25  1611 01/22/25  1648 01/22/25  1637 01/22/25  1046   BLOOD CULTURE   --  No Growth at 24 hrs. No Growth at 24 hrs.  --    GRAM STAIN RESULT  No Polys or Bacteria seen  --   --   --    URINE CULTURE   --   --   --  <10,000 cfu/ml             Results from last 7 days   Lab Units 01/23/25  0649   VANCOMYCIN RM ug/mL 16.3       Past Medical History:   Diagnosis Date    Arthritis     right knee    Hay fever     Hyperlipidemia     Hypertension     Myocardial infarction (HCC) 2003    questionable MI during exploratory surgery    Ovarian cancer ([facility]) 2003    surgery (did not have chemo or radiation)    Stroke ([facility]) 2003    \" mini stroke \" no deficits ; another poss stroke with  left hand weakness    Wears glasses     Wears partial dentures     upper & lower     Present on Admission:   Malignant neoplasm of overlapping sites of bladder ([facility])   Benign hypertension      Admitting Diagnosis: Cellulitis [L03.90]  UTI (urinary tract infection) [N39.0]  Weakness [R53.1]  MARTY (acute kidney injury) ([facility]) [N17.9]  Sepsis ([facility]) [A41.9]  Age/Sex: 84 y.o. female    Network Utilization Review Department  ATTENTION: Please call with any questions or concerns to [phone] and carefully listen to the prompts so that you are directed to the right person. All " voicemails are confidential.   For Discharge needs, contact Care Management DC Support Team at [phone] opt. [phone]  Send all requests for admission clinical reviews, approved or denied determinations and any other requests to dedicated fax number below belonging to the campus where the patient is receiving treatment. List of dedicated fax numbers for the Facilities:  FACILITY NAME UR FAX NUMBER   ADMISSION DENIALS (Administrative/Medical Necessity) [phone]   DISCHARGE SUPPORT TEAM (NETWORK) [phone]   PARENT CHILD HEALTH (Maternity/NICU/Pediatrics) [phone]   [facility] [phone]   [facility] [phone]   Replaced by [facility] [phone]   [facility] [phone]   [facility] [phone]   [facility] [phone]   [facility] [phone]   [facility] [phone]   [facility] [phone]   [facility] [phone]   [facility] [phone]   [facility] [phone]

## 2025-01-23 NOTE — ASSESSMENT & PLAN NOTE
"Patient endorsing \"rash\" on her left back/flank/buttocks and abdomen/groin which she states started after her last nephrostomy tube exchange 01/07  As noted above continue IV cefazolin and oral Valtrex  Prolonged time spent coordinating plan of care with dermatology, nephrology, ID and also surgery.  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  Plan of care was coordinated with nephrology and we will hold until acidosis improves.  "

## 2025-01-23 NOTE — ASSESSMENT & PLAN NOTE
Patient has history of bladder cancer with retroperitoneal lymphadenopathy and is not currently receiving any cancer directed treatment.  She does follow with palliative care as an outpatient.   -Recommend continued outpatient follow-up   -Follow-up CT A/P to assess for progression of disease/hydronephrosis

## 2025-01-23 NOTE — ASSESSMENT & PLAN NOTE
Lab Results   Component Value Date    EGFR 8 01/23/2025    EGFR 7 01/22/2025    EGFR 21 01/13/2025    CREATININE 4.27 (H) 01/23/2025    CREATININE 5.11 (H) 01/22/2025    CREATININE 2.03 (H) 01/13/2025   Creatinine elevated to 5.11 from baseline of 2.7-3.  Likely prerenal due to poor p.o. intake.  Follows with nephrology as an outpatient   -Dose adjust antimicrobials for creatinine clearance   -Nephrology consulted   -Monitor creatinine

## 2025-01-23 NOTE — ASSESSMENT & PLAN NOTE
-Initially with elevated anion gap metabolic acidosis.  Likely multifactorial due to MARTY/azotemia

## 2025-01-23 NOTE — ASSESSMENT & PLAN NOTE
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

## 2025-01-24 LAB
ANION GAP SERPL CALCULATED.3IONS-SCNC: 11 MMOL/L (ref 4–13)
ANION GAP SERPL CALCULATED.3IONS-SCNC: 14 MMOL/L (ref 4–13)
ATRIAL RATE: 100 BPM
BACTERIA UR CULT: NORMAL
BUN SERPL-MCNC: 101 MG/DL (ref 5–25)
BUN SERPL-MCNC: 106 MG/DL (ref 5–25)
CALCIUM SERPL-MCNC: 7.9 MG/DL (ref 8.4–10.2)
CALCIUM SERPL-MCNC: 8.3 MG/DL (ref 8.4–10.2)
CHLORIDE SERPL-SCNC: 104 MMOL/L (ref 96–108)
CHLORIDE SERPL-SCNC: 105 MMOL/L (ref 96–108)
CO2 SERPL-SCNC: 21 MMOL/L (ref 21–32)
CO2 SERPL-SCNC: 21 MMOL/L (ref 21–32)
CREAT SERPL-MCNC: 3.63 MG/DL (ref 0.6–1.3)
CREAT SERPL-MCNC: 3.96 MG/DL (ref 0.6–1.3)
ERYTHROCYTE [DISTWIDTH] IN BLOOD BY AUTOMATED COUNT: 12.6 % (ref 11.6–15.1)
GFR SERPL CREATININE-BSD FRML MDRD: 10 ML/MIN/1.73SQ M
GFR SERPL CREATININE-BSD FRML MDRD: 9 ML/MIN/1.73SQ M
GLUCOSE SERPL-MCNC: 91 MG/DL (ref 65–140)
GLUCOSE SERPL-MCNC: 91 MG/DL (ref 65–140)
HCT VFR BLD AUTO: 34.5 % (ref 34.8–46.1)
HGB BLD-MCNC: 11.5 G/DL (ref 11.5–15.4)
MAGNESIUM SERPL-MCNC: 2.1 MG/DL (ref 1.9–2.7)
MCH RBC QN AUTO: 28.2 PG (ref 26.8–34.3)
MCHC RBC AUTO-ENTMCNC: 33.3 G/DL (ref 31.4–37.4)
MCV RBC AUTO: 85 FL (ref 82–98)
PHOSPHATE SERPL-MCNC: 3.3 MG/DL (ref 2.3–4.1)
PLATELET # BLD AUTO: 229 THOUSANDS/UL (ref 149–390)
PMV BLD AUTO: 11.4 FL (ref 8.9–12.7)
POTASSIUM SERPL-SCNC: 2.8 MMOL/L (ref 3.5–5.3)
POTASSIUM SERPL-SCNC: 3.6 MMOL/L (ref 3.5–5.3)
PTH-INTACT SERPL-MCNC: 151.2 PG/ML (ref 12–88)
QRS AXIS: -51 DEGREES
QRSD INTERVAL: 124 MS
QT INTERVAL: 384 MS
QTC INTERVAL: 507 MS
RBC # BLD AUTO: 4.08 MILLION/UL (ref 3.81–5.12)
SODIUM SERPL-SCNC: 137 MMOL/L (ref 135–147)
SODIUM SERPL-SCNC: 139 MMOL/L (ref 135–147)
T WAVE AXIS: 54 DEGREES
VENTRICULAR RATE: 105 BPM
WBC # BLD AUTO: 7.77 THOUSAND/UL (ref 4.31–10.16)

## 2025-01-24 PROCEDURE — 87493 C DIFF AMPLIFIED PROBE: CPT | Performed by: PHYSICIAN ASSISTANT

## 2025-01-24 PROCEDURE — 97530 THERAPEUTIC ACTIVITIES: CPT

## 2025-01-24 PROCEDURE — 80048 BASIC METABOLIC PNL TOTAL CA: CPT | Performed by: INTERNAL MEDICINE

## 2025-01-24 PROCEDURE — 87070 CULTURE OTHR SPECIMN AEROBIC: CPT | Performed by: FAMILY MEDICINE

## 2025-01-24 PROCEDURE — 85303 CLOT INHIBIT PROT C ACTIVITY: CPT | Performed by: FAMILY MEDICINE

## 2025-01-24 PROCEDURE — 83735 ASSAY OF MAGNESIUM: CPT | Performed by: INTERNAL MEDICINE

## 2025-01-24 PROCEDURE — 87205 SMEAR GRAM STAIN: CPT | Performed by: FAMILY MEDICINE

## 2025-01-24 PROCEDURE — 93010 ELECTROCARDIOGRAM REPORT: CPT | Performed by: INTERNAL MEDICINE

## 2025-01-24 PROCEDURE — 84100 ASSAY OF PHOSPHORUS: CPT | Performed by: INTERNAL MEDICINE

## 2025-01-24 PROCEDURE — 85300 ANTITHROMBIN III ACTIVITY: CPT | Performed by: FAMILY MEDICINE

## 2025-01-24 PROCEDURE — 99232 SBSQ HOSP IP/OBS MODERATE 35: CPT | Performed by: FAMILY MEDICINE

## 2025-01-24 PROCEDURE — 85306 CLOT INHIBIT PROT S FREE: CPT | Performed by: FAMILY MEDICINE

## 2025-01-24 PROCEDURE — 99233 SBSQ HOSP IP/OBS HIGH 50: CPT | Performed by: INTERNAL MEDICINE

## 2025-01-24 PROCEDURE — 82595 ASSAY OF CRYOGLOBULIN: CPT | Performed by: FAMILY MEDICINE

## 2025-01-24 PROCEDURE — G0545 PR INHERENT VISIT TO INPT: HCPCS | Performed by: STUDENT IN AN ORGANIZED HEALTH CARE EDUCATION/TRAINING PROGRAM

## 2025-01-24 PROCEDURE — 99233 SBSQ HOSP IP/OBS HIGH 50: CPT | Performed by: STUDENT IN AN ORGANIZED HEALTH CARE EDUCATION/TRAINING PROGRAM

## 2025-01-24 PROCEDURE — 97163 PT EVAL HIGH COMPLEX 45 MIN: CPT

## 2025-01-24 PROCEDURE — 83970 ASSAY OF PARATHORMONE: CPT | Performed by: INTERNAL MEDICINE

## 2025-01-24 PROCEDURE — 85027 COMPLETE CBC AUTOMATED: CPT | Performed by: INTERNAL MEDICINE

## 2025-01-24 RX ORDER — POTASSIUM CHLORIDE 14.9 MG/ML
20 INJECTION INTRAVENOUS ONCE
Status: COMPLETED | OUTPATIENT
Start: 2025-01-24 | End: 2025-01-24

## 2025-01-24 RX ORDER — POTASSIUM CHLORIDE 29.8 MG/ML
40 INJECTION INTRAVENOUS ONCE
Status: DISCONTINUED | OUTPATIENT
Start: 2025-01-24 | End: 2025-01-24

## 2025-01-24 RX ORDER — SODIUM CHLORIDE, SODIUM GLUCONATE, SODIUM ACETATE, POTASSIUM CHLORIDE, MAGNESIUM CHLORIDE, SODIUM PHOSPHATE, DIBASIC, AND POTASSIUM PHOSPHATE .53; .5; .37; .037; .03; .012; .00082 G/100ML; G/100ML; G/100ML; G/100ML; G/100ML; G/100ML; G/100ML
50 INJECTION, SOLUTION INTRAVENOUS CONTINUOUS
Status: DISCONTINUED | OUTPATIENT
Start: 2025-01-24 | End: 2025-01-27

## 2025-01-24 RX ADMIN — Medication 250 MG: at 18:25

## 2025-01-24 RX ADMIN — SODIUM CHLORIDE, SODIUM GLUCONATE, SODIUM ACETATE, POTASSIUM CHLORIDE, MAGNESIUM CHLORIDE, SODIUM PHOSPHATE, DIBASIC, AND POTASSIUM PHOSPHATE 50 ML/HR: .53; .5; .37; .037; .03; .012; .00082 INJECTION, SOLUTION INTRAVENOUS at 15:23

## 2025-01-24 RX ADMIN — SODIUM CHLORIDE, PRESERVATIVE FREE 10 ML: 5 INJECTION INTRAVENOUS at 12:30

## 2025-01-24 RX ADMIN — ASPIRIN 81 MG CHEWABLE TABLET 81 MG: 81 TABLET CHEWABLE at 12:30

## 2025-01-24 RX ADMIN — METOPROLOL SUCCINATE 25 MG: 25 TABLET, FILM COATED, EXTENDED RELEASE ORAL at 12:30

## 2025-01-24 RX ADMIN — Medication 250 MG: at 12:30

## 2025-01-24 RX ADMIN — HEPARIN SODIUM 5000 UNITS: 5000 INJECTION, SOLUTION INTRAVENOUS; SUBCUTANEOUS at 06:26

## 2025-01-24 RX ADMIN — SODIUM BICARBONATE 75 ML/HR: 84 INJECTION, SOLUTION INTRAVENOUS at 06:32

## 2025-01-24 RX ADMIN — POTASSIUM CHLORIDE 20 MEQ: 14.9 INJECTION, SOLUTION INTRAVENOUS at 12:29

## 2025-01-24 RX ADMIN — HEPARIN SODIUM 5000 UNITS: 5000 INJECTION, SOLUTION INTRAVENOUS; SUBCUTANEOUS at 21:45

## 2025-01-24 RX ADMIN — VALACYCLOVIR HYDROCHLORIDE 500 MG: 500 TABLET, FILM COATED ORAL at 12:30

## 2025-01-24 RX ADMIN — ATORVASTATIN CALCIUM 40 MG: 40 TABLET, FILM COATED ORAL at 18:25

## 2025-01-24 RX ADMIN — CEFAZOLIN SODIUM 1000 MG: 1 SOLUTION INTRAVENOUS at 12:29

## 2025-01-24 RX ADMIN — POTASSIUM CHLORIDE 20 MEQ: 14.9 INJECTION, SOLUTION INTRAVENOUS at 15:23

## 2025-01-24 NOTE — ASSESSMENT & PLAN NOTE
-Per urology as outpatient.  - Patient confirms that if dialysis were needed she would not want dialysis

## 2025-01-24 NOTE — ASSESSMENT & PLAN NOTE
"Patient endorsing \"rash\" on her left back/flank/buttocks and abdomen/groin which she states started after her last nephrostomy tube exchange 01/07  As noted above continue IV cefazolin and oral Valtrex given suspicion for zoster  Prolonged time spent 1/23 coordinating plan of care with dermatology, nephrology, ID and also surgery.  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  Plan of care was coordinated with nephrology and we will hold until acidosis improves.  "

## 2025-01-24 NOTE — PROGRESS NOTES
"Progress Note - Hospitalist   Name: Ning Chambers 84 y.o. female I MRN: [id number]  Unit/Bed#: [address] [place] [address] I Date of Admission: 1/22/2025   Date of Service: 1/24/2025 I Hospital Day: 2    Assessment & Plan  Sepsis (HCC)  Patient with hx of invasive bladder cancer not undergoing treatment, CKD IV, indwelling bilateral nephrostomy tubes, presents with generalized weakness, and new onset \"rash\" on her left flank and lower abdomen which patient states started after her last nephrostomy tube exchange on 01/07  Noted to have leukocytosis, tachycardia, tachypnea and MARTY   Per ID sepsis likely due to cellulitis and possible herpes zoster   Now off IV Cefepime + Vancomycin and currently on cefazolin with p.o. Valtrex  CT A/P f negative for acute pathology  urine culture with mixed contaminants and blood cultures negative so far  Monitor fever curve/hemodynamics  ID consult appreciated   Cellulitis of flank  Patient endorsing \"rash\" on her left back/flank/buttocks and abdomen/groin which she states started after her last nephrostomy tube exchange 01/07  As noted above continue IV cefazolin and oral Valtrex given suspicion for zoster  Prolonged time spent 1/23 coordinating plan of care with dermatology, nephrology, ID and also surgery.  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  Plan of care was coordinated with nephrology and we will hold until acidosis improves.  Acute kidney injury superimposed on chronic kidney disease  (HCC)  Lab Results   Component Value Date    EGFR 9 01/24/2025    EGFR 8 01/23/2025    EGFR 8 01/23/2025    CREATININE 3.96 (H) " 01/24/2025    CREATININE 4.28 (H) 01/23/2025    CREATININE 4.27 (H) 01/23/2025     Cr 5.11 on admission, baseline 2.5-3 (CKD IV) follows with nephrology as outpatient; has expressed wishes to not proceed with dialysis in the past  Patient endorses minimal oral intake over the past week  Noted to have high anion gap metabolic acidosis and was on IV sodium bicarbonate gtt which has been discontinued   Started pH balance fluids  Hold HCTZ   Repeat lab work in a.m.  Nephrology consult appreciated  Abnormal urinalysis  UA with large leuks, 4-10 WBC, innumerable bacteria  Patient with indwelling bilateral percutaneous nephrostomy tubes  Urine culture with mixed contaminants  High anion gap metabolic acidosis  Anion gap 21, bicarb 16 in the setting of MARTY on CKD, dehydration, severe sepsis   Discontinued bicarb drip.  Repeat lab work in a..    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   Follows with palliative medicine as outpatient  Given evidence of worsening kidney function, patient does not wish to undergo dialysis per prior [facility] discussions, may require repeat goals of care discussion if no improvement in kidney function  Benign hypertension  BP acceptable   Hold HCTZ 2/2 MARTY  Continue Toprol XL with hold parameters    VTE Pharmacologic Prophylaxis: VTE Score: 6 High Risk (Score >/= 5) - Pharmacological DVT Prophylaxis Ordered: heparin. Sequential Compression Devices Ordered.    Mobility:   Basic Mobility Inpatient Raw Score: 12  -[facility] Goal: 4: Move to  chair/commode  JH-HLM Achieved: 2: Bed activities/Dependent transfer  JH-HLM Goal NOT achieved. Continue with multidisciplinary rounding and encourage appropriate mobility to improve upon JH-HLM goals.    Patient Centered Rounds: I performed bedside rounds with nursing staff today.   Discussions with Specialists or Other Care Team Provider: Yes-nephrology, ID    Education and Discussions with Family / Patient: Updated  (son and daughter in law) via phone.    Current Length of Stay: 2 day(s)  Current Patient Status: Inpatient   Certification Statement: The patient will continue to require additional inpatient hospital stay due to rash/cellulitis, MARTY  Discharge Plan: Anticipate discharge in >72 hrs to rehab facility.    Code Status: Level 3 - DNAR and DNI    Subjective   Patient reports some mild pain of the rash in groin area, worse with urination    Objective :  Temp:  [97.3 °F (36.3 °C)-98.2 °F (36.8 °C)] 97.3 °F (36.3 °C)  HR:  [72-76] 72  BP: (102-125)/(51-58) 125/58  Resp:  [16-20] 16  SpO2:  [95 %-98 %] 98 %  O2 Device: None (Room air)    Body mass index is 31.41 kg/m².     Input and Output Summary (last 24 hours):     Intake/Output Summary (Last 24 hours) at 1/24/2025 1002  Last data filed at 1/23/2025 1801  Gross per 24 hour   Intake --   Output 450 ml   Net -450 ml       Physical Exam  Constitutional:       General: She is not in acute distress.     Appearance: She is ill-appearing (chronically).   HENT:      Head: Normocephalic and atraumatic.   Eyes:      General: No scleral icterus.  Cardiovascular:      Rate and Rhythm: Normal rate and regular rhythm.   Pulmonary:      Effort: Pulmonary effort is normal. No respiratory distress.      Breath sounds: Normal breath sounds. No wheezing or rales.   Abdominal:      General: Bowel sounds are normal. There is no distension.      Palpations: Abdomen is soft.      Tenderness: There is no abdominal tenderness.   Skin:     Comments: Bilateral  nephrostomy tubes in place.  Rash from left flank/buttock to the abdomen with areas of denuded skin     Neurological:      Mental Status: She is alert.         Lines/Drains:  Lines/Drains/Airways       Active Status       Name Placement date Placement time Site Days    Nephrostomy Left 10.2 Fr. 01/07/25  1124  Left  16    Nephrostomy Right 10.2 Fr. 01/07/25  1130  Right  16                            Lab Results: I have reviewed the following results:   Results from last 7 days   Lab Units 01/24/25  0639 01/23/25  0559 01/22/25  1648   WBC Thousand/uL 7.77   < > 18.02*   HEMOGLOBIN g/dL 11.5   < > 13.5   HEMATOCRIT % 34.5*   < > 40.9   PLATELETS Thousands/uL 229   < > 298   BANDS PCT %  --   --  1   LYMPHO PCT %  --   --  7*   MONO PCT %  --   --  1*   EOS PCT %  --   --  0    < > = values in this interval not displayed.     Results from last 7 days   Lab Units 01/24/25  0639 01/23/25  0559 01/22/25  1648   SODIUM mmol/L 139   < > 138   POTASSIUM mmol/L 2.8*   < > 3.9   CHLORIDE mmol/L 104   < > 101   CO2 mmol/L 21   < > 16*   BUN mg/dL 106*   < > 119*   CREATININE mg/dL 3.96*   < > 5.11*   ANION GAP mmol/L 14*   < > 21*   CALCIUM mg/dL 7.9*   < > 9.1   ALBUMIN g/dL  --   --  3.4*   TOTAL BILIRUBIN mg/dL  --   --  0.63   ALK PHOS U/L  --   --  49   ALT U/L  --   --  27   AST U/L  --   --  51*   GLUCOSE RANDOM mg/dL 91   < > 103    < > = values in this interval not displayed.                 Results from last 7 days   Lab Units 01/23/25  0559 01/22/25  1648   LACTIC ACID mmol/L  --  2.0   PROCALCITONIN ng/ml 2.27* 2.69*       Recent Cultures (last 7 days):   Results from last 7 days   Lab Units 01/23/25  1611 01/22/25  1648 01/22/25  1637 01/22/25  1046   BLOOD CULTURE   --  No Growth at 24 hrs. No Growth at 24 hrs.  --    GRAM STAIN RESULT  No Polys or Bacteria seen  --   --   --    URINE CULTURE   --   --   --  <10,000 cfu/ml       Imaging Results Review: I reviewed radiology reports from this admission including:  CT abdomen/pelvis.  Other Study Results Review: No additional pertinent studies reviewed.    Last 24 Hours Medication List:     Current Facility-Administered Medications:     acetaminophen (TYLENOL) tablet 650 mg, Q6H PRN    aspirin chewable tablet 81 mg, Daily    atorvastatin (LIPITOR) tablet 40 mg, Daily With Dinner    ceFAZolin (ANCEF) IVPB (premix in dextrose) 1,000 mg 50 mL, Q24H, Last Rate: 1,000 mg (01/23/25 1444)    heparin (porcine) subcutaneous injection 5,000 Units, Q8H JD    [Held by provider] hydroCHLOROthiazide tablet 25 mg, QAM    influenza vaccine, high-dose (Fluzone High-Dose) IM injection 0.5 mL, Prior to discharge    metoprolol succinate (TOPROL-XL) 24 hr tablet 25 mg, QAM    saccharomyces boulardii (FLORASTOR) capsule 250 mg, BID    sodium bicarbonate 150 mEq in dextrose 5 % 1,000 mL infusion, Continuous, Last Rate: 75 mL/hr (01/24/25 0632)    sodium chloride (PF) 0.9 % injection 10 mL, Daily    valACYclovir (VALTREX) tablet 500 mg, Q24H    Administrative Statements   Today, Patient Was Seen By: Nata Valentin DO      **Please Note: This note may have been constructed using a voice recognition system.**

## 2025-01-24 NOTE — ASSESSMENT & PLAN NOTE
Anion gap 21, bicarb 16 in the setting of MARTY on CKD, dehydration, severe sepsis   Discontinued bicarb drip.  Repeat lab work in a.m.

## 2025-01-24 NOTE — PROGRESS NOTES
Progress Note - Nephrology   Name: Ning Chambers 84 y.o. female I MRN: [id number]  Unit/Bed#: [address] [address] I Date of Admission: 1/22/2025   Date of Service: 1/24/2025 I Hospital Day: 2    Assessment & Plan  Acute kidney injury superimposed on chronic kidney disease  (HCC)  -Prior baseline creatinine was approximately 2.5 to 3 mg/dL.  Most recent creatinine January 13 as outpatient was 2.03 mg/dL  - Outpatient nephrologist Dr. Reyes  - Admission creatinine January 22 was 5.1 mg/dL improving to 4.3 mg/dL after volume expansion  - Volume state-patient clinically appears volume depleted.  Has baseline hemoglobin 11 and on admission was 13 supporting concentrated state.  - Is on home diuretic which is currently held  - Blood pressures relatively appropriate    Renal imaging--CT scan with dilated left renal collecting system and ureter.  PCN in place.  Right hydronephrosis has resolved.  Worsening lymphadenopathy  - Urinalysis with innumerable bacteria, 4-10 WBC, no RBC, concentrated specimen    Discussion-patient has significant MARTY on CKD.  Appears to be volume depleted.  Creatinine is improving with volume expansion.  Also has acidosis with elevated anion gap.  Lactic acid was 2.  Initially was started on treatment for possible sepsis due to leukocytosis, new rash, history of PCN and workup was started for infectious etiology.  Cultures were sent.  Infectious disease was brought on board by primary team.    January 24-creatinine slowly improving to 3.9 mg/dL.  Bicarbonate is normalized with bicarbonate based fluids.  Potassium is low being repleted.    Plan  - Hold bicarbonate based fluids and changed to pH balance fluids  - Valtrex is renally adjusted by ID team  - CT scan without contrast shows possible dilation of left renal pelvis and left ureter despite nephrostomy tube.  Also mild interval enlargement of retroperitoneal lymphadenopathy.  I have sent a message to review with primary team attending.  May need to  consider adjustment of left PCN?  - Dermatology has raised concern for possible calciphylaxis versus other differentials noted in their note.  Patient underwent skin biopsy January 23  - I reviewed sodium thiosulfate with the patient and risks including acidosis and GI discomfort.  Patient states she is not sure she would want to except this and I did review the severe risk of calciphylaxis untreated.  She wants another day to think about this further  - I/os; avoid nephrotoxic agents  - Avoid hypotension  - If patient is agreeable, no objection to start sodium thiosulfate.  Could start at half dose first to make sure patient tolerates  - Patient is stating she has diarrhea-follow-up per primary team  - PTH borderline elevated-check vitamin D 25 level  High anion gap metabolic acidosis  -Initially with elevated anion gap metabolic acidosis.  Likely multifactorial due to MARTY/azotemia and was on bicarbonate drip until January 24.  Acidosis has resolved.  Anion gap is stable at 14.  Will changed to pH balance fluids.  Sepsis (HCC)  -Source evaluation in progress by primary and infectious disease team  Malignant neoplasm of overlapping sites of bladder (HCC)  -Per urology as outpatient.  - Patient confirms that if dialysis were needed she would not want dialysis  Nephrostomy status (HCC)  -Bilateral nephrostomy tube management per primary team  Benign hypertension  -Hold home diuretic  Abnormal urinalysis  -Follow-up urine culture      Rash of abdomen-ID team on board.  Dermatology on board    I have reviewed the nephrology recommendations including reviewing left renal pelvis changes on CT scan, with primary team attending, and we are in agreement with renal plan including the information outlined above.     Subjective   Brief History of Admission -  84 y.o. female with a past medical history of CKD stage IV baseline creatinine 2.73 mg/dL, hypertension,bladder neoplasm with bilateral nephrostomy tubes, obstructive  uropathy, elevated BMI, who was admitted to [facility] after presenting with abdominal rash. A renal consultation is requested today for assistance in the management of acute on chronic kidney injury.  Patient was in usual state of health until.  She had nephrostomy tube change January 7.  She states for 2 weeks developed a rash in lower abdomen.  She denies fevers, chills, nausea, vomiting, diarrhea     Overnight no acute issues.  Blood pressure is 10 2-1 25 systolic.  Afebrile.  On room air.  Left nephrostomy with no urine output recorded.  Right nephrostomy for 50 cc.    Objective :  Temp:  [97.3 °F (36.3 °C)-98.2 °F (36.8 °C)] 97.3 °F (36.3 °C)  HR:  [72-76] 72  BP: (102-125)/(51-58) 125/58  Resp:  [16-20] 16  SpO2:  [95 %-98 %] 98 %  O2 Device: None (Room air)    Current Weight: Weight - Scale: 77.9 kg (171 lb 11.8 oz)  First Weight: Weight - Scale: 77.9 kg (171 lb 11.8 oz)  I/O         01/22 0701  01/23 0700 01/23 0701  01/24 0700 01/24 0701  01/25 0700    P.O. 960      IV Piggyback 50      Total Intake(mL/kg) 1010 (13)      Urine (mL/kg/hr) 300 450 (0.2)     Total Output 300 450     Net +710 -450                  Physical Exam  General: NAD  Skin: Unchanged erythematous maculopapular lower abdomen rash diffuse.  With necrotic changes noted  Eyes: anicteric sclera  ENT: moist mucous membrane  Neck: supple  Chest: CTA b/l, no ronchii, no wheeze, no rubs, no rales  CVS: s1s2, no murmur, no gallop, no rub  Abdomen: soft, nontender, nl sounds  Extremities: no edema LE b/l  : no posadas, bilateral PCN  Neuro: AAOX3  Psych: normal affect    Medications:    Current Facility-Administered Medications:     acetaminophen (TYLENOL) tablet 650 mg, 650 mg, Oral, Q6H PRN, Elinor Richard PA-C    aspirin chewable tablet 81 mg, 81 mg, Oral, Daily, Elinor Richard PA-C, 81 mg at 01/24/25 1230    atorvastatin (LIPITOR) tablet 40 mg, 40 mg, Oral, Daily With Dinner, Elinor Richard PA-C, 40 mg at 01/23/25 2331     ceFAZolin (ANCEF) IVPB (premix in dextrose) 1,000 mg 50 mL, 1,000 mg, Intravenous, Q24H, Verenice Murray MD, Last Rate: 100 mL/hr at 01/24/25 1229, 1,000 mg at 01/24/25 1229    heparin (porcine) subcutaneous injection 5,000 Units, 5,000 Units, Subcutaneous, Q8H [facility], Elinor Richard PA-C, 5,000 Units at 01/24/25 0626    [Held by provider] hydroCHLOROthiazide tablet 25 mg, 25 mg, Oral, QAM, Elinor Richard PA-C    influenza vaccine, high-dose (Fluzone High-Dose) IM injection 0.5 mL, 0.5 mL, Intramuscular, Prior to discharge, Nata Valentin DO    metoprolol succinate (TOPROL-XL) 24 hr tablet 25 mg, 25 mg, Oral, QAM, Elinor Richard PA-C, 25 mg at 01/24/25 1230    multi-electrolyte (PLASMALYTE-A/ISOLYTE-S PH 7.4) IV solution, 50 mL/hr, Intravenous, Continuous, Mauricio Gabriel MD    potassium chloride 20 mEq IVPB (premix), 20 mEq, Intravenous, Once, Last Rate: 50 mL/hr at 01/24/25 1229, 20 mEq at 01/24/25 1229 **FOLLOWED BY** potassium chloride 20 mEq IVPB (premix), 20 mEq, Intravenous, Once, Nata RevbhumikaarDO    saccharomyces boulardii (FLORASTOR) capsule 250 mg, 250 mg, Oral, BID, Nata Revbhumikaar DO, 250 mg at 01/24/25 1230    sodium chloride (PF) 0.9 % injection 10 mL, 10 mL, Intracatheter, Daily, Elinor Richard PA-C, 10 mL at 01/24/25 1230    valACYclovir (VALTREX) tablet 500 mg, 500 mg, Oral, Q24H, Verenice Murray MD, 500 mg at 01/24/25 1230      Lab Results: I have reviewed the following results:  Results from last 7 days   Lab Units 01/24/25  0639 01/23/25 2026 01/23/25  0559 01/22/25  1648   WBC Thousand/uL 7.77  --  13.27* 18.02*   HEMOGLOBIN g/dL 11.5  --  11.6 13.5   HEMATOCRIT % 34.5*  --  36.0 40.9   PLATELETS Thousands/uL 229  --  253 298   POTASSIUM mmol/L 2.8* 3.2* 3.6 3.9   CHLORIDE mmol/L 104 106 110* 101   CO2 mmol/L 21 19* 14* 16*   BUN mg/dL 106* 108* 110* 119*   CREATININE mg/dL 3.96* 4.28* 4.27* 5.11*   CALCIUM mg/dL 7.9* 8.5 8.5 9.1   MAGNESIUM mg/dL 2.1  --  2.3  "2.4   PHOSPHORUS mg/dL 3.3  --  4.2* 5.1*   ALBUMIN g/dL  --   --   --  3.4*       Administrative Statements     Portions of the record may have been created with voice recognition software. Occasional wrong word or \"sound a like\" substitutions may have occurred due to the inherent limitations of voice recognition software. Read the chart carefully and recognize, using context, where substitutions have occurred.If you have any questions, please contact the dictating provider.  "

## 2025-01-24 NOTE — ASSESSMENT & PLAN NOTE
-Initially with elevated anion gap metabolic acidosis.  Likely multifactorial due to MARTY/azotemia and was on bicarbonate drip until January 24.  Acidosis has resolved.  Anion gap is stable at 14.  Will changed to pH balance fluids.

## 2025-01-24 NOTE — ASSESSMENT & PLAN NOTE
"Patient with hx of invasive bladder cancer not undergoing treatment, CKD IV, indwelling bilateral nephrostomy tubes, presents with generalized weakness, and new onset \"rash\" on her left flank and lower abdomen which patient states started after her last nephrostomy tube exchange on 01/07  Noted to have leukocytosis, tachycardia, tachypnea and MARTY   Per ID sepsis likely due to cellulitis and possible herpes zoster   Now off IV Cefepime + Vancomycin and currently on cefazolin with p.o. Valtrex  CT A/P f negative for acute pathology  urine culture with mixed contaminants and blood cultures negative so far  Monitor fever curve/hemodynamics  ID consult appreciated   "

## 2025-01-24 NOTE — PHYSICAL THERAPY NOTE
"       PHYSICAL THERAPY EVALUATION/TREATMENT     01/24/25 1601   PT Last Visit   PT Visit Date 01/24/25   Note Type   Note type Evaluation   Pain Assessment   Pain Assessment Tool 0-10   Pain Score No Pain   Restrictions/Precautions   Other Precautions Fall Risk;Contact/isolation;Chair Alarm;Bed Alarm  (nephrostomy tubes)   Home Living   Type of Home House   Home Layout One level  (3 STEPHEN with rail)   Home Equipment Walker;Cane   Prior Function   Level of [place] Independent with ADLs;Independent with functional mobility   Lives With Alone   Receives Help From Family  (son)   Falls in the last 6 months 1 to 4   Vocational Part time employment  (Pt is a  at the [employer])   General   Additional Pertinent History Pt admitted with sepsis. Pt had a recent fall then admitted with a rash and weakness. Pt has a history of bladder cancer with B nephrostomy tubes. Pt is on palliative care.   Cognition   Overall Cognitive Status WFL   Arousal/Participation Alert   Orientation Level Oriented X4   Following Commands Follows multistep commands with increased time or repetition   Subjective   Subjective \"I would like to try to get up\"   RLE Assessment   RLE Assessment   (ROM WFL, MMT hip 2/5, knee 3/5, ankle 3+/5)   LLE Assessment   LLE Assessment   (ROM WFL, MMT hip 2/5, knee 3/5, ankle 3+/5)   Bed Mobility   Supine to Sit 4  Minimal assistance   Sit to Supine 4  Minimal assistance   Transfers   Sit to Stand 3  Moderate assistance   Stand to Sit 3  Moderate assistance   Stand pivot 3  Moderate assistance   Additional items   (with walker)   Ambulation/Elevation   Gait pattern   (difficulty advancing LEs)   Gait Assistance 3  Moderate assist   Assistive Device Rolling walker   Distance 3 feet   Balance   Static Sitting Fair +   Static Standing Fair   Ambulatory Fair   Activity Tolerance   Activity Tolerance Patient limited by fatigue   Assessment   Prognosis Good   Problem List Decreased strength;Impaired " balance;Decreased mobility;Decreased skin integrity;Obesity   Assessment Pt is 84 y.o. female seen for PT evaluation s/p admit to [facility] on 1/22/2025 w/ Sepsis (HCC). PT consulted to assess pt's functional mobility and d/c needs. Order placed for PT eval and tx, w/ activity as tolerated order.  Co-morbidities affecting patient's physical performance include: bladder cancer with nephrostomy tubes, stroke, htn, palliative care, obesity, CKD.  Personal factors affecting patient at time of initial evaluation include: stairs to enter home, inability to ambulate household distances, limited home support, positive fall history, and inability to perform ADLS.         Prior to admission, patient was independent with functional mobility without assistive device, independent with ADLS, and works part time.  Upon evaluation: pt required mod assist to transfer with a walker and walk 3 feet.          Please find objective findings from Physical Therapy assessment regarding body systems outlined above with impairments and limitations including weakness, impaired balance, decreased endurance, decreased activity tolerance, decreased functional mobility tolerance, and fall risk.The Barthel Index was used as a functional outcome tool presenting with a score of Barthel Index Score: 35 today indicating marked limitations of functional mobility and ADLS.  Patient's clinical presentation is currently unstable/unpredictable as seen in patient's presentation of increased fall risk, new onset of impairment of functional mobility, decreased endurance, and new onset of weakness. Pt would benefit from continued Physical Therapy treatment to address deficits as defined above and maximize level of functional mobility.     As demonstrated by objective findings, the assigned level of complexity for this evaluation is high.    The patient's AM-PAC Basic Mobility Inpatient Short Form Raw Score is 10. A Raw score of less than or equal to 16  "suggests the patient may benefit from discharge to post-acute rehabilitation services. Please also refer to the recommendation of the Physical Therapist for safe discharge planning.   Goals   Patient Goals \"get stronger\"   STG Expiration Date 01/31/25   Short Term Goal #1 1. improve bed mobility to independent,   2. pt will transfer with min assist,   3. pt will ambulate with a walker with min assist 50 feet   LTG Expiration Date 02/07/25   Long Term Goal #1 1. no falls,   2. supervision ambulation with a walker 100 feet, 3.supervision up and down 3 steps   Plan   Treatment/Interventions LE strengthening/ROM;Elevations;Functional transfer training;ADL retraining;Gait training;Bed mobility;Equipment eval/education;Endurance training;Therapeutic exercise   PT Frequency 3-5x/wk   Discharge Recommendation   Rehab Resource Intensity Level, PT II (Moderate Resource Intensity)   AM-PAC Basic Mobility Inpatient   Turning in Flat Bed Without Bedrails 2   Lying on Back to Sitting on Edge of Flat Bed Without Bedrails 2   Moving Bed to Chair 2   Standing Up From Chair Using Arms 2   Walk in Room 1   Climb 3-5 Stairs With Railing 1   Basic Mobility Inpatient Raw Score 10   Turning Head Towards Sound 4   Follow Simple Instructions 4   Low Function Basic Mobility Raw Score  18   Low Function Basic Mobility Standardized Score  29.25   [facility] Highest Level Of Mobility   -HL Goal 4: Move to chair/commode   -[facility] Achieved 4: Move to chair/commode   Barthel Index   Feeding 10   Bathing 0   Grooming Score 0   Dressing Score 5   Bladder Score 0   Bowels Score 10   Toilet Use Score 5   Transfers (Bed/Chair) Score 5   Mobility (Level Surface) Score 0   Stairs Score 0   Barthel Index Score 35   Additional Treatment Session   Start Time 1550   End Time 1600   Treatment Assessment S: \"I don't want to sit too long, I don't feel right\"  O: Pt transferred sit to stand with mod assist, ambulated with mod assist and a walker x 3 feet.  " Pt returned to sitting then asked to return to bed.  Pt returnd to bed by transferring with a walker with mod assist.  A:  Pt is generally weak and below her baseline as pt generally is independently ambulatory and works part time.  P: Encourage OOB, continue PT   End of Consult   Patient Position at End of Consult All needs within reach;Bed/Chair alarm activated;Supine   Licensure   [place] License Number  Elinor Myricklavern PT [id number]

## 2025-01-24 NOTE — ASSESSMENT & PLAN NOTE
Lab Results   Component Value Date    EGFR 9 01/24/2025    EGFR 8 01/23/2025    EGFR 8 01/23/2025    CREATININE 3.96 (H) 01/24/2025    CREATININE 4.28 (H) 01/23/2025    CREATININE 4.27 (H) 01/23/2025   Creatinine elevated to 5.11 from baseline of 2.7-3.  Likely prerenal due to poor p.o. intake.  Follows with nephrology as an outpatient.  Creatinine now improving   -Dose adjust antimicrobials for creatinine clearance   -Nephrology following   -Monitor creatinine

## 2025-01-24 NOTE — ASSESSMENT & PLAN NOTE
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

## 2025-01-24 NOTE — ASSESSMENT & PLAN NOTE
Leukocytosis, tachycardia, tachypnea.  Concern for left flank/abdominal cellulitis.  Lab and vital sign derangements may also be reactive due to dehydration from poor p.o. intake.  UA shows 4-10 WBCs and she has no symptoms of urinary infection so no evidence of a UTI.  Urine culture <10K mixed contaminants.  CT A/P without source of infection   -Continue IV cefazolin 1 g every 24 hours, renally dose adjusted   -Continue p.o. Valtrex 500 mg every 24 hours   -Follow-up blood cultures    -Repeat CBC tomorrow to monitor treatment response   -Monitor fever curve, vital signs

## 2025-01-24 NOTE — PLAN OF CARE
Problem: PHYSICAL THERAPY ADULT  Goal: Performs mobility at highest level of function for planned discharge setting.  See evaluation for individualized goals.  Description: Treatment/Interventions: LE strengthening/ROM, Elevations, Functional transfer training, ADL retraining, Gait training, Bed mobility, Equipment eval/education, Endurance training, Therapeutic exercise          See flowsheet documentation for full assessment, interventions and recommendations.  Note: Prognosis: Good  Problem List: Decreased strength, Impaired balance, Decreased mobility, Decreased skin integrity, Obesity  Assessment: Pt is 84 y.o. female seen for PT evaluation s/p admit to [facility] on 1/22/2025 w/ Sepsis (HCC). PT consulted to assess pt's functional mobility and d/c needs. Order placed for PT eval and tx, w/ activity as tolerated order.  Co-morbidities affecting patient's physical performance include: bladder cancer with nephrostomy tubes, stroke, htn, palliative care, obesity, CKD.  Personal factors affecting patient at time of initial evaluation include: stairs to enter home, inability to ambulate household distances, limited home support, positive fall history, and inability to perform ADLS.     Prior to admission, patient was independent with functional mobility without assistive device, independent with ADLS, and works part time.  Upon evaluation: pt required mod assist to transfer with a walker and walk 3 feet.      Please find objective findings from Physical Therapy assessment regarding body systems outlined above with impairments and limitations including weakness, impaired balance, decreased endurance, decreased activity tolerance, decreased functional mobility tolerance, and fall risk.The Barthel Index was used as a functional outcome tool presenting with a score of Barthel Index Score: 35 today indicating marked limitations of functional mobility and ADLS.  Patient's clinical presentation is currently  unstable/unpredictable as seen in patient's presentation of increased fall risk, new onset of impairment of functional mobility, decreased endurance, and new onset of weakness. Pt would benefit from continued Physical Therapy treatment to address deficits as defined above and maximize level of functional mobility.     As demonstrated by objective findings, the assigned level of complexity for this evaluation is high.The patient's AM-[facility] Basic Mobility Inpatient Short Form Raw Score is 10. A Raw score of less than or equal to 16 suggests the patient may benefit from discharge to post-acute rehabilitation services. Please also refer to the recommendation of the Physical Therapist for safe discharge planning.        Rehab Resource Intensity Level, PT: II (Moderate Resource Intensity)    See flowsheet documentation for full assessment.

## 2025-01-24 NOTE — ASSESSMENT & PLAN NOTE
UA with large leuks, 4-10 WBC, innumerable bacteria  Patient with indwelling bilateral percutaneous nephrostomy tubes  Urine culture with mixed contaminants

## 2025-01-24 NOTE — ASSESSMENT & PLAN NOTE
Patient has history of bladder cancer with retroperitoneal lymphadenopathy and is not currently receiving any cancer directed treatment.  She does follow with palliative care as an outpatient.   -Recommend continued outpatient follow-up

## 2025-01-24 NOTE — ASSESSMENT & PLAN NOTE
Lab Results   Component Value Date    EGFR 9 01/24/2025    EGFR 8 01/23/2025    EGFR 8 01/23/2025    CREATININE 3.96 (H) 01/24/2025    CREATININE 4.28 (H) 01/23/2025    CREATININE 4.27 (H) 01/23/2025     Cr 5.11 on admission, baseline 2.5-3 (CKD IV) follows with nephrology as outpatient; has expressed wishes to not proceed with dialysis in the past  Patient endorses minimal oral intake over the past week  Noted to have high anion gap metabolic acidosis and was on IV sodium bicarbonate gtt which has been discontinued   Started pH balance fluids  Hold HCTZ   Repeat lab work in a.m.  Nephrology consult appreciated

## 2025-01-24 NOTE — PLAN OF CARE
Problem: Prexisting or High Potential for Compromised Skin Integrity  Goal: Skin integrity is maintained or improved  Description: INTERVENTIONS:  - Identify patients at risk for skin breakdown  - Assess and monitor skin integrity  - Assess and monitor nutrition and hydration status  - Monitor labs   - Assess for incontinence   - Turn and reposition patient  - Assist with mobility/ambulation  - Relieve pressure over bony prominences  - Avoid friction and shearing  - Provide appropriate hygiene as needed including keeping skin clean and dry  - Evaluate need for skin moisturizer/barrier cream  - Collaborate with interdisciplinary team   - Patient/family teaching  - Consider wound care consult   Outcome: Progressing     Problem: PAIN - ADULT  Goal: Verbalizes/displays adequate comfort level or baseline comfort level  Description: Interventions:  - Encourage patient to monitor pain and request assistance  - Assess pain using appropriate pain scale  - Administer analgesics based on type and severity of pain and evaluate response  - Implement non-pharmacological measures as appropriate and evaluate response  - Consider cultural and social influences on pain and pain management  - Notify physician/advanced practitioner if interventions unsuccessful or patient reports new pain  Outcome: Progressing     Problem: INFECTION - ADULT  Goal: Absence or prevention of progression during hospitalization  Description: INTERVENTIONS:  - Assess and monitor for signs and symptoms of infection  - Monitor lab/diagnostic results  - Monitor all insertion sites, i.e. indwelling lines, tubes, and drains  - Monitor endotracheal if appropriate and nasal secretions for changes in amount and color  - [place] appropriate cooling/warming therapies per order  - Administer medications as ordered  - Instruct and encourage patient and family to use good hand hygiene technique  - Identify and instruct in appropriate isolation precautions for  identified infection/condition  Outcome: Progressing     Problem: SAFETY ADULT  Goal: Patient will remain free of falls  Description: INTERVENTIONS:  - Educate patient/family on patient safety including physical limitations  - Instruct patient to call for assistance with activity   - Consult OT/PT to assist with strengthening/mobility   - Keep Call bell within reach  - Keep bed low and locked with side rails adjusted as appropriate  - Keep care items and personal belongings within reach  - Initiate and maintain comfort rounds  - Make Fall Risk Sign visible to staff  - Offer Toileting every 2 Hours, in advance of need  - Initiate/Maintain bed alarm  - Obtain necessary fall risk management equipment: nonskid socks  - Apply yellow socks and bracelet for high fall risk patients  - Consider moving patient to room near nurses station  Outcome: Progressing

## 2025-01-24 NOTE — ASSESSMENT & PLAN NOTE
Requested clarification of orders due to MRA Neck and MRI Cervical spines both ordered on patient. RN to investigate and call if needed. UA with minimal pyuria and she has no symptoms of a urinary tract infection.  Innumerable bacteria present but this is not alone indicative of infection and is common with indwelling nephrostomy tubes.  Urine culture minimal growth.  No evidence of UTI at this time.

## 2025-01-24 NOTE — ASSESSMENT & PLAN NOTE
-Prior baseline creatinine was approximately 2.5 to 3 mg/dL.  Most recent creatinine January 13 as outpatient was 2.03 mg/dL  - Outpatient nephrologist Dr. Reyes  - Admission creatinine January 22 was 5.1 mg/dL improving to 4.3 mg/dL after volume expansion  - Volume state-patient clinically appears volume depleted.  Has baseline hemoglobin 11 and on admission was 13 supporting concentrated state.  - Is on home diuretic which is currently held  - Blood pressures relatively appropriate    Renal imaging--CT scan with dilated left renal collecting system and ureter.  PCN in place.  Right hydronephrosis has resolved.  Worsening lymphadenopathy  - Urinalysis with innumerable bacteria, 4-10 WBC, no RBC, concentrated specimen    Discussion-patient has significant MARTY on CKD.  Appears to be volume depleted.  Creatinine is improving with volume expansion.  Also has acidosis with elevated anion gap.  Lactic acid was 2.  Initially was started on treatment for possible sepsis due to leukocytosis, new rash, history of PCN and workup was started for infectious etiology.  Cultures were sent.  Infectious disease was brought on board by primary team.    January 24-creatinine slowly improving to 3.9 mg/dL.  Bicarbonate is normalized with bicarbonate based fluids.  Potassium is low being repleted.    Plan  - Hold bicarbonate based fluids and changed to pH balance fluids  - Valtrex is renally adjusted by ID team  - CT scan without contrast shows possible dilation of left renal pelvis and left ureter despite nephrostomy tube.  Also mild interval enlargement of retroperitoneal lymphadenopathy.  I have sent a message to review with primary team attending.  May need to consider adjustment of left PCN?  - Dermatology has raised concern for possible calciphylaxis versus other differentials noted in their note.  Patient underwent skin biopsy January 23  - I reviewed sodium thiosulfate with the patient and risks including acidosis and GI  discomfort.  Patient states she is not sure she would want to except this and I did review the severe risk of calciphylaxis untreated.  She wants another day to think about this further  - I/os; avoid nephrotoxic agents  - Avoid hypotension  - If patient is agreeable, no objection to start sodium thiosulfate.  Could start at half dose first to make sure patient tolerates  - Patient is stating she has diarrhea-follow-up per primary team  - PTH borderline elevated-check vitamin D 25 level

## 2025-01-24 NOTE — PROGRESS NOTES
Progress Note - Infectious Disease   Name: Ning Chambers 84 y.o. female I MRN: [id number]  Unit/Bed#: 3 [place] [address] I Date of Admission: 1/22/2025   Date of Service: 1/24/2025 I Hospital Day: 2    Assessment & Plan  Sepsis (HCC)  Leukocytosis, tachycardia, tachypnea.  Concern for left flank/abdominal cellulitis.  Lab and vital sign derangements may also be reactive due to dehydration from poor p.o. intake.  UA shows 4-10 WBCs and she has no symptoms of urinary infection so no evidence of a UTI.  Urine culture <10K mixed contaminants.  CT A/P without source of infection   -Continue IV cefazolin 1 g every 24 hours, renally dose adjusted   -Continue p.o. Valtrex 500 mg every 24 hours   -Follow-up blood cultures    -Repeat CBC tomorrow to monitor treatment response   -Monitor fever curve, vital signs  Cellulitis of flank  The patient reports a rash over the left buttocks extending to the anterior abdomen and mons pubis.  There is erythema of the area as well as hemorrhagic lesions, some denuded vesicles.  Consideration for herpes zoster with superimposed bacterial cellulitis.  Dermatology consulted, concern for hypercoagulable state and possible reniform purpura versus calciphylaxis versus hemorrhagic bullous cellulitis and vasculitis.  Erythema slightly improved today.   -Continue Valtrex and cefazolin, renally dose adjusted   -Monitor rash   -Recommend wound care consult   -Dermatology follow-up   -Follow-up VZV PCR   -Follow-up skin biopsy and culture  Malignant neoplasm of overlapping sites of bladder (HCC)  Patient has history of bladder cancer with retroperitoneal lymphadenopathy and is not currently receiving any cancer directed treatment.  She does follow with palliative care as an outpatient.   -Recommend continued outpatient follow-up  Nephrostomy status (HCC)  Bilateral nephrostomy tubes in place due to obstructive uropathy from invasive bladder cancer.  Follows with urology, nephrology, palliative care.   Nephrostomy tubes exchanged 1/7.  Continue to monitor urine output.  Acute kidney injury superimposed on chronic kidney disease  (HCC)  Lab Results   Component Value Date    EGFR 9 01/24/2025    EGFR 8 01/23/2025    EGFR 8 01/23/2025    CREATININE 3.96 (H) 01/24/2025    CREATININE 4.28 (H) 01/23/2025    CREATININE 4.27 (H) 01/23/2025   Creatinine elevated to 5.11 from baseline of 2.7-3.  Likely prerenal due to poor p.o. intake.  Follows with nephrology as an outpatient.  Creatinine now improving   -Dose adjust antimicrobials for creatinine clearance   -Nephrology following   -Monitor creatinine  Abnormal urinalysis  UA with minimal pyuria and she has no symptoms of a urinary tract infection.  Innumerable bacteria present but this is not alone indicative of infection and is common with indwelling nephrostomy tubes.  Urine culture minimal growth.  No evidence of UTI at this time.    Above management plan to continue current antimicrobials discussed with Dr. Valentin.  ID will see again 1/27/2025, please call questions.    Antibiotics:  IV cefazolin  P.o. Valtrex    Subjective   The patient states she is feeling okay today.  She denies any pain over her abdomen or left flank rash.  No fevers, chills.  Appetite is okay today.    Objective :  Temp:  [97.3 °F (36.3 °C)-98.2 °F (36.8 °C)] 97.3 °F (36.3 °C)  HR:  [72-76] 72  BP: (102-125)/(51-58) 125/58  Resp:  [16-20] 16  SpO2:  [95 %-98 %] 98 %  O2 Device: None (Room air)    General: Chronically ill-appearing but no acute distress  Psychiatric:  Awake and alert  Cardiac: RRR, no murmurs  Pulmonary:  Normal respiratory excursion without accessory muscle use  Abdomen:  Soft, nontender, nondistended  Back: Bilateral percutaneous nephrostomy tubes in place  Extremities:  No edema  Skin: Rash extending from left flank and buttocks over anterior abdomen to the mons pubis.  There are scattered lesions which appear hemorrhagic/vesicular and some areas of denuded skin.  Erythema  surrounding the area      Lab Results: I have reviewed the following results:  Results from last 7 days   Lab Units 01/24/25  0639 01/23/25  0559 01/22/25  1648   WBC Thousand/uL 7.77 13.27* 18.02*   HEMOGLOBIN g/dL 11.5 11.6 13.5   PLATELETS Thousands/uL 229 253 298     Results from last 7 days   Lab Units 01/24/25  0639 01/23/25  2026 01/23/25  0559 01/22/25  1648   SODIUM mmol/L 139 136 138 138   POTASSIUM mmol/L 2.8* 3.2* 3.6 3.9   CHLORIDE mmol/L 104 106 110* 101   CO2 mmol/L 21 19* 14* 16*   BUN mg/dL 106* 108* 110* 119*   CREATININE mg/dL 3.96* 4.28* 4.27* 5.11*   EGFR ml/min/1.73sq m 9 8 8 7   CALCIUM mg/dL 7.9* 8.5 8.5 9.1   AST U/L  --   --   --  51*   ALT U/L  --   --   --  27   ALK PHOS U/L  --   --   --  49   ALBUMIN g/dL  --   --   --  3.4*     Results from last 7 days   Lab Units 01/23/25  1611 01/22/25  1648 01/22/25  1637 01/22/25  1046   BLOOD CULTURE   --  No Growth at 24 hrs. No Growth at 24 hrs.  --    GRAM STAIN RESULT  No Polys or Bacteria seen  --   --   --    URINE CULTURE   --   --   --  <10,000 cfu/ml     Results from last 7 days   Lab Units 01/23/25  0559 01/22/25  1648   PROCALCITONIN ng/ml 2.27* 2.69*

## 2025-01-24 NOTE — ASSESSMENT & PLAN NOTE
The patient reports a rash over the left buttocks extending to the anterior abdomen and mons pubis.  There is erythema of the area as well as hemorrhagic lesions, some denuded vesicles.  Consideration for herpes zoster with superimposed bacterial cellulitis.  Dermatology consulted, concern for hypercoagulable state and possible reniform purpura versus calciphylaxis versus hemorrhagic bullous cellulitis and vasculitis.  Erythema slightly improved today.   -Continue Valtrex and cefazolin, renally dose adjusted   -Monitor rash   -Recommend wound care consult   -Dermatology follow-up   -Follow-up VZV PCR   -Follow-up skin biopsy and culture

## 2025-01-24 NOTE — CASE MANAGEMENT
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, 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) 2024     Obstructive uropathy 2024     Class 1 obesity due to excess calories with serious comorbidity and body mass index (BMI) of 34.0 to 34.9 in adult 2024     Advanced directives, counseling/discussion 2024     Palliative care by specialist 2024     Bilateral leg pain 2024     Ambulatory dysfunction 2024     Stage 4 chronic kidney disease (HCC) 2024     Stage 5 chronic kidney disease 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) 2024     Morbid obesity due to excess calories ([facility]) 2024     Nephrostomy status ([facility]) 2023     Sensorineural hearing loss (SNHL), bilateral 2022     Sensorineural hearing loss (SNHL) of right ear with restricted hearing of left ear 2022     Goals of care, counseling/discussion 05/10/2022     Malignant neoplasm of overlapping sites of bladder ([facility]) 2021     Stroke ([facility]) 10/29/2021     Hypokalemia 10/28/2021     Wrist drop 10/28/2021       LOS (days): 2  Geometric Mean LOS (GMLOS) (days): 3.5  Days to GMLOS:1.6     OBJECTIVE:    Risk of Unplanned Readmission Score: 19.75      Current admission status: Inpatient     Preferred Pharmacy:   [facility]  [place], [place] - [address]   [facility]  [facility] [address]  Phone: [phone] Fax: [phone]    Primary Care Provider: BHAVIK Morillo    Primary Insurance: JORJE ROY  Secondary Insurance:     ASSESSMENT:  Active Health Care Proxies       Rodrigo Chambers Health Care Agent - Son   Primary Phone: [phone] (Mobile)            Readmission Root Cause  30 Day Readmission: No    Patient Information  Admitted from:: Home  Mental Status: Alert  During Assessment patient was accompanied by: Not accompanied during assessment  Assessment information provided by:: Patient  Primary Caregiver: Self  Support Systems: Son, Family members  County of Residence: [place]  What city do you live in?: [place], [place]  Home entry access options. Select all that apply.: Stairs  Number of steps to enter home.: 3  Type of Current Residence: [facility]  Living Arrangements: Lives Alone    Activities of Daily Living Prior to Admission  Functional Status: Independent  Completes ADLs independently?: Yes  Ambulates independently?: Yes  Does patient use assisted devices?: No  Does patient currently own DME?: Yes  What DME does the patient currently own?: Walker, Straight Cane  Does the patient have a history of Short-Term Rehab?: No  Does patient currently have HHC?: No     Patient Information Continued  Income Source: Employed (Works PT at [employer])  Does patient have prescription coverage?: Yes  Does patient receive dialysis treatments?: No     Means of Transportation  Means of Transport to Appts:: Drives Self    DISCHARGE DETAILS:    Discharge planning discussed with:: Patient, Son Rodrigo  [place] of Choice: Yes  Comments - Freedom of Choice: SW met bedside with patient and spoke via phone with son to introduce role, complete assessment, and discuss discharge plan. SW reviewed recommendation for STR. Patient stated that she is unsure if she will go to STR but did consent for SW to send blanket referrals to obtain accepting provider list. Son noted that  he is in full agreement with recommendation for STR and will discuss with patient later tonight when he visits.  CM contacted family/caregiver?: Yes  Were Treatment Team discharge recommendations reviewed with patient/caregiver?: Yes  Did patient/caregiver verbalize understanding of patient care needs?: Yes  Were patient/caregiver advised of the risks associated with not following Treatment Team discharge recommendations?: Yes    Contacts  Patient Contacts: Rodrigo Chambers (son)  Relationship to Patient:: Family  Contact Method: Phone  Phone Number: [phone]  Reason/Outcome: Emergency Contact, Continuity of Care, Discharge Planning    Other Referral/Resources/Interventions Provided:  Interventions: Short Term Rehab  Referral Comments: [facility] referrals sent in A[place]. Responses pending.     Treatment Team Recommendation: Short Term Rehab  Discharge Destination Plan:: Short Term Rehab

## 2025-01-25 LAB
25(OH)D3 SERPL-MCNC: 36.5 NG/ML (ref 30–100)
ANION GAP SERPL CALCULATED.3IONS-SCNC: 10 MMOL/L (ref 4–13)
BUN SERPL-MCNC: 95 MG/DL (ref 5–25)
CALCIUM SERPL-MCNC: 8.2 MG/DL (ref 8.4–10.2)
CHLORIDE SERPL-SCNC: 108 MMOL/L (ref 96–108)
CO2 SERPL-SCNC: 20 MMOL/L (ref 21–32)
CREAT SERPL-MCNC: 3.37 MG/DL (ref 0.6–1.3)
DEPRECATED AT III PPP: 97 % OF NORMAL (ref 92–136)
ERYTHROCYTE [DISTWIDTH] IN BLOOD BY AUTOMATED COUNT: 12.6 % (ref 11.6–15.1)
GFR SERPL CREATININE-BSD FRML MDRD: 11 ML/MIN/1.73SQ M
GLUCOSE SERPL-MCNC: 86 MG/DL (ref 65–140)
HCT VFR BLD AUTO: 34.1 % (ref 34.8–46.1)
HGB BLD-MCNC: 10.9 G/DL (ref 11.5–15.4)
MAGNESIUM SERPL-MCNC: 2.2 MG/DL (ref 1.9–2.7)
MCH RBC QN AUTO: 27.8 PG (ref 26.8–34.3)
MCHC RBC AUTO-ENTMCNC: 32 G/DL (ref 31.4–37.4)
MCV RBC AUTO: 87 FL (ref 82–98)
PHOSPHATE SERPL-MCNC: 3 MG/DL (ref 2.3–4.1)
PLATELET # BLD AUTO: 244 THOUSANDS/UL (ref 149–390)
PMV BLD AUTO: 11.3 FL (ref 8.9–12.7)
POTASSIUM SERPL-SCNC: 3.4 MMOL/L (ref 3.5–5.3)
RBC # BLD AUTO: 3.92 MILLION/UL (ref 3.81–5.12)
SODIUM SERPL-SCNC: 138 MMOL/L (ref 135–147)
WBC # BLD AUTO: 9.96 THOUSAND/UL (ref 4.31–10.16)

## 2025-01-25 PROCEDURE — 85027 COMPLETE CBC AUTOMATED: CPT | Performed by: INTERNAL MEDICINE

## 2025-01-25 PROCEDURE — 87081 CULTURE SCREEN ONLY: CPT | Performed by: FAMILY MEDICINE

## 2025-01-25 PROCEDURE — 82306 VITAMIN D 25 HYDROXY: CPT | Performed by: INTERNAL MEDICINE

## 2025-01-25 PROCEDURE — 99232 SBSQ HOSP IP/OBS MODERATE 35: CPT | Performed by: INTERNAL MEDICINE

## 2025-01-25 PROCEDURE — 83735 ASSAY OF MAGNESIUM: CPT | Performed by: INTERNAL MEDICINE

## 2025-01-25 PROCEDURE — 99232 SBSQ HOSP IP/OBS MODERATE 35: CPT | Performed by: FAMILY MEDICINE

## 2025-01-25 PROCEDURE — 80048 BASIC METABOLIC PNL TOTAL CA: CPT | Performed by: INTERNAL MEDICINE

## 2025-01-25 PROCEDURE — 84100 ASSAY OF PHOSPHORUS: CPT | Performed by: INTERNAL MEDICINE

## 2025-01-25 RX ORDER — POTASSIUM CHLORIDE 1500 MG/1
40 TABLET, EXTENDED RELEASE ORAL ONCE
Status: COMPLETED | OUTPATIENT
Start: 2025-01-25 | End: 2025-01-25

## 2025-01-25 RX ADMIN — POTASSIUM CHLORIDE 40 MEQ: 1500 TABLET, EXTENDED RELEASE ORAL at 12:03

## 2025-01-25 RX ADMIN — HEPARIN SODIUM 5000 UNITS: 5000 INJECTION, SOLUTION INTRAVENOUS; SUBCUTANEOUS at 06:01

## 2025-01-25 RX ADMIN — ASPIRIN 81 MG CHEWABLE TABLET 81 MG: 81 TABLET CHEWABLE at 09:40

## 2025-01-25 RX ADMIN — VALACYCLOVIR HYDROCHLORIDE 500 MG: 500 TABLET, FILM COATED ORAL at 09:47

## 2025-01-25 RX ADMIN — SODIUM CHLORIDE, PRESERVATIVE FREE 10 ML: 5 INJECTION INTRAVENOUS at 09:41

## 2025-01-25 RX ADMIN — HEPARIN SODIUM 5000 UNITS: 5000 INJECTION, SOLUTION INTRAVENOUS; SUBCUTANEOUS at 21:08

## 2025-01-25 RX ADMIN — CEFAZOLIN SODIUM 1000 MG: 1 SOLUTION INTRAVENOUS at 09:48

## 2025-01-25 RX ADMIN — Medication 250 MG: at 09:40

## 2025-01-25 RX ADMIN — ATORVASTATIN CALCIUM 40 MG: 40 TABLET, FILM COATED ORAL at 17:18

## 2025-01-25 RX ADMIN — Medication 250 MG: at 17:18

## 2025-01-25 RX ADMIN — METOPROLOL SUCCINATE 25 MG: 25 TABLET, FILM COATED, EXTENDED RELEASE ORAL at 09:40

## 2025-01-25 RX ADMIN — HEPARIN SODIUM 5000 UNITS: 5000 INJECTION, SOLUTION INTRAVENOUS; SUBCUTANEOUS at 13:22

## 2025-01-25 NOTE — ASSESSMENT & PLAN NOTE
UA with large leuks, 4-10 WBC, innumerable bacteria  Patient with indwelling bilateral percutaneous nephrostomy tubes  Urine culture with mixed contaminants.

## 2025-01-25 NOTE — ASSESSMENT & PLAN NOTE
-Per urology as outpatient.  - Patient confirms that if dialysis were needed she would not want dialysis   Dr. Frazier 

## 2025-01-25 NOTE — DISCHARGE INSTR - OTHER ORDERS
Wound Care Plan:    1-Cleanse wound to left hip, left abdomen and left lower back with normal saline or wound cleanser & pat dry. Apply Dermagran in a single layer cut to size of wounds. Cover with ABD and gentle paper tape. Change daily & as needed for soilage/dislodgement.  2-Cleanse nephrostomy tube sites with normal saline & pat dry. Apply split gauze and secure with gentle paper tape. Change daily & as needed for soilage/dislodgement.  3-Apply Prevent barrier cream or equivalent to bilateral heels 2x/day and as needed.  4-Heel lift boots to be worn to bilateral heels at all times in bed and after transfer to reclining chair if able. If patient unable to tolerate, must float heels on 2 pillows to offload pressure so heels are not in contact with mattress or pillows.  5-Use pressure redistribution cushion in chair when out of bed. Avoid prolonged sitting due to extensive wounds to trunk.  6-Moisturize skin daily with skin nourishing cream.  7-Turn/reposition every 2 hrs in bed and every hour when out of bed to chair for pressure re-distribution on skin.   8-Patient may follow up at [facility]. Call Central Scheduling for appointment - [phone].

## 2025-01-25 NOTE — PLAN OF CARE
Problem: Prexisting or High Potential for Compromised Skin Integrity  Goal: Skin integrity is maintained or improved  Description: INTERVENTIONS:  - Identify patients at risk for skin breakdown  - Assess and monitor skin integrity  - Assess and monitor nutrition and hydration status  - Monitor labs   - Assess for incontinence   - Turn and reposition patient  - Assist with mobility/ambulation  - Relieve pressure over bony prominences  - Avoid friction and shearing  - Provide appropriate hygiene as needed including keeping skin clean and dry  - Evaluate need for skin moisturizer/barrier cream  - Collaborate with interdisciplinary team   - Patient/family teaching  - Consider wound care consult   Outcome: Progressing     Problem: PAIN - ADULT  Goal: Verbalizes/displays adequate comfort level or baseline comfort level  Description: Interventions:  - Encourage patient to monitor pain and request assistance  - Assess pain using appropriate pain scale  - Administer analgesics based on type and severity of pain and evaluate response  - Implement non-pharmacological measures as appropriate and evaluate response  - Consider cultural and social influences on pain and pain management  - Notify physician/advanced practitioner if interventions unsuccessful or patient reports new pain  Outcome: Progressing     Problem: INFECTION - ADULT  Goal: Absence or prevention of progression during hospitalization  Description: INTERVENTIONS:  - Assess and monitor for signs and symptoms of infection  - Monitor lab/diagnostic results  - Monitor all insertion sites, i.e. indwelling lines, tubes, and drains  - Monitor endotracheal if appropriate and nasal secretions for changes in amount and color  - [place] appropriate cooling/warming therapies per order  - Administer medications as ordered  - Instruct and encourage patient and family to use good hand hygiene technique  - Identify and instruct in appropriate isolation precautions for  identified infection/condition  Outcome: Progressing  Goal: Absence of fever/infection during neutropenic period  Description: INTERVENTIONS:  - Monitor WBC    Outcome: Progressing     Problem: SAFETY ADULT  Goal: Patient will remain free of falls  Description: INTERVENTIONS:  - Educate patient/family on patient safety including physical limitations  - Instruct patient to call for assistance with activity   - Consult OT/PT to assist with strengthening/mobility   - Keep Call bell within reach  - Keep bed low and locked with side rails adjusted as appropriate  - Keep care items and personal belongings within reach  - Initiate and maintain comfort rounds  - Make Fall Risk Sign visible to staff  - Offer Toileting every 2 Hours, in advance of need  - Initiate/Maintain bed alarm  - Obtain necessary fall risk management equipment: nonskid socks  - Apply yellow socks and bracelet for high fall risk patients  - Consider moving patient to room near nurses station  Outcome: Progressing  Goal: Maintain or return to baseline ADL function  Description: INTERVENTIONS:  -  Assess patient's ability to carry out ADLs; assess patient's baseline for ADL function and identify physical deficits which impact ability to perform ADLs (bathing, care of mouth/teeth, toileting, grooming, dressing, etc.)  - Assess/evaluate cause of self-care deficits   - Assess range of motion  - Assess patient's mobility; develop plan if impaired  - Assess patient's need for assistive devices and provide as appropriate  - Encourage maximum independence but intervene and supervise when necessary  - Involve family in performance of ADLs  - Assess for home care needs following discharge   - Consider OT consult to assist with ADL evaluation and planning for discharge  - Provide patient education as appropriate  Outcome: Progressing  Goal: Maintains/Returns to pre admission functional level  Description: INTERVENTIONS:  - Perform AM-PAC 6 Click Basic Mobility/  Daily Activity assessment daily.  - Set and communicate daily mobility goal to care team and patient/family/caregiver.   - Collaborate with rehabilitation services on mobility goals if consulted  - Perform Range of Motion 3 times a day.  - Reposition patient every 2 hours.  - Dangle patient 3 times a day  - Stand patient 3 times a day  - Ambulate patient 2 times a day  - Out of bed to chair 3 times a day   - Out of bed for meals 3 times a day  - Out of bed for toileting  - Record patient progress and toleration of activity level   Outcome: Progressing     Problem: DISCHARGE PLANNING  Goal: Discharge to home or other facility with appropriate resources  Description: INTERVENTIONS:  - Identify barriers to discharge w/patient and caregiver  - Arrange for needed discharge resources and transportation as appropriate  - Identify discharge learning needs (meds, wound care, etc.)  - Arrange for interpretive services to assist at discharge as needed  - Refer to Case Management Department for coordinating discharge planning if the patient needs post-hospital services based on physician/advanced practitioner order or complex needs related to functional status, cognitive ability, or social support system  Outcome: Progressing     Problem: Knowledge Deficit  Goal: Patient/family/caregiver demonstrates understanding of disease process, treatment plan, medications, and discharge instructions  Description: Complete learning assessment and assess knowledge base.  Interventions:  - Provide teaching at level of understanding  - Provide teaching via preferred learning methods  Outcome: Progressing     Problem: GASTROINTESTINAL - ADULT  Goal: Minimal or absence of nausea and/or vomiting  Description: INTERVENTIONS:  - Administer IV fluids if ordered to ensure adequate hydration  - Maintain NPO status until nausea and vomiting are resolved  - Nasogastric tube if ordered  - Administer ordered antiemetic medications as needed  - Provide  nonpharmacologic comfort measures as appropriate  - Advance diet as tolerated, if ordered  - Consider nutrition services referral to assist patient with adequate nutrition and appropriate food choices  Outcome: Progressing  Goal: Maintains or returns to baseline bowel function  Description: INTERVENTIONS:  - Assess bowel function  - Encourage oral fluids to ensure adequate hydration  - Administer IV fluids if ordered to ensure adequate hydration  - Administer ordered medications as needed  - Encourage mobilization and activity  - Consider nutritional services referral to assist patient with adequate nutrition and appropriate food choices  Outcome: Progressing  Goal: Maintains adequate nutritional intake  Description: INTERVENTIONS:  - Monitor percentage of each meal consumed  - Identify factors contributing to decreased intake, treat as appropriate  - Assist with meals as needed  - Monitor I&O, weight, and lab values if indicated  - Obtain nutrition services referral as needed  Outcome: Progressing  Goal: Establish and maintain optimal ostomy function  Description: INTERVENTIONS:  - Assess bowel function  - Encourage oral fluids to ensure adequate hydration  - Administer IV fluids if ordered to ensure adequate hydration   - Administer ordered medications as needed  - Encourage mobilization and activity  - Nutrition services referral to assist patient with appropriate food choices  - Assess stoma site  - Consider wound care consult   Outcome: Progressing  Goal: Oral mucous membranes remain intact  Description: INTERVENTIONS  - Assess oral mucosa and hygiene practices  - Implement preventative oral hygiene regimen  - Implement oral medicated treatments as ordered  - Initiate Nutrition services referral as needed  Outcome: Progressing     Problem: GENITOURINARY - ADULT  Goal: Maintains or returns to baseline urinary function  Description: INTERVENTIONS:  - Assess urinary function  - Encourage oral fluids to ensure  adequate hydration if ordered  - Administer IV fluids as ordered to ensure adequate hydration  - Administer ordered medications as needed  - Offer frequent toileting  - Follow urinary retention protocol if ordered  Outcome: Progressing  Goal: Absence of urinary retention  Description: INTERVENTIONS:  - Assess patient’s ability to void and empty bladder  - Monitor I/O  - Bladder scan as needed  - Discuss with physician/AP medications to alleviate retention as needed  - Discuss catheterization for long term situations as appropriate  Outcome: Progressing  Goal: Urinary catheter remains patent  Description: INTERVENTIONS:  - Assess patency of urinary catheter  - If patient has a chronic posadas, consider changing catheter if non-functioning  - Follow guidelines for intermittent irrigation of non-functioning urinary catheter  Outcome: Progressing

## 2025-01-25 NOTE — ASSESSMENT & PLAN NOTE
-Initially with elevated anion gap metabolic acidosis.  Likely multifactorial due to MARTY/azotemia and was on bicarbonate drip until January 24.  Acidosis has resolved.  Anion gap is improved.  Bicarbonate slightly lower January 25 but will monitor for now.

## 2025-01-25 NOTE — ASSESSMENT & PLAN NOTE
Hx of bladder cancer with retroperitoneal lymphadenopathy, not currently receiving treatment as patient wishes to avoid systemic treatment for her invasive bladder cancer.   Follows with palliative medicine as outpatient

## 2025-01-25 NOTE — ASSESSMENT & PLAN NOTE
"Patient with hx of invasive bladder cancer not undergoing treatment, CKD IV, indwelling bilateral nephrostomy tubes, presents with generalized weakness, and new onset \"rash\" on her left flank and lower abdomen which patient states started after her last nephrostomy tube exchange on 01/07  Noted to have leukocytosis, tachycardia, tachypnea and MARTY   Per ID sepsis likely due to cellulitis and possible herpes zoster   Now off IV Cefepime + Vancomycin and currently on cefazolin with p.o. Valtrex.  CT A/P f negative for acute pathology  urine culture with mixed contaminants and blood cultures negative so far  Monitor fever curve/hemodynamics  ID consult appreciated   "

## 2025-01-25 NOTE — WOUND OSTOMY CARE
"Progress Note - Wound   Ning Chambers 84 y.o. female MRN: [id number]  Unit/Bed#: [address] [place] [address] Encounter: [id number]        Assessment:   This is an 84 year old female patient admitted on 1/22/25 with sepsis. Patient was AAO x 3 and agreeable to have wound visit done. She was turned and repositioned with assist of 2 persons. Bilateral nephrostomy tubes in place with episodes of fecal incontinence. Wound consult requested for \"left hip/side rash, multiple open areas\".     Assessment Findings:  1-Full thickness wounds to left abdomen, left hip and left lower back (including part of left sacrobuttock) due to soft tissue necrosis. Orders in place for wound care and for prevention.  2-Bilateral nephrostomy tubes intact - orders in place for peritubular care.  3-Bilateral heels and right sacrobuttock intact with blanchable erythema - orders in place for skin care and for prevention.    Wound Care Plan:  1-Cleanse wound to left hip, left abdomen and left lower back with NSS & pat dry. Apply Dermagran in a single layer cut to size of wounds. Cover with ABD and gentle paper tape. Change daily & prn soilage/dislodgement.  2-Cleanse nephrostomy tube sites with NSS & pat dry. Apply split gauze and secure with gentle paper tape. Change daily & prn soilage/dislodgement.  3-Apply Prevent barrier cream to bilateral heels BID and PRN  4-Heel lift boots to be worn to bilateral heels at all times in bed and after transfer to reclining chair if able. If patient unable to tolerate, must float heels on 2 pillows to offload pressure so heels are not in contact with mattress or pillows.  5-Ehob pressure redistribution cushion in chair when out of bed. Avoid prolonged sitting.  6-Moisturize skin daily with skin nourishing cream.  7-Turn/reposition q2h or when medically stable for pressure re-distribution on skin.     Wound 01/24/25 Soft Tissue Necrosis Abdomen Anterior;Left (Active)   Wound Image   01/24/25 3671   Wound Description " Granulation tissue;Pink 01/24/25 1741   Karmen-wound Assessment Denuded;Excoriated 01/24/25 1741   Wound Length (cm) 13.5 cm 01/24/25 1741   Wound Width (cm) 16 cm 01/24/25 1741   Wound Depth (cm) 0.1 cm 01/24/25 1741   Wound Surface Area (cm^2) 216 cm^2 01/24/25 1741   Wound Volume (cm^3) 21.6 cm^3 01/24/25 1741   Calculated Wound Volume (cm^3) 21.6 cm^3 01/24/25 1741   Drainage Amount Moderate 01/24/25 1741   Drainage Description Serosanguineous 01/24/25 1741   Non-staged Wound Description Full thickness 01/24/25 1741   Treatments Cleansed 01/24/25 1741   Dressing ABD;Dermagran gauze 01/24/25 1741   Dressing Changed New 01/24/25 1741   Dressing Status Clean;Dry;Intact 01/24/25 1741       Wound 01/24/25 Soft Tissue Necrosis Hip Left (Active)   Wound Image   01/24/25 1751   Wound Description Granulation tissue;Pink;Slough;Yellow;Black;Eschar 01/24/25 1751   Karmen-wound Assessment Excoriated;Denuded 01/24/25 1751   Wound Length (cm) 13 cm 01/24/25 1751   Wound Width (cm) 14 cm 01/24/25 1751   Wound Depth (cm) 0.1 cm 01/24/25 1751   Wound Surface Area (cm^2) 182 cm^2 01/24/25 1751   Wound Volume (cm^3) 18.2 cm^3 01/24/25 1751   Calculated Wound Volume (cm^3) 18.2 cm^3 01/24/25 1751   Drainage Amount Moderate 01/24/25 1751   Drainage Description Serosanguineous 01/24/25 1751   Non-staged Wound Description Full thickness 01/24/25 1751   Dressing Dermagran gauze;ABD 01/24/25 1751   Dressing Changed New 01/24/25 1751   Dressing Status Clean;Dry;Intact 01/24/25 1751       Wound 01/24/25 Soft Tissue Necrosis Back Left;Lower (Active)   Wound Image   01/24/25 1724   Wound Description Granulation tissue;Pink;Slough;Yellow;Black;Eschar 01/24/25 1724   Karmen-wound Assessment Denuded;Excoriated 01/24/25 1724   Wound Length (cm) 12 cm 01/24/25 1724   Wound Width (cm) 14 cm 01/24/25 1724   Wound Depth (cm) 0.1 cm 01/24/25 1724   Wound Surface Area (cm^2) 168 cm^2 01/24/25 1724   Wound Volume (cm^3) 16.8 cm^3 01/24/25 1724   Calculated  Wound Volume (cm^3) 16.8 cm^3 01/24/25 1724   Drainage Amount Moderate 01/24/25 1724   Drainage Description Serosanguineous 01/24/25 1724   Non-staged Wound Description Full thickness 01/24/25 1724   Treatments Cleansed 01/24/25 1724   Dressing Changed New/Dermagran gauze & ABD 01/24/25 1724   Dressing Status Clean;Dry;Intact 01/24/25 1724     Discussed assessment findings, and plan of care/recommendations with Nida RN.    Wound care will follow along with patient throughout admission, please call or text with questions and concerns.    Recommendations written as orders.  Linda Jean Baptiste MSN, RN, CWON

## 2025-01-25 NOTE — PROGRESS NOTES
"Progress Note - Hospitalist   Name: Ning Chambers 84 y.o. female I MRN: [id number]  Unit/Bed#: [address] [place] [address] I Date of Admission: 1/22/2025   Date of Service: 1/25/2025 I Hospital Day: 3    Assessment & Plan  Sepsis (HCC)  Patient with hx of invasive bladder cancer not undergoing treatment, CKD IV, indwelling bilateral nephrostomy tubes, presents with generalized weakness, and new onset \"rash\" on her left flank and lower abdomen which patient states started after her last nephrostomy tube exchange on 01/07  Noted to have leukocytosis, tachycardia, tachypnea and MARTY   Per ID sepsis likely due to cellulitis and possible herpes zoster   Now off IV Cefepime + Vancomycin and currently on cefazolin with p.o. Valtrex.  CT A/P f negative for acute pathology  urine culture with mixed contaminants and blood cultures negative so far  Monitor fever curve/hemodynamics  ID consult appreciated   Cellulitis of flank  Patient endorsing \"rash\" on her left back/flank/buttocks and abdomen/groin which she states started after her last nephrostomy tube exchange 01/07  As noted above continue IV cefazolin and oral Valtrex given suspicion for zoster  On 1/23 coordinating plan of care with dermatology, nephrology, ID and also surgery.  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  Plan of care was coordinated with nephrology and we will hold until acidosis improves but patient hesitant to start this  Acute kidney injury superimposed on chronic kidney disease  (HCC)  Lab Results   Component Value Date    EGFR 11 01/25/2025    EGFR 10 01/24/2025    EGFR 9 01/24/2025    CREATININE 3.37 (H) 01/25/2025    CREATININE 3.63 (H) 01/24/2025 "    CREATININE 3.96 (H) 01/24/2025     Cr 5.11 on admission, baseline 2.5-3 (CKD IV) follows with nephrology as outpatient; has expressed wishes to not proceed with dialysis in the past  Patient endorses minimal oral intake over the past week  Noted to have high anion gap metabolic acidosis and was on IV sodium bicarbonate gtt which has been discontinued   Continue plasmalyte  Hold HCTZ   Repeat lab work in a..  Nephrology consult appreciated  Abnormal urinalysis  UA with large leuks, 4-10 WBC, innumerable bacteria  Patient with indwelling bilateral percutaneous nephrostomy tubes  Urine culture with mixed contaminants.  High anion gap metabolic acidosis  Anion gap 21, bicarb 16 in the setting of MARTY on CKD, dehydration, severe sepsis   Discontinued bicarb drip.  Repeat lab work in a..    Nephrostomy status (HCC)  Hx of obstructive uropathy 2/2 invasive bladder cancer s/p bilateral indwelling percutaneous nephrostomy tubes  Follows with urology, nephrology, palliative medicine  Undergoes exchanges every 3 months, last exchange 01/07/25  Plan for IR tube check on Mon based on CT findings  Malignant neoplasm of overlapping sites of bladder (HCC)  Hx of bladder cancer with retroperitoneal lymphadenopathy, not currently receiving treatment as patient wishes to avoid systemic treatment for her invasive bladder cancer.   Follows with palliative medicine as outpatient  Benign hypertension  BP acceptable   Hold HCTZ 2/2 MARTY  Continue Toprol XL with hold parameters    VTE Pharmacologic Prophylaxis: VTE Score: 6 High Risk (Score >/= 5) - Pharmacological DVT Prophylaxis Ordered: heparin. Sequential Compression Devices Ordered.    Mobility:   Basic Mobility Inpatient Raw Score: 10  JH-HLM Goal: 4: Move to chair/commode  JH-HLM Achieved: 4: Move to chair/commode  JH-HLM Goal achieved. Continue to encourage appropriate mobility.    Patient Centered Rounds: I performed bedside rounds with nursing staff today.   Discussions with  Specialists or Other Care Team Provider: yes - nephrology    Education and Discussions with Family / Patient: Updated  (son) via phone.    Current Length of Stay: 3 day(s)  Current Patient Status: Inpatient   Certification Statement: The patient will continue to require additional inpatient hospital stay due to rash/cellulitis  Discharge Plan: Anticipate discharge in >72 hrs to discharge location to be determined pending rehab evaluations.    Code Status: Level 3 - DNAR and DNI    Subjective   PAtient denies any pain today. Reports diarrhea    Objective :  Temp:  [98 °F (36.7 °C)-98.3 °F (36.8 °C)] 98 °F (36.7 °C)  HR:  [76-77] 77  BP: (117-121)/(52-54) 117/52  Resp:  [18] 18  SpO2:  [97 %-98 %] 98 %  O2 Device: None (Room air)    Body mass index is 31.41 kg/m².     Input and Output Summary (last 24 hours):     Intake/Output Summary (Last 24 hours) at 1/25/2025 1030  Last data filed at 1/25/2025 0602  Gross per 24 hour   Intake 240 ml   Output 500 ml   Net -260 ml       Physical Exam  Constitutional:       General: She is not in acute distress.     Appearance: She is not toxic-appearing.   HENT:      Head: Normocephalic and atraumatic.   Eyes:      General: No scleral icterus.  Cardiovascular:      Rate and Rhythm: Normal rate and regular rhythm.   Pulmonary:      Effort: Pulmonary effort is normal. No respiratory distress.      Breath sounds: Normal breath sounds. No wheezing or rales.   Abdominal:      General: Bowel sounds are normal. There is no distension.      Palpations: Abdomen is soft.      Tenderness: There is no abdominal tenderness.   Skin:     Comments: Rash/erythema appears to be improving.B/L nephrostomy tubes in place   Neurological:      Mental Status: She is alert and oriented to person, place, and time.         Lines/Drains:  Lines/Drains/Airways       Active Status       Name Placement date Placement time Site Days    Nephrostomy Left 10.2 Fr. 01/07/25  1124  Left  17    Nephrostomy  Right 10.2 Fr. 01/07/25  1130  Right  17                            Lab Results: I have reviewed the following results:   Results from last 7 days   Lab Units 01/25/25  0602 01/23/25  0559 01/22/25  1648   WBC Thousand/uL 9.96   < > 18.02*   HEMOGLOBIN g/dL 10.9*   < > 13.5   HEMATOCRIT % 34.1*   < > 40.9   PLATELETS Thousands/uL 244   < > 298   BANDS PCT %  --   --  1   LYMPHO PCT %  --   --  7*   MONO PCT %  --   --  1*   EOS PCT %  --   --  0    < > = values in this interval not displayed.     Results from last 7 days   Lab Units 01/25/25  0602 01/23/25  0559 01/22/25  1648   SODIUM mmol/L 138   < > 138   POTASSIUM mmol/L 3.4*   < > 3.9   CHLORIDE mmol/L 108   < > 101   CO2 mmol/L 20*   < > 16*   BUN mg/dL 95*   < > 119*   CREATININE mg/dL 3.37*   < > 5.11*   ANION GAP mmol/L 10   < > 21*   CALCIUM mg/dL 8.2*   < > 9.1   ALBUMIN g/dL  --   --  3.4*   TOTAL BILIRUBIN mg/dL  --   --  0.63   ALK PHOS U/L  --   --  49   ALT U/L  --   --  27   AST U/L  --   --  51*   GLUCOSE RANDOM mg/dL 86   < > 103    < > = values in this interval not displayed.                 Results from last 7 days   Lab Units 01/23/25  0559 01/22/25  1648   LACTIC ACID mmol/L  --  2.0   PROCALCITONIN ng/ml 2.27* 2.69*       Recent Cultures (last 7 days):   Results from last 7 days   Lab Units 01/24/25  1753 01/23/25  1611 01/22/25  1648 01/22/25  1637 01/22/25  1046   BLOOD CULTURE   --   --  No Growth at 48 hrs. No Growth at 48 hrs.  --    GRAM STAIN RESULT  No Polys or Bacteria seen No Polys or Bacteria seen  --   --   --    URINE CULTURE   --   --   --   --  <10,000 cfu/ml       Imaging Results Review: No pertinent imaging studies reviewed.  Other Study Results Review: No additional pertinent studies reviewed.    Last 24 Hours Medication List:     Current Facility-Administered Medications:     acetaminophen (TYLENOL) tablet 650 mg, Q6H PRN    aspirin chewable tablet 81 mg, Daily    atorvastatin (LIPITOR) tablet 40 mg, Daily With Dinner     ceFAZolin (ANCEF) IVPB (premix in dextrose) 1,000 mg 50 mL, Q24H, Last Rate: 1,000 mg (01/25/25 0948)    heparin (porcine) subcutaneous injection 5,000 Units, Q8H JD    [Held by provider] hydroCHLOROthiazide tablet 25 mg, QAM    influenza vaccine, high-dose (Fluzone High-Dose) IM injection 0.5 mL, Prior to discharge    metoprolol succinate (TOPROL-XL) 24 hr tablet 25 mg, QAM    multi-electrolyte (PLASMALYTE-A/ISOLYTE-S PH 7.4) IV solution, Continuous, Last Rate: 50 mL/hr (01/24/25 1523)    potassium chloride (Klor-Con M20) CR tablet 40 mEq, Once    saccharomyces boulardii (FLORASTOR) capsule 250 mg, BID    sodium chloride (PF) 0.9 % injection 10 mL, Daily    valACYclovir (VALTREX) tablet 500 mg, Q24H    Administrative Statements   Today, Patient Was Seen By: Nata Valentin DO      **Please Note: This note may have been constructed using a voice recognition system.**

## 2025-01-25 NOTE — ASSESSMENT & PLAN NOTE
Lab Results   Component Value Date    EGFR 11 01/25/2025    EGFR 10 01/24/2025    EGFR 9 01/24/2025    CREATININE 3.37 (H) 01/25/2025    CREATININE 3.63 (H) 01/24/2025    CREATININE 3.96 (H) 01/24/2025     Cr 5.11 on admission, baseline 2.5-3 (CKD IV) follows with nephrology as outpatient; has expressed wishes to not proceed with dialysis in the past  Patient endorses minimal oral intake over the past week  Noted to have high anion gap metabolic acidosis and was on IV sodium bicarbonate gtt which has been discontinued   Continue plasmalyte  Hold HCTZ   Repeat lab work in a.m.  Nephrology consult appreciated

## 2025-01-25 NOTE — ASSESSMENT & PLAN NOTE
-Prior baseline creatinine was approximately 2.5 to 3 mg/dL.  Most recent creatinine January 13 as outpatient was 2.03 mg/dL  - Outpatient nephrologist Dr. Reyes  - Admission creatinine January 22 was 5.1 mg/dL improving to 4.3 mg/dL after volume expansion  - Volume state-patient clinically appears volume depleted.  Has baseline hemoglobin 11 and on admission was 13 supporting concentrated state.  - Is on home diuretic which is currently held  - Blood pressures relatively appropriate    Renal imaging--CT scan with dilated left renal collecting system and ureter.  PCN in place.  Right hydronephrosis has resolved.  Worsening lymphadenopathy  - Urinalysis with innumerable bacteria, 4-10 WBC, no RBC, concentrated specimen    Discussion-patient has significant MARTY on CKD.  Appears to be volume depleted.  Creatinine is improving with volume expansion.  Also has acidosis with elevated anion gap.  Lactic acid was 2.  Initially was started on treatment for possible sepsis due to leukocytosis, new rash, history of PCN and workup was started for infectious etiology.  Cultures were sent.  Infectious disease was brought on board by primary team.  There is concern for zoster rash in lower abdomen.  Slowly improving.  Dermatology was also brought on board and rash was biopsied by surgery team.  Biopsy is pending.  Question was raised for calciphylaxis also but currently given the rapid improvement in rash with Valtrex, may be more zoster related than calciphylaxis?  And patient has been reluctant to start sodium thiosulfate empirically    January 24-creatinine slowly improving to 3.9 mg/dL.  Bicarbonate is normalized with bicarbonate based fluids.  Potassium is low being repleted.    January 25-rash continues to slowly improve removed.  Creatinine continues to improve to 3.3 mg/dL.  Potassium 3.4 being repleted.  BUN improving.  Vitamin D is okay at 36.    Plan  - Continue low-dose pH balance fluids today  - Valtrex is renally  adjusted by ID team  - CT scan without contrast shows possible dilation of left renal pelvis and left ureter despite nephrostomy tube.  Also mild interval enlargement of retroperitoneal lymphadenopathy.  I have sent a message to review with primary team attending.  May need to consider adjustment of left PCN?  - Dermatology has raised concern for possible calciphylaxis versus other differentials noted in their note.  Patient underwent skin biopsy January 23.  But given the rapid improvement in the rash over the last few days since Valtrex has been started, and patient is reluctant to start sodium thiosulfate empirically, will hold on starting per patient wishes and request  - Reviewed case with primary team attending in person we are in agreement renal plan to monitor off sodium thiosulfate for now

## 2025-01-25 NOTE — ASSESSMENT & PLAN NOTE
"Patient endorsing \"rash\" on her left back/flank/buttocks and abdomen/groin which she states started after her last nephrostomy tube exchange 01/07  As noted above continue IV cefazolin and oral Valtrex given suspicion for zoster  On 1/23 coordinating plan of care with dermatology, nephrology, ID and also surgery.  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  Plan of care was coordinated with nephrology and we will hold until acidosis improves but patient hesitant to start this  "

## 2025-01-25 NOTE — PROGRESS NOTES
Progress Note - Nephrology   Name: Ning Chambers 84 y.o. female I MRN: [id number]  Unit/Bed#: [address] [address] I Date of Admission: 1/22/2025   Date of Service: 1/25/2025 I Hospital Day: 3    Assessment & Plan  Acute kidney injury superimposed on chronic kidney disease  (HCC)  -Prior baseline creatinine was approximately 2.5 to 3 mg/dL.  Most recent creatinine January 13 as outpatient was 2.03 mg/dL  - Outpatient nephrologist Dr. Reyes  - Admission creatinine January 22 was 5.1 mg/dL improving to 4.3 mg/dL after volume expansion  - Volume state-patient clinically appears volume depleted.  Has baseline hemoglobin 11 and on admission was 13 supporting concentrated state.  - Is on home diuretic which is currently held  - Blood pressures relatively appropriate    Renal imaging--CT scan with dilated left renal collecting system and ureter.  PCN in place.  Right hydronephrosis has resolved.  Worsening lymphadenopathy  - Urinalysis with innumerable bacteria, 4-10 WBC, no RBC, concentrated specimen    Discussion-patient has significant MARTY on CKD.  Appears to be volume depleted.  Creatinine is improving with volume expansion.  Also has acidosis with elevated anion gap.  Lactic acid was 2.  Initially was started on treatment for possible sepsis due to leukocytosis, new rash, history of PCN and workup was started for infectious etiology.  Cultures were sent.  Infectious disease was brought on board by primary team.  There is concern for zoster rash in lower abdomen.  Slowly improving.  Dermatology was also brought on board and rash was biopsied by surgery team.  Biopsy is pending.  Question was raised for calciphylaxis also but currently given the rapid improvement in rash with Valtrex, may be more zoster related than calciphylaxis?  And patient has been reluctant to start sodium thiosulfate empirically    January 24-creatinine slowly improving to 3.9 mg/dL.  Bicarbonate is normalized with bicarbonate based fluids.  Potassium  is low being repleted.    January 25-rash continues to slowly improve removed.  Creatinine continues to improve to 3.3 mg/dL.  Potassium 3.4 being repleted.  BUN improving.  Vitamin D is okay at 36.    Plan  - Continue low-dose pH balance fluids today  - Valtrex is renally adjusted by ID team  - CT scan without contrast shows possible dilation of left renal pelvis and left ureter despite nephrostomy tube.  Also mild interval enlargement of retroperitoneal lymphadenopathy.  I have sent a message to review with primary team attending.  May need to consider adjustment of left PCN?  - Dermatology has raised concern for possible calciphylaxis versus other differentials noted in their note.  Patient underwent skin biopsy January 23.  But given the rapid improvement in the rash over the last few days since Valtrex has been started, and patient is reluctant to start sodium thiosulfate empirically, will hold on starting per patient wishes and request  - Reviewed case with primary team attending in person we are in agreement renal plan to monitor off sodium thiosulfate for now    High anion gap metabolic acidosis  -Initially with elevated anion gap metabolic acidosis.  Likely multifactorial due to MARTY/azotemia and was on bicarbonate drip until January 24.  Acidosis has resolved.  Anion gap is improved.  Bicarbonate slightly lower January 25 but will monitor for now.  Sepsis (HCC)  -Source evaluation in progress by primary and infectious disease team  Malignant neoplasm of overlapping sites of bladder (HCC)  -Per urology as outpatient.  - Patient confirms that if dialysis were needed she would not want dialysis  Nephrostomy status (HCC)  -Bilateral nephrostomy tube management per primary team  Benign hypertension  -Hold home diuretic  Abnormal urinalysis  -Follow-up urine culture  Cellulitis of flank  -Patient with rash of lower abdomen-appears to be zoster rash versus other.  Dermatology on board also and ID team.  Surgery  has completed a biopsy and biopsy is pending.  Currently being treated with Valtrex.  Patient is reluctant to empirically start sodium thiosulfate for now.    I have reviewed the nephrology recommendations including monitoring of sodium thiosulfate, with primary team attending, and we are in agreement with renal plan including the information outlined above.     Subjective   Brief History of Admission - 84 y.o. female with a past medical history of CKD stage IV baseline creatinine 2.73 mg/dL, hypertension,bladder neoplasm with bilateral nephrostomy tubes, obstructive uropathy, elevated BMI, who was admitted to [facility] after presenting with abdominal rash. A renal consultation is requested today for assistance in the management of acute on chronic kidney injury.  Patient was in usual state of health until.  She had nephrostomy tube change January 7.  She states for 2 weeks developed a rash in lower abdomen.  She denies fevers, chills, nausea, vomiting, diarrhea     Patient denies complaints.  States rash is improving.  Blood pressure is appropriate.    Objective :  Temp:  [98 °F (36.7 °C)-98.3 °F (36.8 °C)] 98 °F (36.7 °C)  HR:  [76-77] 77  BP: (117-121)/(52-54) 117/52  Resp:  [18] 18  SpO2:  [97 %-98 %] 98 %  O2 Device: None (Room air)    Current Weight: Weight - Scale: 77.9 kg (171 lb 11.8 oz)  First Weight: Weight - Scale: 77.9 kg (171 lb 11.8 oz)  I/O         01/23 0701 01/24 0700 01/24 0701 01/25 0700 01/25 0701  01/26 0700    P.O.  540     IV Piggyback       Total Intake(mL/kg)  540 (6.9)     Urine (mL/kg/hr) 450 (0.2) 500 (0.3)     Total Output 450 500     Net -450 +40                  Physical Exam  General: NAD  Skin: no rash  Eyes: anicteric sclera  ENT: moist mucous membrane  Neck: supple  Chest: CTA b/l, no ronchii, no wheeze, no rubs, no rales  CVS: s1s2, no murmur, no gallop, no rub  Abdomen: soft, nontender, nl sounds, lower abdomen rash less erythematous and appears to be improving overall.   Still with necrotic changes and center area  Extremities: Trace lower extremity edema  : no posadas  Neuro: AAOX3  Psych: normal affect    Medications:    Current Facility-Administered Medications:     acetaminophen (TYLENOL) tablet 650 mg, 650 mg, Oral, Q6H PRN, Elinor Richard PA-C    aspirin chewable tablet 81 mg, 81 mg, Oral, Daily, Elinor Richard PA-C, 81 mg at 01/25/25 0940    atorvastatin (LIPITOR) tablet 40 mg, 40 mg, Oral, Daily With Dinner, Elinor Richard PA-C, 40 mg at 01/24/25 1825    ceFAZolin (ANCEF) IVPB (premix in dextrose) 1,000 mg 50 mL, 1,000 mg, Intravenous, Q24H, Verenice Murray MD, Last Rate: 100 mL/hr at 01/25/25 0948, 1,000 mg at 01/25/25 0948    heparin (porcine) subcutaneous injection 5,000 Units, 5,000 Units, Subcutaneous, Q8H JD, Elinor Richard PA-C, 5,000 Units at 01/25/25 1322    [Held by provider] hydroCHLOROthiazide tablet 25 mg, 25 mg, Oral, QAM, Elinor Richard PA-C    influenza vaccine, high-dose (Fluzone High-Dose) IM injection 0.5 mL, 0.5 mL, Intramuscular, Prior to discharge, Nata Revankar, DO    metoprolol succinate (TOPROL-XL) 24 hr tablet 25 mg, 25 mg, Oral, QAM, Elinor Richard PA-C, 25 mg at 01/25/25 0940    multi-electrolyte (PLASMALYTE-A/ISOLYTE-S PH 7.4) IV solution, 50 mL/hr, Intravenous, Continuous, Mauricio Gabriel MD, Last Rate: 50 mL/hr at 01/24/25 1523, 50 mL/hr at 01/24/25 1523    saccharomyces boulardii (FLORASTOR) capsule 250 mg, 250 mg, Oral, BID, Nata Revankar, DO, 250 mg at 01/25/25 0940    sodium chloride (PF) 0.9 % injection 10 mL, 10 mL, Intracatheter, Daily, Elinor Richard PA-C, 10 mL at 01/25/25 0941    valACYclovir (VALTREX) tablet 500 mg, 500 mg, Oral, Q24H, Verenice Murray MD, 500 mg at 01/25/25 0947      Lab Results: I have reviewed the following results:  Results from last 7 days   Lab Units 01/25/25  0602 01/24/25  2145 01/24/25  0639 01/23/25  2026 01/23/25  0559 01/22/25  1648   WBC Thousand/uL  "9.96  --  7.77  --  13.27* 18.02*   HEMOGLOBIN g/dL 10.9*  --  11.5  --  11.6 13.5   HEMATOCRIT % 34.1*  --  34.5*  --  36.0 40.9   PLATELETS Thousands/uL 244  --  229  --  253 298   POTASSIUM mmol/L 3.4* 3.6 2.8* 3.2* 3.6 3.9   CHLORIDE mmol/L 108 105 104 106 110* 101   CO2 mmol/L 20* 21 21 19* 14* 16*   BUN mg/dL 95* 101* 106* 108* 110* 119*   CREATININE mg/dL 3.37* 3.63* 3.96* 4.28* 4.27* 5.11*   CALCIUM mg/dL 8.2* 8.3* 7.9* 8.5 8.5 9.1   MAGNESIUM mg/dL 2.2  --  2.1  --  2.3 2.4   PHOSPHORUS mg/dL 3.0  --  3.3  --  4.2* 5.1*   ALBUMIN g/dL  --   --   --   --   --  3.4*       Administrative Statements     Portions of the record may have been created with voice recognition software. Occasional wrong word or \"sound a like\" substitutions may have occurred due to the inherent limitations of voice recognition software. Read the chart carefully and recognize, using context, where substitutions have occurred.If you have any questions, please contact the dictating provider.  "

## 2025-01-25 NOTE — ASSESSMENT & PLAN NOTE
-Patient with rash of lower abdomen-appears to be zoster rash versus other.  Dermatology on board also and ID team.  Surgery has completed a biopsy and biopsy is pending.  Currently being treated with Valtrex.  Patient is reluctant to empirically start sodium thiosulfate for now.

## 2025-01-25 NOTE — ASSESSMENT & PLAN NOTE
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

## 2025-01-26 PROBLEM — E87.29 HIGH ANION GAP METABOLIC ACIDOSIS: Status: RESOLVED | Noted: 2025-01-22 | Resolved: 2025-01-26

## 2025-01-26 LAB
ANION GAP SERPL CALCULATED.3IONS-SCNC: 12 MMOL/L (ref 4–13)
BACTERIA TISS AEROBE CULT: ABNORMAL
BACTERIA WND AEROBE CULT: NORMAL
BUN SERPL-MCNC: 80 MG/DL (ref 5–25)
C DIFF TOX GENS STL QL NAA+PROBE: NEGATIVE
CALCIUM SERPL-MCNC: 7.8 MG/DL (ref 8.4–10.2)
CHLORIDE SERPL-SCNC: 106 MMOL/L (ref 96–108)
CO2 SERPL-SCNC: 23 MMOL/L (ref 21–32)
CREAT SERPL-MCNC: 2.95 MG/DL (ref 0.6–1.3)
ERYTHROCYTE [DISTWIDTH] IN BLOOD BY AUTOMATED COUNT: 12.5 % (ref 11.6–15.1)
GFR SERPL CREATININE-BSD FRML MDRD: 13 ML/MIN/1.73SQ M
GLUCOSE SERPL-MCNC: 95 MG/DL (ref 65–140)
GRAM STN SPEC: ABNORMAL
GRAM STN SPEC: NORMAL
HCT VFR BLD AUTO: 32.4 % (ref 34.8–46.1)
HGB BLD-MCNC: 10.6 G/DL (ref 11.5–15.4)
MAGNESIUM SERPL-MCNC: 2.1 MG/DL (ref 1.9–2.7)
MCH RBC QN AUTO: 28 PG (ref 26.8–34.3)
MCHC RBC AUTO-ENTMCNC: 32.7 G/DL (ref 31.4–37.4)
MCV RBC AUTO: 86 FL (ref 82–98)
MRSA NOSE QL CULT: NORMAL
PHOSPHATE SERPL-MCNC: 3.3 MG/DL (ref 2.3–4.1)
PLATELET # BLD AUTO: 294 THOUSANDS/UL (ref 149–390)
PMV BLD AUTO: 11.2 FL (ref 8.9–12.7)
POTASSIUM SERPL-SCNC: 3.3 MMOL/L (ref 3.5–5.3)
PROT C AG ACT/NOR PPP IA: 95 % OF NORMAL (ref 60–150)
PROT S ACT/NOR PPP: 64 % (ref 68–108)
RBC # BLD AUTO: 3.78 MILLION/UL (ref 3.81–5.12)
SODIUM SERPL-SCNC: 141 MMOL/L (ref 135–147)
WBC # BLD AUTO: 7.92 THOUSAND/UL (ref 4.31–10.16)

## 2025-01-26 PROCEDURE — 99232 SBSQ HOSP IP/OBS MODERATE 35: CPT | Performed by: INTERNAL MEDICINE

## 2025-01-26 PROCEDURE — 80048 BASIC METABOLIC PNL TOTAL CA: CPT | Performed by: INTERNAL MEDICINE

## 2025-01-26 PROCEDURE — 84100 ASSAY OF PHOSPHORUS: CPT | Performed by: INTERNAL MEDICINE

## 2025-01-26 PROCEDURE — 83735 ASSAY OF MAGNESIUM: CPT | Performed by: INTERNAL MEDICINE

## 2025-01-26 PROCEDURE — 85027 COMPLETE CBC AUTOMATED: CPT | Performed by: INTERNAL MEDICINE

## 2025-01-26 PROCEDURE — 99232 SBSQ HOSP IP/OBS MODERATE 35: CPT | Performed by: FAMILY MEDICINE

## 2025-01-26 RX ORDER — POTASSIUM CHLORIDE 1500 MG/1
40 TABLET, EXTENDED RELEASE ORAL ONCE
Status: COMPLETED | OUTPATIENT
Start: 2025-01-26 | End: 2025-01-26

## 2025-01-26 RX ADMIN — HEPARIN SODIUM 5000 UNITS: 5000 INJECTION, SOLUTION INTRAVENOUS; SUBCUTANEOUS at 04:36

## 2025-01-26 RX ADMIN — CEFAZOLIN SODIUM 1000 MG: 1 SOLUTION INTRAVENOUS at 11:40

## 2025-01-26 RX ADMIN — SODIUM CHLORIDE, PRESERVATIVE FREE 10 ML: 5 INJECTION INTRAVENOUS at 09:20

## 2025-01-26 RX ADMIN — METOPROLOL SUCCINATE 25 MG: 25 TABLET, FILM COATED, EXTENDED RELEASE ORAL at 09:19

## 2025-01-26 RX ADMIN — POTASSIUM CHLORIDE 40 MEQ: 1500 TABLET, EXTENDED RELEASE ORAL at 11:40

## 2025-01-26 RX ADMIN — ASPIRIN 81 MG CHEWABLE TABLET 81 MG: 81 TABLET CHEWABLE at 09:19

## 2025-01-26 RX ADMIN — ATORVASTATIN CALCIUM 40 MG: 40 TABLET, FILM COATED ORAL at 17:11

## 2025-01-26 RX ADMIN — Medication 250 MG: at 18:13

## 2025-01-26 RX ADMIN — HEPARIN SODIUM 5000 UNITS: 5000 INJECTION, SOLUTION INTRAVENOUS; SUBCUTANEOUS at 21:11

## 2025-01-26 RX ADMIN — VALACYCLOVIR HYDROCHLORIDE 500 MG: 500 TABLET, FILM COATED ORAL at 11:40

## 2025-01-26 RX ADMIN — SODIUM CHLORIDE, SODIUM GLUCONATE, SODIUM ACETATE, POTASSIUM CHLORIDE, MAGNESIUM CHLORIDE, SODIUM PHOSPHATE, DIBASIC, AND POTASSIUM PHOSPHATE 50 ML/HR: .53; .5; .37; .037; .03; .012; .00082 INJECTION, SOLUTION INTRAVENOUS at 04:39

## 2025-01-26 RX ADMIN — Medication 250 MG: at 09:19

## 2025-01-26 RX ADMIN — HEPARIN SODIUM 5000 UNITS: 5000 INJECTION, SOLUTION INTRAVENOUS; SUBCUTANEOUS at 14:15

## 2025-01-26 NOTE — ASSESSMENT & PLAN NOTE
-Patient with rash of lower abdomen-appears to be zoster rash versus other.  Dermatology on board also and ID team.  Surgery has completed a biopsy and biopsy is pending.  Currently being treated with Valtrex and given rapid improvement.  Patient is reluctant to empirically start sodium thiosulfate for now.

## 2025-01-26 NOTE — PLAN OF CARE
Problem: Prexisting or High Potential for Compromised Skin Integrity  Goal: Skin integrity is maintained or improved  Description: INTERVENTIONS:  - Identify patients at risk for skin breakdown  - Assess and monitor skin integrity  - Assess and monitor nutrition and hydration status  - Monitor labs   - Assess for incontinence   - Turn and reposition patient  - Assist with mobility/ambulation  - Relieve pressure over bony prominences  - Avoid friction and shearing  - Provide appropriate hygiene as needed including keeping skin clean and dry  - Evaluate need for skin moisturizer/barrier cream  - Collaborate with interdisciplinary team   - Patient/family teaching  - Consider wound care consult   Outcome: Progressing     Problem: PAIN - ADULT  Goal: Verbalizes/displays adequate comfort level or baseline comfort level  Description: Interventions:  - Encourage patient to monitor pain and request assistance  - Assess pain using appropriate pain scale  - Administer analgesics based on type and severity of pain and evaluate response  - Implement non-pharmacological measures as appropriate and evaluate response  - Consider cultural and social influences on pain and pain management  - Notify physician/advanced practitioner if interventions unsuccessful or patient reports new pain  Outcome: Progressing     Problem: INFECTION - ADULT  Goal: Absence or prevention of progression during hospitalization  Description: INTERVENTIONS:  - Assess and monitor for signs and symptoms of infection  - Monitor lab/diagnostic results  - Monitor all insertion sites, i.e. indwelling lines, tubes, and drains  - Monitor endotracheal if appropriate and nasal secretions for changes in amount and color  - [place] appropriate cooling/warming therapies per order  - Administer medications as ordered  - Instruct and encourage patient and family to use good hand hygiene technique  - Identify and instruct in appropriate isolation precautions for  identified infection/condition  Outcome: Progressing     Problem: SAFETY ADULT  Goal: Patient will remain free of falls  Description: INTERVENTIONS:  - Educate patient/family on patient safety including physical limitations  - Instruct patient to call for assistance with activity   - Consult OT/PT to assist with strengthening/mobility   - Keep Call bell within reach  - Keep bed low and locked with side rails adjusted as appropriate  - Keep care items and personal belongings within reach  - Initiate and maintain comfort rounds  - Make Fall Risk Sign visible to staff  - Offer Toileting every 2 Hours, in advance of need  - Initiate/Maintain bed alarm  - Obtain necessary fall risk management equipment: nonskid socks  - Apply yellow socks and bracelet for high fall risk patients  - Consider moving patient to room near nurses station  Outcome: Progressing

## 2025-01-26 NOTE — ASSESSMENT & PLAN NOTE
Hx of bladder cancer with retroperitoneal lymphadenopathy, not currently receiving treatment as patient wishes to avoid systemic treatment for her invasive bladder cancer.   Follows with palliative medicine as outpatient.

## 2025-01-26 NOTE — ASSESSMENT & PLAN NOTE
"Patient with hx of invasive bladder cancer not undergoing treatment, CKD IV, indwelling bilateral nephrostomy tubes, presents with generalized weakness, and new onset \"rash\" on her left flank and lower abdomen which patient states started after her last nephrostomy tube exchange on 01/07  Noted to have leukocytosis, tachycardia, tachypnea and MARTY   Per ID sepsis likely due to cellulitis and possible herpes zoster   Now off IV Cefepime + Vancomycin and currently on cefazolin with p.o. Valtrex.  CT A/P negative for infectious pathology  urine culture with mixed contaminants and blood cultures negative  Monitor fever curve/hemodynamics  ID consult appreciated   "

## 2025-01-26 NOTE — PROGRESS NOTES
Progress Note - Nephrology   Name: Ning Chambers 84 y.o. female I MRN: [id number]  Unit/Bed#: 3 [place] [address] I Date of Admission: 1/22/2025   Date of Service: 1/26/2025 I Hospital Day: 4    Assessment & Plan  Acute kidney injury superimposed on chronic kidney disease  (HCC)  -Prior baseline creatinine was approximately 2.5 to 3 mg/dL.  Most recent creatinine January 13 as outpatient was 2.03 mg/dL  - Outpatient nephrologist Dr. Reyes  - Admission creatinine January 22 was 5.1 mg/dL improving to 4.3 mg/dL after volume expansion  - Volume state-patient clinically appears volume depleted.  Has baseline hemoglobin 11 and on admission was 13 supporting concentrated state.  - Is on home diuretic which is currently held  - Blood pressures relatively appropriate    Renal imaging--CT scan with dilated left renal collecting system and ureter.  PCN in place.  Right hydronephrosis has resolved.  Worsening lymphadenopathy  - Urinalysis with innumerable bacteria, 4-10 WBC, no RBC, concentrated specimen    Discussion-patient has significant MARTY on CKD.  Appears to be volume depleted.  Creatinine is improving with volume expansion.  Also has acidosis with elevated anion gap.  Lactic acid was 2.  Initially was started on treatment for possible sepsis due to leukocytosis, new rash, history of PCN and workup was started for infectious etiology.  Cultures were sent.  Infectious disease was brought on board by primary team.  There is concern for zoster rash in lower abdomen.  Slowly improving.  Dermatology was also brought on board and rash was biopsied by surgery team.  Biopsy is pending.  Question was raised for calciphylaxis also but currently given the rapid improvement in rash with Valtrex, may be more zoster related than calciphylaxis?  And patient has been reluctant to start sodium thiosulfate empirically    January 24-creatinine slowly improving to 3.9 mg/dL.  Bicarbonate is normalized with bicarbonate based fluids.  Potassium  is low being repleted.    January 25-rash continues to slowly improve removed.  Creatinine continues to improve to 3.3 mg/dL.  Potassium 3.4 being repleted.  BUN improving.  Vitamin D is okay at 36.    January 26-creatinine improving 2.95 mg/dL.  Hypokalemia being repleted.  Sodium, bicarbonate, phosphorus and magnesium are appropriate.  Hemoglobin is stable.  Rash is slowly improving with Valtrex.    Plan  - Continue low-dose pH balance fluids today  - Valtrex is renally adjusted by ID team  - CT scan without contrast shows possible dilation of left renal pelvis and left ureter despite nephrostomy tube.  Also mild interval enlargement of retroperitoneal lymphadenopathy.  I have reviewed with primary team attending earlier in the week and further management to primary team  - Dermatology has raised concern for possible calciphylaxis versus other differentials noted in their note.  Patient underwent skin biopsy January 23.  But given the rapid improvement in the rash over the last few days since Valtrex has been started, and patient is reluctant to start sodium thiosulfate empirically, will hold on starting per patient wishes and request  - Replete potassium    High anion gap metabolic acidosis  -Initially with elevated anion gap metabolic acidosis.  Likely multifactorial due to MARTY/azotemia and was on bicarbonate drip until January 24.  Acidosis has resolved.  Anion gap is improved.  Bicarbonate slightly lower January 25 but will monitor for now.  And has normalized January 26  Sepsis (HCC)  -Source evaluation in progress by primary and infectious disease team-possible skin source  Malignant neoplasm of overlapping sites of bladder (HCC)  -Per urology as outpatient.  - Patient confirms that if dialysis were needed she would not want dialysis  Nephrostomy status (HCC)  -Bilateral nephrostomy tube management per primary team  Benign hypertension  -Hold home diuretic  Abnormal urinalysis  -Follow-up urine  culture  Cellulitis of flank  -Patient with rash of lower abdomen-appears to be zoster rash versus other.  Dermatology on board also and ID team.  Surgery has completed a biopsy and biopsy is pending.  Currently being treated with Valtrex and given rapid improvement.  Patient is reluctant to empirically start sodium thiosulfate for now.    I have reviewed the nephrology recommendations including potassium repletion and intravenous fluid continuation, with primary team attending, and we are in agreement with renal plan including the information outlined above.     Subjective   Brief History of Admission - 84 y.o. female with a past medical history of CKD stage IV baseline creatinine 2.73 mg/dL, hypertension,bladder neoplasm with bilateral nephrostomy tubes, obstructive uropathy, elevated BMI, who was admitted to [facility] after presenting with abdominal rash. A renal consultation is requested today for assistance in the management of acute on chronic kidney injury. Patient was in usual state of health until. She had nephrostomy tube change January 7. She states for 2 weeks developed a rash in lower abdomen. She denies fevers, chills, nausea, vomiting, diarrhea     Blood pressure is 1 10-1 20 systolic.  Afebrile.  On room air.  Patient has urine output right nephrostomy 625 cc, left nephrostomy to 25 cc, 3 times loose stool    Objective :  Temp:  [97.4 °F (36.3 °C)-98 °F (36.7 °C)] 97.7 °F (36.5 °C)  HR:  [77-88] 88  BP: (110-128)/(52-77) 118/77  Resp:  [18] 18  SpO2:  [97 %-99 %] 97 %  O2 Device: None (Room air)    Current Weight: Weight - Scale: 77.9 kg (171 lb 11.8 oz)  First Weight: Weight - Scale: 77.9 kg (171 lb 11.8 oz)  I/O         01/24 0701 01/25 0700 01/25 0701 01/26 0700 01/26 0701 01/27 0700    P.O. 540 240     I.V. (mL/kg)  479.2 (6.2)     Other  40     Total Intake(mL/kg) 540 (6.9) 759.2 (9.7)     Urine (mL/kg/hr) 500 (0.3) 850 (0.5)     Stool  0     Total Output 500 850     Net +40 -90.8             Unmeasured Stool Occurrence  3 x           Physical Exam  General: NAD  Skin: Diffuse lower abdomen/left flank rash appears to be less erythematous today.  Still with necrotic changes.  Overall appears to be improving and extends to the left flank which is unchanged in terms of location but overall is improved significantly  Eyes: anicteric sclera  ENT: moist mucous membrane  Neck: supple  Chest: CTA b/l, no ronchii, no wheeze, no rubs, no rales  CVS: s1s2, no murmur, no gallop, no rub  Abdomen: soft, nontender, nl sounds  Extremities: no edema LE b/l  : no posadas, bilateral PCN  Neuro: AAOX3  Psych: normal affect    Medications:    Current Facility-Administered Medications:     acetaminophen (TYLENOL) tablet 650 mg, 650 mg, Oral, Q6H PRN, Elinor Richard PA-C    aspirin chewable tablet 81 mg, 81 mg, Oral, Daily, Elinor Richard PA-C, 81 mg at 01/26/25 0919    atorvastatin (LIPITOR) tablet 40 mg, 40 mg, Oral, Daily With Dinner, Elinor Richard PA-C, 40 mg at 01/25/25 1718    ceFAZolin (ANCEF) IVPB (premix in dextrose) 1,000 mg 50 mL, 1,000 mg, Intravenous, Q24H, Verenice Murray MD, Last Rate: 100 mL/hr at 01/25/25 0948, 1,000 mg at 01/25/25 0948    heparin (porcine) subcutaneous injection 5,000 Units, 5,000 Units, Subcutaneous, Q8H [facility], Elinor Richard PA-C, 5,000 Units at 01/26/25 0436    [Held by provider] hydroCHLOROthiazide tablet 25 mg, 25 mg, Oral, QAM, Elinor Richard PA-C    influenza vaccine, high-dose (Fluzone High-Dose) IM injection 0.5 mL, 0.5 mL, Intramuscular, Prior to discharge, Nata Valentin DO    metoprolol succinate (TOPROL-XL) 24 hr tablet 25 mg, 25 mg, Oral, QAM, Elinor Richard PA-C, 25 mg at 01/26/25 0919    multi-electrolyte (PLASMALYTE-A/ISOLYTE-S PH 7.4) IV solution, 50 mL/hr, Intravenous, Continuous, Mauricio Gabriel MD, Last Rate: 50 mL/hr at 01/26/25 0439, 50 mL/hr at 01/26/25 0439    saccharomyces boulardii (FLORASTOR) capsule 250 mg, 250 mg, Oral,  "BID, Nata Valentin DO, 250 mg at 01/26/25 0919    sodium chloride (PF) 0.9 % injection 10 mL, 10 mL, Intracatheter, Daily, Elinor Richard PA-C, 10 mL at 01/26/25 0920    sodium chloride 0.9 % bolus 500 mL, 500 mL, Intravenous, Once, Nata Revbhumikaar, DO    valACYclovir (VALTREX) tablet 500 mg, 500 mg, Oral, Q24H, Verenice Murray MD, 500 mg at 01/25/25 0947      Lab Results: I have reviewed the following results:  Results from last 7 days   Lab Units 01/26/25  0630 01/25/25  0602 01/24/25  2145 01/24/25  0639 01/23/25  2026 01/23/25  0559 01/22/25  1648   WBC Thousand/uL 7.92 9.96  --  7.77  --  13.27* 18.02*   HEMOGLOBIN g/dL 10.6* 10.9*  --  11.5  --  11.6 13.5   HEMATOCRIT % 32.4* 34.1*  --  34.5*  --  36.0 40.9   PLATELETS Thousands/uL 294 244  --  229  --  253 298   POTASSIUM mmol/L 3.3* 3.4* 3.6 2.8* 3.2* 3.6 3.9   CHLORIDE mmol/L 106 108 105 104 106 110* 101   CO2 mmol/L 23 20* 21 21 19* 14* 16*   BUN mg/dL 80* 95* 101* 106* 108* 110* 119*   CREATININE mg/dL 2.95* 3.37* 3.63* 3.96* 4.28* 4.27* 5.11*   CALCIUM mg/dL 7.8* 8.2* 8.3* 7.9* 8.5 8.5 9.1   MAGNESIUM mg/dL 2.1 2.2  --  2.1  --  2.3 2.4   PHOSPHORUS mg/dL 3.3 3.0  --  3.3  --  4.2* 5.1*   ALBUMIN g/dL  --   --   --   --   --   --  3.4*       Administrative Statements     Portions of the record may have been created with voice recognition software. Occasional wrong word or \"sound a like\" substitutions may have occurred due to the inherent limitations of voice recognition software. Read the chart carefully and recognize, using context, where substitutions have occurred.If you have any questions, please contact the dictating provider.  "

## 2025-01-26 NOTE — ASSESSMENT & PLAN NOTE
-Initially with elevated anion gap metabolic acidosis.  Likely multifactorial due to MARTY/azotemia and was on bicarbonate drip until January 24.  Acidosis has resolved.  Anion gap is improved.  Bicarbonate slightly lower January 25 but will monitor for now.  And has normalized January 26

## 2025-01-26 NOTE — ASSESSMENT & PLAN NOTE
"Patient endorsing \"rash\" on her left back/flank/buttocks and abdomen/groin which she states started after her last nephrostomy tube exchange 01/07  As noted above continue IV cefazolin and oral Valtrex given suspicion for zoster  On 1/23 coordinating plan of care with dermatology, nephrology, ID and also surgery.  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  Patient not interested in starting this  "

## 2025-01-26 NOTE — ASSESSMENT & PLAN NOTE
-Prior baseline creatinine was approximately 2.5 to 3 mg/dL.  Most recent creatinine January 13 as outpatient was 2.03 mg/dL  - Outpatient nephrologist Dr. Reyes  - Admission creatinine January 22 was 5.1 mg/dL improving to 4.3 mg/dL after volume expansion  - Volume state-patient clinically appears volume depleted.  Has baseline hemoglobin 11 and on admission was 13 supporting concentrated state.  - Is on home diuretic which is currently held  - Blood pressures relatively appropriate    Renal imaging--CT scan with dilated left renal collecting system and ureter.  PCN in place.  Right hydronephrosis has resolved.  Worsening lymphadenopathy  - Urinalysis with innumerable bacteria, 4-10 WBC, no RBC, concentrated specimen    Discussion-patient has significant MARTY on CKD.  Appears to be volume depleted.  Creatinine is improving with volume expansion.  Also has acidosis with elevated anion gap.  Lactic acid was 2.  Initially was started on treatment for possible sepsis due to leukocytosis, new rash, history of PCN and workup was started for infectious etiology.  Cultures were sent.  Infectious disease was brought on board by primary team.  There is concern for zoster rash in lower abdomen.  Slowly improving.  Dermatology was also brought on board and rash was biopsied by surgery team.  Biopsy is pending.  Question was raised for calciphylaxis also but currently given the rapid improvement in rash with Valtrex, may be more zoster related than calciphylaxis?  And patient has been reluctant to start sodium thiosulfate empirically    January 24-creatinine slowly improving to 3.9 mg/dL.  Bicarbonate is normalized with bicarbonate based fluids.  Potassium is low being repleted.    January 25-rash continues to slowly improve removed.  Creatinine continues to improve to 3.3 mg/dL.  Potassium 3.4 being repleted.  BUN improving.  Vitamin D is okay at 36.    January 26-creatinine improving 2.95 mg/dL.  Hypokalemia being repleted.   Sodium, bicarbonate, phosphorus and magnesium are appropriate.  Hemoglobin is stable.  Rash is slowly improving with Valtrex.    Plan  - Continue low-dose pH balance fluids today  - Valtrex is renally adjusted by ID team  - CT scan without contrast shows possible dilation of left renal pelvis and left ureter despite nephrostomy tube.  Also mild interval enlargement of retroperitoneal lymphadenopathy.  I have reviewed with primary team attending earlier in the week and further management to primary team  - Dermatology has raised concern for possible calciphylaxis versus other differentials noted in their note.  Patient underwent skin biopsy January 23.  But given the rapid improvement in the rash over the last few days since Valtrex has been started, and patient is reluctant to start sodium thiosulfate empirically, will hold on starting per patient wishes and request  - Replete potassium

## 2025-01-26 NOTE — ASSESSMENT & PLAN NOTE
Hx of obstructive uropathy 2/2 invasive bladder cancer s/p bilateral indwelling percutaneous nephrostomy tubes  Follows with urology, nephrology, palliative medicine  Undergoes exchanges every 3 months, last exchange 01/07/25  Plan for IR tube check on Mon based on CT findings.

## 2025-01-26 NOTE — PROGRESS NOTES
"Progress Note - Hospitalist   Name: Ning Chambers 84 y.o. female I MRN: [id number]  Unit/Bed#: [address] [place] [address] I Date of Admission: 1/22/2025   Date of Service: 1/26/2025 I Hospital Day: 4    Assessment & Plan  Sepsis (HCC)  Patient with hx of invasive bladder cancer not undergoing treatment, CKD IV, indwelling bilateral nephrostomy tubes, presents with generalized weakness, and new onset \"rash\" on her left flank and lower abdomen which patient states started after her last nephrostomy tube exchange on 01/07  Noted to have leukocytosis, tachycardia, tachypnea and MARTY   Per ID sepsis likely due to cellulitis and possible herpes zoster   Now off IV Cefepime + Vancomycin and currently on cefazolin with p.o. Valtrex.  CT A/P negative for infectious pathology  urine culture with mixed contaminants and blood cultures negative  Monitor fever curve/hemodynamics  ID consult appreciated   Cellulitis of flank  Patient endorsing \"rash\" on her left back/flank/buttocks and abdomen/groin which she states started after her last nephrostomy tube exchange 01/07  As noted above continue IV cefazolin and oral Valtrex given suspicion for zoster  On 1/23 coordinating plan of care with dermatology, nephrology, ID and also surgery.  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  Patient not interested in starting this  Acute kidney injury superimposed on chronic kidney disease  (HCC)  Lab Results   Component Value Date    EGFR 13 01/26/2025    EGFR 11 01/25/2025    EGFR 10 01/24/2025    CREATININE 2.95 (H) 01/26/2025    CREATININE 3.37 (H) 01/25/2025    CREATININE 3.63 (H) 01/24/2025     Cr 5.11 on admission, baseline 2.5-3 (CKD IV) " follows with nephrology as outpatient; has expressed wishes to not proceed with dialysis in the past  Patient endorses minimal oral intake over the past week prior to admission  Noted to have high anion gap metabolic acidosis and was on IV sodium bicarbonate gtt which has been discontinued   Continue plasmalyte.  Creatinine improving  Hold HCTZ   Repeat lab work in a.m.  Nephrology consult appreciated  Abnormal urinalysis  UA with large leuks, 4-10 WBC, innumerable bacteria  Patient with indwelling bilateral percutaneous nephrostomy tubes  Urine culture with mixed contaminants  High anion gap metabolic acidosis (Resolved: 1/26/2025)  Anion gap 21, bicarb 16 in the setting of MARTY on CKD, dehydration, severe sepsis   Discontinued bicarb drip.  Repeat lab work in a.m.    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  Malignant neoplasm of overlapping sites of bladder (HCC)  Hx of bladder cancer with retroperitoneal lymphadenopathy, not currently receiving treatment as patient wishes to avoid systemic treatment for her invasive bladder cancer.   Follows with palliative medicine as outpatient.  Benign hypertension  BP acceptable   Hold HCTZ 2/2 MARTY  Continue Toprol XL with hold parameters.    VTE Pharmacologic Prophylaxis: VTE Score: 6 High Risk (Score >/= 5) - Pharmacological DVT Prophylaxis Ordered: heparin. Sequential Compression Devices Ordered.    Mobility:   Basic Mobility Inpatient Raw Score: 10  JH-HLM Goal: 4: Move to chair/commode  JH-HLM Achieved: 2: Bed activities/Dependent transfer  JH-HLM Goal NOT achieved. Continue with multidisciplinary rounding and encourage appropriate mobility to improve upon JH-HLM goals.    Patient Centered Rounds: I performed  bedside rounds with nursing staff today.   Discussions with Specialists or Other Care Team Provider: Yes-nephrology    Education and Discussions with Family / Patient: Updated  (son and daughter in law) at bedside.    Current Length of Stay: 4 day(s)  Current Patient Status: Inpatient   Certification Statement: The patient will continue to require additional inpatient hospital stay due to MARTY on CKD, cellulitis/rash  Discharge Plan: Anticipate discharge in 48-72 hrs to rehab facility.    Code Status: Level 3 - DNAR and DNI    Subjective   Patient denies any pain.  Wondering why she is feeling significantly weak    Objective :  Temp:  [97.4 °F (36.3 °C)-97.7 °F (36.5 °C)] 97.7 °F (36.5 °C)  HR:  [78-88] 88  BP: (110-128)/(56-77) 118/77  Resp:  [18] 18  SpO2:  [97 %-99 %] 97 %  O2 Device: None (Room air)    Body mass index is 31.41 kg/m².     Input and Output Summary (last 24 hours):     Intake/Output Summary (Last 24 hours) at 1/26/2025 1021  Last data filed at 1/26/2025 0439  Gross per 24 hour   Intake 759.17 ml   Output 850 ml   Net -90.83 ml       Physical Exam  Vitals reviewed.   Constitutional:       General: She is not in acute distress.     Appearance: She is ill-appearing (chronically).   HENT:      Head: Normocephalic and atraumatic.   Eyes:      General: No scleral icterus.  Cardiovascular:      Rate and Rhythm: Normal rate and regular rhythm.   Pulmonary:      Effort: Pulmonary effort is normal. No respiratory distress.      Breath sounds: Normal breath sounds. No wheezing or rales.   Abdominal:      General: Bowel sounds are normal. There is no distension.      Palpations: Abdomen is soft.      Tenderness: There is no abdominal tenderness.   Skin:     Comments: Rash on abdomen/flank area appears less erythematous and now improving  Bilateral nephrostomy tubes in place   Neurological:      Mental Status: She is alert.           Lines/Drains:  Lines/Drains/Airways       Active Status        Name Placement date Placement time Site Days    Nephrostomy Left 10.2 Fr. 01/07/25  1124  Left  18    Nephrostomy Right 10.2 Fr. 01/07/25  1130  Right  18    Rectal Tube 01/25/25  1725  --  less than 1                            Lab Results: I have reviewed the following results:   Results from last 7 days   Lab Units 01/26/25  0630 01/23/25  0559 01/22/25  1648   WBC Thousand/uL 7.92   < > 18.02*   HEMOGLOBIN g/dL 10.6*   < > 13.5   HEMATOCRIT % 32.4*   < > 40.9   PLATELETS Thousands/uL 294   < > 298   BANDS PCT %  --   --  1   LYMPHO PCT %  --   --  7*   MONO PCT %  --   --  1*   EOS PCT %  --   --  0    < > = values in this interval not displayed.     Results from last 7 days   Lab Units 01/26/25  0630 01/23/25  0559 01/22/25  1648   SODIUM mmol/L 141   < > 138   POTASSIUM mmol/L 3.3*   < > 3.9   CHLORIDE mmol/L 106   < > 101   CO2 mmol/L 23   < > 16*   BUN mg/dL 80*   < > 119*   CREATININE mg/dL 2.95*   < > 5.11*   ANION GAP mmol/L 12   < > 21*   CALCIUM mg/dL 7.8*   < > 9.1   ALBUMIN g/dL  --   --  3.4*   TOTAL BILIRUBIN mg/dL  --   --  0.63   ALK PHOS U/L  --   --  49   ALT U/L  --   --  27   AST U/L  --   --  51*   GLUCOSE RANDOM mg/dL 95   < > 103    < > = values in this interval not displayed.                 Results from last 7 days   Lab Units 01/23/25  0559 01/22/25  1648   LACTIC ACID mmol/L  --  2.0   PROCALCITONIN ng/ml 2.27* 2.69*       Recent Cultures (last 7 days):   Results from last 7 days   Lab Units 01/24/25  1753 01/23/25  1611 01/22/25  1648 01/22/25  1637 01/22/25  1046   BLOOD CULTURE   --   --  No Growth at 72 hrs. No Growth at 72 hrs.  --    GRAM STAIN RESULT  No Polys or Bacteria seen No Polys or Bacteria seen  --   --   --    URINE CULTURE   --   --   --   --  <10,000 cfu/ml   WOUND CULTURE  Culture too young- will reincubate  --   --   --   --        Imaging Results Review: No pertinent imaging studies reviewed.  Other Study Results Review: No additional pertinent studies  reviewed.    Last 24 Hours Medication List:     Current Facility-Administered Medications:     acetaminophen (TYLENOL) tablet 650 mg, Q6H PRN    aspirin chewable tablet 81 mg, Daily    atorvastatin (LIPITOR) tablet 40 mg, Daily With Dinner    ceFAZolin (ANCEF) IVPB (premix in dextrose) 1,000 mg 50 mL, Q24H, Last Rate: 1,000 mg (01/25/25 0948)    heparin (porcine) subcutaneous injection 5,000 Units, Q8H JD    [Held by provider] hydroCHLOROthiazide tablet 25 mg, QAM    influenza vaccine, high-dose (Fluzone High-Dose) IM injection 0.5 mL, Prior to discharge    metoprolol succinate (TOPROL-XL) 24 hr tablet 25 mg, QAM    multi-electrolyte (PLASMALYTE-A/ISOLYTE-S PH 7.4) IV solution, Continuous, Last Rate: 50 mL/hr (01/26/25 0439)    potassium chloride (Klor-Con M20) CR tablet 40 mEq, Once    saccharomyces boulardii (FLORASTOR) capsule 250 mg, BID    sodium chloride (PF) 0.9 % injection 10 mL, Daily    sodium chloride 0.9 % bolus 500 mL, Once    valACYclovir (VALTREX) tablet 500 mg, Q24H    Administrative Statements   Today, Patient Was Seen By: Nata Valentin DO      **Please Note: This note may have been constructed using a voice recognition system.**

## 2025-01-26 NOTE — ASSESSMENT & PLAN NOTE
Lab Results   Component Value Date    EGFR 13 01/26/2025    EGFR 11 01/25/2025    EGFR 10 01/24/2025    CREATININE 2.95 (H) 01/26/2025    CREATININE 3.37 (H) 01/25/2025    CREATININE 3.63 (H) 01/24/2025     Cr 5.11 on admission, baseline 2.5-3 (CKD IV) follows with nephrology as outpatient; has expressed wishes to not proceed with dialysis in the past  Patient endorses minimal oral intake over the past week prior to admission  Noted to have high anion gap metabolic acidosis and was on IV sodium bicarbonate gtt which has been discontinued   Continue plasmalyte.  Creatinine improving  Hold HCTZ   Repeat lab work in a.m.  Nephrology consult appreciated

## 2025-01-27 PROBLEM — R42 LIGHTHEADEDNESS: Status: ACTIVE | Noted: 2025-01-27

## 2025-01-27 PROBLEM — E87.8 ELECTROLYTE ABNORMALITY: Status: ACTIVE | Noted: 2025-01-27

## 2025-01-27 PROBLEM — R82.90 ABNORMAL URINALYSIS: Status: RESOLVED | Noted: 2025-01-22 | Resolved: 2025-01-27

## 2025-01-27 LAB
ANION GAP SERPL CALCULATED.3IONS-SCNC: 9 MMOL/L (ref 4–13)
BACTERIA BLD CULT: NORMAL
BACTERIA BLD CULT: NORMAL
BUN SERPL-MCNC: 75 MG/DL (ref 5–25)
CALCIUM SERPL-MCNC: 8.7 MG/DL (ref 8.4–10.2)
CHLORIDE SERPL-SCNC: 110 MMOL/L (ref 96–108)
CO2 SERPL-SCNC: 22 MMOL/L (ref 21–32)
CREAT SERPL-MCNC: 2.66 MG/DL (ref 0.6–1.3)
ERYTHROCYTE [DISTWIDTH] IN BLOOD BY AUTOMATED COUNT: 12.6 % (ref 11.6–15.1)
GFR SERPL CREATININE-BSD FRML MDRD: 15 ML/MIN/1.73SQ M
GLUCOSE SERPL-MCNC: 92 MG/DL (ref 65–140)
HCT VFR BLD AUTO: 31 % (ref 34.8–46.1)
HGB BLD-MCNC: 10.1 G/DL (ref 11.5–15.4)
MAGNESIUM SERPL-MCNC: 2 MG/DL (ref 1.9–2.7)
MCH RBC QN AUTO: 28.1 PG (ref 26.8–34.3)
MCHC RBC AUTO-ENTMCNC: 32.6 G/DL (ref 31.4–37.4)
MCV RBC AUTO: 86 FL (ref 82–98)
PHOSPHATE SERPL-MCNC: 2.9 MG/DL (ref 2.3–4.1)
PLATELET # BLD AUTO: 284 THOUSANDS/UL (ref 149–390)
PMV BLD AUTO: 11 FL (ref 8.9–12.7)
POTASSIUM SERPL-SCNC: 3.2 MMOL/L (ref 3.5–5.3)
RBC # BLD AUTO: 3.6 MILLION/UL (ref 3.81–5.12)
SODIUM SERPL-SCNC: 141 MMOL/L (ref 135–147)
WBC # BLD AUTO: 8.16 THOUSAND/UL (ref 4.31–10.16)

## 2025-01-27 PROCEDURE — 83735 ASSAY OF MAGNESIUM: CPT | Performed by: INTERNAL MEDICINE

## 2025-01-27 PROCEDURE — 99232 SBSQ HOSP IP/OBS MODERATE 35: CPT | Performed by: INTERNAL MEDICINE

## 2025-01-27 PROCEDURE — 84100 ASSAY OF PHOSPHORUS: CPT | Performed by: INTERNAL MEDICINE

## 2025-01-27 PROCEDURE — 0T25X0Z CHANGE DRAINAGE DEVICE IN KIDNEY, EXTERNAL APPROACH: ICD-10-PCS | Performed by: RADIOLOGY

## 2025-01-27 PROCEDURE — 80048 BASIC METABOLIC PNL TOTAL CA: CPT | Performed by: INTERNAL MEDICINE

## 2025-01-27 PROCEDURE — 85027 COMPLETE CBC AUTOMATED: CPT | Performed by: INTERNAL MEDICINE

## 2025-01-27 PROCEDURE — G0545 PR INHERENT VISIT TO INPT: HCPCS | Performed by: STUDENT IN AN ORGANIZED HEALTH CARE EDUCATION/TRAINING PROGRAM

## 2025-01-27 PROCEDURE — 97167 OT EVAL HIGH COMPLEX 60 MIN: CPT

## 2025-01-27 PROCEDURE — 99232 SBSQ HOSP IP/OBS MODERATE 35: CPT | Performed by: FAMILY MEDICINE

## 2025-01-27 PROCEDURE — 99232 SBSQ HOSP IP/OBS MODERATE 35: CPT | Performed by: STUDENT IN AN ORGANIZED HEALTH CARE EDUCATION/TRAINING PROGRAM

## 2025-01-27 RX ORDER — VALACYCLOVIR HYDROCHLORIDE 500 MG/1
1000 TABLET, FILM COATED ORAL EVERY 24 HOURS
Status: COMPLETED | OUTPATIENT
Start: 2025-01-28 | End: 2025-01-29

## 2025-01-27 RX ORDER — TRAMADOL HYDROCHLORIDE 50 MG/1
50 TABLET ORAL ONCE
Status: COMPLETED | OUTPATIENT
Start: 2025-01-27 | End: 2025-01-27

## 2025-01-27 RX ORDER — CEFAZOLIN SODIUM 1 G/50ML
1000 SOLUTION INTRAVENOUS EVERY 12 HOURS
Status: COMPLETED | OUTPATIENT
Start: 2025-01-28 | End: 2025-01-29

## 2025-01-27 RX ORDER — POTASSIUM CHLORIDE 29.8 MG/ML
40 INJECTION INTRAVENOUS ONCE
Status: DISCONTINUED | OUTPATIENT
Start: 2025-01-27 | End: 2025-01-27

## 2025-01-27 RX ORDER — POTASSIUM CHLORIDE 14.9 MG/ML
20 INJECTION INTRAVENOUS ONCE
Status: COMPLETED | OUTPATIENT
Start: 2025-01-27 | End: 2025-01-27

## 2025-01-27 RX ORDER — POTASSIUM CHLORIDE 1500 MG/1
20 TABLET, EXTENDED RELEASE ORAL DAILY
Status: DISCONTINUED | OUTPATIENT
Start: 2025-01-28 | End: 2025-01-30

## 2025-01-27 RX ADMIN — METOPROLOL SUCCINATE 25 MG: 25 TABLET, FILM COATED, EXTENDED RELEASE ORAL at 11:01

## 2025-01-27 RX ADMIN — HEPARIN SODIUM 5000 UNITS: 5000 INJECTION, SOLUTION INTRAVENOUS; SUBCUTANEOUS at 06:28

## 2025-01-27 RX ADMIN — POTASSIUM CHLORIDE 20 MEQ: 14.9 INJECTION, SOLUTION INTRAVENOUS at 12:38

## 2025-01-27 RX ADMIN — HEPARIN SODIUM 5000 UNITS: 5000 INJECTION, SOLUTION INTRAVENOUS; SUBCUTANEOUS at 21:07

## 2025-01-27 RX ADMIN — CEFAZOLIN SODIUM 1000 MG: 1 SOLUTION INTRAVENOUS at 14:34

## 2025-01-27 RX ADMIN — POTASSIUM CHLORIDE 20 MEQ: 14.9 INJECTION, SOLUTION INTRAVENOUS at 10:47

## 2025-01-27 RX ADMIN — SODIUM CHLORIDE, PRESERVATIVE FREE 10 ML: 5 INJECTION INTRAVENOUS at 12:18

## 2025-01-27 RX ADMIN — ATORVASTATIN CALCIUM 40 MG: 40 TABLET, FILM COATED ORAL at 18:10

## 2025-01-27 RX ADMIN — VALACYCLOVIR HYDROCHLORIDE 500 MG: 500 TABLET, FILM COATED ORAL at 11:01

## 2025-01-27 RX ADMIN — TRAMADOL HYDROCHLORIDE 50 MG: 50 TABLET, COATED ORAL at 13:41

## 2025-01-27 RX ADMIN — Medication 250 MG: at 18:10

## 2025-01-27 RX ADMIN — HEPARIN SODIUM 5000 UNITS: 5000 INJECTION, SOLUTION INTRAVENOUS; SUBCUTANEOUS at 13:21

## 2025-01-27 RX ADMIN — ASPIRIN 81 MG CHEWABLE TABLET 81 MG: 81 TABLET CHEWABLE at 11:01

## 2025-01-27 RX ADMIN — Medication 250 MG: at 11:01

## 2025-01-27 NOTE — ASSESSMENT & PLAN NOTE
"Patient endorsing \"rash\" on her left back/flank/buttocks and abdomen/groin which she states started after her last nephrostomy tube exchange 01/07  As noted above continue IV cefazolin and oral Valtrex given suspicion for zoster  Per ID can change to Keflex on discharge  On 1/23 coordinated plan of care with dermatology, nephrology, ID and also surgery.  Plan of care coordinated with dermatology again today.  Per dermatology preliminary reading on biopsy shows viral change, herpetic looking which can be seen in zoster or herpes simplex.  Patient can follow-up outpatient with dermatology and can continue Valtrex  Per dermatology recommendation, s/p biopsy by surgery and hypercoagulable workup ordered  Antithrombin III activity normal, protein C activity normal, protein as activity low, cryoglobulin pending  Follow-up VZV PCR.   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  Patient not interested and therefore never started  "

## 2025-01-27 NOTE — ASSESSMENT & PLAN NOTE
Lab Results   Component Value Date    EGFR 15 01/27/2025    EGFR 13 01/26/2025    EGFR 11 01/25/2025    CREATININE 2.66 (H) 01/27/2025    CREATININE 2.95 (H) 01/26/2025    CREATININE 3.37 (H) 01/25/2025     Cr 5.11 on admission, baseline 2.5-3 (CKD IV) follows with nephrology as outpatient; has expressed wishes to not proceed with dialysis in the past  Patient endorses minimal oral intake over the past week prior to admission  Noted to have high anion gap metabolic acidosis and was on IV sodium bicarbonate gtt which has been discontinued   Discontinued plasmalyte and monitor off fluids per nephrology.  Creatinine improving  Hold HCTZ   Repeat lab work in a.m.  Nephrology consult appreciated

## 2025-01-27 NOTE — ASSESSMENT & PLAN NOTE
Currently on cefazolin per ID and internal medicine for treatment of cellulitis.  Also on Valtrex for herpes zoster.

## 2025-01-27 NOTE — PROGRESS NOTES
"Progress Note - Hospitalist   Name: Ning Chambers 84 y.o. female I MRN: [id number]  Unit/Bed#: [address] [place] [address] I Date of Admission: 1/22/2025   Date of Service: 1/27/2025 I Hospital Day: 5    Assessment & Plan  Sepsis (HCC)  Patient with hx of invasive bladder cancer not undergoing treatment, CKD IV, indwelling bilateral nephrostomy tubes, presents with generalized weakness, and new onset \"rash\" on her left flank and lower abdomen which patient states started after her last nephrostomy tube exchange on 01/07  Noted to have leukocytosis, tachycardia, tachypnea  Per ID sepsis likely due to cellulitis and possible herpes zoster   Now off IV Cefepime + Vancomycin and currently on cefazolin with p.o. Valtrex.  CT A/P negative for infectious pathology  urine culture with mixed contaminants and blood cultures negative  ID consult appreciated   Cellulitis of flank  Patient endorsing \"rash\" on her left back/flank/buttocks and abdomen/groin which she states started after her last nephrostomy tube exchange 01/07  As noted above continue IV cefazolin and oral Valtrex given suspicion for zoster  Per ID can change to Keflex on discharge  On 1/23 coordinated plan of care with dermatology, nephrology, ID and also surgery.  Plan of care coordinated with dermatology again today.  Per dermatology preliminary reading on biopsy shows viral change, herpetic looking which can be seen in zoster or herpes simplex.  Patient can follow-up outpatient with dermatology and can continue Valtrex  Per dermatology recommendation, s/p biopsy by surgery and hypercoagulable workup ordered  Antithrombin III activity normal, protein C activity normal, protein as activity low, cryoglobulin pending  Follow-up VZV PCR.   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  Patient not interested and therefore never started  Acute kidney injury superimposed on chronic kidney disease  (HCC)  Lab " Results   Component Value Date    EGFR 15 01/27/2025    EGFR 13 01/26/2025    EGFR 11 01/25/2025    CREATININE 2.66 (H) 01/27/2025    CREATININE 2.95 (H) 01/26/2025    CREATININE 3.37 (H) 01/25/2025     Cr 5.11 on admission, baseline 2.5-3 (CKD IV) follows with nephrology as outpatient; has expressed wishes to not proceed with dialysis in the past  Patient endorses minimal oral intake over the past week prior to admission  Noted to have high anion gap metabolic acidosis and was on IV sodium bicarbonate gtt which has been discontinued   Discontinued plasmalyte and monitor off fluids per nephrology.  Creatinine improving  Hold HCTZ   Repeat lab work in a.m.  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  Plan for IR tube check tomm  Abnormal urinalysis (Resolved: 1/27/2025)  UA with large leuks, 4-10 WBC, innumerable bacteria  Patient with indwelling bilateral percutaneous nephrostomy tubes  Urine culture with mixed contaminants  Malignant neoplasm of overlapping sites of bladder (HCC)  Hx of bladder cancer with retroperitoneal lymphadenopathy, not currently receiving treatment as patient wishes to avoid systemic treatment for her invasive bladder cancer.   Follows with palliative medicine as outpatient  Benign hypertension  BP acceptable   Hold HCTZ 2/2 MARTY  Continue Toprol XL with hold parameters    VTE Pharmacologic Prophylaxis: VTE Score: 6 High Risk (Score >/= 5) -  Pharmacological DVT Prophylaxis Ordered: heparin. Sequential Compression Devices Ordered.    Mobility:   Basic Mobility Inpatient Raw Score: 10  JH-HLM Goal: 4: Move to chair/commode  JH-HLM Achieved: 4: Move to chair/commode  JH-HLM Goal achieved. Continue to encourage appropriate mobility.    Patient Centered Rounds: I performed bedside rounds with nursing staff today.   Discussions with Specialists or Other Care Team Provider: Yes-nephrology, ID, dermatology    Education and Discussions with Family / Patient: Updated  (son and daughter in law) via phone.    Current Length of Stay: 5 day(s)  Current Patient Status: Inpatient   Certification Statement: The patient will continue to require additional inpatient hospital stay due to rash, nephrostomy tube check by IR  Discharge Plan: Anticipate discharge in 24-48 hrs to rehab facility.    Code Status: Level 3 - DNAR and DNI    Subjective     Patient states she is tired and wants to get discharge.  Feels as if she will never be able to return home when she goes to rehab    Objective :  Temp:  [97.4 °F (36.3 °C)-98.1 °F (36.7 °C)] 98.1 °F (36.7 °C)  HR:  [86-94] 86  BP: (117-120)/(58-62) 120/62  Resp:  [18] 18  SpO2:  [96 %-98 %] 96 %    Body mass index is 31.41 kg/m².     Input and Output Summary (last 24 hours):     Intake/Output Summary (Last 24 hours) at 1/27/2025 0961  Last data filed at 1/27/2025 0601  Gross per 24 hour   Intake 11 ml   Output 600 ml   Net -589 ml       Physical Exam  Vitals reviewed.   Constitutional:       General: She is not in acute distress.     Appearance: She is ill-appearing (chronically). She is not toxic-appearing.   HENT:      Head: Normocephalic and atraumatic.   Eyes:      Conjunctiva/sclera: Conjunctivae normal.   Cardiovascular:      Rate and Rhythm: Normal rate and regular rhythm.   Pulmonary:      Effort: Pulmonary effort is normal. No respiratory distress.      Breath sounds: Normal breath sounds. No wheezing or  rales.   Abdominal:      General: Bowel sounds are normal. There is no distension.      Palpations: Abdomen is soft.      Tenderness: There is no abdominal tenderness.   Skin:     Comments: Rash noted from left flank/buttocks over anterior abdomen to the mons pubis.  Erythema improving.  Lesions mostly scabbed but some areas are still open  Nephrostomy tubes in place   Neurological:      Mental Status: She is alert.         Lines/Drains:  Lines/Drains/Airways       Active Status       Name Placement date Placement time Site Days    Nephrostomy Left 10.2 Fr. 01/07/25  1124  Left  19    Nephrostomy Right 10.2 Fr. 01/07/25  1130  Right  19    Rectal Tube 01/25/25  1725  --  1                            Lab Results: I have reviewed the following results:   Results from last 7 days   Lab Units 01/27/25  0651 01/23/25  0559 01/22/25  1648   WBC Thousand/uL 8.16   < > 18.02*   HEMOGLOBIN g/dL 10.1*   < > 13.5   HEMATOCRIT % 31.0*   < > 40.9   PLATELETS Thousands/uL 284   < > 298   BANDS PCT %  --   --  1   LYMPHO PCT %  --   --  7*   MONO PCT %  --   --  1*   EOS PCT %  --   --  0    < > = values in this interval not displayed.     Results from last 7 days   Lab Units 01/27/25  0651 01/23/25  0559 01/22/25  1648   SODIUM mmol/L 141   < > 138   POTASSIUM mmol/L 3.2*   < > 3.9   CHLORIDE mmol/L 110*   < > 101   CO2 mmol/L 22   < > 16*   BUN mg/dL 75*   < > 119*   CREATININE mg/dL 2.66*   < > 5.11*   ANION GAP mmol/L 9   < > 21*   CALCIUM mg/dL 8.7   < > 9.1   ALBUMIN g/dL  --   --  3.4*   TOTAL BILIRUBIN mg/dL  --   --  0.63   ALK PHOS U/L  --   --  49   ALT U/L  --   --  27   AST U/L  --   --  51*   GLUCOSE RANDOM mg/dL 92   < > 103    < > = values in this interval not displayed.                 Results from last 7 days   Lab Units 01/23/25  0559 01/22/25  1648   LACTIC ACID mmol/L  --  2.0   PROCALCITONIN ng/ml 2.27* 2.69*       Recent Cultures (last 7 days):   Results from last 7 days   Lab Units 01/24/25  4939  01/24/25  1753 01/23/25  1611 01/22/25  1648 01/22/25  1637 01/22/25  1046   BLOOD CULTURE   --   --   --  No Growth After 4 Days. No Growth After 4 Days.  --    GRAM STAIN RESULT   --  No Polys or Bacteria seen No Polys or Bacteria seen  --   --   --    URINE CULTURE   --   --   --   --   --  <10,000 cfu/ml   WOUND CULTURE   --  1+ Growth of  --   --   --   --    C DIFF TOXIN B BY PCR  Negative  --   --   --   --   --        Imaging Results Review: No pertinent imaging studies reviewed.  Other Study Results Review: No additional pertinent studies reviewed.    Last 24 Hours Medication List:     Current Facility-Administered Medications:     acetaminophen (TYLENOL) tablet 650 mg, Q6H PRN    aspirin chewable tablet 81 mg, Daily    atorvastatin (LIPITOR) tablet 40 mg, Daily With Dinner    ceFAZolin (ANCEF) IVPB (premix in dextrose) 1,000 mg 50 mL, Q24H, Last Rate: 1,000 mg (01/26/25 1140)    heparin (porcine) subcutaneous injection 5,000 Units, Q8H JD    [Held by provider] hydroCHLOROthiazide tablet 25 mg, QAM    influenza vaccine, high-dose (Fluzone High-Dose) IM injection 0.5 mL, Prior to discharge    metoprolol succinate (TOPROL-XL) 24 hr tablet 25 mg, QAM    multi-electrolyte (PLASMALYTE-A/ISOLYTE-S PH 7.4) IV solution, Continuous, Last Rate: 50 mL/hr (01/26/25 0439)    potassium chloride 40 mEq IVPB (premix), Once    saccharomyces boulardii (FLORASTOR) capsule 250 mg, BID    sodium chloride (PF) 0.9 % injection 10 mL, Daily    sodium chloride 0.9 % bolus 500 mL, Once    valACYclovir (VALTREX) tablet 500 mg, Q24H    Administrative Statements   Today, Patient Was Seen By: Nata Valentin DO      **Please Note: This note may have been constructed using a voice recognition system.**

## 2025-01-27 NOTE — OCCUPATIONAL THERAPY NOTE
"OT EVALUATION       01/27/25 1050   OT Last Visit   OT Visit Date 01/27/25   Note Type   Note type Evaluation   Pain Assessment   Pain Assessment Tool 0-10   Pain Score No Pain   Restrictions/Precautions   Other Precautions Chair Alarm;Bed Alarm;Fall Risk  (B nephrostomy tubes, rectal tube)   Home Living   Type of Home House   Home Layout One level  (3 STEPHEN)   Bathroom Shower/Tub Walk-in shower   Bathroom Toilet Raised   Bathroom Equipment Grab bars in shower   Home Equipment Cane;Walker   Prior Function   Level of [place] Independent with ADLs;Independent with functional mobility;Independent with IADLS   Lives With Alone   Receives Help From Family;Friend(s)  (neighbor cuts the lawn)   Falls in the last 6 months 1 to 4   Vocational Part time employment  ( at the [employer])   Subjective   Subjective \"I got lightheaded when I sat up for 10 minutes yesterday\"   ADL   Eating Assistance 5  Supervision/Setup   Grooming Assistance 4  Minimal Assistance   UB Bathing Assistance 3  Moderate Assistance   LB Bathing Assistance 3  Moderate Assistance   UB Dressing Assistance 3  Moderate Assistance   LB Dressing Assistance 3  Moderate Assistance   Toileting Assistance  2  Maximal Assistance   Bed Mobility   Supine to Sit 4  Minimal assistance   Additional items Assist x 1;Verbal cues   Sit to Supine 4  Minimal assistance   Additional items Assist x 1;Verbal cues   Transfers   Sit to Stand 3  Moderate assistance   Additional items Assist x 1;Verbal cues   Stand to Sit 3  Moderate assistance   Additional items Assist x 1;Verbal cues   Functional Mobility   Functional Mobility 4  Minimal assistance   Additional Comments few steps to head of bed with RW, pt reports she didnt sleep well and requested to go back to bed after OT session   Balance   Static Sitting Fair   Dynamic Sitting Fair   Static Standing Fair -   Dynamic Standing Fair -   Activity Tolerance   Activity Tolerance Patient limited by fatigue   Nurse " Made Aware yes, Anjali   RUE Assessment   RUE Assessment WFL   LUE Assessment   LUE Assessment WFL   Cognition   Overall Cognitive Status WFL   Arousal/Participation Cooperative   Attention Within functional limits   Orientation Level Oriented to person   Following Commands Follows multistep commands with increased time or repetition   Assessment   Limitation Decreased ADL status;Decreased Safe judgement during ADL;Decreased UE strength;Decreased endurance;Decreased high-level ADLs;Decreased self-care trans  (decreased balance and mobility)   Prognosis Good   Assessment Patient evaluated by Occupational Therapy.  Patient admitted with Sepsis (HCC).  The patients occupational profile, medical and therapy history includes a extensive additional review of physical, cognitive, or psychosocial history related to current functional performance.  Comorbidities affecting functional mobility and ADLS include: arthritis, cancer, CVA, hypertension, and MI.  Prior to admission, patient was independent with functional mobility without assistive device, independent with ADLS, and independent with IADLS.  The evaluation identifies the following performance deficits: weakness, impaired balance, decreased endurance, increased fall risk, new onset of impairment of functional mobility, decreased ADLS, decreased IADLS, decreased activity tolerance, decreased safety awareness, impaired judgement, and decreased strength, that result in activity limitations and/or participation restrictions. This evaluation requires clinical decision making of high complexity, because the patient presents with comorbidites that affect occupational performance and required significant modification of tasks or assistance with consideration of multiple treatment options.  The Barthel Index was used as a functional outcome tool presenting with a score of Barthel Index Score: 25, indicating marked limitations of functional mobility and ADLS.  The patient's raw  score on the AM-PAC Daily Activity Inpatient Short Form is 14. A raw score of less than 19 suggests the patient may benefit from discharge to post-acute rehabilitation services. Please refer to the recommendation of the Occupational Therapist for safe discharge planning.  Patient will benefit from skilled Occupational Therapy services to address above deficits and facilitate a safe return to prior level of function.   Goals   Patient Goals to get stronger and to go home   STG Time Frame   (1-7 days)   Short Term Goal  Goals established to promote Patient Goals: to get stronger and to go home:  Eating: independent; Grooming: supervision seated; Bathing: min assist; Upper Body Dressing min assist; Lower Body Dressing: min assist; Toileting: mod assist; Patient will increase ambulatory standard toilet transfer to min assist with rolling walker to increase performance and safety with ADLS and functional mobility; Patient will increase standing tolerance to 3 minutes during ADL task to decrease assistance level and decrease fall risk; Patient will increase bed mobility to min assist in preparation for ADLS and transfers; Patient will increase functional mobility to and from bathroom with rolling walker with min assist to increase performance with ADLS and to use a toilet; Patient will tolerate 5 minutes of UE ROM/strengthening to increase general activity tolerance and performance in ADLS/IADLS; Patient will improve functional activity tolerance to 10 minutes of sustained functional tasks to increase participation in basic self-care and decrease assistance level; Patient will increase dynamic sitting balance to fair+ to improve the ability to sit at edge of bed or on a chair for ADLS;  Patient will increase dynamic standing balance to fair to improve postural stability and decrease fall risk during standing ADLS and transfers.   LTG Time Frame   (8-14 days)   Long Term Goal Grooming: independent seated; Bathing:  supervision; Upper Body Dressing supervision; Lower Body Dressing: supervision; Toileting: min assist; Patient will increase ambulatory standard toilet transfer to supervision with rolling walker to increase performance and safety with ADLS and functional mobility; Patient will increase standing tolerance to 6 minutes during ADL task to decrease assistance level and decrease fall risk; Patient will increase bed mobility to supervision in preparation for ADLS and transfers; Patient will increase functional mobility to and from bathroom with rolling walker with supervision to increase performance with ADLS and to use a toilet; Patient will tolerate 10 minutes of UE ROM/strengthening to increase general activity tolerance and performance in ADLS/IADLS; Patient will improve functional activity tolerance to 20 minutes of sustained functional tasks to increase participation in basic self-care and decrease assistance level; Patient will increase dynamic sitting balance to good to improve the ability to sit at edge of bed or on a chair for ADLS;  Patient will increase dynamic standing balance to fair+ to improve postural stability and decrease fall risk during standing ADLS and transfers. Pt will score >/= 18/24 on AM-PAC Daily Activity Inpatient scale to promote safe independence with ADLs and functional mobility; Pt will score >/= 55/100 on Barthel Index in order to decrease caregiver assistance needed and increase ability to perform ADLs and functional mobility.   Plan   Treatment Interventions ADL retraining;Functional transfer training;UE strengthening/ROM;Endurance training;Patient/family training;Equipment evaluation/education;Activityengagement;Compensatory technique education   Goal Expiration Date 02/10/25   OT Frequency 3-5x/wk   Discharge Recommendation   Rehab Resource Intensity Level, OT II (Moderate Resource Intensity)   AM-PAC Daily Activity Inpatient   Lower Body Dressing 2   Bathing 2   Toileting 2   Upper  Body Dressing 2   Grooming 3   Eating 3   Daily Activity Raw Score 14   Daily Activity Standardized Score (Calc for Raw Score >=11) 33.39   AM-PAC Applied Cognition Inpatient   Following a Speech/Presentation 4   Understanding Ordinary Conversation 4   Taking Medications 4   Remembering Where Things Are Placed or Put Away 4   Remembering List of 4-5 Errands 4   Taking Care of Complicated Tasks 4   Applied Cognition Raw Score 24   Applied Cognition Standardized Score 62.21   Barthel Index   Feeding 10   Bathing 0   Grooming Score 0   Dressing Score 5   Bladder Score 0   Bowels Score 0   Toilet Use Score 5   Transfers (Bed/Chair) Score 5   Mobility (Level Surface) Score 0   Stairs Score 0   Barthel Index Score 25   Licensure   [place] License Number  Carmen Gutiérrez MS OTR/L [id number]

## 2025-01-27 NOTE — CONSULTS
e-Consult (IPC)  - Interventional Radiology  Ning Chambers 84 y.o. female MRN: [id number]  Unit/Bed#: [address] [place] [address] Encounter: [id number]          Interventional Radiology has been consulted to evaluate Ning Chambers    We were consulted by PANCHO concerning this patient with MARTY and sepsis.    Inpatient Consult to IR  Consult performed by: David Fall MD  Consult ordered by: Nata Valentin DO        01/27/25    Assessment/Recommendation:   83 yo female with h/o invasive bladder ca with indwelling bilateral PCNs (last changed 1/7/2025), CKD 4, presented 1/22/2025 with sepsis, UTI, and MARTY, with CT demonstrating new mild left hydroureteronephrosis, for which we are asked to evaluate patient's left PCN, which we will plan to do 1/27, pending IR schedule.    11-20 minutes, >50% of the total time devoted to medical consultative verbal/EMR discussion between providers. Written report will be generated in the EMR.     Thank you for allowing Interventional Radiology to participate in the care of Ning Chambers. Please don't hesitate to call or TigerText us with any questions.     David Fall MD

## 2025-01-27 NOTE — ASSESSMENT & PLAN NOTE
"Patient with hx of invasive bladder cancer not undergoing treatment, CKD IV, indwelling bilateral nephrostomy tubes, presents with generalized weakness, and new onset \"rash\" on her left flank and lower abdomen which patient states started after her last nephrostomy tube exchange on 01/07  Noted to have leukocytosis, tachycardia, tachypnea  Per ID sepsis likely due to cellulitis and possible herpes zoster   Now off IV Cefepime + Vancomycin and currently on cefazolin with p.o. Valtrex.  CT A/P negative for infectious pathology  urine culture with mixed contaminants and blood cultures negative  ID consult appreciated   "

## 2025-01-27 NOTE — ASSESSMENT & PLAN NOTE
UA with minimal pyuria and she has no symptoms of a urinary tract infection.  Innumerable bacteria present but this is not alone indicative of infection and is common with indwelling nephrostomy tubes.  Urine culture minimal growth.  No evidence of UTI at this time.

## 2025-01-27 NOTE — PLAN OF CARE
Problem: Prexisting or High Potential for Compromised Skin Integrity  Goal: Skin integrity is maintained or improved  Description: INTERVENTIONS:  - Identify patients at risk for skin breakdown  - Assess and monitor skin integrity  - Assess and monitor nutrition and hydration status  - Monitor labs   - Assess for incontinence   - Turn and reposition patient  - Assist with mobility/ambulation  - Relieve pressure over bony prominences  - Avoid friction and shearing  - Provide appropriate hygiene as needed including keeping skin clean and dry  - Evaluate need for skin moisturizer/barrier cream  - Collaborate with interdisciplinary team   - Patient/family teaching  - Consider wound care consult   Outcome: Progressing     Problem: PAIN - ADULT  Goal: Verbalizes/displays adequate comfort level or baseline comfort level  Description: Interventions:  - Encourage patient to monitor pain and request assistance  - Assess pain using appropriate pain scale  - Administer analgesics based on type and severity of pain and evaluate response  - Implement non-pharmacological measures as appropriate and evaluate response  - Consider cultural and social influences on pain and pain management  - Notify physician/advanced practitioner if interventions unsuccessful or patient reports new pain  Outcome: Progressing     Problem: INFECTION - ADULT  Goal: Absence or prevention of progression during hospitalization  Description: INTERVENTIONS:  - Assess and monitor for signs and symptoms of infection  - Monitor lab/diagnostic results  - Monitor all insertion sites, i.e. indwelling lines, tubes, and drains  - Monitor endotracheal if appropriate and nasal secretions for changes in amount and color  - [place] appropriate cooling/warming therapies per order  - Administer medications as ordered  - Instruct and encourage patient and family to use good hand hygiene technique  - Identify and instruct in appropriate isolation precautions for  identified infection/condition  Outcome: Progressing  Goal: Absence of fever/infection during neutropenic period  Description: INTERVENTIONS:  - Monitor WBC    Outcome: Progressing     Problem: SAFETY ADULT  Goal: Patient will remain free of falls  Description: INTERVENTIONS:  - Educate patient/family on patient safety including physical limitations  - Instruct patient to call for assistance with activity   - Consult OT/PT to assist with strengthening/mobility   - Keep Call bell within reach  - Keep bed low and locked with side rails adjusted as appropriate  - Keep care items and personal belongings within reach  - Initiate and maintain comfort rounds  - Make Fall Risk Sign visible to staff  - Offer Toileting every 2 Hours, in advance of need  - Initiate/Maintain bed alarm  - Obtain necessary fall risk management equipment: nonskid socks  - Apply yellow socks and bracelet for high fall risk patients  - Consider moving patient to room near nurses station  Outcome: Progressing  Goal: Maintain or return to baseline ADL function  Description: INTERVENTIONS:  -  Assess patient's ability to carry out ADLs; assess patient's baseline for ADL function and identify physical deficits which impact ability to perform ADLs (bathing, care of mouth/teeth, toileting, grooming, dressing, etc.)  - Assess/evaluate cause of self-care deficits   - Assess range of motion  - Assess patient's mobility; develop plan if impaired  - Assess patient's need for assistive devices and provide as appropriate  - Encourage maximum independence but intervene and supervise when necessary  - Involve family in performance of ADLs  - Assess for home care needs following discharge   - Consider OT consult to assist with ADL evaluation and planning for discharge  - Provide patient education as appropriate  Outcome: Progressing  Goal: Maintains/Returns to pre admission functional level  Description: INTERVENTIONS:  - Perform AM-PAC 6 Click Basic Mobility/  Daily Activity assessment daily.  - Set and communicate daily mobility goal to care team and patient/family/caregiver.   - Collaborate with rehabilitation services on mobility goals if consulted  - Perform Range of Motion 3 times a day.  - Reposition patient every 2 hours.  - Dangle patient 3 times a day  - Stand patient 3 times a day  - Ambulate patient 2 times a day  - Out of bed to chair 3 times a day   - Out of bed for meals 3 times a day  - Out of bed for toileting  - Record patient progress and toleration of activity level   Outcome: Progressing     Problem: DISCHARGE PLANNING  Goal: Discharge to home or other facility with appropriate resources  Description: INTERVENTIONS:  - Identify barriers to discharge w/patient and caregiver  - Arrange for needed discharge resources and transportation as appropriate  - Identify discharge learning needs (meds, wound care, etc.)  - Arrange for interpretive services to assist at discharge as needed  - Refer to Case Management Department for coordinating discharge planning if the patient needs post-hospital services based on physician/advanced practitioner order or complex needs related to functional status, cognitive ability, or social support system  Outcome: Progressing     Problem: Knowledge Deficit  Goal: Patient/family/caregiver demonstrates understanding of disease process, treatment plan, medications, and discharge instructions  Description: Complete learning assessment and assess knowledge base.  Interventions:  - Provide teaching at level of understanding  - Provide teaching via preferred learning methods  Outcome: Progressing     Problem: GASTROINTESTINAL - ADULT  Goal: Minimal or absence of nausea and/or vomiting  Description: INTERVENTIONS:  - Administer IV fluids if ordered to ensure adequate hydration  - Maintain NPO status until nausea and vomiting are resolved  - Nasogastric tube if ordered  - Administer ordered antiemetic medications as needed  - Provide  nonpharmacologic comfort measures as appropriate  - Advance diet as tolerated, if ordered  - Consider nutrition services referral to assist patient with adequate nutrition and appropriate food choices  Outcome: Progressing  Goal: Maintains or returns to baseline bowel function  Description: INTERVENTIONS:  - Assess bowel function  - Encourage oral fluids to ensure adequate hydration  - Administer IV fluids if ordered to ensure adequate hydration  - Administer ordered medications as needed  - Encourage mobilization and activity  - Consider nutritional services referral to assist patient with adequate nutrition and appropriate food choices  Outcome: Progressing  Goal: Maintains adequate nutritional intake  Description: INTERVENTIONS:  - Monitor percentage of each meal consumed  - Identify factors contributing to decreased intake, treat as appropriate  - Assist with meals as needed  - Monitor I&O, weight, and lab values if indicated  - Obtain nutrition services referral as needed  Outcome: Progressing  Goal: Establish and maintain optimal ostomy function  Description: INTERVENTIONS:  - Assess bowel function  - Encourage oral fluids to ensure adequate hydration  - Administer IV fluids if ordered to ensure adequate hydration   - Administer ordered medications as needed  - Encourage mobilization and activity  - Nutrition services referral to assist patient with appropriate food choices  - Assess stoma site  - Consider wound care consult   Outcome: Progressing  Goal: Oral mucous membranes remain intact  Description: INTERVENTIONS  - Assess oral mucosa and hygiene practices  - Implement preventative oral hygiene regimen  - Implement oral medicated treatments as ordered  - Initiate Nutrition services referral as needed  Outcome: Progressing     Problem: GENITOURINARY - ADULT  Goal: Maintains or returns to baseline urinary function  Description: INTERVENTIONS:  - Assess urinary function  - Encourage oral fluids to ensure  adequate hydration if ordered  - Administer IV fluids as ordered to ensure adequate hydration  - Administer ordered medications as needed  - Offer frequent toileting  - Follow urinary retention protocol if ordered  Outcome: Progressing  Goal: Absence of urinary retention  Description: INTERVENTIONS:  - Assess patient’s ability to void and empty bladder  - Monitor I/O  - Bladder scan as needed  - Discuss with physician/AP medications to alleviate retention as needed  - Discuss catheterization for long term situations as appropriate  Outcome: Progressing  Goal: Urinary catheter remains patent  Description: INTERVENTIONS:  - Assess patency of urinary catheter  - If patient has a chronic posadas, consider changing catheter if non-functioning  - Follow guidelines for intermittent irrigation of non-functioning urinary catheter  Outcome: Progressing

## 2025-01-27 NOTE — ASSESSMENT & PLAN NOTE
MARTY  Admission creatinine 5.11 on 1/22/2025.  Etiology thought to be multifactorial: Volume depletion and obstruction.  Renal function improving.  Creatinine down to 2.66 today.  Monitor off IVF today.  CT on 1/23/2025 showed interval resolution of the right hydronephrosis but increase mild dilatation of the left renal pelvis.  Left PCN to be evaluated by IR later today.    CKD III/IV  Baseline creatinine was 2.7-3.0 in 2023.  Follows with Dr. Reyes  Of note, creatinine was 2.03 on 1/13/2025 and was 2.3 in 2024.  Baseline creatinine is probably closer to 2.0-2.3 at this time.

## 2025-01-27 NOTE — ASSESSMENT & PLAN NOTE
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  Plan for IR tube check tomm

## 2025-01-27 NOTE — ASSESSMENT & PLAN NOTE
Concern for zoster rash vs other.   Currently on Valtrex.  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  S/p skin biopsy 1/23/2025.  Results pending.

## 2025-01-27 NOTE — PROGRESS NOTES
Progress Note - Infectious Disease   Name: Ning Chambers 84 y.o. female I MRN: [id number]  Unit/Bed#: [address] [place] [address] [address] I Date of Admission: 1/22/2025   Date of Service: 1/27/2025 I Hospital Day: 5    Assessment & Plan  Sepsis (HCC)  Leukocytosis, tachycardia, tachypnea.  Concern for left flank/abdominal cellulitis.  Lab and vital sign derangements may also be reactive due to dehydration from poor p.o. intake.  UA shows 4-10 WBCs and she has no symptoms of urinary infection so no evidence of a UTI.  Urine culture <10K mixed contaminants.  CT A/P without source of infection.  Scabbed lesions on the left flank remain but surrounding erythema improving   -Continue IV cefazolin and p.o. Valtrex renally dose adjusted.  Continue treatment x 7 days, through 1/29/2025   -If patient is discharged prior to completion of antibiotics, IV cefazolin can be transitioned to p.o. Keflex   -Repeat CBC tomorrow to monitor treatment response   -Monitor fever curve, vital signs  Cellulitis of flank  The patient reports a rash over the left buttocks extending to the anterior abdomen and mons pubis.  There is erythema of the area as well as hemorrhagic lesions, some denuded vesicles.  Consideration for herpes zoster with superimposed bacterial cellulitis.  Dermatology consulted, concern for hypercoagulable state and possible reniform purpura versus calciphylaxis versus hemorrhagic bullous cellulitis and vasculitis.  Erythema is improving and other skin lesions are now scabbing.  Skin biopsy is growing Clostridium tertium which likely represents colonization due to fecal contamination   -Continue Valtrex and cefazolin, renally dose adjusted   -Monitor rash   -Continue local wound care   -Dermatology follow-up   -Follow-up VZV PCR   -Follow-up skin biopsy and culture  Malignant neoplasm of overlapping sites of bladder (HCC)  Patient has history of bladder cancer with retroperitoneal lymphadenopathy and is not currently receiving any cancer  directed treatment.  She does follow with palliative care as an outpatient.   -Recommend continued outpatient follow-up  Nephrostomy status (HCC)  Bilateral nephrostomy tubes in place due to obstructive uropathy from invasive bladder cancer.  Follows with urology, nephrology, palliative care.  Nephrostomy tubes exchanged 1/7.  Continue to monitor urine output.  Acute kidney injury superimposed on chronic kidney disease  (HCC)  Lab Results   Component Value Date    EGFR 15 01/27/2025    EGFR 13 01/26/2025    EGFR 11 01/25/2025    CREATININE 2.66 (H) 01/27/2025    CREATININE 2.95 (H) 01/26/2025    CREATININE 3.37 (H) 01/25/2025   Creatinine elevated to 5.11 from baseline of 2.7-3.  Likely prerenal due to poor p.o. intake.  Follows with nephrology as an outpatient.  Creatinine now improved to baseline   -Dose adjust antimicrobials for creatinine clearance   -Nephrology following   -Monitor creatinine  Abnormal urinalysis  UA with minimal pyuria and she has no symptoms of a urinary tract infection.  Innumerable bacteria present but this is not alone indicative of infection and is common with indwelling nephrostomy tubes.  Urine culture minimal growth.  No evidence of UTI at this time.    Above management plan to continue current antimicrobials discussed with Dr. Valentin.  ID will follow.    Antibiotics:  IV cefazolin  P.o. Valtrex    Subjective   The patient has no acute complaints.  She denies any abdominal pain, pain over her rash.  No fever or chills.    Objective :  Temp:  [97.2 °F (36.2 °C)-98.3 °F (36.8 °C)] 97.2 °F (36.2 °C)  HR:  [70-94] 70  BP: (116-128)/(57-83) 128/57  Resp:  [17-19] 19  SpO2:  [96 %-98 %] 98 %  O2 Device: None (Room air)    General: Chronically ill-appearing but no acute distress  Psychiatric:  Awake and alert  Cardiac: RRR, no murmurs  Pulmonary:  Normal respiratory excursion without accessory muscle use  Abdomen:  Soft, nontender, nondistended  Back: Bilateral percutaneous nephrostomy  tubes in place  Extremities:  No edema  Skin: Rash extending from left flank and buttocks over anterior abdomen to the mons pubis.  Lesions are now mostly scabbed although some wounds near the mid abdomen remain open.  Erythema improving      Lab Results: I have reviewed the following results:  Results from last 7 days   Lab Units 01/27/25  0651 01/26/25  0630 01/25/25  0602   WBC Thousand/uL 8.16 7.92 9.96   HEMOGLOBIN g/dL 10.1* 10.6* 10.9*   PLATELETS Thousands/uL 284 294 244     Results from last 7 days   Lab Units 01/27/25  0651 01/26/25  0630 01/25/25  0602 01/23/25  0559 01/22/25  1648   SODIUM mmol/L 141 141 138   < > 138   POTASSIUM mmol/L 3.2* 3.3* 3.4*   < > 3.9   CHLORIDE mmol/L 110* 106 108   < > 101   CO2 mmol/L 22 23 20*   < > 16*   BUN mg/dL 75* 80* 95*   < > 119*   CREATININE mg/dL 2.66* 2.95* 3.37*   < > 5.11*   EGFR ml/min/1.73sq m 15 13 11   < > 7   CALCIUM mg/dL 8.7 7.8* 8.2*   < > 9.1   AST U/L  --   --   --   --  51*   ALT U/L  --   --   --   --  27   ALK PHOS U/L  --   --   --   --  49   ALBUMIN g/dL  --   --   --   --  3.4*    < > = values in this interval not displayed.     Results from last 7 days   Lab Units 01/25/25  1030 01/24/25  2248 01/24/25  1753 01/23/25  1611 01/22/25  1648 01/22/25  1637 01/22/25  1046   BLOOD CULTURE   --   --   --   --  No Growth After 4 Days. No Growth After 4 Days.  --    GRAM STAIN RESULT   --   --  No Polys or Bacteria seen No Polys or Bacteria seen  --   --   --    URINE CULTURE   --   --   --   --   --   --  <10,000 cfu/ml   WOUND CULTURE   --   --  1+ Growth of  --   --   --   --    MRSA CULTURE ONLY  No Methicillin Resistant Staphlyococcus aureus (MRSA) isolated  --   --   --   --   --   --    C DIFF TOXIN B BY PCR   --  Negative  --   --   --   --   --      Results from last 7 days   Lab Units 01/23/25  0559 01/22/25  1648   PROCALCITONIN ng/ml 2.27* 2.69*

## 2025-01-27 NOTE — ASSESSMENT & PLAN NOTE
The patient reports a rash over the left buttocks extending to the anterior abdomen and mons pubis.  There is erythema of the area as well as hemorrhagic lesions, some denuded vesicles.  Consideration for herpes zoster with superimposed bacterial cellulitis.  Dermatology consulted, concern for hypercoagulable state and possible reniform purpura versus calciphylaxis versus hemorrhagic bullous cellulitis and vasculitis.  Erythema is improving and other skin lesions are now scabbing.  Skin biopsy is growing Clostridium tertium which likely represents colonization due to fecal contamination   -Continue Valtrex and cefazolin, renally dose adjusted   -Monitor rash   -Continue local wound care   -Dermatology follow-up   -Follow-up VZV PCR   -Follow-up skin biopsy and culture

## 2025-01-27 NOTE — ASSESSMENT & PLAN NOTE
Lab Results   Component Value Date    EGFR 15 01/27/2025    EGFR 13 01/26/2025    EGFR 11 01/25/2025    CREATININE 2.66 (H) 01/27/2025    CREATININE 2.95 (H) 01/26/2025    CREATININE 3.37 (H) 01/25/2025   Creatinine elevated to 5.11 from baseline of 2.7-3.  Likely prerenal due to poor p.o. intake.  Follows with nephrology as an outpatient.  Creatinine now improved to baseline   -Dose adjust antimicrobials for creatinine clearance   -Nephrology following   -Monitor creatinine

## 2025-01-27 NOTE — CASE MANAGEMENT
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, 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) 2024     Obstructive uropathy 2024     Class 1 obesity due to excess calories with serious comorbidity and body mass index (BMI) of 34.0 to 34.9 in adult 2024     Advanced directives, counseling/discussion 2024     Palliative care by specialist 2024     Bilateral leg pain 2024     Ambulatory dysfunction 2024     Stage 4 chronic kidney disease (HCC) 2024     Stage 5 chronic kidney disease 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) 2024     Morbid obesity due to excess calories ([facility]) 2024     Nephrostomy status ([facility]) 2023     Sensorineural hearing loss (SNHL), bilateral 2022     Sensorineural hearing loss (SNHL) of right ear with restricted hearing of left ear 2022     Goals of care, counseling/discussion 05/10/2022     Malignant neoplasm of overlapping sites of bladder ([facility]) 2021     Stroke ([facility]) 10/29/2021     Hypokalemia 10/28/2021     Wrist drop 10/28/2021       LOS (days): 5  Geometric Mean LOS (GMLOS) (days): 3.5  Days to GMLOS:-1.4     OBJECTIVE:  Risk of Unplanned Readmission Score: 19.43         Current admission status: Inpatient   Preferred Pharmacy:   [facility] - [place] - [address] [place]  [address]  [place] [address]  Phone:  [phone] Fax: [phone]    Primary Care Provider: BHAVIK Morillo    Primary Insurance: [employer]  REP  Secondary Insurance:     DISCHARGE DETAILS:    Discharge planning discussed with:: Patient, Son Alexsander  Freedom of Choice: Yes  Comments - Freedom of Choice: Accepting STR list provided bedside to patient and emailed to bertin Beltre at [email or web address]  CM contacted family/caregiver?: Yes  Were Treatment Team discharge recommendations reviewed with patient/caregiver?: Yes  Did patient/caregiver verbalize understanding of patient care needs?: Yes  Were patient/caregiver advised of the risks associated with not following Treatment Team discharge recommendations?: Yes    Contacts  Patient Contacts: Rodrigo Chambers (son)  Relationship to Patient:: Family  Contact Method: Phone  Phone Number: [phone]  Reason/Outcome: Emergency Contact, Continuity of Care, Discharge Planning    Treatment Team Recommendation: Short Term Rehab  Discharge Destination Plan:: Short Term Rehab

## 2025-01-27 NOTE — ASSESSMENT & PLAN NOTE
Leukocytosis, tachycardia, tachypnea.  Concern for left flank/abdominal cellulitis.  Lab and vital sign derangements may also be reactive due to dehydration from poor p.o. intake.  UA shows 4-10 WBCs and she has no symptoms of urinary infection so no evidence of a UTI.  Urine culture <10K mixed contaminants.  CT A/P without source of infection.  Scabbed lesions on the left flank remain but surrounding erythema improving   -Continue IV cefazolin and p.o. Valtrex renally dose adjusted.  Continue treatment x 7 days, through 1/29/2025   -If patient is discharged prior to completion of antibiotics, IV cefazolin can be transitioned to p.o. Keflex   -Repeat CBC tomorrow to monitor treatment response   -Monitor fever curve, vital signs

## 2025-01-27 NOTE — PROGRESS NOTES
NEPHROLOGY HOSPITAL PROGRESS NOTE   Ning Chambers 84 y.o. female MRN: [id number]  Unit/Bed#: [address] [place] [address] Encounter: [id number]  Reason for Consult: MARTY  Assessment & Plan  Acute kidney injury superimposed on chronic kidney disease  (HCC)  MARTY  Admission creatinine 5.11 on 1/22/2025.  Etiology thought to be multifactorial: Volume depletion and obstruction.  Renal function improving.  Creatinine down to 2.66 today.  Monitor off IVF today.  CT on 1/23/2025 showed interval resolution of the right hydronephrosis but increase mild dilatation of the left renal pelvis.  Left PCN to be evaluated by IR later today.    CKD III/IV  Baseline creatinine was 2.7-3.0 in 2023.  Follows with Dr. Reyes  Of note, creatinine was 2.03 on 1/13/2025 and was 2.3 in 2024.  Baseline creatinine is probably closer to 2.0-2.3 at this time.    Benign hypertension  BP is controlled.  Continue metoprolol succinate 25 mg daily.  HCTZ on hold.  Will discontinue.    Electrolyte abnormality  Hypokalemia  K 3.2 today.  Ordered for IV KCl today.  Start K-Dur 20 meq daily tomorrow.     Sepsis (HCC)  Currently on cefazolin per ID and internal medicine for treatment of cellulitis.  Also on Valtrex for herpes zoster.    Cellulitis of flank  Concern for zoster rash vs other.   Currently on Valtrex.  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  S/p skin biopsy 1/23/2025.  Results pending.    Nephrostomy status (HCC)  Has bilateral PCN's.  Left PCN to be evaluated by IR today.    Malignant neoplasm of overlapping sites of bladder (HCC)  Defer to urology.    TODAY's DISCUSSION / PLAN:  Improving renal function.  Stop IVF.  Start K-Dur 20 meq daily.     SUBJECTIVE / 24H INTERVAL HISTORY:  No CP or SOB.  She seems upset about her dietary restrictions.  IR consulted and planning for evaluation of left PCN sometime today.    OBJECTIVE:  Current Weight: Weight - Scale: 77.9 kg (171 lb  11.8 oz)  Vitals:    01/26/25 0919 01/26/25 1900 01/26/25 2300 01/27/25 0900   BP: 118/77 117/58 120/62 116/83   BP Location:  Right arm Right arm Right arm   Pulse: 88 94 86 82   Resp:  18 18 17   Temp:  (!) 97.4 °F (36.3 °C) 98.1 °F (36.7 °C) 98.3 °F (36.8 °C)   TempSrc:  Oral Temporal Temporal   SpO2:  98% 96% 97%   Weight:       Height:           Intake/Output Summary (Last 24 hours) at 1/27/2025 1120  Last data filed at 1/27/2025 1056  Gross per 24 hour   Intake 11 ml   Output 950 ml   Net -939 ml     General: conscious, cooperative, no distress  Skin: dry  Eyes: pink conjunctivae  ENT: moist mucous membranes  Respiratory: equal chest expansion, clear breath sounds.  Cardiovascular: distinct heart sounds, normal rate, regular rhythm, no rub  Abdomen: soft, non tender, non distended, normal bowel sounds  Extremities: + mild LE edema.   Genitourinary: + posadas catheter.   Neuro: awake, alert.   Psych: appropriate affect    Medications:    Current Facility-Administered Medications:     acetaminophen (TYLENOL) tablet 650 mg, 650 mg, Oral, Q6H PRN, Elinor Richard PA-C    aspirin chewable tablet 81 mg, 81 mg, Oral, Daily, Elinor Richard PA-C, 81 mg at 01/27/25 1101    atorvastatin (LIPITOR) tablet 40 mg, 40 mg, Oral, Daily With Dinner, Elinor Richard PA-C, 40 mg at 01/26/25 1711    ceFAZolin (ANCEF) IVPB (premix in dextrose) 1,000 mg 50 mL, 1,000 mg, Intravenous, Q24H, Verenice Murray MD, Last Rate: 100 mL/hr at 01/26/25 1140, 1,000 mg at 01/26/25 1140    heparin (porcine) subcutaneous injection 5,000 Units, 5,000 Units, Subcutaneous, Q8H JD, Elinor Richard PA-C, 5,000 Units at 01/27/25 0628    [Held by provider] hydroCHLOROthiazide tablet 25 mg, 25 mg, Oral, QAM, Elinor A Vilanova, ERIKA    influenza vaccine, high-dose (Fluzone High-Dose) IM injection 0.5 mL, 0.5 mL, Intramuscular, Prior to discharge, Nata Valentin DO    metoprolol succinate (TOPROL-XL) 24 hr tablet 25 mg, 25 mg, Oral,  "QAM, Elinor Richard PA-C, 25 mg at 01/27/25 1101    multi-electrolyte (PLASMALYTE-A/ISOLYTE-S PH 7.4) IV solution, 50 mL/hr, Intravenous, Continuous, Mauricio Gabriel MD, Last Rate: 50 mL/hr at 01/26/25 0439, 50 mL/hr at 01/26/25 0439    potassium chloride 20 mEq IVPB (premix), 20 mEq, Intravenous, Once, Last Rate: 50 mL/hr at 01/27/25 1047, 20 mEq at 01/27/25 1047 **FOLLOWED BY** potassium chloride 20 mEq IVPB (premix), 20 mEq, Intravenous, Once, Nata Revankar, DO    saccharomyces boulardii (FLORASTOR) capsule 250 mg, 250 mg, Oral, BID, Nata Revankar, DO, 250 mg at 01/27/25 1101    sodium chloride (PF) 0.9 % injection 10 mL, 10 mL, Intracatheter, Daily, Elinor Richard PA-C, 10 mL at 01/26/25 0920    sodium chloride 0.9 % bolus 500 mL, 500 mL, Intravenous, Once, Nata Revankar, DO    valACYclovir (VALTREX) tablet 500 mg, 500 mg, Oral, Q24H, Verenice Murray MD, 500 mg at 01/27/25 1101    Laboratory Results:  Results from last 7 days   Lab Units 01/27/25  0651 01/26/25  0630 01/25/25  0602 01/24/25  2145 01/24/25  0639 01/23/25  2026 01/23/25  0559 01/22/25  1648   WBC Thousand/uL 8.16 7.92 9.96  --  7.77  --  13.27* 18.02*   HEMOGLOBIN g/dL 10.1* 10.6* 10.9*  --  11.5  --  11.6 13.5   HEMATOCRIT % 31.0* 32.4* 34.1*  --  34.5*  --  36.0 40.9   PLATELETS Thousands/uL 284 294 244  --  229  --  253 298   POTASSIUM mmol/L 3.2* 3.3* 3.4* 3.6 2.8* 3.2* 3.6 3.9   CHLORIDE mmol/L 110* 106 108 105 104 106 110* 101   CO2 mmol/L 22 23 20* 21 21 19* 14* 16*   BUN mg/dL 75* 80* 95* 101* 106* 108* 110* 119*   CREATININE mg/dL 2.66* 2.95* 3.37* 3.63* 3.96* 4.28* 4.27* 5.11*   CALCIUM mg/dL 8.7 7.8* 8.2* 8.3* 7.9* 8.5 8.5 9.1   MAGNESIUM mg/dL 2.0 2.1 2.2  --  2.1  --  2.3 2.4   PHOSPHORUS mg/dL 2.9 3.3 3.0  --  3.3  --  4.2* 5.1*     Portions of the record may have been created with voice recognition software. Occasional wrong word or \"sound a like\" substitutions may have occurred due to the inherent " limitations of voice recognition software. Read the chart carefully and recognize, using context, where substitutions have occurred.If you have any questions, please contact the dictating provider.

## 2025-01-28 ENCOUNTER — APPOINTMENT (INPATIENT)
Dept: RADIOLOGY | Facility: HOSPITAL | Age: 85
DRG: 872 | End: 2025-01-28
Payer: COMMERCIAL

## 2025-01-28 LAB
ANION GAP SERPL CALCULATED.3IONS-SCNC: 9 MMOL/L (ref 4–13)
BUN SERPL-MCNC: 65 MG/DL (ref 5–25)
CALCIUM SERPL-MCNC: 8.8 MG/DL (ref 8.4–10.2)
CHLORIDE SERPL-SCNC: 112 MMOL/L (ref 96–108)
CO2 SERPL-SCNC: 22 MMOL/L (ref 21–32)
CREAT SERPL-MCNC: 2.53 MG/DL (ref 0.6–1.3)
GFR SERPL CREATININE-BSD FRML MDRD: 16 ML/MIN/1.73SQ M
GLUCOSE SERPL-MCNC: 94 MG/DL (ref 65–140)
POTASSIUM SERPL-SCNC: 3.6 MMOL/L (ref 3.5–5.3)
SODIUM SERPL-SCNC: 143 MMOL/L (ref 135–147)
VZV DNA SPEC QL NAA+PROBE: POSITIVE

## 2025-01-28 PROCEDURE — G0545 PR INHERENT VISIT TO INPT: HCPCS | Performed by: STUDENT IN AN ORGANIZED HEALTH CARE EDUCATION/TRAINING PROGRAM

## 2025-01-28 PROCEDURE — 99233 SBSQ HOSP IP/OBS HIGH 50: CPT | Performed by: STUDENT IN AN ORGANIZED HEALTH CARE EDUCATION/TRAINING PROGRAM

## 2025-01-28 PROCEDURE — 70450 CT HEAD/BRAIN W/O DYE: CPT

## 2025-01-28 PROCEDURE — 88313 SPECIAL STAINS GROUP 2: CPT | Performed by: STUDENT IN AN ORGANIZED HEALTH CARE EDUCATION/TRAINING PROGRAM

## 2025-01-28 PROCEDURE — 99232 SBSQ HOSP IP/OBS MODERATE 35: CPT | Performed by: INTERNAL MEDICINE

## 2025-01-28 PROCEDURE — 88341 IMHCHEM/IMCYTCHM EA ADD ANTB: CPT | Performed by: STUDENT IN AN ORGANIZED HEALTH CARE EDUCATION/TRAINING PROGRAM

## 2025-01-28 PROCEDURE — 80048 BASIC METABOLIC PNL TOTAL CA: CPT | Performed by: FAMILY MEDICINE

## 2025-01-28 PROCEDURE — 88312 SPECIAL STAINS GROUP 1: CPT | Performed by: STUDENT IN AN ORGANIZED HEALTH CARE EDUCATION/TRAINING PROGRAM

## 2025-01-28 PROCEDURE — 99232 SBSQ HOSP IP/OBS MODERATE 35: CPT | Performed by: PHYSICIAN ASSISTANT

## 2025-01-28 PROCEDURE — 88305 TISSUE EXAM BY PATHOLOGIST: CPT | Performed by: STUDENT IN AN ORGANIZED HEALTH CARE EDUCATION/TRAINING PROGRAM

## 2025-01-28 PROCEDURE — 88342 IMHCHEM/IMCYTCHM 1ST ANTB: CPT | Performed by: STUDENT IN AN ORGANIZED HEALTH CARE EDUCATION/TRAINING PROGRAM

## 2025-01-28 RX ORDER — SODIUM CHLORIDE 9 MG/ML
75 INJECTION, SOLUTION INTRAVENOUS CONTINUOUS
Status: DISCONTINUED | OUTPATIENT
Start: 2025-01-28 | End: 2025-01-29

## 2025-01-28 RX ADMIN — CEFAZOLIN SODIUM 1000 MG: 1 SOLUTION INTRAVENOUS at 03:01

## 2025-01-28 RX ADMIN — ASPIRIN 81 MG CHEWABLE TABLET 81 MG: 81 TABLET CHEWABLE at 10:28

## 2025-01-28 RX ADMIN — CEFAZOLIN SODIUM 1000 MG: 1 SOLUTION INTRAVENOUS at 16:26

## 2025-01-28 RX ADMIN — METOPROLOL SUCCINATE 25 MG: 25 TABLET, FILM COATED, EXTENDED RELEASE ORAL at 10:27

## 2025-01-28 RX ADMIN — Medication 250 MG: at 18:11

## 2025-01-28 RX ADMIN — SODIUM CHLORIDE, PRESERVATIVE FREE 10 ML: 5 INJECTION INTRAVENOUS at 10:29

## 2025-01-28 RX ADMIN — VALACYCLOVIR HYDROCHLORIDE 1000 MG: 500 TABLET, FILM COATED ORAL at 10:32

## 2025-01-28 RX ADMIN — SODIUM CHLORIDE 75 ML/HR: 0.9 INJECTION, SOLUTION INTRAVENOUS at 18:12

## 2025-01-28 RX ADMIN — HEPARIN SODIUM 5000 UNITS: 5000 INJECTION, SOLUTION INTRAVENOUS; SUBCUTANEOUS at 15:19

## 2025-01-28 RX ADMIN — ATORVASTATIN CALCIUM 40 MG: 40 TABLET, FILM COATED ORAL at 16:25

## 2025-01-28 RX ADMIN — HEPARIN SODIUM 5000 UNITS: 5000 INJECTION, SOLUTION INTRAVENOUS; SUBCUTANEOUS at 21:14

## 2025-01-28 RX ADMIN — HEPARIN SODIUM 5000 UNITS: 5000 INJECTION, SOLUTION INTRAVENOUS; SUBCUTANEOUS at 06:02

## 2025-01-28 RX ADMIN — Medication 250 MG: at 10:27

## 2025-01-28 RX ADMIN — POTASSIUM CHLORIDE 20 MEQ: 1500 TABLET, EXTENDED RELEASE ORAL at 10:27

## 2025-01-28 NOTE — ASSESSMENT & PLAN NOTE
Per family patient reports feeling lightheaded/dizzy when getting out of bed or trying to work with therapy on 01/27   CT head (01/27):  Chronic microangiopathic changes and chronic infarctions  Stable chronic asymmetric left ventricular enlargement  No acute hydrocephalus.  Orthostatics not completed on 01/27, will request repeat  No acute complaints

## 2025-01-28 NOTE — CASE MANAGEMENT
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, 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 ([facility]) 2024     Obstructive uropathy 2024     Class 1 obesity due to excess calories with serious comorbidity and body mass index (BMI) of 34.0 to 34.9 in adult 2024     Advanced directives, counseling/discussion 2024     Palliative care by specialist 2024     Bilateral leg pain 2024     Ambulatory dysfunction 2024     Stage 4 chronic kidney disease (HCC) 2024     Stage 5 chronic kidney disease 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) 2024     Morbid obesity due to excess calories ([facility]) 2024     Nephrostomy status ([facility]) 2023     Sensorineural hearing loss (SNHL), bilateral 2022     Sensorineural hearing loss (SNHL) of right ear with restricted hearing of left ear 2022     Goals of care, counseling/discussion 05/10/2022     Malignant neoplasm of overlapping sites of bladder ([facility]) 2021     Stroke ([facility]) 10/29/2021     Hypokalemia 10/28/2021     Wrist drop 10/28/2021       LOS (days): 6  Geometric Mean LOS (GMLOS) (days): 3.5  Days to GMLOS:-2.4     OBJECTIVE:  Risk of Unplanned Readmission Score: 18.97      Current admission status: Inpatient   Preferred Pharmacy:   [facility] - [place] - [address] [place]  [address]  [place] [address]  Phone: [phone]  Fax: [phone]    Primary Care Provider: BHAVIK Morillo    Primary Insurance: [employer]  REP  Secondary Insurance:     DISCHARGE DETAILS:    Discharge planning discussed with:: Patient, Son JUAN Beltre  Freedom of Choice: Yes  Comments - Freedom of Choice: STR choice is #1 [facility], #2 [facility], #3 [facility].  CM contacted family/caregiver?: Yes  Were Treatment Team discharge recommendations reviewed with patient/caregiver?: Yes  Did patient/caregiver verbalize understanding of patient care needs?: Yes  Were patient/caregiver advised of the risks associated with not following Treatment Team discharge recommendations?: Yes    Contacts  Patient Contacts: Rodrigo Chambers (son)  Relationship to Patient:: Family  Contact Method: In Person  Reason/Outcome: Emergency Contact, Continuity of Care, Discharge Planning    Other Referral/Resources/Interventions Provided:  Interventions: Short Term Rehab  Referral Comments: SW reached out to [facility] facilities. Confirming availability of ISO bed.     Treatment Team Recommendation: Short Term Rehab  Discharge Destination Plan:: Short Term Rehab

## 2025-01-28 NOTE — ASSESSMENT & PLAN NOTE
Lab Results   Component Value Date    EGFR 16 01/28/2025    EGFR 15 01/27/2025    EGFR 13 01/26/2025    CREATININE 2.53 (H) 01/28/2025    CREATININE 2.66 (H) 01/27/2025    CREATININE 2.95 (H) 01/26/2025     Cr 5.11 on admit, baseline 2.0-2.3(CKD IV) follows w/ nephrology as outpatient; has expressed wishes to not proceed with dialysis in the past  Patient endorses minimal oral intake over the past week prior to admission  Noted to have high anion gap metabolic acidosis and was on IV sodium bicarbonate gtt which has been discontinued   Discontinued plasmalyte and monitor off fluids per nephrology.  Creatinine improving  PTA HCTZ on hold  Repeat lab work in a.m.  Nephrology consult appreciated

## 2025-01-28 NOTE — ASSESSMENT & PLAN NOTE
Lab Results   Component Value Date    EGFR 16 01/28/2025    EGFR 15 01/27/2025    EGFR 13 01/26/2025    CREATININE 2.53 (H) 01/28/2025    CREATININE 2.66 (H) 01/27/2025    CREATININE 2.95 (H) 01/26/2025   Creatinine elevated to 5.11 from baseline of 2.7-3.  Likely prerenal due to poor p.o. intake.  Follows with nephrology as an outpatient.  Creatinine now improved to baseline   -Dose adjust antimicrobials for creatinine clearance   -Nephrology following   -Monitor creatinine

## 2025-01-28 NOTE — PLAN OF CARE
Problem: Prexisting or High Potential for Compromised Skin Integrity  Goal: Skin integrity is maintained or improved  Description: INTERVENTIONS:  - Identify patients at risk for skin breakdown  - Assess and monitor skin integrity  - Assess and monitor nutrition and hydration status  - Monitor labs   - Assess for incontinence   - Turn and reposition patient  - Assist with mobility/ambulation  - Relieve pressure over bony prominences  - Avoid friction and shearing  - Provide appropriate hygiene as needed including keeping skin clean and dry  - Evaluate need for skin moisturizer/barrier cream  - Collaborate with interdisciplinary team   - Patient/family teaching  - Consider wound care consult   Outcome: Progressing     Problem: PAIN - ADULT  Goal: Verbalizes/displays adequate comfort level or baseline comfort level  Description: Interventions:  - Encourage patient to monitor pain and request assistance  - Assess pain using appropriate pain scale  - Administer analgesics based on type and severity of pain and evaluate response  - Implement non-pharmacological measures as appropriate and evaluate response  - Consider cultural and social influences on pain and pain management  - Notify physician/advanced practitioner if interventions unsuccessful or patient reports new pain  Outcome: Progressing     Problem: INFECTION - ADULT  Goal: Absence or prevention of progression during hospitalization  Description: INTERVENTIONS:  - Assess and monitor for signs and symptoms of infection  - Monitor lab/diagnostic results  - Monitor all insertion sites, i.e. indwelling lines, tubes, and drains  - Monitor endotracheal if appropriate and nasal secretions for changes in amount and color  - [place] appropriate cooling/warming therapies per order  - Administer medications as ordered  - Instruct and encourage patient and family to use good hand hygiene technique  - Identify and instruct in appropriate isolation precautions for  identified infection/condition  Outcome: Progressing  Goal: Absence of fever/infection during neutropenic period  Description: INTERVENTIONS:  - Monitor WBC    Outcome: Progressing     Problem: SAFETY ADULT  Goal: Patient will remain free of falls  Description: INTERVENTIONS:  - Educate patient/family on patient safety including physical limitations  - Instruct patient to call for assistance with activity   - Consult OT/PT to assist with strengthening/mobility   - Keep Call bell within reach  - Keep bed low and locked with side rails adjusted as appropriate  - Keep care items and personal belongings within reach  - Initiate and maintain comfort rounds  - Make Fall Risk Sign visible to staff  - Offer Toileting every 2 Hours, in advance of need  - Initiate/Maintain bed alarm  - Obtain necessary fall risk management equipment: nonskid socks  - Apply yellow socks and bracelet for high fall risk patients  - Consider moving patient to room near nurses station  Outcome: Progressing  Goal: Maintain or return to baseline ADL function  Description: INTERVENTIONS:  -  Assess patient's ability to carry out ADLs; assess patient's baseline for ADL function and identify physical deficits which impact ability to perform ADLs (bathing, care of mouth/teeth, toileting, grooming, dressing, etc.)  - Assess/evaluate cause of self-care deficits   - Assess range of motion  - Assess patient's mobility; develop plan if impaired  - Assess patient's need for assistive devices and provide as appropriate  - Encourage maximum independence but intervene and supervise when necessary  - Involve family in performance of ADLs  - Assess for home care needs following discharge   - Consider OT consult to assist with ADL evaluation and planning for discharge  - Provide patient education as appropriate  Outcome: Progressing  Goal: Maintains/Returns to pre admission functional level  Description: INTERVENTIONS:  - Perform AM-PAC 6 Click Basic Mobility/  Daily Activity assessment daily.  - Set and communicate daily mobility goal to care team and patient/family/caregiver.   - Collaborate with rehabilitation services on mobility goals if consulted  - Perform Range of Motion 3 times a day.  - Reposition patient every 2 hours.  - Dangle patient 3 times a day  - Stand patient 3 times a day  - Ambulate patient 2 times a day  - Out of bed to chair 3 times a day   - Out of bed for meals 3 times a day  - Out of bed for toileting  - Record patient progress and toleration of activity level   Outcome: Progressing     Problem: DISCHARGE PLANNING  Goal: Discharge to home or other facility with appropriate resources  Description: INTERVENTIONS:  - Identify barriers to discharge w/patient and caregiver  - Arrange for needed discharge resources and transportation as appropriate  - Identify discharge learning needs (meds, wound care, etc.)  - Arrange for interpretive services to assist at discharge as needed  - Refer to Case Management Department for coordinating discharge planning if the patient needs post-hospital services based on physician/advanced practitioner order or complex needs related to functional status, cognitive ability, or social support system  Outcome: Progressing     Problem: Knowledge Deficit  Goal: Patient/family/caregiver demonstrates understanding of disease process, treatment plan, medications, and discharge instructions  Description: Complete learning assessment and assess knowledge base.  Interventions:  - Provide teaching at level of understanding  - Provide teaching via preferred learning methods  Outcome: Progressing     Problem: GASTROINTESTINAL - ADULT  Goal: Minimal or absence of nausea and/or vomiting  Description: INTERVENTIONS:  - Administer IV fluids if ordered to ensure adequate hydration  - Maintain NPO status until nausea and vomiting are resolved  - Nasogastric tube if ordered  - Administer ordered antiemetic medications as needed  - Provide  nonpharmacologic comfort measures as appropriate  - Advance diet as tolerated, if ordered  - Consider nutrition services referral to assist patient with adequate nutrition and appropriate food choices  Outcome: Progressing  Goal: Maintains or returns to baseline bowel function  Description: INTERVENTIONS:  - Assess bowel function  - Encourage oral fluids to ensure adequate hydration  - Administer IV fluids if ordered to ensure adequate hydration  - Administer ordered medications as needed  - Encourage mobilization and activity  - Consider nutritional services referral to assist patient with adequate nutrition and appropriate food choices  Outcome: Progressing  Goal: Maintains adequate nutritional intake  Description: INTERVENTIONS:  - Monitor percentage of each meal consumed  - Identify factors contributing to decreased intake, treat as appropriate  - Assist with meals as needed  - Monitor I&O, weight, and lab values if indicated  - Obtain nutrition services referral as needed  Outcome: Progressing  Goal: Establish and maintain optimal ostomy function  Description: INTERVENTIONS:  - Assess bowel function  - Encourage oral fluids to ensure adequate hydration  - Administer IV fluids if ordered to ensure adequate hydration   - Administer ordered medications as needed  - Encourage mobilization and activity  - Nutrition services referral to assist patient with appropriate food choices  - Assess stoma site  - Consider wound care consult   Outcome: Progressing  Goal: Oral mucous membranes remain intact  Description: INTERVENTIONS  - Assess oral mucosa and hygiene practices  - Implement preventative oral hygiene regimen  - Implement oral medicated treatments as ordered  - Initiate Nutrition services referral as needed  Outcome: Progressing     Problem: GENITOURINARY - ADULT  Goal: Maintains or returns to baseline urinary function  Description: INTERVENTIONS:  - Assess urinary function  - Encourage oral fluids to ensure  adequate hydration if ordered  - Administer IV fluids as ordered to ensure adequate hydration  - Administer ordered medications as needed  - Offer frequent toileting  - Follow urinary retention protocol if ordered  Outcome: Progressing  Goal: Absence of urinary retention  Description: INTERVENTIONS:  - Assess patient’s ability to void and empty bladder  - Monitor I/O  - Bladder scan as needed  - Discuss with physician/AP medications to alleviate retention as needed  - Discuss catheterization for long term situations as appropriate  Outcome: Progressing  Goal: Urinary catheter remains patent  Description: INTERVENTIONS:  - Assess patency of urinary catheter  - If patient has a chronic posadas, consider changing catheter if non-functioning  - Follow guidelines for intermittent irrigation of non-functioning urinary catheter  Outcome: Progressing

## 2025-01-28 NOTE — ASSESSMENT & PLAN NOTE
MARTY  Admission creatinine 5.11 on 1/22/2025.  Etiology thought to be multifactorial: Volume depletion and obstruction.  MARTY resolving. SCr improving and down to 2.53 today.   Off IVF since 1/27/25.   CT on 1/23/2025 showed interval resolution of the right hydronephrosis but increase mild dilatation of the left renal pelvis - awaiting L PCN evaluation by IR.     CKD III/IV  Baseline creatinine was 2.7-3.0 in 2023.  Follows with Dr. Reyes  Of note, creatinine was 2.03 on 1/13/2025 and was 2.3 in 2024.  Baseline creatinine is probably closer to 2.0-2.3 at this time.

## 2025-01-28 NOTE — ASSESSMENT & PLAN NOTE
Per family patient reports feeling lightheaded/dizzy when getting out of bed or trying to work with therapy  Will obtain CT head for further evaluation and check orthostatic vitals

## 2025-01-28 NOTE — ASSESSMENT & PLAN NOTE
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  Follows with urology, nephrology, palliative care.  Nephrostomy tubes exchanged 1/7.  Continue to monitor urine output.  IR consulted for left nephrostomy evaluation

## 2025-01-28 NOTE — ASSESSMENT & PLAN NOTE
"Patient endorsing \"rash\" on her L back/flank/buttocks and abdomen/groin which she states started after her last nephrostomy tube exchange 01/07  S/p biopsy by surgery and hypercoagulable workup ordered  Antithrombin III activity normal, protein C activity normal, protein as activity low, cryoglobulin pending  Follow-up VZV PCR  S/p Cefepime + Vancomycin; c/w Cefazolin & p.o. Valtrex through 01/29 to complete 7 day course   Coordinated plan of care on 01/23 w/ dermatology, nephrology, ID and also surgery & again w/ derm on 01/27   Per dermatology preliminary reading on biopsy shows viral change, herpetic looking which can be seen in zoster or herpes simplex.  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  Patient not interested and therefore never started  "

## 2025-01-28 NOTE — ASSESSMENT & PLAN NOTE
Recent Labs     01/26/25  0630 01/27/25  0651 01/28/25  0348   K 3.3* 3.2* 3.6   MG 2.1 2.0  --       Improved, trend

## 2025-01-28 NOTE — ASSESSMENT & PLAN NOTE
"Patient with hx of invasive bladder cancer not undergoing treatment, CKD IV, indwelling bilateral nephrostomy tubes, presents w/ generalized weakness, and new onset \"rash\" on her left flank and lower abdomen which patient states started after her last nephrostomy tube exchange on 01/07  POA - leukocytosis, tachycardia, tachypnea  CT A/P negative for infectious pathology  UCx w/ mixed contaminants  BCx negative   ID consulted during hospital course  Per ID sepsis likely due to cellulitis and possible herpes zoster   S/p Cefepime + Vancomycin; c/w Cefazolin & p.o. Valtrex through 01/29 to complete 7 day course   "

## 2025-01-28 NOTE — ASSESSMENT & PLAN NOTE
The patient reported a rash over the left buttocks extending to the anterior abdomen and mons pubis.  There is erythema of the area as well as hemorrhagic lesions, some denuded vesicles.  Consideration for herpes zoster with superimposed bacterial cellulitis.  Dermatology consulted, concern for hypercoagulable state and possible reniform purpura versus calciphylaxis versus hemorrhagic bullous cellulitis and vasculitis.  Erythema is improving and other skin lesions are now scabbing.  Skin biopsy is growing Clostridium tertium which likely represents fecal contamination/superficial colonization   -Continue Valtrex and cefazolin, renally dose adjusted to complete 7 days total   -Monitor rash   -Continue local wound care   -Dermatology follow-up   -Follow-up VZV PCR   -Follow-up skin biopsy

## 2025-01-28 NOTE — PROGRESS NOTES
NEPHROLOGY HOSPITAL PROGRESS NOTE   Ning Chambers 84 y.o. female MRN: [id number]  Unit/Bed#: [address] [place] [address] Encounter: [id number]  Reason for Consult: MARTY  Assessment & Plan  Acute kidney injury superimposed on chronic kidney disease  (HCC)  MARTY  Admission creatinine 5.11 on 1/22/2025.  Etiology thought to be multifactorial: Volume depletion and obstruction.  MARTY resolving. SCr improving and down to 2.53 today.   Off IVF since 1/27/25.   CT on 1/23/2025 showed interval resolution of the right hydronephrosis but increase mild dilatation of the left renal pelvis - awaiting L PCN evaluation by IR.     CKD III/IV  Baseline creatinine was 2.7-3.0 in 2023.  Follows with Dr. Reyes  Of note, creatinine was 2.03 on 1/13/2025 and was 2.3 in 2024.  Baseline creatinine is probably closer to 2.0-2.3 at this time.    Benign hypertension  BP is at goal.   Continue metoprolol succinate 25 mg daily.   HCTZ discontinued due to MARTY.     Electrolyte abnormality  Hypokalemia  K 3.6 today.   Continue K-Dur 20 meq daily    Sepsis (HCC)  Currently on cefazolin per ID and internal medicine for treatment of cellulitis.  Also on Valtrex for herpes zoster.    Cellulitis of flank  Concern for zoster rash vs other.   Currently on Valtrex.  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  S/p skin biopsy 1/23/2025.  Results pending.    Nephrostomy status (HCC)  Has bilateral PCN's.  Left PCN to be evaluated by IR     Malignant neoplasm of overlapping sites of bladder (HCC)  Defer to urology.    TODAY's DISCUSSION / PLAN:  Renal function improving but SCr still slightly above baseline.   Monitor off IVF.   Awaiting evaluation of L PCN by IR.     SUBJECTIVE / 24H INTERVAL HISTORY:  Awaiting PCN eval with IR.   No new acute complaints.     OBJECTIVE:  Current Weight: Weight - Scale: 77.9 kg (171 lb 11.8 oz)  Vitals:    01/27/25 2045 01/27/25 2048 01/27/25 2300 01/28/25 0900    BP: 115/59 122/65 130/57 127/58   BP Location: Right arm  Left arm Left arm   Pulse: 82 89 75 88   Resp:   18 16   Temp:   98 °F (36.7 °C) (!) 97.3 °F (36.3 °C)   TempSrc:   Oral Temporal   SpO2:   98% 100%   Weight:       Height:           Intake/Output Summary (Last 24 hours) at 1/28/2025 1121  Last data filed at 1/28/2025 1029  Gross per 24 hour   Intake 10 ml   Output 560 ml   Net -550 ml     General: conscious, cooperative, no distress  Skin: dry  Eyes: pink conjunctivae  ENT: moist mucous membranes  Respiratory: equal chest expansion, clear breath sounds.  Cardiovascular: distinct heart sounds, normal rate, regular rhythm, no rub  Abdomen: soft, non tender, non distended, normal bowel sounds  Extremities: + mild LE edema.   Neuro: awake, alert.   Psych: appropriate affect    Medications:    Current Facility-Administered Medications:     acetaminophen (TYLENOL) tablet 650 mg, 650 mg, Oral, Q6H PRN, Elinor Richard PA-C    aspirin chewable tablet 81 mg, 81 mg, Oral, Daily, Elinor Richard PA-C, 81 mg at 01/28/25 1028    atorvastatin (LIPITOR) tablet 40 mg, 40 mg, Oral, Daily With Dinner, Elinor Richard PA-C, 40 mg at 01/27/25 1810    ceFAZolin (ANCEF) IVPB (premix in dextrose) 1,000 mg 50 mL, 1,000 mg, Intravenous, Q12H, Verenice Murray MD, Last Rate: 100 mL/hr at 01/28/25 0301, 1,000 mg at 01/28/25 0301    heparin (porcine) subcutaneous injection 5,000 Units, 5,000 Units, Subcutaneous, Q8H [facility], Elinor Richard PA-C, 5,000 Units at 01/28/25 0602    [Held by provider] hydroCHLOROthiazide tablet 25 mg, 25 mg, Oral, QAM, Elinor Richard PA-C    influenza vaccine, high-dose (Fluzone High-Dose) IM injection 0.5 mL, 0.5 mL, Intramuscular, Prior to discharge, Nata Valentin DO    metoprolol succinate (TOPROL-XL) 24 hr tablet 25 mg, 25 mg, Oral, Elinor LEWIS PA-C, 25 mg at 01/28/25 1027    potassium chloride (Klor-Con M20) CR tablet 20 mEq, 20 mEq, Oral, Daily, Azael Thornton  "MD Haja, 20 mEq at 01/28/25 1027    saccharomyces boulardii (FLORASTOR) capsule 250 mg, 250 mg, Oral, BID, Nata Revbhumikaar, DO, 250 mg at 01/28/25 1027    sodium chloride (PF) 0.9 % injection 10 mL, 10 mL, Intracatheter, Daily, Elinor Richard PA-C, 10 mL at 01/28/25 1029    sodium chloride 0.9 % bolus 500 mL, 500 mL, Intravenous, Once, Nata Valentin DO    valACYclovir (VALTREX) tablet 1,000 mg, 1,000 mg, Oral, Q24H, Verenice Murray MD, 1,000 mg at 01/28/25 1032    Laboratory Results:  Results from last 7 days   Lab Units 01/28/25  0348 01/27/25  0651 01/26/25  0630 01/25/25  0602 01/24/25  2145 01/24/25  0639 01/23/25  2026 01/23/25  0559 01/22/25  1648   WBC Thousand/uL  --  8.16 7.92 9.96  --  7.77  --  13.27* 18.02*   HEMOGLOBIN g/dL  --  10.1* 10.6* 10.9*  --  11.5  --  11.6 13.5   HEMATOCRIT %  --  31.0* 32.4* 34.1*  --  34.5*  --  36.0 40.9   PLATELETS Thousands/uL  --  284 294 244  --  229  --  253 298   POTASSIUM mmol/L 3.6 3.2* 3.3* 3.4* 3.6 2.8* 3.2* 3.6 3.9   CHLORIDE mmol/L 112* 110* 106 108 105 104 106 110* 101   CO2 mmol/L 22 22 23 20* 21 21 19* 14* 16*   BUN mg/dL 65* 75* 80* 95* 101* 106* 108* 110* 119*   CREATININE mg/dL 2.53* 2.66* 2.95* 3.37* 3.63* 3.96* 4.28* 4.27* 5.11*   CALCIUM mg/dL 8.8 8.7 7.8* 8.2* 8.3* 7.9* 8.5 8.5 9.1   MAGNESIUM mg/dL  --  2.0 2.1 2.2  --  2.1  --  2.3 2.4   PHOSPHORUS mg/dL  --  2.9 3.3 3.0  --  3.3  --  4.2* 5.1*     Portions of the record may have been created with voice recognition software. Occasional wrong word or \"sound a like\" substitutions may have occurred due to the inherent limitations of voice recognition software. Read the chart carefully and recognize, using context, where substitutions have occurred.If you have any questions, please contact the dictating provider.    "

## 2025-01-28 NOTE — ASSESSMENT & PLAN NOTE
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  Pending IR tube check later today

## 2025-01-28 NOTE — PROGRESS NOTES
"Progress Note - Hospitalist   Name: Ning Chambers 84 y.o. female I MRN: [id number]  Unit/Bed#: [address] [place] [address] I Date of Admission: 1/22/2025   Date of Service: 1/28/2025 I Hospital Day: 6    Assessment & Plan  Sepsis (HCC)  Patient with hx of invasive bladder cancer not undergoing treatment, CKD IV, indwelling bilateral nephrostomy tubes, presents w/ generalized weakness, and new onset \"rash\" on her left flank and lower abdomen which patient states started after her last nephrostomy tube exchange on 01/07  POA - leukocytosis, tachycardia, tachypnea  CT A/P negative for infectious pathology  UCx w/ mixed contaminants  BCx negative   ID consulted during hospital course  Per ID sepsis likely due to cellulitis and possible herpes zoster   S/p Cefepime + Vancomycin; c/w Cefazolin & p.o. Valtrex through 01/29 to complete 7 day course   Cellulitis of flank  Patient endorsing \"rash\" on her L back/flank/buttocks and abdomen/groin which she states started after her last nephrostomy tube exchange 01/07  S/p biopsy by surgery and hypercoagulable workup ordered  Antithrombin III activity normal, protein C activity normal, protein as activity low, cryoglobulin pending  Follow-up VZV PCR  S/p Cefepime + Vancomycin; c/w Cefazolin & p.o. Valtrex through 01/29 to complete 7 day course   Coordinated plan of care on 01/23 w/ dermatology, nephrology, ID and also surgery & again w/ derm on 01/27   Per dermatology preliminary reading on biopsy shows viral change, herpetic looking which can be seen in zoster or herpes simplex.  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  Patient not interested and therefore never started  Acute kidney injury superimposed on chronic kidney disease  (HCC)  Lab Results   Component Value Date    EGFR 16 01/28/2025    EGFR 15 01/27/2025    EGFR 13 01/26/2025    " CREATININE 2.53 (H) 01/28/2025    CREATININE 2.66 (H) 01/27/2025    CREATININE 2.95 (H) 01/26/2025     Cr 5.11 on admit, baseline 2.0-2.3(CKD IV) follows w/ nephrology as outpatient; has expressed wishes to not proceed with dialysis in the past  Patient endorses minimal oral intake over the past week prior to admission  Noted to have high anion gap metabolic acidosis and was on IV sodium bicarbonate gtt which has been discontinued   Discontinued plasmalyte and monitor off fluids per nephrology.  Creatinine improving  PTA HCTZ on hold  Repeat lab work in a.m.  Nephrology consult appreciated  Lightheadedness  Per family patient reports feeling lightheaded/dizzy when getting out of bed or trying to work with therapy on 01/27   CT head (01/27):  Chronic microangiopathic changes and chronic infarctions  Stable chronic asymmetric left ventricular enlargement  No acute hydrocephalus.  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  Pending IR tube check later today  Malignant neoplasm of overlapping sites of bladder (HCC)  Hx of bladder cancer with retroperitoneal lymphadenopathy, not currently receiving treatment as patient wishes to avoid systemic treatment for her invasive bladder cancer.   Follows with palliative medicine as outpatient  Benign hypertension  BP acceptable   Hold HCTZ 2/2 MARTY  Continue Toprol XL with hold parameters  Electrolyte abnormality  Recent Labs     01/26/25  0630 01/27/25  0651 01/28/25  0348   K 3.3* 3.2* 3.6   MG 2.1 2.0  --       Improved, trend     VTE Pharmacologic Prophylaxis: VTE Score: 6 High Risk  (Score >/= 5) - Pharmacological DVT Prophylaxis Ordered: heparin. Sequential Compression Devices Ordered.    Mobility:   Basic Mobility Inpatient Raw Score: 10  JH-HLM Goal: 4: Move to chair/commode  JH-HLM Achieved: 1: Laying in bed  JH-HLM Goal NOT achieved. Continue with multidisciplinary rounding and encourage appropriate mobility to improve upon JH-HLM goals.    Patient Centered Rounds: I performed bedside rounds with nursing staff today.   Discussions with Specialists or Other Care Team Provider: Plan of care reviewed with infectious disease, nephrology, case management, nursing    Education and Discussions with Family / Patient: Updated  (daughter and son in law) via phone.    Current Length of Stay: 6 day(s)  Current Patient Status: Inpatient   Certification Statement: The patient will continue to require additional inpatient hospital stay due to pending IR tube check, pending rehab authorization  Discharge Plan: Anticipate discharge tomorrow to rehab    Code Status: Level 3 - DNAR and DNI    Subjective   Patient has no specific complaints.  She had a recent bowel movement yesterday.  She is tolerating p.o. intake.  She just mitts that she is very tired because everyone keeps waking her up.  She is noting no abnormal discomfort with nephrostomy tubes.    Objective :  Temp:  [97.2 °F (36.2 °C)-98 °F (36.7 °C)] 97.3 °F (36.3 °C)  HR:  [70-89] 88  BP: (115-130)/(57-65) 127/58  Resp:  [16-19] 16  SpO2:  [98 %-100 %] 100 %  O2 Device: None (Room air)    Body mass index is 31.41 kg/m².     Input and Output Summary (last 24 hours):     Intake/Output Summary (Last 24 hours) at 1/28/2025 1145  Last data filed at 1/28/2025 1029  Gross per 24 hour   Intake 10 ml   Output 560 ml   Net -550 ml       Physical Exam  Constitutional:       General: She is not in acute distress.     Appearance: She is not toxic-appearing.      Comments: Sleepy but subsequently arousable, frail   HENT:      Head: Normocephalic.       Right Ear: External ear normal.      Left Ear: External ear normal.      Nose: Nose normal.      Mouth/Throat:      Mouth: Mucous membranes are moist.      Pharynx: Oropharynx is clear.   Eyes:      Extraocular Movements: Extraocular movements intact.      Conjunctiva/sclera: Conjunctivae normal.   Cardiovascular:      Rate and Rhythm: Normal rate and regular rhythm.      Pulses: Normal pulses.      Heart sounds: Normal heart sounds.   Pulmonary:      Effort: Pulmonary effort is normal. No respiratory distress.      Breath sounds: Normal breath sounds. No wheezing or rales.   Abdominal:      General: Abdomen is flat. Bowel sounds are normal. There is no distension.      Palpations: Abdomen is soft.      Tenderness: There is no abdominal tenderness. There is no guarding.   Genitourinary:     Comments: Nephrostomy tubes with bags with brown/green drainage  Musculoskeletal:         General: Swelling (Trace lower extremity) present.   Skin:     General: Skin is warm and dry.      Coloration: Skin is not jaundiced.      Findings: No bruising or lesion.   Neurological:      General: No focal deficit present.      Mental Status: She is alert. Mental status is at baseline.   Psychiatric:         Mood and Affect: Mood normal.         Behavior: Behavior normal.           Lines/Drains:  Lines/Drains/Airways       Active Status       Name Placement date Placement time Site Days    Nephrostomy Left 10.2 Fr. 01/07/25  1124  Left  21    Nephrostomy Right 10.2 Fr. 01/07/25  1130  Right  21    Rectal Tube 01/25/25  1725  --  2                            Lab Results: I have reviewed the following results:   Results from last 7 days   Lab Units 01/27/25  0651 01/23/25  0559 01/22/25  1648   WBC Thousand/uL 8.16   < > 18.02*   HEMOGLOBIN g/dL 10.1*   < > 13.5   HEMATOCRIT % 31.0*   < > 40.9   PLATELETS Thousands/uL 284   < > 298   BANDS PCT %  --   --  1   LYMPHO PCT %  --   --  7*   MONO PCT %  --   --  1*   EOS PCT %  --   --  0     < > = values in this interval not displayed.     Results from last 7 days   Lab Units 01/28/25  0348 01/23/25  0559 01/22/25  1648   SODIUM mmol/L 143   < > 138   POTASSIUM mmol/L 3.6   < > 3.9   CHLORIDE mmol/L 112*   < > 101   CO2 mmol/L 22   < > 16*   BUN mg/dL 65*   < > 119*   CREATININE mg/dL 2.53*   < > 5.11*   ANION GAP mmol/L 9   < > 21*   CALCIUM mg/dL 8.8   < > 9.1   ALBUMIN g/dL  --   --  3.4*   TOTAL BILIRUBIN mg/dL  --   --  0.63   ALK PHOS U/L  --   --  49   ALT U/L  --   --  27   AST U/L  --   --  51*   GLUCOSE RANDOM mg/dL 94   < > 103    < > = values in this interval not displayed.                 Results from last 7 days   Lab Units 01/23/25  0559 01/22/25  1648   LACTIC ACID mmol/L  --  2.0   PROCALCITONIN ng/ml 2.27* 2.69*       Recent Cultures (last 7 days):   Results from last 7 days   Lab Units 01/24/25  2248 01/24/25  1753 01/23/25  1611 01/22/25  1648 01/22/25  1637 01/22/25  1046   BLOOD CULTURE   --   --   --  No Growth After 5 Days. No Growth After 5 Days.  --    GRAM STAIN RESULT   --  No Polys or Bacteria seen No Polys or Bacteria seen  --   --   --    URINE CULTURE   --   --   --   --   --  <10,000 cfu/ml   WOUND CULTURE   --  1+ Growth of  --   --   --   --    C DIFF TOXIN B BY PCR  Negative  --   --   --   --   --        Imaging Results Review: I reviewed radiology reports from this admission including: CT head.  Other Study Results Review: No additional pertinent studies reviewed.    Last 24 Hours Medication List:     Current Facility-Administered Medications:     acetaminophen (TYLENOL) tablet 650 mg, Q6H PRN    aspirin chewable tablet 81 mg, Daily    atorvastatin (LIPITOR) tablet 40 mg, Daily With Dinner    ceFAZolin (ANCEF) IVPB (premix in dextrose) 1,000 mg 50 mL, Q12H, Last Rate: 1,000 mg (01/28/25 0301)    heparin (porcine) subcutaneous injection 5,000 Units, Q8H [facility]    influenza vaccine, high-dose (Fluzone High-Dose) IM injection 0.5 mL, Prior to discharge    metoprolol  succinate (TOPROL-XL) 24 hr tablet 25 mg, QAM    potassium chloride (Klor-Con M20) CR tablet 20 mEq, Daily    saccharomyces boulardii (FLORASTOR) capsule 250 mg, BID    sodium chloride (PF) 0.9 % injection 10 mL, Daily    valACYclovir (VALTREX) tablet 1,000 mg, Q24H    Administrative Statements   Today, Patient Was Seen By: Trina Pandey PA-C  I have spent a total time of 45 minutes in caring for this patient on the day of the visit/encounter including Diagnostic results, Patient and family education, Importance of tx compliance, Impressions, Counseling / Coordination of care, Documenting in the medical record, Reviewing / ordering tests, medicine, procedures  , Obtaining or reviewing history  , and Communicating with other healthcare professionals .    **Please Note: This note may have been constructed using a voice recognition system.**

## 2025-01-28 NOTE — PROGRESS NOTES
Progress Note - Infectious Disease   Name: Ning Chambers 84 y.o. female I MRN: [id number]  Unit/Bed#: [address] [address] I Date of Admission: 1/22/2025   Date of Service: 1/28/2025 I Hospital Day: 6    Assessment & Plan  Sepsis (HCC)  Leukocytosis, tachycardia, tachypnea.  Concern for left flank/abdominal cellulitis.  Lab and vital sign derangements may also be reactive due to dehydration from poor p.o. intake.  UA shows 4-10 WBCs and she has no symptoms of urinary infection so no evidence of a UTI.  Urine culture <10K mixed contaminants.  CT A/P without source of infection.  Scabbed lesions on the left flank remain but surrounding erythema improving   -Continue IV cefazolin and p.o. Valtrex renally dose adjusted.  Continue treatment x 7 days, through 1/29/2025   -If patient is discharged prior to completion of antibiotics, IV cefazolin can be transitioned to p.o. Keflex   -Repeat CBC tomorrow to monitor treatment response   -Monitor fever curve, vital signs  Cellulitis of flank  The patient reported a rash over the left buttocks extending to the anterior abdomen and mons pubis.  There is erythema of the area as well as hemorrhagic lesions, some denuded vesicles.  Consideration for herpes zoster with superimposed bacterial cellulitis.  Dermatology consulted, concern for hypercoagulable state and possible reniform purpura versus calciphylaxis versus hemorrhagic bullous cellulitis and vasculitis.  Erythema is improving and other skin lesions are now scabbing.  Skin biopsy is growing Clostridium tertium which likely represents fecal contamination/superficial colonization   -Continue Valtrex and cefazolin, renally dose adjusted to complete 7 days total   -Monitor rash   -Continue local wound care   -Dermatology follow-up   -Follow-up VZV PCR   -Follow-up skin biopsy   Malignant neoplasm of overlapping sites of bladder (HCC)  Patient has history of bladder cancer with retroperitoneal lymphadenopathy and is not currently  receiving any cancer directed treatment.  She does follow with palliative care as an outpatient.   -Recommend continued outpatient follow-up  Nephrostomy status (HCC)  Bilateral nephrostomy tubes in place due to obstructive uropathy from invasive bladder cancer.  Follows with urology, nephrology, palliative care.  Nephrostomy tubes exchanged 1/7.  Continue to monitor urine output.  IR consulted for left nephrostomy evaluation  Acute kidney injury superimposed on chronic kidney disease  (HCC)  Lab Results   Component Value Date    EGFR 16 01/28/2025    EGFR 15 01/27/2025    EGFR 13 01/26/2025    CREATININE 2.53 (H) 01/28/2025    CREATININE 2.66 (H) 01/27/2025    CREATININE 2.95 (H) 01/26/2025   Creatinine elevated to 5.11 from baseline of 2.7-3.  Likely prerenal due to poor p.o. intake.  Follows with nephrology as an outpatient.  Creatinine now improved to baseline   -Dose adjust antimicrobials for creatinine clearance   -Nephrology following   -Monitor creatinine    Above management plan to continue current antimicrobials discussed with the PANCHO Mena. Discussed with the patient at discharge. ID will follow.    Antibiotics:  IV cefazolin  P.o. Valtrex  Antimicrobials day 6    Subjective   The patient is feeling okay. Reports some pain on the right hip from her fall at home. No fever, chills, abdominal pain. No pain from her skin lesions. Erythema improving.    Objective :  Temp:  [97.2 °F (36.2 °C)-98 °F (36.7 °C)] 97.3 °F (36.3 °C)  HR:  [70-89] 88  BP: (115-130)/(57-65) 127/58  Resp:  [16-19] 16  SpO2:  [98 %-100 %] 100 %  O2 Device: None (Room air)    General: Chronically ill-appearing but no acute distress  Psychiatric:  Awake and alert  Cardiac: RRR, no murmurs  Pulmonary:  Normal respiratory excursion without accessory muscle use  Abdomen:  Soft, nontender, nondistended  Back: Bilateral percutaneous nephrostomy tubes in place  Extremities:  No edema  Skin: Rash extending from left flank and buttocks over  anterior abdomen to the mons pubis.  Lesions are now mostly scabbed although some wounds near the mid abdomen remain open.  Erythema improving      Lab Results: I have reviewed the following results:  Results from last 7 days   Lab Units 01/27/25  0651 01/26/25  0630 01/25/25  0602   WBC Thousand/uL 8.16 7.92 9.96   HEMOGLOBIN g/dL 10.1* 10.6* 10.9*   PLATELETS Thousands/uL 284 294 244     Results from last 7 days   Lab Units 01/28/25  0348 01/27/25  0651 01/26/25  0630 01/23/25  0559 01/22/25  1648   SODIUM mmol/L 143 141 141   < > 138   POTASSIUM mmol/L 3.6 3.2* 3.3*   < > 3.9   CHLORIDE mmol/L 112* 110* 106   < > 101   CO2 mmol/L 22 22 23   < > 16*   BUN mg/dL 65* 75* 80*   < > 119*   CREATININE mg/dL 2.53* 2.66* 2.95*   < > 5.11*   EGFR ml/min/1.73sq m 16 15 13   < > 7   CALCIUM mg/dL 8.8 8.7 7.8*   < > 9.1   AST U/L  --   --   --   --  51*   ALT U/L  --   --   --   --  27   ALK PHOS U/L  --   --   --   --  49   ALBUMIN g/dL  --   --   --   --  3.4*    < > = values in this interval not displayed.     Results from last 7 days   Lab Units 01/25/25  1030 01/24/25  2248 01/24/25  1753 01/23/25  1611 01/22/25  1648 01/22/25  1637 01/22/25  1046   BLOOD CULTURE   --   --   --   --  No Growth After 5 Days. No Growth After 5 Days.  --    GRAM STAIN RESULT   --   --  No Polys or Bacteria seen No Polys or Bacteria seen  --   --   --    URINE CULTURE   --   --   --   --   --   --  <10,000 cfu/ml   WOUND CULTURE   --   --  1+ Growth of  --   --   --   --    MRSA CULTURE ONLY  No Methicillin Resistant Staphlyococcus aureus (MRSA) isolated  --   --   --   --   --   --    C DIFF TOXIN B BY PCR   --  Negative  --   --   --   --   --      Results from last 7 days   Lab Units 01/23/25  0559 01/22/25  1648   PROCALCITONIN ng/ml 2.27* 2.69*

## 2025-01-29 ENCOUNTER — APPOINTMENT (INPATIENT)
Dept: NON INVASIVE DIAGNOSTICS | Facility: HOSPITAL | Age: 85
DRG: 872 | End: 2025-01-29
Attending: RADIOLOGY
Payer: COMMERCIAL

## 2025-01-29 LAB
ANION GAP SERPL CALCULATED.3IONS-SCNC: 8 MMOL/L (ref 4–13)
BUN SERPL-MCNC: 51 MG/DL (ref 5–25)
CALCIUM SERPL-MCNC: 8.3 MG/DL (ref 8.4–10.2)
CHLORIDE SERPL-SCNC: 113 MMOL/L (ref 96–108)
CO2 SERPL-SCNC: 22 MMOL/L (ref 21–32)
CREAT SERPL-MCNC: 2.35 MG/DL (ref 0.6–1.3)
ERYTHROCYTE [DISTWIDTH] IN BLOOD BY AUTOMATED COUNT: 12.8 % (ref 11.6–15.1)
GFR SERPL CREATININE-BSD FRML MDRD: 18 ML/MIN/1.73SQ M
GLUCOSE SERPL-MCNC: 122 MG/DL (ref 65–140)
HCT VFR BLD AUTO: 33.5 % (ref 34.8–46.1)
HGB BLD-MCNC: 10.4 G/DL (ref 11.5–15.4)
MAGNESIUM SERPL-MCNC: 1.8 MG/DL (ref 1.9–2.7)
MCH RBC QN AUTO: 27.4 PG (ref 26.8–34.3)
MCHC RBC AUTO-ENTMCNC: 31 G/DL (ref 31.4–37.4)
MCV RBC AUTO: 88 FL (ref 82–98)
PLATELET # BLD AUTO: 325 THOUSANDS/UL (ref 149–390)
PMV BLD AUTO: 10.8 FL (ref 8.9–12.7)
POTASSIUM SERPL-SCNC: 3.9 MMOL/L (ref 3.5–5.3)
RBC # BLD AUTO: 3.79 MILLION/UL (ref 3.81–5.12)
SODIUM SERPL-SCNC: 143 MMOL/L (ref 135–147)
WBC # BLD AUTO: 8.58 THOUSAND/UL (ref 4.31–10.16)

## 2025-01-29 PROCEDURE — 97116 GAIT TRAINING THERAPY: CPT

## 2025-01-29 PROCEDURE — 49424 ASSESS CYST CONTRAST INJECT: CPT

## 2025-01-29 PROCEDURE — 97530 THERAPEUTIC ACTIVITIES: CPT

## 2025-01-29 PROCEDURE — 99232 SBSQ HOSP IP/OBS MODERATE 35: CPT | Performed by: INTERNAL MEDICINE

## 2025-01-29 PROCEDURE — 83735 ASSAY OF MAGNESIUM: CPT | Performed by: PHYSICIAN ASSISTANT

## 2025-01-29 PROCEDURE — G0545 PR INHERENT VISIT TO INPT: HCPCS | Performed by: STUDENT IN AN ORGANIZED HEALTH CARE EDUCATION/TRAINING PROGRAM

## 2025-01-29 PROCEDURE — 80048 BASIC METABOLIC PNL TOTAL CA: CPT | Performed by: INTERNAL MEDICINE

## 2025-01-29 PROCEDURE — 76080 X-RAY EXAM OF FISTULA: CPT

## 2025-01-29 PROCEDURE — 50431 NJX PX NFROSGRM &/URTRGRM: CPT | Performed by: RADIOLOGY

## 2025-01-29 PROCEDURE — 99233 SBSQ HOSP IP/OBS HIGH 50: CPT | Performed by: STUDENT IN AN ORGANIZED HEALTH CARE EDUCATION/TRAINING PROGRAM

## 2025-01-29 PROCEDURE — 85027 COMPLETE CBC AUTOMATED: CPT | Performed by: PHYSICIAN ASSISTANT

## 2025-01-29 RX ORDER — MAGNESIUM SULFATE HEPTAHYDRATE 40 MG/ML
2 INJECTION, SOLUTION INTRAVENOUS ONCE
Status: COMPLETED | OUTPATIENT
Start: 2025-01-29 | End: 2025-01-29

## 2025-01-29 RX ADMIN — ASPIRIN 81 MG CHEWABLE TABLET 81 MG: 81 TABLET CHEWABLE at 08:17

## 2025-01-29 RX ADMIN — POTASSIUM CHLORIDE 20 MEQ: 1500 TABLET, EXTENDED RELEASE ORAL at 08:17

## 2025-01-29 RX ADMIN — IOHEXOL 8 ML: 350 INJECTION, SOLUTION INTRAVENOUS at 09:53

## 2025-01-29 RX ADMIN — ATORVASTATIN CALCIUM 40 MG: 40 TABLET, FILM COATED ORAL at 17:07

## 2025-01-29 RX ADMIN — VALACYCLOVIR HYDROCHLORIDE 1000 MG: 500 TABLET, FILM COATED ORAL at 10:22

## 2025-01-29 RX ADMIN — MAGNESIUM SULFATE HEPTAHYDRATE 2 G: 40 INJECTION, SOLUTION INTRAVENOUS at 14:36

## 2025-01-29 RX ADMIN — METOPROLOL SUCCINATE 25 MG: 25 TABLET, FILM COATED, EXTENDED RELEASE ORAL at 08:17

## 2025-01-29 RX ADMIN — HEPARIN SODIUM 5000 UNITS: 5000 INJECTION, SOLUTION INTRAVENOUS; SUBCUTANEOUS at 21:17

## 2025-01-29 RX ADMIN — CEFAZOLIN SODIUM 1000 MG: 1 SOLUTION INTRAVENOUS at 13:49

## 2025-01-29 RX ADMIN — Medication 250 MG: at 17:07

## 2025-01-29 RX ADMIN — HEPARIN SODIUM 5000 UNITS: 5000 INJECTION, SOLUTION INTRAVENOUS; SUBCUTANEOUS at 13:49

## 2025-01-29 RX ADMIN — HEPARIN SODIUM 5000 UNITS: 5000 INJECTION, SOLUTION INTRAVENOUS; SUBCUTANEOUS at 05:51

## 2025-01-29 RX ADMIN — SODIUM CHLORIDE, PRESERVATIVE FREE 10 ML: 5 INJECTION INTRAVENOUS at 08:17

## 2025-01-29 RX ADMIN — Medication 250 MG: at 08:17

## 2025-01-29 RX ADMIN — CEFAZOLIN SODIUM 1000 MG: 1 SOLUTION INTRAVENOUS at 03:27

## 2025-01-29 NOTE — ASSESSMENT & PLAN NOTE
"Patient with hx of invasive bladder cancer not undergoing treatment, CKD IV, indwelling bilateral nephrostomy tubes, presents w/ generalized weakness, and new onset \"rash\" on her left flank and lower abdomen which patient states started after her last nephrostomy tube exchange on 01/07  POA - leukocytosis, tachycardia, tachypnea  CT A/P negative for infectious pathology  UCx w/ mixed contaminants  BCx negative   ID consulted during hospital course  Per ID sepsis likely due to cellulitis and herpes zoster   S/p Cefepime + Vancomycin; c/w Cefazolin & p.o. Valtrex through 01/29 to complete 7 day course   "

## 2025-01-29 NOTE — PHYSICAL THERAPY NOTE
"   PT TREATMENT     25 2516   PT Last Visit   PT Visit Date 25   Note Type   Note Type Treatment   Pain Assessment   Pain Assessment Tool 0-10   Pain Score No Pain   Restrictions/Precautions   Other Precautions Airborne/isolation;Contact/isolation;Fall Risk;Bed Alarm;Chair Alarm  (nephrostomy tubes;rectal tube)   General   Chart Reviewed Yes   Family/Caregiver Present No   Cognition   Overall Cognitive Status WFL   Arousal/Participation Cooperative   Attention Within functional limits   Orientation Level Oriented X4   Memory Within functional limits   Following Commands Follows one step commands with increased time or repetition   Comments At least 2 pt identifiers including name and    Subjective   Subjective \"I just want to go home\"   Bed Mobility   Supine to Sit 3  Moderate assistance   Additional items Assist x 1;Verbal cues;Increased time required;LE management;HOB elevated;Bedrails  (trunk management)   Sit to Supine 3  Moderate assistance   Additional items Assist x 1;Verbal cues;Increased time required;LE management;Bedrails  (trunk management)   Transfers   Sit to Stand 3  Moderate assistance   Additional items Assist x 1;Verbal cues;Increased time required  (pt pulling up on RW)   Stand to Sit 3  Moderate assistance   Additional items Assist x 1;Verbal cues;Increased time required  (pt holding onto RW)   Additional Comments Pt stood for approx 3-4 mins while PT performed hygiene;despite rectal tube, pt incontient of loose BM - GUANAKO Reyes made aware   Ambulation/Elevation   Gait pattern Short stride;Step to;Foward flexed;Decreased foot clearance   Gait Assistance 3  Moderate assist   Additional items Assist x 1;Verbal cues;Tactile cues   Assistive Device Rolling walker   Distance 3-4 steps to head of bed   Balance   Static Sitting Fair   Static Standing Fair -  (w RW)   Ambulatory Poor +  (w RW)   Endurance Deficit   Endurance Deficit Yes   Endurance Deficit Description Limited activity, " "sitting, standing and gait endurance;self reported fatigue - \"I can't sit for very long\"   Activity Tolerance   Activity Tolerance Patient limited by fatigue;Treatment limited secondary to medical complications (Comment)  (weakness and deconditioning)   Nurse Made Aware GUANAKO Reyes   Assessment   Problem List Decreased strength;Decreased endurance;Impaired balance;Decreased mobility;Decreased safety awareness;Pain  (BM incontinence)   Assessment Pt agreeable to PT session. Pt needs mod A for supine <--> sit, mod A for transfers to standing with RW, able to stand for ~3-4 mins, and able to amb a few steps (3-4). While adm, pt will cont to benefit from skilled PT services to cont to increase pt's strength, balance, endurance, safe mobility and gait with a RW. Pt remains appropriate for Level II Moderate Resource Intensity when medically stable for dc.    The patient's AM-PAC Basic Mobility Inpatient Short Form Raw Score is 11. A Raw score of less than or equal to 16 suggests the patient may benefit from discharge to post-acute rehabilitation services. Please also refer to the recommendation of the Physical Therapist for safe discharge planning.   Goals   Patient Goals to go home   STG Expiration Date 01/31/25   Short Term Goal #1 1. improve bed mobility to independent, 2. pt will transfer with min assist, 3. pt will ambulate with a walker with min assist 50 feet   LTG Expiration Date 02/07/25   Long Term Goal #1 1. no falls, 2. supervision ambulation with a walker 100 feet, supervision up and donwn 3 steps   Plan   Treatment/Interventions ADL retraining;Functional transfer training;LE strengthening/ROM;Therapeutic exercise;Endurance training;Patient/family training;Equipment eval/education;Bed mobility;Gait training;Compensatory technique education;Elevations;Spoke to nursing;Spoke to case management   PT Frequency 3-5x/wk   Discharge Recommendation   Rehab Resource Intensity Level, PT II (Moderate Resource Intensity) "   AM-PAC Basic Mobility Inpatient   Turning in Flat Bed Without Bedrails 2   Lying on Back to Sitting on Edge of Flat Bed Without Bedrails 2   Moving Bed to Chair 2   Standing Up From Chair Using Arms 2   Walk in Room 2   Climb 3-5 Stairs With Railing 1   Basic Mobility Inpatient Raw Score 11   Basic Mobility Standardized Score 30.25   [facility] Highest Level Of Mobility   -[facility] Goal 4: Move to chair/commode   -[facility] Achieved 5: Stand (1 or more minutes)   Education   Education Provided Mobility training;Assistive device   Patient Reinforcement needed   End of Consult   Patient Position at End of Consult Supine;All needs within reach;Bed/Chair alarm activated   Licensure   [place] License Number  Tamera Mclain, PT [id number]

## 2025-01-29 NOTE — CASE MANAGEMENT
[facility] has received APPROVED authorization.  Insurance:   [employer]   Auth obtained via portal.  Authorization received for: [facility]  Facility:  [facility]   Authorization #: [id number]   Start of Care: 1/30  Next Review Date: 2/5  Continued Stay Care Coordinator:  n/a  Submit next review to: [phone]     Care Manager notified: Carmen Chapman    Please reach out to CM for updates on any clinical information.

## 2025-01-29 NOTE — PLAN OF CARE
Problem: Prexisting or High Potential for Compromised Skin Integrity  Goal: Skin integrity is maintained or improved  Description: INTERVENTIONS:  - Identify patients at risk for skin breakdown  - Assess and monitor skin integrity  - Assess and monitor nutrition and hydration status  - Monitor labs   - Assess for incontinence   - Turn and reposition patient  - Assist with mobility/ambulation  - Relieve pressure over bony prominences  - Avoid friction and shearing  - Provide appropriate hygiene as needed including keeping skin clean and dry  - Evaluate need for skin moisturizer/barrier cream  - Collaborate with interdisciplinary team   - Patient/family teaching  - Consider wound care consult   Outcome: Progressing     Problem: PAIN - ADULT  Goal: Verbalizes/displays adequate comfort level or baseline comfort level  Description: Interventions:  - Encourage patient to monitor pain and request assistance  - Assess pain using appropriate pain scale  - Administer analgesics based on type and severity of pain and evaluate response  - Implement non-pharmacological measures as appropriate and evaluate response  - Consider cultural and social influences on pain and pain management  - Notify physician/advanced practitioner if interventions unsuccessful or patient reports new pain  Outcome: Progressing     Problem: INFECTION - ADULT  Goal: Absence or prevention of progression during hospitalization  Description: INTERVENTIONS:  - Assess and monitor for signs and symptoms of infection  - Monitor lab/diagnostic results  - Monitor all insertion sites, i.e. indwelling lines, tubes, and drains  - Monitor endotracheal if appropriate and nasal secretions for changes in amount and color  - [place] appropriate cooling/warming therapies per order  - Administer medications as ordered  - Instruct and encourage patient and family to use good hand hygiene technique  - Identify and instruct in appropriate isolation precautions for  identified infection/condition  Outcome: Progressing  Goal: Absence of fever/infection during neutropenic period  Description: INTERVENTIONS:  - Monitor WBC    Outcome: Progressing     Problem: SAFETY ADULT  Goal: Patient will remain free of falls  Description: INTERVENTIONS:  - Educate patient/family on patient safety including physical limitations  - Instruct patient to call for assistance with activity   - Consult OT/PT to assist with strengthening/mobility   - Keep Call bell within reach  - Keep bed low and locked with side rails adjusted as appropriate  - Keep care items and personal belongings within reach  - Initiate and maintain comfort rounds  - Make Fall Risk Sign visible to staff  - Offer Toileting every 2 Hours, in advance of need  - Initiate/Maintain bed alarm  - Obtain necessary fall risk management equipment: nonskid socks  - Apply yellow socks and bracelet for high fall risk patients  - Consider moving patient to room near nurses station  Outcome: Progressing  Goal: Maintain or return to baseline ADL function  Description: INTERVENTIONS:  -  Assess patient's ability to carry out ADLs; assess patient's baseline for ADL function and identify physical deficits which impact ability to perform ADLs (bathing, care of mouth/teeth, toileting, grooming, dressing, etc.)  - Assess/evaluate cause of self-care deficits   - Assess range of motion  - Assess patient's mobility; develop plan if impaired  - Assess patient's need for assistive devices and provide as appropriate  - Encourage maximum independence but intervene and supervise when necessary  - Involve family in performance of ADLs  - Assess for home care needs following discharge   - Consider OT consult to assist with ADL evaluation and planning for discharge  - Provide patient education as appropriate  Outcome: Progressing  Goal: Maintains/Returns to pre admission functional level  Description: INTERVENTIONS:  - Perform AM-PAC 6 Click Basic Mobility/  Daily Activity assessment daily.  - Set and communicate daily mobility goal to care team and patient/family/caregiver.   - Collaborate with rehabilitation services on mobility goals if consulted  - Perform Range of Motion 3 times a day.  - Reposition patient every 2 hours.  - Dangle patient 3 times a day  - Stand patient 3 times a day  - Ambulate patient 2 times a day  - Out of bed to chair 3 times a day   - Out of bed for meals 3 times a day  - Out of bed for toileting  - Record patient progress and toleration of activity level   Outcome: Progressing     Problem: DISCHARGE PLANNING  Goal: Discharge to home or other facility with appropriate resources  Description: INTERVENTIONS:  - Identify barriers to discharge w/patient and caregiver  - Arrange for needed discharge resources and transportation as appropriate  - Identify discharge learning needs (meds, wound care, etc.)  - Arrange for interpretive services to assist at discharge as needed  - Refer to Case Management Department for coordinating discharge planning if the patient needs post-hospital services based on physician/advanced practitioner order or complex needs related to functional status, cognitive ability, or social support system  Outcome: Progressing     Problem: Knowledge Deficit  Goal: Patient/family/caregiver demonstrates understanding of disease process, treatment plan, medications, and discharge instructions  Description: Complete learning assessment and assess knowledge base.  Interventions:  - Provide teaching at level of understanding  - Provide teaching via preferred learning methods  Outcome: Progressing     Problem: GASTROINTESTINAL - ADULT  Goal: Minimal or absence of nausea and/or vomiting  Description: INTERVENTIONS:  - Administer IV fluids if ordered to ensure adequate hydration  - Maintain NPO status until nausea and vomiting are resolved  - Nasogastric tube if ordered  - Administer ordered antiemetic medications as needed  - Provide  nonpharmacologic comfort measures as appropriate  - Advance diet as tolerated, if ordered  - Consider nutrition services referral to assist patient with adequate nutrition and appropriate food choices  Outcome: Progressing  Goal: Maintains or returns to baseline bowel function  Description: INTERVENTIONS:  - Assess bowel function  - Encourage oral fluids to ensure adequate hydration  - Administer IV fluids if ordered to ensure adequate hydration  - Administer ordered medications as needed  - Encourage mobilization and activity  - Consider nutritional services referral to assist patient with adequate nutrition and appropriate food choices  Outcome: Progressing  Goal: Maintains adequate nutritional intake  Description: INTERVENTIONS:  - Monitor percentage of each meal consumed  - Identify factors contributing to decreased intake, treat as appropriate  - Assist with meals as needed  - Monitor I&O, weight, and lab values if indicated  - Obtain nutrition services referral as needed  Outcome: Progressing  Goal: Establish and maintain optimal ostomy function  Description: INTERVENTIONS:  - Assess bowel function  - Encourage oral fluids to ensure adequate hydration  - Administer IV fluids if ordered to ensure adequate hydration   - Administer ordered medications as needed  - Encourage mobilization and activity  - Nutrition services referral to assist patient with appropriate food choices  - Assess stoma site  - Consider wound care consult   Outcome: Progressing  Goal: Oral mucous membranes remain intact  Description: INTERVENTIONS  - Assess oral mucosa and hygiene practices  - Implement preventative oral hygiene regimen  - Implement oral medicated treatments as ordered  - Initiate Nutrition services referral as needed  Outcome: Progressing     Problem: GENITOURINARY - ADULT  Goal: Maintains or returns to baseline urinary function  Description: INTERVENTIONS:  - Assess urinary function  - Encourage oral fluids to ensure  adequate hydration if ordered  - Administer IV fluids as ordered to ensure adequate hydration  - Administer ordered medications as needed  - Offer frequent toileting  - Follow urinary retention protocol if ordered  Outcome: Progressing  Goal: Absence of urinary retention  Description: INTERVENTIONS:  - Assess patient’s ability to void and empty bladder  - Monitor I/O  - Bladder scan as needed  - Discuss with physician/AP medications to alleviate retention as needed  - Discuss catheterization for long term situations as appropriate  Outcome: Progressing  Goal: Urinary catheter remains patent  Description: INTERVENTIONS:  - Assess patency of urinary catheter  - If patient has a chronic posadas, consider changing catheter if non-functioning  - Follow guidelines for intermittent irrigation of non-functioning urinary catheter  Outcome: Progressing

## 2025-01-29 NOTE — CASE MANAGEMENT
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, 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 ([facility]) 2024     Obstructive uropathy 2024     Class 1 obesity due to excess calories with serious comorbidity and body mass index (BMI) of 34.0 to 34.9 in adult 2024     Advanced directives, counseling/discussion 2024     Palliative care by specialist 2024     Bilateral leg pain 2024     Ambulatory dysfunction 2024     Stage 4 chronic kidney disease (HCC) 2024     Stage 5 chronic kidney disease 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) 2024     Morbid obesity due to excess calories ([facility]) 2024     Nephrostomy status ([facility]) 2023     Sensorineural hearing loss (SNHL), bilateral 2022     Sensorineural hearing loss (SNHL) of right ear with restricted hearing of left ear 2022     Goals of care, counseling/discussion 05/10/2022     Malignant neoplasm of overlapping sites of bladder ([facility]) 2021     Stroke ([facility]) 10/29/2021     Hypokalemia 10/28/2021     Wrist drop 10/28/2021       LOS (days): 7  Geometric Mean LOS (GMLOS) (days): 3.5  Days to GMLOS:-3.3     OBJECTIVE:  Risk of Unplanned Readmission Score: 19.39         Current admission status: Inpatient   Preferred Pharmacy:   [facility] - [place] - [address]  [address]  [place] [address]  Phone:  [phone] Fax: [phone]    Primary Care Provider: BHAVIK Morillo    [facility]  Secondary Insurance:     DISCHARGE DETAILS:    Discharge planning discussed with:: Patient, Son Alexsander     CM contacted family/caregiver?: Yes (Son aware of bed at [facility], auth approval, and planned discharge tomorrow.)    Contacts  Patient Contacts: Rodrigo Chambers (son)  Relationship to Patient:: Family  Contact Method: Phone  Phone Number: [phone]  Reason/Outcome: Emergency Contact, Continuity of Care, Discharge Planning    Other Referral/Resources/Interventions Provided:  Referral Comments: SW confirmed with [facility] bed is available tomorrow. CMDS tasked for auth. Auth was auto-approved. Information sent to [facility].     Treatment Team Recommendation: Short Term Rehab  Discharge Destination Plan:: Short Term Rehab

## 2025-01-29 NOTE — BRIEF OP NOTE (RAD/CATH)
INTERVENTIONAL RADIOLOGY PROCEDURE NOTE    Date: 1/29/2025    Procedure: IR NEPHROSTOMY TUBE CHECK/CHANGE/REPOSITION/REINSERTION/UPSIZE     Preoperative diagnosis:   1. MARTY (acute kidney injury) (HCC)    2. Sepsis (HCC)    3. Cellulitis    4. UTI (urinary tract infection)    5. Stage 4 chronic kidney disease (HCC)    6. Cellulitis of flank    7. Severe sepsis (HCC)    8. Rash    9. Nephrostomy status (HCC)         Postoperative diagnosis: Same.    Surgeon: Chanelle Jones MD     Assistant: None. No qualified resident was available.    Blood loss: None    Specimens: None    Findings: Existing nephrostomy catheter in good position in the left kidney.  Sutures intact.    Complications: None immediate.    Anesthesia: none

## 2025-01-29 NOTE — ASSESSMENT & PLAN NOTE
The patient reported a rash over the left buttocks extending to the anterior abdomen and mons pubis.  There is erythema of the area as well as hemorrhagic lesions, some denuded vesicles.  Concern for herpes zoster with superimposed bacterial cellulitis.  Dermatology consulted.  Status post skin biopsy 1/23.  VZV PCR is positive and pathology findings are also consistent with herpes zoster.  Skin culture is Clostridium tertium which likely represents fecal contamination/superficial colonization. Erythema is improving and other skin lesions are now scabbing.     -Complete 7-day course of Valtrex and cefazolin today   -Monitor rash   -Continue local wound care   -Maintain contact precautions until all lesions are crusted

## 2025-01-29 NOTE — ASSESSMENT & PLAN NOTE
Lab Results   Component Value Date    EGFR 16 01/28/2025    EGFR 15 01/27/2025    EGFR 13 01/26/2025    CREATININE 2.53 (H) 01/28/2025    CREATININE 2.66 (H) 01/27/2025    CREATININE 2.95 (H) 01/26/2025     Cr 5.11 on admit, baseline 2.0-2.3 (CKD IV) follows w/ nephrology as outpatient; has expressed wishes to not proceed with dialysis in the past  Patient endorses minimal oral intake over the past week prior to admission  Noted to have high anion gap metabolic acidosis and was on IV sodium bicarbonate gtt which has been discontinued   PTA HCTZ on hold  Creatinine improving 2.35 today. Monitor off IVF  Nephrology consult appreciated

## 2025-01-29 NOTE — ASSESSMENT & PLAN NOTE
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  Follows with urology, nephrology, palliative care.  Nephrostomy tubes exchanged 1/7.  Continue to monitor urine output.  Status post left nephrostomy tube check 1/29.

## 2025-01-29 NOTE — ASSESSMENT & PLAN NOTE
Per family patient reports feeling lightheaded/dizzy when getting out of bed or trying to work with therapy on 01/27   CT head (01/27):  Chronic microangiopathic changes and chronic infarctions  Stable chronic asymmetric left ventricular enlargement  No acute hydrocephalus.  Orthostatics positive 1/28, was started on IVF. Now discontinued  Compression stockings  Repeat orthostatics daily  No acute complaints

## 2025-01-29 NOTE — SEDATION DOCUMENTATION
IR nephrostomy tube check completed. New dressing and bag applied, patient returned to inpatient room via bed.

## 2025-01-29 NOTE — ASSESSMENT & PLAN NOTE
Leukocytosis, tachycardia, tachypnea.  Likely due to left flank zoster with superimposed bacterial cellulitis.  Lab and vital sign derangements may also be reactive due to dehydration from poor p.o. intake.  UA shows 4-10 WBCs and she has no symptoms of urinary infection so no evidence of a UTI.  Urine culture <10K mixed contaminants.  CT A/P without source of infection.  Skin lesions are now crusting and surrounding erythema improved   -Continue IV cefazolin and p.o. Valtrex renally dose adjusted through today to complete 7 days total   -Repeat CBC to monitor treatment response   -Monitor fever curve, vital signs

## 2025-01-29 NOTE — ASSESSMENT & PLAN NOTE
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  1/29/25 IR Nephrostomy tube check - Existing nephrostomy catheter in good position in the left kidney. Sutures intact.

## 2025-01-29 NOTE — ASSESSMENT & PLAN NOTE
Lab Results   Component Value Date    EGFR 18 01/29/2025    EGFR 16 01/28/2025    EGFR 15 01/27/2025    CREATININE 2.35 (H) 01/29/2025    CREATININE 2.53 (H) 01/28/2025    CREATININE 2.66 (H) 01/27/2025   Creatinine elevated to 5.11 from baseline of 2.7-3.  Likely prerenal due to poor p.o. intake.  Follows with nephrology as an outpatient.  Creatinine now improved to baseline   -Dose adjust antimicrobials for creatinine clearance   -Nephrology following   -Monitor creatinine

## 2025-01-29 NOTE — ASSESSMENT & PLAN NOTE
"Patient endorsing \"rash\" on her L back/flank/buttocks and abdomen/groin which she states started after her last nephrostomy tube exchange 01/07  S/p biopsy by surgery and hypercoagulable workup ordered  Antithrombin III activity normal, protein C activity normal, protein as activity low, cryoglobulin pending  Surgical skin biopsy histopathologic findings compatible with viral exanthem by a herpesvirus, particularly varicella-zoster virus (VZV)   VZV PCR positive   S/p Cefepime + Vancomycin; c/w Cefazolin & p.o. Valtrex through 01/29 to complete 7 day course   Coordinated plan of care on 01/23 w/ dermatology, nephrology, ID and also surgery & again w/ derm on 01/27  Patient can follow-up outpatient with dermatology and can continue Valtrex  "

## 2025-01-29 NOTE — CASE MANAGEMENT
[facility] received request for authorization from Care Manager.  Authorization request submitted for: [facility]  Facility Name: [facility]  NPI: [id number]  Facility MD: Dr. Pond  NPI: [id number]  Authorization initiated by contacting insurance:  [employer]  Via: [facility] Portal   Clinicals submitted via [facility] attachment   Pending Reference #: [id number]    Care Manager notified: Carmen Chapman    Updates to authorization status will be noted in chart. Please reach out to CM for updates on any clinical information.

## 2025-01-29 NOTE — PLAN OF CARE
Problem: Prexisting or High Potential for Compromised Skin Integrity  Goal: Skin integrity is maintained or improved  Description: INTERVENTIONS:  - Identify patients at risk for skin breakdown  - Assess and monitor skin integrity  - Assess and monitor nutrition and hydration status  - Monitor labs   - Assess for incontinence   - Turn and reposition patient  - Assist with mobility/ambulation  - Relieve pressure over bony prominences  - Avoid friction and shearing  - Provide appropriate hygiene as needed including keeping skin clean and dry  - Evaluate need for skin moisturizer/barrier cream  - Collaborate with interdisciplinary team   - Patient/family teaching  - Consider wound care consult   Outcome: Progressing     Problem: PAIN - ADULT  Goal: Verbalizes/displays adequate comfort level or baseline comfort level  Description: Interventions:  - Encourage patient to monitor pain and request assistance  - Assess pain using appropriate pain scale  - Administer analgesics based on type and severity of pain and evaluate response  - Implement non-pharmacological measures as appropriate and evaluate response  - Consider cultural and social influences on pain and pain management  - Notify physician/advanced practitioner if interventions unsuccessful or patient reports new pain  Outcome: Progressing     Problem: INFECTION - ADULT  Goal: Absence or prevention of progression during hospitalization  Description: INTERVENTIONS:  - Assess and monitor for signs and symptoms of infection  - Monitor lab/diagnostic results  - Monitor all insertion sites, i.e. indwelling lines, tubes, and drains  - Monitor endotracheal if appropriate and nasal secretions for changes in amount and color  - [place] appropriate cooling/warming therapies per order  - Administer medications as ordered  - Instruct and encourage patient and family to use good hand hygiene technique  - Identify and instruct in appropriate isolation precautions for  identified infection/condition  Outcome: Progressing  Goal: Absence of fever/infection during neutropenic period  Description: INTERVENTIONS:  - Monitor WBC    Outcome: Progressing     Problem: SAFETY ADULT  Goal: Patient will remain free of falls  Description: INTERVENTIONS:  - Educate patient/family on patient safety including physical limitations  - Instruct patient to call for assistance with activity   - Consult OT/PT to assist with strengthening/mobility   - Keep Call bell within reach  - Keep bed low and locked with side rails adjusted as appropriate  - Keep care items and personal belongings within reach  - Initiate and maintain comfort rounds  - Make Fall Risk Sign visible to staff  - Offer Toileting every 2 Hours, in advance of need  - Initiate/Maintain bed alarm  - Obtain necessary fall risk management equipment: nonskid socks  - Apply yellow socks and bracelet for high fall risk patients  - Consider moving patient to room near nurses station  Outcome: Progressing  Goal: Maintain or return to baseline ADL function  Description: INTERVENTIONS:  -  Assess patient's ability to carry out ADLs; assess patient's baseline for ADL function and identify physical deficits which impact ability to perform ADLs (bathing, care of mouth/teeth, toileting, grooming, dressing, etc.)  - Assess/evaluate cause of self-care deficits   - Assess range of motion  - Assess patient's mobility; develop plan if impaired  - Assess patient's need for assistive devices and provide as appropriate  - Encourage maximum independence but intervene and supervise when necessary  - Involve family in performance of ADLs  - Assess for home care needs following discharge   - Consider OT consult to assist with ADL evaluation and planning for discharge  - Provide patient education as appropriate  Outcome: Progressing  Goal: Maintains/Returns to pre admission functional level  Description: INTERVENTIONS:  - Perform AM-PAC 6 Click Basic Mobility/  Daily Activity assessment daily.  - Set and communicate daily mobility goal to care team and patient/family/caregiver.   - Collaborate with rehabilitation services on mobility goals if consulted  - Perform Range of Motion 3 times a day.  - Reposition patient every 2 hours.  - Dangle patient 3 times a day  - Stand patient 3 times a day  - Ambulate patient 2 times a day  - Out of bed to chair 3 times a day   - Out of bed for meals 3 times a day  - Out of bed for toileting  - Record patient progress and toleration of activity level   Outcome: Progressing     Problem: DISCHARGE PLANNING  Goal: Discharge to home or other facility with appropriate resources  Description: INTERVENTIONS:  - Identify barriers to discharge w/patient and caregiver  - Arrange for needed discharge resources and transportation as appropriate  - Identify discharge learning needs (meds, wound care, etc.)  - Arrange for interpretive services to assist at discharge as needed  - Refer to Case Management Department for coordinating discharge planning if the patient needs post-hospital services based on physician/advanced practitioner order or complex needs related to functional status, cognitive ability, or social support system  Outcome: Progressing     Problem: Knowledge Deficit  Goal: Patient/family/caregiver demonstrates understanding of disease process, treatment plan, medications, and discharge instructions  Description: Complete learning assessment and assess knowledge base.  Interventions:  - Provide teaching at level of understanding  - Provide teaching via preferred learning methods  Outcome: Progressing     Problem: GASTROINTESTINAL - ADULT  Goal: Minimal or absence of nausea and/or vomiting  Description: INTERVENTIONS:  - Administer IV fluids if ordered to ensure adequate hydration  - Maintain NPO status until nausea and vomiting are resolved  - Nasogastric tube if ordered  - Administer ordered antiemetic medications as needed  - Provide  nonpharmacologic comfort measures as appropriate  - Advance diet as tolerated, if ordered  - Consider nutrition services referral to assist patient with adequate nutrition and appropriate food choices  Outcome: Progressing  Goal: Maintains or returns to baseline bowel function  Description: INTERVENTIONS:  - Assess bowel function  - Encourage oral fluids to ensure adequate hydration  - Administer IV fluids if ordered to ensure adequate hydration  - Administer ordered medications as needed  - Encourage mobilization and activity  - Consider nutritional services referral to assist patient with adequate nutrition and appropriate food choices  Outcome: Progressing  Goal: Maintains adequate nutritional intake  Description: INTERVENTIONS:  - Monitor percentage of each meal consumed  - Identify factors contributing to decreased intake, treat as appropriate  - Assist with meals as needed  - Monitor I&O, weight, and lab values if indicated  - Obtain nutrition services referral as needed  Outcome: Progressing    Goal: Oral mucous membranes remain intact  Description: INTERVENTIONS  - Assess oral mucosa and hygiene practices  - Implement preventative oral hygiene regimen  - Implement oral medicated treatments as ordered  - Initiate Nutrition services referral as needed  Outcome: Progressing     Problem: GENITOURINARY - ADULT  Goal: Maintains or returns to baseline urinary function  Description: INTERVENTIONS:  - Assess urinary function  - Encourage oral fluids to ensure adequate hydration if ordered  - Administer IV fluids as ordered to ensure adequate hydration  - Administer ordered medications as needed  - Offer frequent toileting  - Follow urinary retention protocol if ordered  Outcome: Progressing  Goal: Absence of urinary retention  Description: INTERVENTIONS:  - Assess patient’s ability to void and empty bladder  - Monitor I/O  - Bladder scan as needed  - Discuss with physician/AP medications to alleviate retention as  needed  - Discuss catheterization for long term situations as appropriate  Outcome: Progressing

## 2025-01-29 NOTE — PLAN OF CARE
Problem: PHYSICAL THERAPY ADULT  Goal: Performs mobility at highest level of function for planned discharge setting.  See evaluation for individualized goals.  Description: Treatment/Interventions: LE strengthening/ROM, Elevations, Functional transfer training, ADL retraining, Gait training, Bed mobility, Equipment eval/education, Endurance training, Therapeutic exercise          See flowsheet documentation for full assessment, interventions and recommendations.  1/29/2025 1405 by Tamera Mclain PT  Outcome: Progressing  Note: Prognosis: Good  Problem List: Decreased strength, Decreased endurance, Impaired balance, Decreased mobility, Decreased safety awareness, Pain (BM incontinence)  Assessment: Pt agreeable to PT session. Pt needs mod A for supine <--> sit, mod A for transfers to standing with RW, able to stand for ~3-4 mins, and able to amb a few steps (3-4). While adm, pt will cont to benefit from skilled PT services to cont to increase pt's strength, balance, endurance, safe mobility and gait with a RW. Pt remains appropriate for Level II Moderate Resource Intensity when medically stable for dc.        Rehab Resource Intensity Level, PT: II (Moderate Resource Intensity)    See flowsheet documentation for full assessment.     1/29/2025 1405 by Tamera Mclain PT  Note: Prognosis: Good  Problem List: Decreased strength, Decreased endurance, Impaired balance, Decreased mobility, Decreased safety awareness, Pain (BM incontinence)  Assessment: Pt agreeable to PT session. Pt needs mod A for supine <--> sit, mod A for transfers to standing with RW, able to stand for ~3-4 mins, and able to amb a few steps (3-4). While adm, pt will cont to benefit from skilled PT services to cont to increase pt's strength, balance, endurance, safe mobility and gait with a RW. Pt remains appropriate for Level II Moderate Resource Intensity when medically stable for dc.        Rehab Resource Intensity Level, PT: II (Moderate  Resource Intensity)    See flowsheet documentation for full assessment.

## 2025-01-29 NOTE — CASE MANAGEMENT
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, 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 ([facility]) 2024     Obstructive uropathy 2024     Class 1 obesity due to excess calories with serious comorbidity and body mass index (BMI) of 34.0 to 34.9 in adult 2024     Advanced directives, counseling/discussion 2024     Palliative care by specialist 2024     Bilateral leg pain 2024     Ambulatory dysfunction 2024     Stage 4 chronic kidney disease (HCC) 2024     Stage 5 chronic kidney disease 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) 2024     Morbid obesity due to excess calories ([facility]) 2024     Nephrostomy status ([facility]) 2023     Sensorineural hearing loss (SNHL), bilateral 2022     Sensorineural hearing loss (SNHL) of right ear with restricted hearing of left ear 2022     Goals of care, counseling/discussion 05/10/2022     Malignant neoplasm of overlapping sites of bladder ([facility]) 2021     Stroke ([facility]) 10/29/2021     Hypokalemia 10/28/2021     Wrist drop 10/28/2021       LOS (days): 7  Geometric Mean LOS (GMLOS) (days): 3.5  Days to GMLOS:-3.3     OBJECTIVE:  Risk of Unplanned Readmission Score: 19.39         Current admission status: Inpatient   Preferred Pharmacy:   [facility] - [place] - [address] [place]  [address]  [place] [address]  Phone:  [phone] Fax: [phone]    Primary Care Provider: BHAVIK Morillo    Primary Insurance: JORJE ROY  Secondary Insurance:     DISCHARGE DETAILS:                                                                                                               Facility Insurance Auth Number: [id number]

## 2025-01-29 NOTE — PROGRESS NOTES
Progress Note - Infectious Disease   Name: Ning Chambers 84 y.o. female I MRN: [id number]  Unit/Bed#: [address] [place] [address] I Date of Admission: 1/22/2025   Date of Service: 1/29/2025 I Hospital Day: 7    Assessment & Plan  Sepsis (HCC)  Leukocytosis, tachycardia, tachypnea.  Likely due to left flank zoster with superimposed bacterial cellulitis.  Lab and vital sign derangements may also be reactive due to dehydration from poor p.o. intake.  UA shows 4-10 WBCs and she has no symptoms of urinary infection so no evidence of a UTI.  Urine culture <10K mixed contaminants.  CT A/P without source of infection.  Skin lesions are now crusting and surrounding erythema improved   -Continue IV cefazolin and p.o. Valtrex renally dose adjusted through today to complete 7 days total   -Repeat CBC to monitor treatment response   -Monitor fever curve, vital signs  Cellulitis of flank  The patient reported a rash over the left buttocks extending to the anterior abdomen and mons pubis.  There is erythema of the area as well as hemorrhagic lesions, some denuded vesicles.  Concern for herpes zoster with superimposed bacterial cellulitis.  Dermatology consulted.  Status post skin biopsy 1/23.  VZV PCR is positive and pathology findings are also consistent with herpes zoster.  Skin culture is Clostridium tertium which likely represents fecal contamination/superficial colonization. Erythema is improving and other skin lesions are now scabbing.     -Complete 7-day course of Valtrex and cefazolin today   -Monitor rash   -Continue local wound care   -Maintain contact precautions until all lesions are crusted  Malignant neoplasm of overlapping sites of bladder (HCC)  Patient has history of bladder cancer with retroperitoneal lymphadenopathy and is not currently receiving any cancer directed treatment.  She does follow with palliative care as an outpatient.   -Recommend continued outpatient follow-up  Nephrostomy status (HCC)  Bilateral nephrostomy  tubes in place due to obstructive uropathy from invasive bladder cancer.  Follows with urology, nephrology, palliative care.  Nephrostomy tubes exchanged 1/7.  Continue to monitor urine output.  Status post left nephrostomy tube check 1/29.  Acute kidney injury superimposed on chronic kidney disease  (HCC)  Lab Results   Component Value Date    EGFR 18 01/29/2025    EGFR 16 01/28/2025    EGFR 15 01/27/2025    CREATININE 2.35 (H) 01/29/2025    CREATININE 2.53 (H) 01/28/2025    CREATININE 2.66 (H) 01/27/2025   Creatinine elevated to 5.11 from baseline of 2.7-3.  Likely prerenal due to poor p.o. intake.  Follows with nephrology as an outpatient.  Creatinine now improved to baseline   -Dose adjust antimicrobials for creatinine clearance   -Nephrology following   -Monitor creatinine    Above management plan to continue current antimicrobials discussed with the PANCHO Arcos.  Discussed with the patient.  ID will sign off at this time, please call with questions.    Antibiotics:  IV cefazolin  P.o. Valtrex  Antimicrobials day 7    Subjective   The patient is feeling well, no complaints.  No abdominal pain, fever, chills.  Lesions on the left flank and buttocks are scabbed and erythema improved.  Lesions are still open over the mons lower abdomen.    Objective :  Temp:  [97.6 °F (36.4 °C)-99 °F (37.2 °C)] 98 °F (36.7 °C)  HR:  [] 106  BP: (114-146)/(54-92) 114/54  Resp:  [18-20] 18  SpO2:  [96 %-100 %] 96 %  O2 Device: None (Room air)    General: Chronically ill-appearing but no acute distress  Psychiatric:  Awake and alert  Cardiac: RRR, no murmurs  Pulmonary:  Normal respiratory excursion without accessory muscle use  Abdomen:  Soft, nontender, nondistended  Back: Bilateral percutaneous nephrostomy tubes in place  Extremities:  No edema  Skin: Rash extending from left flank and buttocks over anterior abdomen to the mons pubis.  Surrounding erythema improved.  Lesions are now scabbed over the left flank and buttocks  but there are open lesions over the mons pubis and lower abdomen      Lab Results: I have reviewed the following results:  Results from last 7 days   Lab Units 01/29/25  1304 01/27/25  0651 01/26/25  0630   WBC Thousand/uL 8.58 8.16 7.92   HEMOGLOBIN g/dL 10.4* 10.1* 10.6*   PLATELETS Thousands/uL 325 284 294     Results from last 7 days   Lab Units 01/29/25  1304 01/28/25  0348 01/27/25  0651 01/23/25  0559 01/22/25  1648   SODIUM mmol/L 143 143 141   < > 138   POTASSIUM mmol/L 3.9 3.6 3.2*   < > 3.9   CHLORIDE mmol/L 113* 112* 110*   < > 101   CO2 mmol/L 22 22 22   < > 16*   BUN mg/dL 51* 65* 75*   < > 119*   CREATININE mg/dL 2.35* 2.53* 2.66*   < > 5.11*   EGFR ml/min/1.73sq m 18 16 15   < > 7   CALCIUM mg/dL 8.3* 8.8 8.7   < > 9.1   AST U/L  --   --   --   --  51*   ALT U/L  --   --   --   --  27   ALK PHOS U/L  --   --   --   --  49   ALBUMIN g/dL  --   --   --   --  3.4*    < > = values in this interval not displayed.     Results from last 7 days   Lab Units 01/25/25  1030 01/24/25  2248 01/24/25  1753 01/23/25  1611 01/22/25  1648 01/22/25  1637   BLOOD CULTURE   --   --   --   --  No Growth After 5 Days. No Growth After 5 Days.   GRAM STAIN RESULT   --   --  No Polys or Bacteria seen No Polys or Bacteria seen  --   --    WOUND CULTURE   --   --  1+ Growth of  --   --   --    MRSA CULTURE ONLY  No Methicillin Resistant Staphlyococcus aureus (MRSA) isolated  --   --   --   --   --    C DIFF TOXIN B BY PCR   --  Negative  --   --   --   --      Results from last 7 days   Lab Units 01/23/25  0559 01/22/25  1648   PROCALCITONIN ng/ml 2.27* 2.69*

## 2025-01-29 NOTE — PROGRESS NOTES
"Progress Note - Hospitalist   Name: Ning Chambers 84 y.o. female I MRN: [id number]  Unit/Bed#: [address] [place] [address] I Date of Admission: 1/22/2025   Date of Service: 1/29/2025 I Hospital Day: 7    Assessment & Plan  Sepsis (HCC)  Patient with hx of invasive bladder cancer not undergoing treatment, CKD IV, indwelling bilateral nephrostomy tubes, presents w/ generalized weakness, and new onset \"rash\" on her left flank and lower abdomen which patient states started after her last nephrostomy tube exchange on 01/07  POA - leukocytosis, tachycardia, tachypnea  CT A/P negative for infectious pathology  UCx w/ mixed contaminants  BCx negative   ID consulted during hospital course  Per ID sepsis likely due to cellulitis and herpes zoster   S/p Cefepime + Vancomycin; c/w Cefazolin & p.o. Valtrex through 01/29 to complete 7 day course   Cellulitis of flank  Patient endorsing \"rash\" on her L back/flank/buttocks and abdomen/groin which she states started after her last nephrostomy tube exchange 01/07  S/p biopsy by surgery and hypercoagulable workup ordered  Antithrombin III activity normal, protein C activity normal, protein as activity low, cryoglobulin pending  Surgical skin biopsy histopathologic findings compatible with viral exanthem by a herpesvirus, particularly varicella-zoster virus (VZV)   VZV PCR positive   S/p Cefepime + Vancomycin; c/w Cefazolin & p.o. Valtrex through 01/29 to complete 7 day course   Coordinated plan of care on 01/23 w/ dermatology, nephrology, ID and also surgery & again w/ derm on 01/27  Patient can follow-up outpatient with dermatology and can continue Valtrex  Acute kidney injury superimposed on chronic kidney disease  (HCC)  Lab Results   Component Value Date    EGFR 16 01/28/2025    EGFR 15 01/27/2025    EGFR 13 01/26/2025    CREATININE 2.53 (H) 01/28/2025    CREATININE 2.66 (H) 01/27/2025    CREATININE 2.95 (H) 01/26/2025     Cr 5.11 on admit, baseline 2.0-2.3 (CKD IV) follows w/ nephrology as " outpatient; has expressed wishes to not proceed with dialysis in the past  Patient endorses minimal oral intake over the past week prior to admission  Noted to have high anion gap metabolic acidosis and was on IV sodium bicarbonate gtt which has been discontinued   PTA HCTZ on hold  Creatinine improving 2.35 today. Monitor off IVF  Nephrology consult appreciated  Lightheadedness  Per family patient reports feeling lightheaded/dizzy when getting out of bed or trying to work with therapy on 01/27   CT head (01/27):  Chronic microangiopathic changes and chronic infarctions  Stable chronic asymmetric left ventricular enlargement  No acute hydrocephalus.  Orthostatics positive 1/28, was started on IVF. 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  1/29/25 IR Nephrostomy tube check - Existing nephrostomy catheter in good position in the left kidney. Sutures intact.   Malignant neoplasm of overlapping sites of bladder (HCC)  Hx of bladder cancer with retroperitoneal lymphadenopathy, not currently receiving treatment as patient wishes to avoid systemic treatment for her invasive bladder cancer.   Follows with palliative medicine as outpatient  Benign hypertension  BP acceptable   Hold HCTZ 2/2 MARTY  Continue Toprol XL with hold parameters  Electrolyte abnormality  Recent Labs     01/27/25  0651 01/28/25  0348   K 3.2* 3.6   MG 2.0  --       Improved, trend     VTE Pharmacologic Prophylaxis: VTE Score: 6 High Risk (Score >/= 5) - Pharmacological DVT Prophylaxis Ordered: heparin. Sequential Compression Devices  Ordered.    Mobility:   Basic Mobility Inpatient Raw Score: 10  JH-HLM Goal: 4: Move to chair/commode  JH-HLM Achieved: 5: Stand (1 or more minutes)  JH-HLM Goal achieved. Continue to encourage appropriate mobility.    Patient Centered Rounds: I performed bedside rounds with nursing staff today.   Discussions with Specialists or Other Care Team Provider: IR, ID, Nephrology    Education and Discussions with Family / Patient:  Via CM, son Alexsander updated.     Current Length of Stay: 7 day(s)  Current Patient Status: Inpatient   Certification Statement: The patient will continue to require additional inpatient hospital stay due to repeat labs, rehab placement   Discharge Plan: Anticipate discharge tomorrow to discharge location to be determined pending rehab evaluations.    Code Status: Level 3 - DNAR and DNI    Subjective   Patient seen and examined lying in bed. Reports dull LLQ ache after nephrostomy tube check. No acute complaints. She denies chest pain and SOB.     Objective :  Temp:  [97.6 °F (36.4 °C)-99 °F (37.2 °C)] 99 °F (37.2 °C)  HR:  [] 106  BP: (121-146)/(57-92) 139/92  Resp:  [18-20] 18  SpO2:  [97 %-100 %] 97 %  O2 Device: None (Room air)    Body mass index is 31.41 kg/m².     Input and Output Summary (last 24 hours):     Intake/Output Summary (Last 24 hours) at 1/29/2025 1029  Last data filed at 1/29/2025 0900  Gross per 24 hour   Intake 1240 ml   Output 850 ml   Net 390 ml       Physical Exam  Constitutional:       General: She is not in acute distress.  HENT:      Head: Normocephalic and atraumatic.   Cardiovascular:      Rate and Rhythm: Normal rate.   Pulmonary:      Effort: Pulmonary effort is normal. No respiratory distress.   Abdominal:      General: Bowel sounds are normal.      Tenderness: There is no abdominal tenderness.      Comments: Nephrostomy tubes in place draining brown fluid   Musculoskeletal:         General: Swelling (trace b/l lower extremity) present.   Skin:     Comments:  Cellulitis and skin slothing of flank side   Neurological:      Mental Status: She is alert. Mental status is at baseline.   Psychiatric:         Mood and Affect: Mood normal.       Lines/Drains:  Lines/Drains/Airways       Active Status       Name Placement date Placement time Site Days    Nephrostomy Left 10.2 Fr. 01/07/25  1124  Left  21    Nephrostomy Right 10.2 Fr. 01/07/25  1130  Right  21    Rectal Tube 01/25/25  1725  --  3                            Lab Results: I have reviewed the following results:   Results from last 7 days   Lab Units 01/27/25  0651 01/23/25  0559 01/22/25  1648   WBC Thousand/uL 8.16   < > 18.02*   HEMOGLOBIN g/dL 10.1*   < > 13.5   HEMATOCRIT % 31.0*   < > 40.9   PLATELETS Thousands/uL 284   < > 298   BANDS PCT %  --   --  1   LYMPHO PCT %  --   --  7*   MONO PCT %  --   --  1*   EOS PCT %  --   --  0    < > = values in this interval not displayed.     Results from last 7 days   Lab Units 01/28/25  0348 01/23/25  0559 01/22/25  1648   SODIUM mmol/L 143   < > 138   POTASSIUM mmol/L 3.6   < > 3.9   CHLORIDE mmol/L 112*   < > 101   CO2 mmol/L 22   < > 16*   BUN mg/dL 65*   < > 119*   CREATININE mg/dL 2.53*   < > 5.11*   ANION GAP mmol/L 9   < > 21*   CALCIUM mg/dL 8.8   < > 9.1   ALBUMIN g/dL  --   --  3.4*   TOTAL BILIRUBIN mg/dL  --   --  0.63   ALK PHOS U/L  --   --  49   ALT U/L  --   --  27   AST U/L  --   --  51*   GLUCOSE RANDOM mg/dL 94   < > 103    < > = values in this interval not displayed.                 Results from last 7 days   Lab Units 01/23/25  0559 01/22/25  1648   LACTIC ACID mmol/L  --  2.0   PROCALCITONIN ng/ml 2.27* 2.69*       Recent Cultures (last 7 days):   Results from last 7 days   Lab Units 01/24/25  2248 01/24/25  1753 01/23/25  1611 01/22/25  1648 01/22/25  1637 01/22/25  1046   BLOOD CULTURE   --   --   --  No Growth After 5 Days. No Growth After 5 Days.  --    GRAM STAIN RESULT   --  No Polys or Bacteria seen No Polys or Bacteria seen  --   --   --     URINE CULTURE   --   --   --   --   --  <10,000 cfu/ml   WOUND CULTURE   --  1+ Growth of  --   --   --   --    C DIFF TOXIN B BY PCR  Negative  --   --   --   --   --        Imaging Results Review: No pertinent imaging studies reviewed.  Other Study Results Review: No additional pertinent studies reviewed.    Last 24 Hours Medication List:     Current Facility-Administered Medications:     acetaminophen (TYLENOL) tablet 650 mg, Q6H PRN    aspirin chewable tablet 81 mg, Daily    atorvastatin (LIPITOR) tablet 40 mg, Daily With Dinner    ceFAZolin (ANCEF) IVPB (premix in dextrose) 1,000 mg 50 mL, Q12H, Last Rate: 1,000 mg (01/29/25 0327)    heparin (porcine) subcutaneous injection 5,000 Units, Q8H JD    influenza vaccine, high-dose (Fluzone High-Dose) IM injection 0.5 mL, Prior to discharge    metoprolol succinate (TOPROL-XL) 24 hr tablet 25 mg, QAM    potassium chloride (Klor-Con M20) CR tablet 20 mEq, Daily    saccharomyces boulardii (FLORASTOR) capsule 250 mg, BID    sodium chloride (PF) 0.9 % injection 10 mL, Daily    valACYclovir (VALTREX) tablet 1,000 mg, Q24H    Administrative Statements   Today, Patient Was Seen By: Yaquelin Galo  I have spent a total time of 30 minutes in caring for this patient on the day of the visit/encounter including Diagnostic results, Risks and benefits of tx options, Instructions for management, Patient and family education, Counseling / Coordination of care, Documenting in the medical record, Reviewing / ordering tests, medicine, procedures  , Obtaining or reviewing history  , and Communicating with other healthcare professionals .    **Please Note: This note may have been constructed using a voice recognition system.**

## 2025-01-30 VITALS
RESPIRATION RATE: 18 BRPM | BODY MASS INDEX: 31.6 KG/M2 | HEART RATE: 85 BPM | WEIGHT: 171.74 LBS | SYSTOLIC BLOOD PRESSURE: 140 MMHG | DIASTOLIC BLOOD PRESSURE: 63 MMHG | HEIGHT: 62 IN | OXYGEN SATURATION: 99 % | TEMPERATURE: 97.8 F

## 2025-01-30 LAB
ANION GAP SERPL CALCULATED.3IONS-SCNC: 10 MMOL/L (ref 4–13)
BUN SERPL-MCNC: 46 MG/DL (ref 5–25)
CALCIUM SERPL-MCNC: 9 MG/DL (ref 8.4–10.2)
CHLORIDE SERPL-SCNC: 116 MMOL/L (ref 96–108)
CO2 SERPL-SCNC: 17 MMOL/L (ref 21–32)
CREAT SERPL-MCNC: 2.19 MG/DL (ref 0.6–1.3)
CRYOGLOB SER QL 1D COLD INC: NORMAL
GFR SERPL CREATININE-BSD FRML MDRD: 19 ML/MIN/1.73SQ M
GLUCOSE SERPL-MCNC: 90 MG/DL (ref 65–140)
MAGNESIUM SERPL-MCNC: 2.2 MG/DL (ref 1.9–2.7)
POTASSIUM SERPL-SCNC: 4 MMOL/L (ref 3.5–5.3)
SODIUM SERPL-SCNC: 143 MMOL/L (ref 135–147)

## 2025-01-30 PROCEDURE — 83735 ASSAY OF MAGNESIUM: CPT | Performed by: INTERNAL MEDICINE

## 2025-01-30 PROCEDURE — 99239 HOSP IP/OBS DSCHRG MGMT >30: CPT | Performed by: INTERNAL MEDICINE

## 2025-01-30 PROCEDURE — 99232 SBSQ HOSP IP/OBS MODERATE 35: CPT | Performed by: INTERNAL MEDICINE

## 2025-01-30 PROCEDURE — 80048 BASIC METABOLIC PNL TOTAL CA: CPT | Performed by: INTERNAL MEDICINE

## 2025-01-30 RX ORDER — SACCHAROMYCES BOULARDII 250 MG
250 CAPSULE ORAL 2 TIMES DAILY
Start: 2025-01-30

## 2025-01-30 RX ADMIN — METOPROLOL SUCCINATE 25 MG: 25 TABLET, FILM COATED, EXTENDED RELEASE ORAL at 10:15

## 2025-01-30 RX ADMIN — HEPARIN SODIUM 5000 UNITS: 5000 INJECTION, SOLUTION INTRAVENOUS; SUBCUTANEOUS at 05:52

## 2025-01-30 RX ADMIN — POTASSIUM CHLORIDE 20 MEQ: 1500 TABLET, EXTENDED RELEASE ORAL at 10:15

## 2025-01-30 RX ADMIN — ASPIRIN 81 MG CHEWABLE TABLET 81 MG: 81 TABLET CHEWABLE at 10:14

## 2025-01-30 RX ADMIN — Medication 250 MG: at 10:14

## 2025-01-30 NOTE — ASSESSMENT & PLAN NOTE
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  S/p skin biopsy 1/23/2025. Results consistent with herpes zoster.   Completed Cefazolin and Valtrex.

## 2025-01-30 NOTE — ASSESSMENT & PLAN NOTE
MARTY  Admission creatinine 5.11 on 1/22/2025.  Etiology felt to be due to prerenal azotemia from volume depletion.  MARTY resolved. Off IVF since 1/27/25.   Stable renal function.  Creatinine 2.19 today.  CT on 1/23/2025 showed interval resolution of the right hydronephrosis but increase mild dilatation of the left renal pelvis - IR eval with nephrostogram on 1/29/25 showed no issues with PCN.     CKD III/IV  Baseline creatinine was 2.7-3.0 in 2023.  Follows with Dr. Reyes  Of note, creatinine was 2.03 on 1/13/2025 and was 2.3 in 2024.  Baseline creatinine is probably closer to 2.0-2.3 at this time.

## 2025-01-30 NOTE — PROGRESS NOTES
NEPHROLOGY HOSPITAL PROGRESS NOTE   Ning Chambers 84 y.o. female MRN: [id number]  Unit/Bed#: [address] [place] [address] Encounter: [id number]  Reason for Consult: MARTY  Assessment & Plan  Acute kidney injury superimposed on chronic kidney disease  (HCC)  MARTY  Admission creatinine 5.11 on 1/22/2025.  Etiology felt to be due to prerenal azotemia from volume depletion.  MARTY resolved. Off IVF since 1/27/25.   Stable renal function.  Creatinine 2.19 today.  CT on 1/23/2025 showed interval resolution of the right hydronephrosis but increase mild dilatation of the left renal pelvis - IR eval with nephrostogram on 1/29/25 showed no issues with PCN.     CKD III/IV  Baseline creatinine was 2.7-3.0 in 2023.  Follows with Dr. Reyes  Of note, creatinine was 2.03 on 1/13/2025 and was 2.3 in 2024.  Baseline creatinine is probably closer to 2.0-2.3 at this time.    Benign hypertension  BP is controlled.   Continue metoprolol succinate 25 mg daily.   Keep off HCTZ at this time given volume depletion on admission and hypokalemia.     Electrolyte abnormality  Hypokalemia  K 4.0 today.   Stop Kdur.  Maintain off HCTZ.     Sepsis ([facility])  S/p cefazolin per ID and internal medicine for treatment of cellulitis.  S/p Valtrex for herpes zoster.    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  S/p skin biopsy 1/23/2025. Results consistent with herpes zoster.   Completed Cefazolin and Valtrex.     Nephrostomy status ([facility])  Has bilateral PCN's.  S/p nephrostogram on 1/29/2025 without any issues noted by IR.    Malignant neoplasm of overlapping sites of bladder ([facility])  Defer to urology.      TODAY's DISCUSSION / PLAN:  MARTY resolved and stable renal function.  Stop Kdur.   Stable for discharge from renal standpoint.  Check BMP 1 week after discharge.  Will arrange for renal follow-up on discharge.    The plan was discussed with the internal medicine service and  we agreed that the patient is stable for discharge.    SUBJECTIVE / 24H INTERVAL HISTORY:  No new acute events.   Reports R knee pain.   No CP or SOB.   Nephrostogram done yesterday revealed no evidence of any abnormalities.    OBJECTIVE:  Current Weight: Weight - Scale: 77.9 kg (171 lb 11.8 oz)  Vitals:    01/29/25 0819 01/29/25 0821 01/29/25 2025 01/29/25 2245   BP: 144/86 114/54 145/63 140/63   BP Location: Right arm Right arm Left arm Left arm   Pulse:   72 85   Resp:   18 18   Temp:   98.2 °F (36.8 °C) 97.8 °F (36.6 °C)   TempSrc:   Oral Oral   SpO2:   99% 99%   Weight:       Height:           Intake/Output Summary (Last 24 hours) at 1/30/2025 1021  Last data filed at 1/30/2025 0604  Gross per 24 hour   Intake 250 ml   Output 650 ml   Net -400 ml     General: conscious, cooperative, no distress  Skin: dry  Eyes: pink conjunctivae  ENT: moist mucous membranes  Respiratory: equal chest expansion, clear breath sounds.  Cardiovascular: distinct heart sounds, normal rate, regular rhythm, no rub  Abdomen: soft, non tender, non distended, normal bowel sounds  Extremities: mild LE edema.   Genitourinary: + bilateral PCN  Neuro: awake, alert.   Psych: appropriate affect    Medications:    Current Facility-Administered Medications:     acetaminophen (TYLENOL) tablet 650 mg, 650 mg, Oral, Q6H PRN, Elinor Richard PA-C    aspirin chewable tablet 81 mg, 81 mg, Oral, Daily, Elinor Richard PA-C, 81 mg at 01/30/25 1014    atorvastatin (LIPITOR) tablet 40 mg, 40 mg, Oral, Daily With Dinner, Elinor Richard PA-C, 40 mg at 01/29/25 1707    heparin (porcine) subcutaneous injection 5,000 Units, 5,000 Units, Subcutaneous, Q8H [facility], Elinor Richard PA-C, 5,000 Units at 01/30/25 0552    influenza vaccine, high-dose (Fluzone High-Dose) IM injection 0.5 mL, 0.5 mL, Intramuscular, Prior to discharge, Nata Revankar, DO    metoprolol succinate (TOPROL-XL) 24 hr tablet 25 mg, 25 mg, Oral, JOSHUA, Elinor Richard,  "ERIKA, 25 mg at 01/30/25 1015    potassium chloride (Klor-Con M20) CR tablet 20 mEq, 20 mEq, Oral, Daily, Azael Smyth MD, 20 mEq at 01/30/25 1015    saccharomyces boulardii (FLORASTOR) capsule 250 mg, 250 mg, Oral, BID, Nata Valentin DO, 250 mg at 01/30/25 1014    sodium chloride (PF) 0.9 % injection 10 mL, 10 mL, Intracatheter, Daily, Elinor Richard PA-C, 10 mL at 01/29/25 0817    Laboratory Results:  Results from last 7 days   Lab Units 01/30/25  0604 01/29/25  1304 01/28/25  0348 01/27/25  0651 01/26/25  0630 01/25/25  0602 01/24/25  2145 01/24/25  0639   WBC Thousand/uL  --  8.58  --  8.16 7.92 9.96  --  7.77   HEMOGLOBIN g/dL  --  10.4*  --  10.1* 10.6* 10.9*  --  11.5   HEMATOCRIT %  --  33.5*  --  31.0* 32.4* 34.1*  --  34.5*   PLATELETS Thousands/uL  --  325  --  284 294 244  --  229   POTASSIUM mmol/L 4.0 3.9 3.6 3.2* 3.3* 3.4* 3.6 2.8*   CHLORIDE mmol/L 116* 113* 112* 110* 106 108 105 104   CO2 mmol/L 17* 22 22 22 23 20* 21 21   BUN mg/dL 46* 51* 65* 75* 80* 95* 101* 106*   CREATININE mg/dL 2.19* 2.35* 2.53* 2.66* 2.95* 3.37* 3.63* 3.96*   CALCIUM mg/dL 9.0 8.3* 8.8 8.7 7.8* 8.2* 8.3* 7.9*   MAGNESIUM mg/dL 2.2 1.8*  --  2.0 2.1 2.2  --  2.1   PHOSPHORUS mg/dL  --   --   --  2.9 3.3 3.0  --  3.3     Portions of the record may have been created with voice recognition software. Occasional wrong word or \"sound a like\" substitutions may have occurred due to the inherent limitations of voice recognition software. Read the chart carefully and recognize, using context, where substitutions have occurred.If you have any questions, please contact the dictating provider.    "

## 2025-01-30 NOTE — ASSESSMENT & PLAN NOTE
BP is controlled.   Continue metoprolol succinate 25 mg daily.   Keep off HCTZ at this time given volume depletion on admission and hypokalemia.

## 2025-01-30 NOTE — PLAN OF CARE
Problem: Prexisting or High Potential for Compromised Skin Integrity  Goal: Skin integrity is maintained or improved  Description: INTERVENTIONS:  - Identify patients at risk for skin breakdown  - Assess and monitor skin integrity  - Assess and monitor nutrition and hydration status  - Monitor labs   - Assess for incontinence   - Turn and reposition patient  - Assist with mobility/ambulation  - Relieve pressure over bony prominences  - Avoid friction and shearing  - Provide appropriate hygiene as needed including keeping skin clean and dry  - Evaluate need for skin moisturizer/barrier cream  - Collaborate with interdisciplinary team   - Patient/family teaching  - Consider wound care consult   Outcome: Progressing     Problem: PAIN - ADULT  Goal: Verbalizes/displays adequate comfort level or baseline comfort level  Description: Interventions:  - Encourage patient to monitor pain and request assistance  - Assess pain using appropriate pain scale  - Administer analgesics based on type and severity of pain and evaluate response  - Implement non-pharmacological measures as appropriate and evaluate response  - Consider cultural and social influences on pain and pain management  - Notify physician/advanced practitioner if interventions unsuccessful or patient reports new pain  Outcome: Progressing     Problem: INFECTION - ADULT  Goal: Absence or prevention of progression during hospitalization  Description: INTERVENTIONS:  - Assess and monitor for signs and symptoms of infection  - Monitor lab/diagnostic results  - Monitor all insertion sites, i.e. indwelling lines, tubes, and drains  - Monitor endotracheal if appropriate and nasal secretions for changes in amount and color  - [place] appropriate cooling/warming therapies per order  - Administer medications as ordered  - Instruct and encourage patient and family to use good hand hygiene technique  - Identify and instruct in appropriate isolation precautions for  identified infection/condition  Outcome: Progressing  Goal: Absence of fever/infection during neutropenic period  Description: INTERVENTIONS:  - Monitor WBC    Outcome: Progressing     Problem: SAFETY ADULT  Goal: Patient will remain free of falls  Description: INTERVENTIONS:  - Educate patient/family on patient safety including physical limitations  - Instruct patient to call for assistance with activity   - Consult OT/PT to assist with strengthening/mobility   - Keep Call bell within reach  - Keep bed low and locked with side rails adjusted as appropriate  - Keep care items and personal belongings within reach  - Initiate and maintain comfort rounds  - Make Fall Risk Sign visible to staff  - Offer Toileting every 2 Hours, in advance of need  - Initiate/Maintain bed alarm  - Obtain necessary fall risk management equipment: nonskid socks  - Apply yellow socks and bracelet for high fall risk patients  - Consider moving patient to room near nurses station  Outcome: Progressing  Goal: Maintain or return to baseline ADL function  Description: INTERVENTIONS:  -  Assess patient's ability to carry out ADLs; assess patient's baseline for ADL function and identify physical deficits which impact ability to perform ADLs (bathing, care of mouth/teeth, toileting, grooming, dressing, etc.)  - Assess/evaluate cause of self-care deficits   - Assess range of motion  - Assess patient's mobility; develop plan if impaired  - Assess patient's need for assistive devices and provide as appropriate  - Encourage maximum independence but intervene and supervise when necessary  - Involve family in performance of ADLs  - Assess for home care needs following discharge   - Consider OT consult to assist with ADL evaluation and planning for discharge  - Provide patient education as appropriate  Outcome: Progressing  Goal: Maintains/Returns to pre admission functional level  Description: INTERVENTIONS:  - Perform AM-PAC 6 Click Basic Mobility/  Daily Activity assessment daily.  - Set and communicate daily mobility goal to care team and patient/family/caregiver.   - Collaborate with rehabilitation services on mobility goals if consulted  - Perform Range of Motion 3 times a day.  - Reposition patient every 2 hours.  - Dangle patient 3 times a day  - Stand patient 3 times a day  - Ambulate patient 2 times a day  - Out of bed to chair 3 times a day   - Out of bed for meals 3 times a day  - Out of bed for toileting  - Record patient progress and toleration of activity level   Outcome: Progressing     Problem: DISCHARGE PLANNING  Goal: Discharge to home or other facility with appropriate resources  Description: INTERVENTIONS:  - Identify barriers to discharge w/patient and caregiver  - Arrange for needed discharge resources and transportation as appropriate  - Identify discharge learning needs (meds, wound care, etc.)  - Arrange for interpretive services to assist at discharge as needed  - Refer to Case Management Department for coordinating discharge planning if the patient needs post-hospital services based on physician/advanced practitioner order or complex needs related to functional status, cognitive ability, or social support system  Outcome: Progressing     Problem: Knowledge Deficit  Goal: Patient/family/caregiver demonstrates understanding of disease process, treatment plan, medications, and discharge instructions  Description: Complete learning assessment and assess knowledge base.  Interventions:  - Provide teaching at level of understanding  - Provide teaching via preferred learning methods  Outcome: Progressing     Problem: GASTROINTESTINAL - ADULT  Goal: Minimal or absence of nausea and/or vomiting  Description: INTERVENTIONS:  - Administer IV fluids if ordered to ensure adequate hydration  - Maintain NPO status until nausea and vomiting are resolved  - Nasogastric tube if ordered  - Administer ordered antiemetic medications as needed  - Provide  nonpharmacologic comfort measures as appropriate  - Advance diet as tolerated, if ordered  - Consider nutrition services referral to assist patient with adequate nutrition and appropriate food choices  Outcome: Progressing  Goal: Maintains or returns to baseline bowel function  Description: INTERVENTIONS:  - Assess bowel function  - Encourage oral fluids to ensure adequate hydration  - Administer IV fluids if ordered to ensure adequate hydration  - Administer ordered medications as needed  - Encourage mobilization and activity  - Consider nutritional services referral to assist patient with adequate nutrition and appropriate food choices  Outcome: Progressing  Goal: Maintains adequate nutritional intake  Description: INTERVENTIONS:  - Monitor percentage of each meal consumed  - Identify factors contributing to decreased intake, treat as appropriate  - Assist with meals as needed  - Monitor I&O, weight, and lab values if indicated  - Obtain nutrition services referral as needed  Outcome: Progressing  Goal: Establish and maintain optimal ostomy function  Description: INTERVENTIONS:  - Assess bowel function  - Encourage oral fluids to ensure adequate hydration  - Administer IV fluids if ordered to ensure adequate hydration   - Administer ordered medications as needed  - Encourage mobilization and activity  - Nutrition services referral to assist patient with appropriate food choices  - Assess stoma site  - Consider wound care consult   Outcome: Progressing  Goal: Oral mucous membranes remain intact  Description: INTERVENTIONS  - Assess oral mucosa and hygiene practices  - Implement preventative oral hygiene regimen  - Implement oral medicated treatments as ordered  - Initiate Nutrition services referral as needed  Outcome: Progressing     Problem: GENITOURINARY - ADULT  Goal: Maintains or returns to baseline urinary function  Description: INTERVENTIONS:  - Assess urinary function  - Encourage oral fluids to ensure  adequate hydration if ordered  - Administer IV fluids as ordered to ensure adequate hydration  - Administer ordered medications as needed  - Offer frequent toileting  - Follow urinary retention protocol if ordered  Outcome: Progressing  Goal: Absence of urinary retention  Description: INTERVENTIONS:  - Assess patient’s ability to void and empty bladder  - Monitor I/O  - Bladder scan as needed  - Discuss with physician/AP medications to alleviate retention as needed  - Discuss catheterization for long term situations as appropriate  Outcome: Progressing  Goal: Urinary catheter remains patent  Description: INTERVENTIONS:  - Assess patency of urinary catheter  - If patient has a chronic posadas, consider changing catheter if non-functioning  - Follow guidelines for intermittent irrigation of non-functioning urinary catheter  Outcome: Progressing     Problem: Nutrition/Hydration-ADULT  Goal: Nutrient/Hydration intake appropriate for improving, restoring or maintaining nutritional needs  Description: Monitor and assess patient's nutrition/hydration status for malnutrition. Collaborate with interdisciplinary team and initiate plan and interventions as ordered.  Monitor patient's weight and dietary intake as ordered or per policy. Utilize nutrition screening tool and intervene as necessary. Determine patient's food preferences and provide high-protein, high-caloric foods as appropriate.     INTERVENTIONS:  - Monitor oral intake, urinary output, labs, and treatment plans  - Assess nutrition and hydration status and recommend course of action  - Evaluate amount of meals eaten  - Assist patient with eating if necessary   - Allow adequate time for meals  - Recommend/ encourage appropriate diets, oral nutritional supplements, and vitamin/mineral supplements  - Order, calculate, and assess calorie counts as needed  - Recommend, monitor, and adjust tube feedings and TPN/PPN based on assessed needs  - Assess need for intravenous  fluids  - Provide specific nutrition/hydration education as appropriate  - Include patient/family/caregiver in decisions related to nutrition  Outcome: Progressing

## 2025-01-30 NOTE — [place] UNIVERSAL TRANSFER FORM
"[place] UNIVERSAL TRANSFER FORM  (ALL ITEMS MUST BE COMPLETED)    1. TRANSFER FROM: [facility]      TRANSFER TO: [facility]       2. DATE OF TRANSFER: 1/30/2025                        TIME OF TRANSFER: 1330    3. PATIENT NAME: Ning Chambers J      YOB: 1940                             GENDER: female    4. LANGUAGE:   English    5. PHYSICIAN NAME:  Eitan Barth MD                   PHONE: [phone]    6. CODE STATUS: Level 3 - DNAR and DNI        Out of Hospital DNR Attached: No    7. :                                      :  Extended Emergency Contact Information  Primary Emergency Contact: Rodrigo Chambers   [place]  Mobile Phone: [phone]  Relation: Son  Secondary Emergency Contact: ReyesBri ([facility])  Mobile Phone: [phone]  Relation: Daughter In-Law           Health Care Representative/Proxy:  No           Legal Guardian:  No             NAME OF:           HEALTH CARE REPRESENTATIVE/PROXY:                                         OR           LEGAL GUARDIAN, IF NOT :                                               PHONE:  (Day)           (Night)                        (Cell)    8. REASON FOR TRANSFER: (Must include brief medical history and recent changes in physical function or cognition.) REHAB            V/S: /63 (BP Location: Left arm)   Pulse 85   Temp 97.8 °F (36.6 °C) (Oral)   Resp 18   Ht 5' 2\" (1.575 m)   Wt 77.9 kg (171 lb 11.8 oz)   SpO2 99%   BMI 31.41 kg/m²           PAIN: None    9. PRIMARY DIAGNOSIS: Sepsis (HCC)      Secondary Diagnosis:         Pacemaker: No      Internal Defib: No          Mental Health Diagnosis (if Applicable):    10. RESTRAINTS: No     11. RESPIRATORY NEEDS: None    12. ISOLATION/PRECAUTION: Otherherpez zoster, Sitelower abdomen, and Commentscontact precaution    13. ALLERGY: Percocet [oxycodone-acetaminophen], Vicodin " [hydrocodone-acetaminophen], Medical tape, and Propoxyphene    14. SENSORY:       Vision Good, Hearing Good , and Speech Clear    15. SKIN CONDITION: Yes:  Otherlower abdomen     16. DIET: Regular    17. IV ACCESS: None    18. PERSONAL ITEMS SENT WITH PATIENT: Glasses    19. ATTACHED DOCUMENTS: MUST ATTACH CURRENT MEDICATION INFORMATION Face Sheet, Medication Reconciliation, Diagnostic Studies, Labs, Discharge Summary, PT Note, OT Note, ST Note, and HX/PE    20. AT RISK ALERTS:Falls        HARM TO: N/A    21. WEIGHT BEARING STATUS:         Left Leg: None        Right Leg: None    22. MENTAL STATUS:Alert    23. FUNCTION:        Walk: Self        Transfer: With Help        Toilet: With Help        Feed: With Help    24. IMMUNIZATIONS/SCREENING:     Immunization History   Administered Date(s) Administered    COVID-19 MODERNA VACC 0.5 ML IM 02/19/2021, 03/18/2021    H1N1 Inj 12/17/2009    Influenza Split High Dose Preservative Free IM 10/29/2021, 10/29/2021    Influenza, high dose seasonal 0.7 mL 10/29/2021, 01/03/2024    Pneumococcal Conjugate 13-Valent 03/23/2016    influenza, injectable, quadrivalent 10/13/2022       25. BOWEL: Continent    26. BLADDER: CommentsB/L nephrostomy tubes    27. SENDING FACILITY CONTACT: Sandy Ledesma                  Title: RN        Unit: 3N        Phone: 911          REC'G FACILITY CONTACT (if known):        Title:        Unit:         Phone:         FORM PREFILLED BY (if applicable)       Title:       Unit:        Phone:         FORM COMPLETED BY Sandy Ledesma RN      Title: ***      Phone: ***

## 2025-01-30 NOTE — ASSESSMENT & PLAN NOTE
Per family patient reports feeling lightheaded/dizzy when getting out of bed or trying to work with therapy on 01/27   CT head (01/27):  Chronic microangiopathic changes and chronic infarctions  Stable chronic asymmetric left ventricular enlargement  No acute hydrocephalus.  Orthostatics positive 1/28, was started on IVF. Now discontinued  Compression stockings  No acute complaints

## 2025-01-30 NOTE — PLAN OF CARE
Problem: Prexisting or High Potential for Compromised Skin Integrity  Goal: Skin integrity is maintained or improved  Description: INTERVENTIONS:  - Identify patients at risk for skin breakdown  - Assess and monitor skin integrity  - Assess and monitor nutrition and hydration status  - Monitor labs   - Assess for incontinence   - Turn and reposition patient  - Assist with mobility/ambulation  - Relieve pressure over bony prominences  - Avoid friction and shearing  - Provide appropriate hygiene as needed including keeping skin clean and dry  - Evaluate need for skin moisturizer/barrier cream  - Collaborate with interdisciplinary team   - Patient/family teaching  - Consider wound care consult   Outcome: Progressing     Problem: PAIN - ADULT  Goal: Verbalizes/displays adequate comfort level or baseline comfort level  Description: Interventions:  - Encourage patient to monitor pain and request assistance  - Assess pain using appropriate pain scale  - Administer analgesics based on type and severity of pain and evaluate response  - Implement non-pharmacological measures as appropriate and evaluate response  - Consider cultural and social influences on pain and pain management  - Notify physician/advanced practitioner if interventions unsuccessful or patient reports new pain  Outcome: Progressing     Problem: INFECTION - ADULT  Goal: Absence or prevention of progression during hospitalization  Description: INTERVENTIONS:  - Assess and monitor for signs and symptoms of infection  - Monitor lab/diagnostic results  - Monitor all insertion sites, i.e. indwelling lines, tubes, and drains  - Monitor endotracheal if appropriate and nasal secretions for changes in amount and color  - [place] appropriate cooling/warming therapies per order  - Administer medications as ordered  - Instruct and encourage patient and family to use good hand hygiene technique  - Identify and instruct in appropriate isolation precautions for  identified infection/condition  Outcome: Progressing  Goal: Absence of fever/infection during neutropenic period  Description: INTERVENTIONS:  - Monitor WBC    Outcome: Progressing     Problem: SAFETY ADULT  Goal: Patient will remain free of falls  Description: INTERVENTIONS:  - Educate patient/family on patient safety including physical limitations  - Instruct patient to call for assistance with activity   - Consult OT/PT to assist with strengthening/mobility   - Keep Call bell within reach  - Keep bed low and locked with side rails adjusted as appropriate  - Keep care items and personal belongings within reach  - Initiate and maintain comfort rounds  - Make Fall Risk Sign visible to staff  - Offer Toileting every 2 Hours, in advance of need  - Initiate/Maintain bed alarm  - Obtain necessary fall risk management equipment: nonskid socks  - Apply yellow socks and bracelet for high fall risk patients  - Consider moving patient to room near nurses station  Outcome: Progressing  Goal: Maintain or return to baseline ADL function  Description: INTERVENTIONS:  -  Assess patient's ability to carry out ADLs; assess patient's baseline for ADL function and identify physical deficits which impact ability to perform ADLs (bathing, care of mouth/teeth, toileting, grooming, dressing, etc.)  - Assess/evaluate cause of self-care deficits   - Assess range of motion  - Assess patient's mobility; develop plan if impaired  - Assess patient's need for assistive devices and provide as appropriate  - Encourage maximum independence but intervene and supervise when necessary  - Involve family in performance of ADLs  - Assess for home care needs following discharge   - Consider OT consult to assist with ADL evaluation and planning for discharge  - Provide patient education as appropriate  Outcome: Progressing  Goal: Maintains/Returns to pre admission functional level  Description: INTERVENTIONS:  - Perform AM-PAC 6 Click Basic Mobility/  Daily Activity assessment daily.  - Set and communicate daily mobility goal to care team and patient/family/caregiver.   - Collaborate with rehabilitation services on mobility goals if consulted  - Perform Range of Motion 3 times a day.  - Reposition patient every 2 hours.  - Dangle patient 3 times a day  - Stand patient 3 times a day  - Ambulate patient 2 times a day  - Out of bed to chair 3 times a day   - Out of bed for meals 3 times a day  - Out of bed for toileting  - Record patient progress and toleration of activity level   Outcome: Progressing     Problem: DISCHARGE PLANNING  Goal: Discharge to home or other facility with appropriate resources  Description: INTERVENTIONS:  - Identify barriers to discharge w/patient and caregiver  - Arrange for needed discharge resources and transportation as appropriate  - Identify discharge learning needs (meds, wound care, etc.)  - Arrange for interpretive services to assist at discharge as needed  - Refer to Case Management Department for coordinating discharge planning if the patient needs post-hospital services based on physician/advanced practitioner order or complex needs related to functional status, cognitive ability, or social support system  Outcome: Progressing     Problem: Knowledge Deficit  Goal: Patient/family/caregiver demonstrates understanding of disease process, treatment plan, medications, and discharge instructions  Description: Complete learning assessment and assess knowledge base.  Interventions:  - Provide teaching at level of understanding  - Provide teaching via preferred learning methods  Outcome: Progressing     Problem: GASTROINTESTINAL - ADULT  Goal: Minimal or absence of nausea and/or vomiting  Description: INTERVENTIONS:  - Administer IV fluids if ordered to ensure adequate hydration  - Maintain NPO status until nausea and vomiting are resolved  - Nasogastric tube if ordered  - Administer ordered antiemetic medications as needed  - Provide  nonpharmacologic comfort measures as appropriate  - Advance diet as tolerated, if ordered  - Consider nutrition services referral to assist patient with adequate nutrition and appropriate food choices  Outcome: Progressing  Goal: Maintains or returns to baseline bowel function  Description: INTERVENTIONS:  - Assess bowel function  - Encourage oral fluids to ensure adequate hydration  - Administer IV fluids if ordered to ensure adequate hydration  - Administer ordered medications as needed  - Encourage mobilization and activity  - Consider nutritional services referral to assist patient with adequate nutrition and appropriate food choices  Outcome: Progressing  Goal: Maintains adequate nutritional intake  Description: INTERVENTIONS:  - Monitor percentage of each meal consumed  - Identify factors contributing to decreased intake, treat as appropriate  - Assist with meals as needed  - Monitor I&O, weight, and lab values if indicated  - Obtain nutrition services referral as needed  Outcome: Progressing  Goal: Establish and maintain optimal ostomy function  Description: INTERVENTIONS:  - Assess bowel function  - Encourage oral fluids to ensure adequate hydration  - Administer IV fluids if ordered to ensure adequate hydration   - Administer ordered medications as needed  - Encourage mobilization and activity  - Nutrition services referral to assist patient with appropriate food choices  - Assess stoma site  - Consider wound care consult   Outcome: Progressing  Goal: Oral mucous membranes remain intact  Description: INTERVENTIONS  - Assess oral mucosa and hygiene practices  - Implement preventative oral hygiene regimen  - Implement oral medicated treatments as ordered  - Initiate Nutrition services referral as needed  Outcome: Progressing     Problem: GENITOURINARY - ADULT  Goal: Maintains or returns to baseline urinary function  Description: INTERVENTIONS:  - Assess urinary function  - Encourage oral fluids to ensure  adequate hydration if ordered  - Administer IV fluids as ordered to ensure adequate hydration  - Administer ordered medications as needed  - Offer frequent toileting  - Follow urinary retention protocol if ordered  Outcome: Progressing  Goal: Absence of urinary retention  Description: INTERVENTIONS:  - Assess patient’s ability to void and empty bladder  - Monitor I/O  - Bladder scan as needed  - Discuss with physician/AP medications to alleviate retention as needed  - Discuss catheterization for long term situations as appropriate  Outcome: Progressing  Goal: Urinary catheter remains patent  Description: INTERVENTIONS:  - Assess patency of urinary catheter  - If patient has a chronic posadas, consider changing catheter if non-functioning  - Follow guidelines for intermittent irrigation of non-functioning urinary catheter  Outcome: Progressing     Problem: Nutrition/Hydration-ADULT  Goal: Nutrient/Hydration intake appropriate for improving, restoring or maintaining nutritional needs  Description: Monitor and assess patient's nutrition/hydration status for malnutrition. Collaborate with interdisciplinary team and initiate plan and interventions as ordered.  Monitor patient's weight and dietary intake as ordered or per policy. Utilize nutrition screening tool and intervene as necessary. Determine patient's food preferences and provide high-protein, high-caloric foods as appropriate.     INTERVENTIONS:  - Monitor oral intake, urinary output, labs, and treatment plans  - Assess nutrition and hydration status and recommend course of action  - Evaluate amount of meals eaten  - Assist patient with eating if necessary   - Allow adequate time for meals  - Recommend/ encourage appropriate diets, oral nutritional supplements, and vitamin/mineral supplements  - Order, calculate, and assess calorie counts as needed  - Recommend, monitor, and adjust tube feedings and TPN/PPN based on assessed needs  - Assess need for intravenous  fluids  - Provide specific nutrition/hydration education as appropriate  - Include patient/family/caregiver in decisions related to nutrition  Outcome: Progressing

## 2025-01-30 NOTE — ASSESSMENT & PLAN NOTE
Lab Results   Component Value Date    EGFR 19 01/30/2025    EGFR 18 01/29/2025    EGFR 16 01/28/2025    CREATININE 2.19 (H) 01/30/2025    CREATININE 2.35 (H) 01/29/2025    CREATININE 2.53 (H) 01/28/2025     Cr 5.11 on admit, baseline 2.0-2.3 (CKD IV) follows w/ nephrology as outpatient; has expressed wishes to not proceed with dialysis in the past  Patient endorses minimal oral intake over the past week prior to admission  Noted to have high anion gap metabolic acidosis and was on IV sodium bicarbonate gtt which has been discontinued   PTA HCTZ on hold at discharge  Creatinine improving 2.19 today  Nephrology consult appreciated. Stable for discharge. Repeat BMP in 1 week. Follow up with Dr. Reyes

## 2025-01-30 NOTE — DISCHARGE INSTR - AVS FIRST PAGE
Follow ups:  -PCP  -Nephrology  -Dermatology    Medication changes:  -STOP HCTZ    Other instructions:  -Continue wound care as instructions listed below  -Repeat BMP lab work in 1 week. Can be ordered by rehab facility

## 2025-01-30 NOTE — ASSESSMENT & PLAN NOTE
S/p cefazolin per ID and internal medicine for treatment of cellulitis.  S/p Valtrex for herpes zoster.

## 2025-01-30 NOTE — CASE MANAGEMENT
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, 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 ([facility]) 2024     Obstructive uropathy 2024     Class 1 obesity due to excess calories with serious comorbidity and body mass index (BMI) of 34.0 to 34.9 in adult 2024     Advanced directives, counseling/discussion 2024     Palliative care by specialist 2024     Bilateral leg pain 2024     Ambulatory dysfunction 2024     Stage 4 chronic kidney disease (HCC) 2024     Stage 5 chronic kidney disease 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) 2024     Morbid obesity due to excess calories ([facility]) 2024     Nephrostomy status ([facility]) 2023     Sensorineural hearing loss (SNHL), bilateral 2022     Sensorineural hearing loss (SNHL) of right ear with restricted hearing of left ear 2022     Goals of care, counseling/discussion 05/10/2022     Malignant neoplasm of overlapping sites of bladder ([facility]) 2021     Stroke ([facility]) 10/29/2021     Hypokalemia 10/28/2021     Wrist drop 10/28/2021       LOS (days): 8  Geometric Mean LOS (GMLOS) (days): 3.5  Days to GMLOS:-4.2     OBJECTIVE:  Risk of Unplanned Readmission Score: 18.15         Current admission status: Inpatient   Preferred Pharmacy:   [facility] - [place] - [address]  [address]  [place] [address]  Phone:  [phone] Fax: [phone]    Primary Care Provider: BHAVIK Morillo    [facility]  Secondary Insurance:     DISCHARGE DETAILS:    Discharge planning discussed with:: Patient Son Rodrigo FERGUSON contacted family/caregiver?: Yes (Son aware of scheduled pickup time.)    Contacts  Patient Contacts: Rodrigo Chambers (son)  Relationship to Patient:: Family  Contact Method: Phone  Phone Number: [phone]  Reason/Outcome: Emergency Contact, Continuity of Care, Discharge Planning     Other Referral/Resources/Interventions Provided:  Interventions: Transportation, Short Term Rehab  Referral Comments: CCL admissions aware of scheduled 1430 pickup time to admit to facility today.     Treatment Team Recommendation: Short Term Rehab  Discharge Destination Plan:: Short Term Rehab  Transport at Discharge : [facility]     Number/Name of Dispatcher: [facility]  Transported by (Company and Unit #): [facility]  ETA of Transport (Date): 01/30/25  ETA of Transport (Time): 1430     IMM Given (Date):: 01/30/25  IMM Given to:: Family  IMM reviewed with patient and caregiver, patient and caregiver agrees with discharge determination.     Accepting Facility Name, City & State : [facility]  Receiving Facility/Agency Phone Number: [phone]

## 2025-01-30 NOTE — DISCHARGE SUMMARY
"Discharge Summary - Hospitalist   Name: Ning Chambers 84 y.o. female I MRN: [id number]  Unit/Bed#: [address] [place] [address] I Date of Admission: 1/22/2025   Date of Service: 1/30/2025 I Hospital Day: 8     Assessment & Plan  Sepsis (HCC)  Patient with hx of invasive bladder cancer not undergoing treatment, CKD IV, indwelling bilateral nephrostomy tubes, presents w/ generalized weakness, and new onset \"rash\" on her left flank and lower abdomen which patient states started after her last nephrostomy tube exchange on 01/07  POA - leukocytosis, tachycardia, tachypnea  CT A/P negative for infectious pathology  UCx w/ mixed contaminants  BCx negative   ID consulted during hospital course  Per ID sepsis likely due to cellulitis and herpes zoster   S/p Cefepime + Vancomycin; c/w Cefazolin & p.o. Valtrex through 01/29 to complete 7 day course   Cellulitis of flank  Patient endorsing \"rash\" on her L back/flank/buttocks and abdomen/groin which she states started after her last nephrostomy tube exchange 01/07  S/p biopsy by surgery and hypercoagulable workup ordered  Antithrombin III activity normal, protein C activity normal, protein as activity low, cryoglobulin pending  Surgical skin biopsy histopathologic findings compatible with viral exanthem by a herpesvirus, particularly varicella-zoster virus (VZV)   VZV PCR positive   S/p Cefepime + Vancomycin; c/w Cefazolin & p.o. Valtrex through 01/29 to complete 7 day course   Coordinated plan of care on 01/23 w/ dermatology, nephrology, ID and also surgery & again w/ derm on 01/27  Patient can follow-up outpatient with dermatology, referral placed  Acute kidney injury superimposed on chronic kidney disease  (HCC)  Lab Results   Component Value Date    EGFR 19 01/30/2025    EGFR 18 01/29/2025    EGFR 16 01/28/2025    CREATININE 2.19 (H) 01/30/2025    CREATININE 2.35 (H) 01/29/2025    CREATININE 2.53 (H) 01/28/2025     Cr 5.11 on admit, baseline 2.0-2.3 (CKD IV) follows w/ nephrology as " outpatient; has expressed wishes to not proceed with dialysis in the past  Patient endorses minimal oral intake over the past week prior to admission  Noted to have high anion gap metabolic acidosis and was on IV sodium bicarbonate gtt which has been discontinued   PTA HCTZ on hold at discharge  Creatinine improving 2.19 today  Nephrology consult appreciated. Stable for discharge. Repeat BMP in 1 week. Follow up with Dr. Reyes Lightheadedness  Per family patient reports feeling lightheaded/dizzy when getting out of bed or trying to work with therapy on 01/27   CT head (01/27):  Chronic microangiopathic changes and chronic infarctions  Stable chronic asymmetric left ventricular enlargement  No acute hydrocephalus.  Orthostatics positive 1/28, was started on IVF. Now discontinued  Compression stockings  No acute complaints   Nephrostomy status (HCC)  Hx of obstructive uropathy 2/2 invasive bladder cancer s/p bilateral indwelling percutaneous nephrostomy tubes  Follows with urology, nephrology, palliative medicine  Undergoes exchanges every 3 months, last exchange 01/07/25  CT showed increased mild dilation of left renal pelvis and left ureter despite nephrostomy tube placement.  1/29/25 IR Nephrostomy tube check - Existing nephrostomy catheter in good position in the left kidney. Sutures intact.   Malignant neoplasm of overlapping sites of bladder (HCC)  Hx of bladder cancer with retroperitoneal lymphadenopathy, not currently receiving treatment as patient wishes to avoid systemic treatment for her invasive bladder cancer.   Follows with palliative medicine as outpatient  Benign hypertension  BP acceptable   Continue Toprol XL  Discontinue HCTZ due to CKD  Electrolyte abnormality  Recent Labs     01/29/25  1304 01/30/25  0604   K 3.9 4.0   MG 1.8* 2.2      Improved, trend      Medical Problems       Resolved Problems  Date Reviewed: 10/28/2024          Resolved    High anion gap metabolic acidosis 1/26/2025      Resolved by  Nata Valentin DO    Abnormal urinalysis 1/27/2025     Resolved by  Nata Valentin DO        Discharging Physician / Practitioner: Josselyn Dawson PA-C  PCP: BHAVIK Morillo  Admission Date:   Admission Orders (From admission, onward)       Ordered        01/22/25 1834  INPATIENT ADMISSION  Once                          Discharge Date: 01/30/25    Consultations During Hospital Stay:  Nephrology  Infectious Disease  Dermatology  Interventional Radiology    Procedures Performed:   Lesion biopsy (1/23/25)    Significant Findings / Test Results:   CXR (1/22/25): No acute cardiopulmonary disease.   CT A/P (1/23/25): 1.  Interval resolution of the right hydronephrosis. However, there is increased, mild dilation of the left renal pelvis and left ureter, despite nurse nephrostomy tube placement. 2.  Mild interval enlargement of the retroperitoneal lymphadenopathy, favoring reactive changes.  CT head (1/28/25):1.  No acute hemorrhage, edema, or mass effect. 2.  Chronic microangiopathic changes and chronic infarctions as above.3.  Stable chronic asymmetric left ventricular enlargement. No acute hydrocephalus.   IR nephrostomy tube check (1/29/25): Nephrostogram demonstrates no evidence of abnormality as described above.     Incidental Findings:   None     Test Results Pending at Discharge (will require follow up):   None     Outpatient Tests Requested:  None    Complications:  None    Reason for Admission: Sepsis    Hospital Course:   Ning Chambers is a 84 y.o. female patient who originally presented to the hospital on 1/22/2025 due to generalized weakness and poor oral intake along with noticeable back rash.  Patient meeting sepsis criteria on admission and was started on antibiotics with cefepime and vancomycin.  Patient continued treatment area.  General surgery was consulted and performed lesion biopsy, which results came back positive for VZV PCR +.  Infectious disease was consulted.  Patient  "was started on Valtrex course for total of 7 days through 1/29/25.  Patient's antibiotics were also de-escalated to cefazolin in which patient completed 7-day course through 1/29/2025.  Dermatology was consulted and agree with current treatment plan, will place referral for outpatient follow-up.  During hospital stay patient also noted to have MARTY on CKD.  Nephrology was consulted.  Patient was started on IV fluids and HCTZ medication was placed on hold.  Renal function improved back to baseline on day of discharge.  Continue to hold HCTZ at discharge.  Follow-up with Dr. Reyes with nephrology outpatient.  Patient also had nephrostomy status checked by IR on 1/29/2025, nephrostomy tubes are in place and sutures are intact.  Patient was evaluated by PT/OT and recommended for short-term rehab.  Patient stable for discharge to rehab at [facility].  All patient and family questions answered to the best of my ability.    Please see above list of diagnoses and related plan for additional information.     Condition at Discharge: stable    Discharge Day Visit / Exam:   Subjective:  Patient sitting up in bed eating breakfast. Denies any acute complaints. Agreeable for discharge to [facility] today    Vitals: Blood Pressure: 140/63 (01/29/25 2245)  Pulse: 85 (01/29/25 2245)  Temperature: 97.8 °F (36.6 °C) (01/29/25 2245)  Temp Source: Oral (01/29/25 2245)  Respirations: 18 (01/29/25 2245)  Height: 5' 2\" (157.5 cm) (01/22/25 2100)  Weight - Scale: 77.9 kg (171 lb 11.8 oz) (01/22/25 2100)  SpO2: 99 % (01/29/25 2245)  Physical Exam  Vitals and nursing note reviewed.   Constitutional:       General: She is not in acute distress.     Appearance: She is well-developed.   HENT:      Head: Normocephalic and atraumatic.   Eyes:      Conjunctiva/sclera: Conjunctivae normal.   Cardiovascular:      Rate and Rhythm: Normal rate.      Heart sounds: No murmur heard.  Pulmonary:      Effort: Pulmonary effort is normal. No respiratory distress. "   Abdominal:      Palpations: Abdomen is soft.      Tenderness: There is no abdominal tenderness.   Genitourinary:     Comments: Bilateral nephrostomy tubes in place  Musculoskeletal:         General: No swelling.      Cervical back: Neck supple.   Skin:     Comments: Resolving cellulitis and skin slothing of flank side   Neurological:      Mental Status: She is alert.   Psychiatric:         Mood and Affect: Mood normal.          Discussion with Family: Updated  (son) via phone.    Discharge instructions/Information to patient and family:   See after visit summary for information provided to patient and family.      Provisions for Follow-Up Care:  See after visit summary for information related to follow-up care and any pertinent home health orders.      Mobility at time of Discharge:   Basic Mobility Inpatient Raw Score: 11  -HLM Goal: 4: Move to chair/commode  JH-HLM Achieved: 5: Stand (1 or more minutes)  HLM Goal NOT achieved. Continue to encourage mobility in post discharge setting.     Disposition:   Acute Rehab at [facility]    Planned Readmission: None    Discharge Medications:  See after visit summary for reconciled discharge medications provided to patient and/or family.      Administrative Statements   Discharge Statement:  I have spent a total time of 50 minutes in caring for this patient on the day of the visit/encounter. >30 minutes of time was spent on: Diagnostic results, Risks and benefits of tx options, Instructions for management, Patient and family education, Importance of tx compliance, Counseling / Coordination of care, Documenting in the medical record, Reviewing / ordering tests, medicine, procedures  , and Communicating with other healthcare professionals .    **Please Note: This note may have been constructed using a voice recognition system**

## 2025-01-30 NOTE — ASSESSMENT & PLAN NOTE
"Patient endorsing \"rash\" on her L back/flank/buttocks and abdomen/groin which she states started after her last nephrostomy tube exchange 01/07  S/p biopsy by surgery and hypercoagulable workup ordered  Antithrombin III activity normal, protein C activity normal, protein as activity low, cryoglobulin pending  Surgical skin biopsy histopathologic findings compatible with viral exanthem by a herpesvirus, particularly varicella-zoster virus (VZV)   VZV PCR positive   S/p Cefepime + Vancomycin; c/w Cefazolin & p.o. Valtrex through 01/29 to complete 7 day course   Coordinated plan of care on 01/23 w/ dermatology, nephrology, ID and also surgery & again w/ derm on 01/27  Patient can follow-up outpatient with dermatology, referral placed  "

## 2025-01-31 ENCOUNTER — TELEPHONE (OUTPATIENT)
Dept: NEPHROLOGY | Facility: CLINIC | Age: 85
End: 2025-01-31

## 2025-01-31 DIAGNOSIS — N18.9 ACUTE KIDNEY INJURY SUPERIMPOSED ON CHRONIC KIDNEY DISEASE  (HCC): Primary | ICD-10-CM

## 2025-01-31 DIAGNOSIS — N17.9 ACUTE KIDNEY INJURY SUPERIMPOSED ON CHRONIC KIDNEY DISEASE  (HCC): Primary | ICD-10-CM

## 2025-01-31 NOTE — TELEPHONE ENCOUNTER
----- Message from Chrissy FRIEND sent at 1/31/2025  9:44 AM EST -----  Hello,    Pt is scheduled for hosp f/u. Please place labs in system. Thank you.  ----- Message -----  From: Azael Smyth MD  Sent: 1/30/2025  11:47 PM EST  To: Joselyn Reyes Bahamonde, MD; #    This patient was seen at [facility] for MARTY  Please arrange for follow up in the office in 4-8 weeks.   She was discharged to rehab.   Please have rehab draw a BMP 1 week after discharge and send results to Dr. Reyes.     Dr. Reyes - Alice presented with sepsis due to cellulitis and had MARTY on admission. Her creatinine was 5.11 and improved with IVF and was down to 2.19 on discharge. 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 The biopsy came back as herpes zoster. She was discharged off HCTZ.

## 2025-02-03 ENCOUNTER — TELEPHONE (OUTPATIENT)
Age: 85
End: 2025-02-03

## 2025-02-03 ENCOUNTER — TELEPHONE (OUTPATIENT)
Dept: NEPHROLOGY | Facility: CLINIC | Age: 85
End: 2025-02-03

## 2025-02-03 NOTE — TELEPHONE ENCOUNTER
Called Chantel at [facility] pt is residing at to offer appt    Sched into NP/Cons slot next Mon, 2/10 w/Dr Givens

## 2025-02-03 NOTE — TELEPHONE ENCOUNTER
Chantel calling to schedule HFU for pt, routine referral on file. Pt was seen at [place] ED on Jan 22 for Cellulitis of Left Flank. See media for images. Rash has spread to back/flank/buttocks/abdomen/groin.    Advised Chantel I will have mgt/providers review pts chart and call her back to schedule appropriately at [phone]

## 2025-02-03 NOTE — TELEPHONE ENCOUNTER
Talked with staff at [facility].  BMP to be done 2/6/25 with results being faxed to our office.      ----- Message from Joselyn Reyes Bahamonde, MD sent at 2/2/2025  9:36 AM EST -----  thanks  ----- Message -----  From: Azael Smyth MD  Sent: 1/30/2025  11:47 PM EST  To: Joselyn Reyes Bahamonde, MD; #    This patient was seen at [facility] for MARTY  Please arrange for follow up in the office in 4-8 weeks.   She was discharged to rehab.   Please have rehab draw a BMP 1 week after discharge and send results to Dr. Reyes.     Dr. Reyes - Alice presented with sepsis due to cellulitis and had MARTY on admission. Her creatinine was 5.11 and improved with IVF and was down to 2.19 on discharge. 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 The biopsy came back as herpes zoster. She was discharged off HCTZ.

## 2025-02-04 ENCOUNTER — TELEPHONE (OUTPATIENT)
Age: 85
End: 2025-02-04

## 2025-02-04 NOTE — TELEPHONE ENCOUNTER
[facility][1]  [phone]   Ground floor called regarding the pt and her surtures how long do they stay in? She has many questions for the office/;

## 2025-02-06 ENCOUNTER — TELEPHONE (OUTPATIENT)
Age: 85
End: 2025-02-06

## 2025-02-06 NOTE — TELEPHONE ENCOUNTER
Noa from [facility] calling states that she is faxing over a recent lab result.

## 2025-02-07 NOTE — TELEPHONE ENCOUNTER
Called patient at [facility] and spoke with Gil duque. Gil asked if patient has a consult appt with Nephrology. Told her that patient has a hospital follow up on 3/25 at 10am. Office  address :[address] [place] [address] and phone number :[phone]  . No further questions at this time.

## 2025-02-10 ENCOUNTER — CONSULT (OUTPATIENT)
Age: 85
End: 2025-02-10
Payer: COMMERCIAL

## 2025-02-10 VITALS — HEIGHT: 62 IN | BODY MASS INDEX: 31.41 KG/M2 | TEMPERATURE: 97.7 F

## 2025-02-10 DIAGNOSIS — B02.8 HERPES ZOSTER WITH OTHER COMPLICATION: Primary | ICD-10-CM

## 2025-02-10 DIAGNOSIS — L03.90 CELLULITIS: ICD-10-CM

## 2025-02-10 DIAGNOSIS — L82.1 SEBORRHEIC KERATOSES: ICD-10-CM

## 2025-02-10 PROCEDURE — 99203 OFFICE O/P NEW LOW 30 MIN: CPT | Performed by: DERMATOLOGY

## 2025-02-10 NOTE — PROGRESS NOTES
"[facility] Dermatology Clinic Note     Patient Name: Ning Chambers  Encounter Date: 2/10/25     Have you been cared for by a [facility] Dermatologist in the last 3 years and, if so, which description applies to you?    NO.   I am considered a \"new\" patient and must complete all patient intake questions. I am FEMALE/of child-bearing potential.    REVIEW OF SYSTEMS:  Have you recently had or currently have any of the following? Recent fever or chills? No  Any non-healing wound? No  Are you pregnant or planning to become pregnant? No  Are you currently or planning to be nursing or breast feeding? No   PAST MEDICAL HISTORY:  Have you personally ever had or currently have any of the following?  If \"YES,\" then please provide more detail. Skin cancer (such as Melanoma, Basal Cell Carcinoma, Squamous Cell Carcinoma?  No  Tuberculosis, HIV/AIDS, Hepatitis B or C: No  Radiation Treatment No   HISTORY OF IMMUNOSUPPRESSION:   Do you have a history of any of the following:  Systemic Immunosuppression such as Diabetes, Biologic or Immunotherapy, Chemotherapy, Organ Transplantation, Bone Marrow Transplantation or Prednsione?  No    Answering \"YES\" requires the addition of the dotphrase \"IMMUNOSUPPRESSED\" as the first diagnosis of the patient's visit.   FAMILY HISTORY:  Any \"first degree relatives\" (parent, brother, sister, or child) with the following?    Skin Cancer, Pancreatic or Other Cancer? YES, father, Leukemia, sisters melanoma   PATIENT EXPERIENCE:    Do you want the Dermatologist to perform a COMPLETE skin exam today including a clinical examination under the \"bra and underwear\" areas?  Yes, did not check lower half of body, patient is in a wheel chair  If necessary, do we have your permission to call and leave a detailed message on your Preferred Phone number that includes your specific medical information?  Yes      Allergies   Allergen Reactions   • Percocet [Oxycodone-Acetaminophen] GI Intolerance   • Vicodin " [Hydrocodone-Acetaminophen] GI Intolerance     Also darvocet   • Medical Tape Rash   • Propoxyphene Other (See Comments)      Current Outpatient Medications:   •  acetaminophen (TYLENOL) 500 mg tablet, Take 500 mg by mouth, Disp: , Rfl:   •  aspirin 81 mg chewable tablet, Chew 81 mg daily, Disp: , Rfl:   •  atorvastatin (LIPITOR) 40 mg tablet, Take 1 tablet (40 mg total) by mouth daily with dinner, Disp: 14 tablet, Rfl: 0  •  cholecalciferol (VITAMIN D3) 1,000 units tablet, Take 1,000 Units by mouth daily, Disp: , Rfl:   •  metoprolol succinate (TOPROL-XL) 50 mg 24 hr tablet, Take 25 mg by mouth every morning, Disp: , Rfl:   •  saccharomyces boulardii (FLORASTOR) 250 mg capsule, Take 1 capsule (250 mg total) by mouth 2 (two) times a day, Disp: , Rfl:   •  sodium chloride, PF, 0.9 %, 10 mL by Intracatheter route daily Intracatheter flushing daily. May substitute prefilled syringe with normal saline 10 mL vials, 10 mL syringes, and 18 g blunt needles, Disp: 900 mL, Rfl: 0          Whom besides the patient is providing clinical information about today's encounter?   NO ADDITIONAL HISTORIAN (patient alone provided history)    Physical Exam and Assessment/Plan by Diagnosis:    SHINGLES COMPLICATED BY CELLULITIS   Physical Exam:  Anatomic Location Affected: Abdomen And Back  Morphological Description:  Erosions with granulating base, no purulent exudate, not hot to touch, minimal discomfort  Pertinent Positives:  Pertinent Negatives:    Additional History of Present Condition:  currently receiving wound care, minimal pain, has been told it is healing well    Assessment and Plan:  Based on a thorough discussion of this condition and the management approach to it (including a comprehensive discussion of the known risks, side effects and potential benefits of treatment), the patient (family) agrees to implement the following specific plan:  Keep up with wound care til healed   Follow up as needed.    SEBORRHEIC KERATOSES  -  Relevant exam: Scattered over the trunk/extremities are waxy brown to black plaques and papules with stuck on appearance  - Exam and clinical history consistent with seborrheic keratoses  - Counseled that these are benign growths that do not require treatment  - Counseled that removal of lesions is considered cosmetic and so would incur a fee should patient elect to move forward.   - Patient to hold on treatments for now but will inform us should they desire additional treatments    Scribe Attestation    I,:  Falguni Mcallister MA am acting as a scribe while in the presence of the attending physician.:       I,:  Yelena Huerta MD personally performed the services described in this documentation    as scribed in my presence.:

## 2025-02-10 NOTE — PATIENT INSTRUCTIONS
SHINGLES COMPLICATED BY CELLULITIS       Assessment and Plan:  Based on a thorough discussion of this condition and the management approach to it (including a comprehensive discussion of the known risks, side effects and potential benefits of treatment), the patient (family) agrees to implement the following specific plan:  Keep up with wound care   Follow up as needed.    SEBORRHEIC KERATOSES  - Relevant exam: Scattered over the trunk/extremities are waxy brown to black plaques and papules with stuck on appearance  - Exam and clinical history consistent with seborrheic keratoses  - Counseled that these are benign growths that do not require treatment  - Counseled that removal of lesions is considered cosmetic and so would incur a fee should patient elect to move forward.   - Patient to hold on treatments for now but will inform us should they desire additional treatments

## 2025-02-22 PROBLEM — A41.9 SEPSIS (HCC): Status: RESOLVED | Noted: 2025-01-22 | Resolved: 2025-02-22

## 2025-03-11 ENCOUNTER — TELEPHONE (OUTPATIENT)
Dept: UROLOGY | Facility: CLINIC | Age: 85
End: 2025-03-11

## 2025-03-11 NOTE — TELEPHONE ENCOUNTER
Call was transferred to me by call center. Sharonda on the phone from [facility] in [place]. She received referral for nursing to come into the patient's home tomorrow as patient will be discharged from facility. Patient has a R PCN. She is requesting orders to have nurse go in tomorrow to assess and flush.     Sharonda [phone] main number   Cell: [phone]  Fax: [phone]

## 2025-03-11 NOTE — TELEPHONE ENCOUNTER
VNA was calling to speak to the nurse from the office. Reach out via teams.     Nurse asked if she received the orders yet. VNA did not. Confirmed correct fax number. Nurse stated she will refax them    VNA confirmed and will wait for the orders

## 2025-03-11 NOTE — TELEPHONE ENCOUNTER
Patient's daughter in law called to reschedule the patient's canceled appt. Pt is scheduled in next avail with Dr Ricardo on 7/24 at 11 AM and is on the wait list. Sooner appt was offered with [facility] but caller declined.

## 2025-03-12 NOTE — TELEPHONE ENCOUNTER
Spoke with Sharonda. She needs orders printed ( see encounter from Mandie), signed and faxed back.

## 2025-03-12 NOTE — TELEPHONE ENCOUNTER
Sharonda was calling again is regards to the order for this patient. She has some questions and concerns for the nursing staff of the office. She would appreciate a call back as soon as some can.     She can not move forward until this is addressed    Sharonda   [phone]

## 2025-03-14 NOTE — TELEPHONE ENCOUNTER
Flush bilateral PCNs with 10 cc NSS twice weekly or PRN for any blockage or sediment. Use split gauze dressing PRN

## 2025-03-14 NOTE — TELEPHONE ENCOUNTER
VNA called asking if both neph tubes should be flushed twice a week. Also wants to know if she should use foam dressing or split gauze dressing. Please advise.     JULIANA # [phone]

## 2025-03-14 NOTE — TELEPHONE ENCOUNTER
Called and LM for Beverley (SIMI). If she calls back, please confirm that patient does have bilateral neph tubes. Also, can give verbal orders if she will accept or ask for fax number to fax.

## 2025-03-23 ENCOUNTER — APPOINTMENT (EMERGENCY)
Dept: RADIOLOGY | Facility: HOSPITAL | Age: 85
DRG: 641 | End: 2025-03-23
Payer: COMMERCIAL

## 2025-03-23 ENCOUNTER — HOSPITAL ENCOUNTER (INPATIENT)
Facility: HOSPITAL | Age: 85
LOS: 3 days | Discharge: HOME | DRG: 641 | End: 2025-03-26
Attending: EMERGENCY MEDICINE | Admitting: FAMILY MEDICINE
Payer: COMMERCIAL

## 2025-03-23 DIAGNOSIS — R53.1 WEAKNESS: Primary | ICD-10-CM

## 2025-03-23 DIAGNOSIS — R13.10 DYSPHAGIA: ICD-10-CM

## 2025-03-23 DIAGNOSIS — N39.0 UTI (URINARY TRACT INFECTION): ICD-10-CM

## 2025-03-23 DIAGNOSIS — R53.1 GENERALIZED WEAKNESS: ICD-10-CM

## 2025-03-23 DIAGNOSIS — Z93.6 NEPHROSTOMY STATUS (HCC): ICD-10-CM

## 2025-03-23 DIAGNOSIS — R19.7 DIARRHEA: ICD-10-CM

## 2025-03-23 PROBLEM — R79.89 ELEVATED TROPONIN: Status: ACTIVE | Noted: 2025-03-23

## 2025-03-23 PROBLEM — E87.8 ELECTROLYTE ABNORMALITY: Status: ACTIVE | Noted: 2025-03-23

## 2025-03-23 LAB
2HR DELTA HS TROPONIN: -3 NG/L
4HR DELTA HS TROPONIN: -2 NG/L
ALBUMIN SERPL BCG-MCNC: 3.1 G/DL (ref 3.5–5)
ALP SERPL-CCNC: 69 U/L (ref 34–104)
ALT SERPL W P-5'-P-CCNC: 8 U/L (ref 7–52)
ANION GAP SERPL CALCULATED.3IONS-SCNC: 9 MMOL/L (ref 4–13)
AST SERPL W P-5'-P-CCNC: 14 U/L (ref 13–39)
BACTERIA UR QL AUTO: ABNORMAL /HPF
BASOPHILS # BLD AUTO: 0.11 THOUSANDS/ÂΜL (ref 0–0.1)
BASOPHILS NFR BLD AUTO: 1 % (ref 0–1)
BILIRUB SERPL-MCNC: 0.63 MG/DL (ref 0.2–1)
BILIRUB UR QL STRIP: NEGATIVE
BUDDING YEAST: PRESENT
BUN SERPL-MCNC: 40 MG/DL (ref 5–25)
CALCIUM ALBUM COR SERPL-MCNC: 10.9 MG/DL (ref 8.3–10.1)
CALCIUM SERPL-MCNC: 10.2 MG/DL (ref 8.4–10.2)
CARDIAC TROPONIN I PNL SERPL HS: 66 NG/L (ref ?–50)
CARDIAC TROPONIN I PNL SERPL HS: 67 NG/L (ref ?–50)
CARDIAC TROPONIN I PNL SERPL HS: 69 NG/L (ref ?–50)
CHLORIDE SERPL-SCNC: 112 MMOL/L (ref 96–108)
CLARITY UR: CLEAR
CO2 SERPL-SCNC: 18 MMOL/L (ref 21–32)
COLOR UR: ABNORMAL
CREAT SERPL-MCNC: 2 MG/DL (ref 0.6–1.3)
EOSINOPHIL # BLD AUTO: 0.17 THOUSAND/ÂΜL (ref 0–0.61)
EOSINOPHIL NFR BLD AUTO: 2 % (ref 0–6)
ERYTHROCYTE [DISTWIDTH] IN BLOOD BY AUTOMATED COUNT: 14.4 % (ref 11.6–15.1)
FLUAV AG UPPER RESP QL IA.RAPID: NEGATIVE
FLUBV AG UPPER RESP QL IA.RAPID: NEGATIVE
GFR SERPL CREATININE-BSD FRML MDRD: 22 ML/MIN/1.73SQ M
GLUCOSE SERPL-MCNC: 105 MG/DL (ref 65–140)
GLUCOSE UR STRIP-MCNC: NEGATIVE MG/DL
HCT VFR BLD AUTO: 36.9 % (ref 34.8–46.1)
HGB BLD-MCNC: 11.8 G/DL (ref 11.5–15.4)
HGB UR QL STRIP.AUTO: ABNORMAL
IMM GRANULOCYTES # BLD AUTO: 0.05 THOUSAND/UL (ref 0–0.2)
IMM GRANULOCYTES NFR BLD AUTO: 1 % (ref 0–2)
KETONES UR STRIP-MCNC: ABNORMAL MG/DL
LACTATE SERPL-SCNC: 1 MMOL/L (ref 0.5–2)
LEUKOCYTE ESTERASE UR QL STRIP: ABNORMAL
LYMPHOCYTES # BLD AUTO: 1.06 THOUSANDS/ÂΜL (ref 0.6–4.47)
LYMPHOCYTES NFR BLD AUTO: 10 % (ref 14–44)
MAGNESIUM SERPL-MCNC: 1.7 MG/DL (ref 1.9–2.7)
MCH RBC QN AUTO: 27.8 PG (ref 26.8–34.3)
MCHC RBC AUTO-ENTMCNC: 32 G/DL (ref 31.4–37.4)
MCV RBC AUTO: 87 FL (ref 82–98)
MONOCYTES # BLD AUTO: 0.74 THOUSAND/ÂΜL (ref 0.17–1.22)
MONOCYTES NFR BLD AUTO: 7 % (ref 4–12)
NEUTROPHILS # BLD AUTO: 8.75 THOUSANDS/ÂΜL (ref 1.85–7.62)
NEUTS SEG NFR BLD AUTO: 79 % (ref 43–75)
NITRITE UR QL STRIP: POSITIVE
NON-SQ EPI CELLS URNS QL MICRO: ABNORMAL /HPF
NRBC BLD AUTO-RTO: 0 /100 WBCS
PH UR STRIP.AUTO: 6 [PH]
PLATELET # BLD AUTO: 322 THOUSANDS/UL (ref 149–390)
PMV BLD AUTO: 10.7 FL (ref 8.9–12.7)
POTASSIUM SERPL-SCNC: 4.5 MMOL/L (ref 3.5–5.3)
PROCALCITONIN SERPL-MCNC: 0.27 NG/ML
PROT SERPL-MCNC: 6.6 G/DL (ref 6.4–8.4)
PROT UR STRIP-MCNC: ABNORMAL MG/DL
RBC # BLD AUTO: 4.25 MILLION/UL (ref 3.81–5.12)
RBC #/AREA URNS AUTO: ABNORMAL /HPF
SARS-COV+SARS-COV-2 AG RESP QL IA.RAPID: NEGATIVE
SODIUM SERPL-SCNC: 139 MMOL/L (ref 135–147)
SP GR UR STRIP.AUTO: 1.02 (ref 1–1.03)
TSH SERPL DL<=0.05 MIU/L-ACNC: 0.78 UIU/ML (ref 0.45–4.5)
UROBILINOGEN UR STRIP-ACNC: <2 MG/DL
WBC # BLD AUTO: 10.88 THOUSAND/UL (ref 4.31–10.16)
WBC #/AREA URNS AUTO: ABNORMAL /HPF

## 2025-03-23 PROCEDURE — 70450 CT HEAD/BRAIN W/O DYE: CPT

## 2025-03-23 PROCEDURE — 85025 COMPLETE CBC W/AUTO DIFF WBC: CPT | Performed by: EMERGENCY MEDICINE

## 2025-03-23 PROCEDURE — 87804 INFLUENZA ASSAY W/OPTIC: CPT | Performed by: EMERGENCY MEDICINE

## 2025-03-23 PROCEDURE — 93005 ELECTROCARDIOGRAM TRACING: CPT

## 2025-03-23 PROCEDURE — 87811 SARS-COV-2 COVID19 W/OPTIC: CPT | Performed by: EMERGENCY MEDICINE

## 2025-03-23 PROCEDURE — 99285 EMERGENCY DEPT VISIT HI MDM: CPT

## 2025-03-23 PROCEDURE — 36415 COLL VENOUS BLD VENIPUNCTURE: CPT | Performed by: EMERGENCY MEDICINE

## 2025-03-23 PROCEDURE — 96360 HYDRATION IV INFUSION INIT: CPT

## 2025-03-23 PROCEDURE — 80053 COMPREHEN METABOLIC PANEL: CPT | Performed by: EMERGENCY MEDICINE

## 2025-03-23 PROCEDURE — 84443 ASSAY THYROID STIM HORMONE: CPT | Performed by: EMERGENCY MEDICINE

## 2025-03-23 PROCEDURE — 84145 PROCALCITONIN (PCT): CPT | Performed by: FAMILY MEDICINE

## 2025-03-23 PROCEDURE — 81001 URINALYSIS AUTO W/SCOPE: CPT | Performed by: EMERGENCY MEDICINE

## 2025-03-23 PROCEDURE — 87077 CULTURE AEROBIC IDENTIFY: CPT | Performed by: EMERGENCY MEDICINE

## 2025-03-23 PROCEDURE — 71045 X-RAY EXAM CHEST 1 VIEW: CPT

## 2025-03-23 PROCEDURE — 74176 CT ABD & PELVIS W/O CONTRAST: CPT

## 2025-03-23 PROCEDURE — 87086 URINE CULTURE/COLONY COUNT: CPT | Performed by: EMERGENCY MEDICINE

## 2025-03-23 PROCEDURE — 83605 ASSAY OF LACTIC ACID: CPT | Performed by: EMERGENCY MEDICINE

## 2025-03-23 PROCEDURE — 83735 ASSAY OF MAGNESIUM: CPT | Performed by: EMERGENCY MEDICINE

## 2025-03-23 PROCEDURE — 84484 ASSAY OF TROPONIN QUANT: CPT | Performed by: FAMILY MEDICINE

## 2025-03-23 PROCEDURE — 87186 SC STD MICRODIL/AGAR DIL: CPT | Performed by: EMERGENCY MEDICINE

## 2025-03-23 PROCEDURE — 99223 1ST HOSP IP/OBS HIGH 75: CPT | Performed by: FAMILY MEDICINE

## 2025-03-23 PROCEDURE — 84484 ASSAY OF TROPONIN QUANT: CPT | Performed by: EMERGENCY MEDICINE

## 2025-03-23 PROCEDURE — 99285 EMERGENCY DEPT VISIT HI MDM: CPT | Performed by: EMERGENCY MEDICINE

## 2025-03-23 RX ORDER — MAGNESIUM SULFATE HEPTAHYDRATE 40 MG/ML
2 INJECTION, SOLUTION INTRAVENOUS ONCE
Status: COMPLETED | OUTPATIENT
Start: 2025-03-23 | End: 2025-03-23

## 2025-03-23 RX ORDER — LOPERAMIDE HYDROCHLORIDE 2 MG/1
2 CAPSULE ORAL AS NEEDED
COMMUNITY

## 2025-03-23 RX ORDER — CEFTRIAXONE 1 G/50ML
1000 INJECTION, SOLUTION INTRAVENOUS EVERY 24 HOURS
Status: DISCONTINUED | OUTPATIENT
Start: 2025-03-24 | End: 2025-03-25

## 2025-03-23 RX ORDER — ATORVASTATIN CALCIUM 40 MG/1
40 TABLET, FILM COATED ORAL
Status: DISCONTINUED | OUTPATIENT
Start: 2025-03-23 | End: 2025-03-26 | Stop reason: HOSPADM

## 2025-03-23 RX ORDER — METOPROLOL SUCCINATE 25 MG/1
25 TABLET, EXTENDED RELEASE ORAL EVERY MORNING
Status: DISCONTINUED | OUTPATIENT
Start: 2025-03-24 | End: 2025-03-26 | Stop reason: HOSPADM

## 2025-03-23 RX ORDER — SODIUM CHLORIDE, SODIUM GLUCONATE, SODIUM ACETATE, POTASSIUM CHLORIDE, MAGNESIUM CHLORIDE, SODIUM PHOSPHATE, DIBASIC, AND POTASSIUM PHOSPHATE .53; .5; .37; .037; .03; .012; .00082 G/100ML; G/100ML; G/100ML; G/100ML; G/100ML; G/100ML; G/100ML
75 INJECTION, SOLUTION INTRAVENOUS CONTINUOUS
Status: DISCONTINUED | OUTPATIENT
Start: 2025-03-23 | End: 2025-03-25

## 2025-03-23 RX ORDER — HEPARIN SODIUM 5000 [USP'U]/ML
5000 INJECTION, SOLUTION INTRAVENOUS; SUBCUTANEOUS EVERY 8 HOURS SCHEDULED
Status: DISCONTINUED | OUTPATIENT
Start: 2025-03-23 | End: 2025-03-26 | Stop reason: HOSPADM

## 2025-03-23 RX ORDER — ENOXAPARIN SODIUM 100 MG/ML
40 INJECTION SUBCUTANEOUS DAILY
Status: DISCONTINUED | OUTPATIENT
Start: 2025-03-24 | End: 2025-03-23

## 2025-03-23 RX ORDER — SACCHAROMYCES BOULARDII 250 MG
250 CAPSULE ORAL 2 TIMES DAILY
Status: DISCONTINUED | OUTPATIENT
Start: 2025-03-23 | End: 2025-03-26 | Stop reason: HOSPADM

## 2025-03-23 RX ORDER — CEFTRIAXONE 1 G/50ML
1000 INJECTION, SOLUTION INTRAVENOUS ONCE
Status: COMPLETED | OUTPATIENT
Start: 2025-03-23 | End: 2025-03-23

## 2025-03-23 RX ORDER — ASPIRIN 81 MG/1
81 TABLET, CHEWABLE ORAL DAILY
Status: DISCONTINUED | OUTPATIENT
Start: 2025-03-24 | End: 2025-03-26 | Stop reason: HOSPADM

## 2025-03-23 RX ADMIN — ATORVASTATIN CALCIUM 40 MG: 40 TABLET, FILM COATED ORAL at 18:17

## 2025-03-23 RX ADMIN — SODIUM CHLORIDE 500 ML: 0.9 INJECTION, SOLUTION INTRAVENOUS at 15:19

## 2025-03-23 RX ADMIN — MAGNESIUM SULFATE HEPTAHYDRATE 2 G: 40 INJECTION, SOLUTION INTRAVENOUS at 20:42

## 2025-03-23 RX ADMIN — HEPARIN SODIUM 5000 UNITS: 5000 INJECTION INTRAVENOUS; SUBCUTANEOUS at 18:17

## 2025-03-23 RX ADMIN — SODIUM CHLORIDE, SODIUM GLUCONATE, SODIUM ACETATE, POTASSIUM CHLORIDE, MAGNESIUM CHLORIDE, SODIUM PHOSPHATE, DIBASIC, AND POTASSIUM PHOSPHATE 50 ML/HR: .53; .5; .37; .037; .03; .012; .00082 INJECTION, SOLUTION INTRAVENOUS at 17:35

## 2025-03-23 RX ADMIN — CEFTRIAXONE 1000 MG: 1 INJECTION, SOLUTION INTRAVENOUS at 17:14

## 2025-03-23 RX ADMIN — Medication 250 MG: at 18:17

## 2025-03-23 NOTE — H&P
H&P - Hospitalist   Name: Ning Chambers 85 y.o. female I MRN: [id number]  Unit/Bed#: ED 08 I Date of Admission: 3/23/2025   Date of Service: 3/23/2025 I Hospital Day: 0     Assessment & Plan  Generalized weakness  Patient presented from home as VNA nurse was concern for dehydration as patient has been weak along with decreased p.o. intake since January  Patient states she is unsure why she does not have much of an appetite.  Initially while at rehab she was not eating much because she did not like the food.  Patient reports having some difficulty swallowing her pills but per caretaker at bedside she has been taking her medications  Will start patient on mechanical soft diet with thin liquids after bedside nursing eval and consult speech pathologist for further evaluation  May need addition of appetite stimulant if appetite does not improve  CT abdomen/pelvis showed possible right lung bronchiolitis along with lymphadenopathy in the abdomen which is unchanged but no acute pathology  CT head was negative for acute pathology and chest x-ray negative for pneumonia  PT/OT  Abnormal urinalysis  UA is abnormal.  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  Per caretaker at bedside not all her bowel movements are diarrhea-like  Was prescribed Imodium as needed on discharge from rehab  Given recent hospitalization and treatment with antibiotics, will check for C. difficile and bacterial enteric panel if patient has further diarrhea here  Benign hypertension  Continue Toprol-XL  and monitor blood pressures  Stage 4 chronic kidney disease (HCC)  Lab Results   Component Value Date    EGFR 22 03/23/2025    EGFR 19 01/30/2025    EGFR 18 01/29/2025    CREATININE 2.00 (H) 03/23/2025    CREATININE 2.19 (H) 01/30/2025    CREATININE 2.35 (H) 01/29/2025   Per prior nephrology notes, baseline 2.0-2.3  Creatinine today appears at baseline.  Repeat lab work in a.m.  Low bicarbonate level  Bicarb low at 18 with normal anion gap.  Gentle IV fluids with Plasma-Lyte and get repeat lab work in a.m.  Electrolyte abnormality  Patient with hypomagnesemia on lab work, replete with IV mag and get repeat lab work in a.m.  Elevated troponin  Initial troponin of 69, trend troponins  EKG nonischemic  Likely non-MI troponin elevation in the setting of CKD      VTE Pharmacologic Prophylaxis:    Heparin  Code Status: Level 3 - DNAR and DNI   Discussion with family: Updated  (son and daughter in law) at bedside.    Anticipated Length of Stay: Patient will be admitted on an inpatient basis with an anticipated length of stay of greater than 2 midnights secondary to generalized weakness, abnormal UA, diarrhea.    History of Present Illness   Chief Complaint: Weakness, decreased p.o. intake    Ning Chambers is a 85 y.o. female with a PMH of CKD stage IV who presents with generalized weakness, decreased p.o. intake.  Per family at bedside, VNA was at her home and was concerned that she was dehydrated as patient has had decreased food and fluid intake.  Patient states she does not have much of an appetite since January.  Patient also with weakness since beginning of January along with on and off diarrhea since then as well.  Patient was admitted in January with sepsis due to flank cellulitis along with MARTY on CKD and discharged to rehab.  She has been home from rehab since 3/12 and has had difficulty getting out of bed without assistance and now only taking some steps with the help of a walker.  Patient reports  "1 episode of vomiting today.  Reports some cough since yesterday.  Patient reports having some difficulty swallowing her pills but per caretaker has been taking her medications.    Review of Systems   Constitutional:  Positive for appetite change. Negative for chills and fever.   HENT:  Negative for sore throat.    Eyes:  Negative for photophobia.   Respiratory:  Positive for cough. Negative for chest tightness and shortness of breath.    Cardiovascular:  Negative for chest pain and leg swelling.   Gastrointestinal:  Positive for diarrhea and vomiting. Negative for abdominal pain and blood in stool.   Genitourinary:  Negative for hematuria.   Musculoskeletal:  Positive for back pain (occasional).   Skin:  Negative for pallor.   Neurological:  Positive for weakness and light-headedness.   Hematological:  Does not bruise/bleed easily.   Psychiatric/Behavioral:  Negative for agitation.        Historical Information   Past Medical History:   Diagnosis Date    Arthritis     right knee    Hay fever     Hyperlipidemia     Hypertension     Myocardial infarction (HCC) 2003    questionable MI during exploratory surgery    Ovarian cancer ([facility]) 2003    surgery (did not have chemo or radiation)    Stroke ([facility]) 2003    \" mini stroke \" no deficits ; another poss stroke with  left hand weakness    Wears glasses     Wears partial dentures     upper & lower     Past Surgical History:   Procedure Laterality Date    BREAST SURGERY Right     exc. bx. lump (R) breast    DILATION AND CURETTAGE OF UTERUS      HYSTERECTOMY  2003    TAHBSO    IR NEPHROSTOMY TUBE CHECK/CHANGE/REPOSITION/REINSERTION/UPSIZE  5/24/2023    IR NEPHROSTOMY TUBE CHECK/CHANGE/REPOSITION/REINSERTION/UPSIZE  7/13/2023    IR NEPHROSTOMY TUBE CHECK/CHANGE/REPOSITION/REINSERTION/UPSIZE  8/29/2023    IR NEPHROSTOMY TUBE CHECK/CHANGE/REPOSITION/REINSERTION/UPSIZE  10/11/2023    IR NEPHROSTOMY TUBE CHECK/CHANGE/REPOSITION/REINSERTION/UPSIZE  12/11/2023    IR NEPHROSTOMY TUBE " CHECK/CHANGE/REPOSITION/REINSERTION/UPSIZE  2/9/2024    IR NEPHROSTOMY TUBE CHECK/CHANGE/REPOSITION/REINSERTION/UPSIZE  4/8/2024    IR NEPHROSTOMY TUBE CHECK/CHANGE/REPOSITION/REINSERTION/UPSIZE  6/7/2024    IR NEPHROSTOMY TUBE CHECK/CHANGE/REPOSITION/REINSERTION/UPSIZE  8/7/2024    IR NEPHROSTOMY TUBE CHECK/CHANGE/REPOSITION/REINSERTION/UPSIZE  10/7/2024    IR NEPHROSTOMY TUBE CHECK/CHANGE/REPOSITION/REINSERTION/UPSIZE  1/7/2025    IR NEPHROSTOMY TUBE CHECK/CHANGE/REPOSITION/REINSERTION/UPSIZE  1/29/2025    IR NEPHROSTOMY TUBE PLACEMENT  4/13/2023    JOINT REPLACEMENT Right     OOPHORECTOMY Bilateral     cancer    KS CYSTO W/REMOVAL OF TUMORS SMALL N/A 4/4/2022    Procedure: TRANSURETHRAL RESECTION OF BLADDER (MULTIFOCAL) TUMOR (TURBT);  Surgeon: Vinnie Ricardo MD;  [facility];  Service: Urology    KS CYSTOURETHROSCOPY W/DEST &/RMVL TUMOR LARGE Bilateral 11/11/2021    Procedure: CYSTOSCOPY, TRANSURETHRAL RESECTION OF BLADDER TUMOR (TURBT), EVACUATION OF CLOTS, LARGE TUMOR 7-8CM;  Surgeon: Malcolm Reyes MD;  Location: [facility];  Service: Urology    KS CYSTOURETHROSCOPY W/DEST &/RMVL TUMOR LARGE N/A 3/21/2023    Procedure: TRANSURETHRAL RESECTION OF BLADDER TUMOR (TURBT);  Surgeon: Vinnie Ricardo MD;  [facility];  Service: Urology    KS CYSTOURETHROSCOPY W/URETERAL CATHETERIZATION Bilateral 4/4/2022    Procedure: CYSTOSCOPY WITH ATTEMPTED B/L RETROGRADE PYELOGRAM;  Surgeon: Vinnie Ricardo MD;  Location[facility];  Service: Urology    KS XCAPSL CTRC RMVL INSJ IO LENS PROSTH W/O ECP Left 7/24/2017    Procedure: EXTRACTION EXTRACAPSULAR CATARACT PHACO INTRAOCULAR LENS (IOL);  Surgeon: Elmer Ocampo MD;  Location: [facility] MAIN OR;  Service: Ophthalmology    TONSILLECTOMY       Social History     Tobacco Use    Smoking status: Former     Current packs/day: 0.00     Average packs/day: 1.5 packs/day for 40.0 years (60.0 ttl pk-yrs)     Types: Cigarettes     Start date: 1960     Quit date:  2000     Years since quittin.2    Smokeless tobacco: Never   Vaping Use    Vaping status: Never Used   Substance and Sexual Activity    Alcohol use: Yes     Comment: occassional, socially    Drug use: No    Sexual activity: Not Currently     E-Cigarette/Vaping    E-Cigarette Use Never User      E-Cigarette/Vaping Substances    Nicotine No     THC No     CBD No     Flavoring No     Other No     Unknown No      Family History   Problem Relation Age of Onset    Parkinsonism Mother     Heart disease Father     Cancer Father         leukemia    Heart disease Sister     Diabetes Sister     Diabetes Brother     Diabetes Sister     Melanoma Sister     Melanoma Sister     Diabetes Maternal Aunt     No Known Problems Maternal Aunt     No Known Problems Paternal Aunt      Social History:  Marital Status:    Occupation: None  Patient Pre-hospital Level of Mobility: walks with walker  Patient Pre-hospital Diet Restrictions: None    Meds/Allergies   I have reviewed home medications with patient family member.  Prior to Admission medications    Medication Sig Start Date End Date Taking? Authorizing Provider   acetaminophen (TYLENOL) 500 mg tablet Take 500 mg by mouth    Historical Provider, MD   aspirin 81 mg chewable tablet Chew 81 mg daily    Historical Provider, MD   atorvastatin (LIPITOR) 40 mg tablet Take 1 tablet (40 mg total) by mouth daily with dinner 10/30/21   Oneil Morrell DO   cholecalciferol (VITAMIN D3) 1,000 units tablet Take 1,000 Units by mouth daily    Historical Provider, MD   metoprolol succinate (TOPROL-XL) 50 mg 24 hr tablet Take 25 mg by mouth every morning    Historical Provider, MD   saccharomyces boulardii (FLORASTOR) 250 mg capsule Take 1 capsule (250 mg total) by mouth 2 (two) times a day 25   Josselyn Dawson PA-C   sodium chloride, PF, 0.9 % 10 mL by Intracatheter route daily Intracatheter flushing daily. May substitute prefilled syringe with normal saline 10 mL vials, 10 mL syringes,  and 18 g blunt needles 4/13/23 7/14/23  Gregorio Ruth MD     Allergies   Allergen Reactions    Percocet [Oxycodone-Acetaminophen] GI Intolerance    Vicodin [Hydrocodone-Acetaminophen] GI Intolerance     Also darvocet    Medical Tape Rash    Propoxyphene Other (See Comments)       Objective :  Temp:  [98.4 °F (36.9 °C)] 98.4 °F (36.9 °C)  HR:  [76-78] 78  BP: (152)/(66) 152/66  Resp:  [18-19] 19  SpO2:  [98 %] 98 %    Physical Exam  Vitals reviewed.   Constitutional:       General: She is not in acute distress.     Appearance: She is ill-appearing (chronically). She is not toxic-appearing.   HENT:      Head: Normocephalic and atraumatic.   Eyes:      General: No scleral icterus.  Cardiovascular:      Rate and Rhythm: Normal rate and regular rhythm.   Pulmonary:      Effort: Pulmonary effort is normal. No respiratory distress.      Breath sounds: Normal breath sounds. No wheezing or rales.   Abdominal:      General: Bowel sounds are normal. There is no distension.      Palpations: Abdomen is soft.      Tenderness: There is no abdominal tenderness.   Musculoskeletal:      Right lower leg: No edema.      Left lower leg: No edema.   Skin:     Comments: Prior rash on the back has improved.  Bilateral nephrostomy tubes in place.  Left nephrostomy with yellow-colored urine.  Right nephrostomy with dark colored urine   Neurological:      Mental Status: She is alert and oriented to person, place, and time.         Lines/Drains:            Lab Results: I have reviewed the following results:  Results from last 7 days   Lab Units 03/23/25  1422   WBC Thousand/uL 10.88*   HEMOGLOBIN g/dL 11.8   HEMATOCRIT % 36.9   PLATELETS Thousands/uL 322   SEGS PCT % 79*   LYMPHO PCT % 10*   MONO PCT % 7   EOS PCT % 2     Results from last 7 days   Lab Units 03/23/25  1422   SODIUM mmol/L 139   POTASSIUM mmol/L 4.5   CHLORIDE mmol/L 112*   CO2 mmol/L 18*   BUN mg/dL 40*   CREATININE mg/dL 2.00*   ANION GAP mmol/L 9   CALCIUM mg/dL 10.2    ALBUMIN g/dL 3.1*   TOTAL BILIRUBIN mg/dL 0.63   ALK PHOS U/L 69   ALT U/L 8   AST U/L 14   GLUCOSE RANDOM mg/dL 105             Lab Results   Component Value Date    HGBA1C 5.4 10/30/2021    HGBA1C 5.5 04/29/2016     Results from last 7 days   Lab Units 03/23/25  1422   LACTIC ACID mmol/L 1.0       Imaging Results Review: I reviewed radiology reports from this admission including: chest xray and CT abdomen/pelvis.  Other Study Results Review: EKG was personally reviewed and my interpretation is: Sinus rhythm with no acute ST elevations..    Administrative Statements       ** Please Note: This note has been constructed using a voice recognition system. **

## 2025-03-23 NOTE — ASSESSMENT & PLAN NOTE
Lab Results   Component Value Date    EGFR 22 03/23/2025    EGFR 19 01/30/2025    EGFR 18 01/29/2025    CREATININE 2.00 (H) 03/23/2025    CREATININE 2.19 (H) 01/30/2025    CREATININE 2.35 (H) 01/29/2025   Per prior nephrology notes, baseline 2.0-2.3  Creatinine today appears at baseline.  Repeat lab work in a.m.

## 2025-03-23 NOTE — ED NOTES
Pt reports difficulty swallowing PO solids and liquids.  States she puts the food in her mouth but is unable to proceed any further. Provider notified of possible dysphagia issue.     Kerwin Bridges RN  03/23/25 [id number]

## 2025-03-23 NOTE — ASSESSMENT & PLAN NOTE
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 intake since January  Patient states she is unsure why she does not have much of an appetite.  Initially while at rehab she was not eating much because she did not like the food.  Patient reports having some difficulty swallowing her pills but per caretaker at bedside she has been taking her medications  Will start patient on mechanical soft diet with thin liquids after bedside nursing eval and consult speech pathologist for further evaluation  May need addition of appetite stimulant if appetite does not improve  CT abdomen/pelvis showed possible right lung bronchiolitis along with lymphadenopathy in the abdomen which is unchanged but no acute pathology  CT head was negative for acute pathology and chest x-ray negative for pneumonia  PT/OT

## 2025-03-23 NOTE — ED NOTES
Transport to floor delayed to draw Troponin.  Unable to pull from line, pt difficult stick.  Transport will resume once delta trop can be drawn.     Kerwin Bridges RN  03/23/25 [id number]

## 2025-03-23 NOTE — ASSESSMENT & PLAN NOTE
Patient reports on and off diarrhea since January 20.  Per caretaker at bedside not all her bowel movements are diarrhea-like  Was prescribed Imodium as needed on discharge from rehab  Given recent hospitalization and treatment with antibiotics, will check for C. difficile and bacterial enteric panel if patient has further diarrhea here

## 2025-03-23 NOTE — ASSESSMENT & PLAN NOTE
Initial troponin of 69, trend troponins  EKG nonischemic  Likely non-MI troponin elevation in the setting of CKD

## 2025-03-23 NOTE — ASSESSMENT & PLAN NOTE
Bicarb low at 18 with normal anion gap.  Gentle IV fluids with Plasma-Lyte and get repeat lab work in a.m.

## 2025-03-23 NOTE — ED PROVIDER NOTES
Time reflects when diagnosis was documented in both MDM as applicable and the Disposition within this note       Time User Action Codes Description Comment    3/23/2025  3:54 PM Anepu, Luh Add [R53.1] Weakness     3/23/2025  3:54 PM Anepu, Luh Add [R13.10] Dysphagia     3/23/2025  6:41 PM Anepu, Luh Add [N39.0] UTI (urinary tract infection)           ED Disposition       ED Disposition   Admit    Condition   Stable    Date/Time   Sun Mar 23, 2025  3:54 PM    Comment                   Assessment & Plan       Medical Decision Making  Patient evaluated in the ED with EKG labs imaging.  Reviewed results with the family.  Given IV antibiotics and IV fluids admitted to the hospitalist service for further evaluation management    Problems Addressed:  Dysphagia: acute illness or injury  Weakness: acute illness or injury    Amount and/or Complexity of Data Reviewed  External Data Reviewed: notes.  Labs: ordered. Decision-making details documented in ED Course.  Radiology: ordered. Decision-making details documented in ED Course.  ECG/medicine tests: ordered and independent interpretation performed. Decision-making details documented in ED Course.    Risk  Decision regarding hospitalization.             Medications   sodium chloride 0.9 % bolus 500 mL (0 mL Intravenous Stopped 3/23/25 1619)       ED Risk Strat Scores                            SBIRT 22yo+      Flowsheet Row Most Recent Value   Initial Alcohol Screen: US AUDIT-C     1. How often do you have a drink containing alcohol? 0 Filed at: 03/23/2025 1514   2. How many drinks containing alcohol do you have on a typical day you are drinking?  0 Filed at: 03/23/2025 1514   3b. FEMALE Any Age, or MALE 65+: How often do you have 4 or more drinks on one occassion? 0 Filed at: 03/23/2025 1514   Audit-C Score 0 Filed at: 03/23/2025 1514   LESTER: How many times in the past year have you...    Used an illegal drug or used a prescription medication for non-medical  "reasons? Never Filed at: 03/23/2025 1514                            History of Present Illness       Chief Complaint   Patient presents with    Weakness - Generalized     Failure to thrive from home, reduced PO intake.  +N/V/D        Past Medical History:   Diagnosis Date    Arthritis     right knee    Hay fever     Hyperlipidemia     Hypertension     Myocardial infarction (HCC) 2003    questionable MI during exploratory surgery    Ovarian cancer (HCC) 2003    surgery (did not have chemo or radiation)    Stroke (HCC) 2003    \" mini stroke \" no deficits ; another poss stroke with  left hand weakness    Wears glasses     Wears partial dentures     upper & lower      Past Surgical History:   Procedure Laterality Date    BREAST SURGERY Right     exc. bx. lump (R) breast    DILATION AND CURETTAGE OF UTERUS      HYSTERECTOMY  2003    TAHBSO    IR NEPHROSTOMY TUBE CHECK/CHANGE/REPOSITION/REINSERTION/UPSIZE  5/24/2023    IR NEPHROSTOMY TUBE CHECK/CHANGE/REPOSITION/REINSERTION/UPSIZE  7/13/2023    IR NEPHROSTOMY TUBE CHECK/CHANGE/REPOSITION/REINSERTION/UPSIZE  8/29/2023    IR NEPHROSTOMY TUBE CHECK/CHANGE/REPOSITION/REINSERTION/UPSIZE  10/11/2023    IR NEPHROSTOMY TUBE CHECK/CHANGE/REPOSITION/REINSERTION/UPSIZE  12/11/2023    IR NEPHROSTOMY TUBE CHECK/CHANGE/REPOSITION/REINSERTION/UPSIZE  2/9/2024    IR NEPHROSTOMY TUBE CHECK/CHANGE/REPOSITION/REINSERTION/UPSIZE  4/8/2024    IR NEPHROSTOMY TUBE CHECK/CHANGE/REPOSITION/REINSERTION/UPSIZE  6/7/2024    IR NEPHROSTOMY TUBE CHECK/CHANGE/REPOSITION/REINSERTION/UPSIZE  8/7/2024    IR NEPHROSTOMY TUBE CHECK/CHANGE/REPOSITION/REINSERTION/UPSIZE  10/7/2024    IR NEPHROSTOMY TUBE CHECK/CHANGE/REPOSITION/REINSERTION/UPSIZE  1/7/2025    IR NEPHROSTOMY TUBE CHECK/CHANGE/REPOSITION/REINSERTION/UPSIZE  1/29/2025    IR NEPHROSTOMY TUBE PLACEMENT  4/13/2023    JOINT REPLACEMENT Right     OOPHORECTOMY Bilateral     cancer    PA CYSTO W/REMOVAL OF TUMORS SMALL N/A 4/4/2022    Procedure: " TRANSURETHRAL RESECTION OF BLADDER (MULTIFOCAL) TUMOR (TURBT);  Surgeon: Vinnie Ricardo MD;  Location: [facility];  Service: Urology    ID CYSTOURETHROSCOPY W/DEST &/RMVL TUMOR LARGE Bilateral 2021    Procedure: CYSTOSCOPY, TRANSURETHRAL RESECTION OF BLADDER TUMOR (TURBT), EVACUATION OF CLOTS, LARGE TUMOR 7-8CM;  Surgeon: Malcolm Reyes MD;  Location: [facility];  Service: Urology    ID CYSTOURETHROSCOPY W/DEST &/RMVL TUMOR LARGE N/A 3/21/2023    Procedure: TRANSURETHRAL RESECTION OF BLADDER TUMOR (TURBT);  Surgeon: Vinnie Ricardo MD;  Location: [facility];  Service: Urology    ID CYSTOURETHROSCOPY W/URETERAL CATHETERIZATION Bilateral 2022    Procedure: CYSTOSCOPY WITH ATTEMPTED B/L RETROGRADE PYELOGRAM;  Surgeon: Vinnie Ricardo MD;  Location: [facility];  Service: Urology    ID XCAPSL CTRC RMVL INSJ IO LENS PROSTH W/O ECP Left 2017    Procedure: EXTRACTION EXTRACAPSULAR CATARACT PHACO INTRAOCULAR LENS (IOL);  Surgeon: Elmer Ocampo MD;  Location: [facility] MAIN OR;  Service: Ophthalmology    TONSILLECTOMY        Family History   Problem Relation Age of Onset    Parkinsonism Mother     Heart disease Father     Cancer Father         leukemia    Heart disease Sister     Diabetes Sister     Diabetes Brother     Diabetes Sister     Melanoma Sister     Melanoma Sister     Diabetes Maternal Aunt     No Known Problems Maternal Aunt     No Known Problems Paternal Aunt       Social History     Tobacco Use    Smoking status: Former     Current packs/day: 0.00     Average packs/day: 1.5 packs/day for 40.0 years (60.0 ttl pk-yrs)     Types: Cigarettes     Start date:      Quit date: 2000     Years since quittin.2    Smokeless tobacco: Never   Vaping Use    Vaping status: Never Used   Substance Use Topics    Alcohol use: Yes     Comment: occassional, socially    Drug use: No      E-Cigarette/Vaping    E-Cigarette Use Never User       E-Cigarette/Vaping Substances    Nicotine No     THC No     CBD No      Flavoring No     Other No     Unknown No       I have reviewed and agree with the history as documented.     85-year-old female bed bound with bilateral nephrostomy tubes because of bladder cancer presents with lack of appetite not swallowing for the past week generalized weakness some failure to thrive she was discharged from rehab a couple weeks ago and then she deteriorated.  No reports of fevers chills nausea vomiting abdominal pain she did have 1 episode of diarrhea today and some productive cough.  No strokelike symptoms otherwise no head injury no other complaints      History provided by:  Patient   used: No        Review of Systems   Constitutional:  Positive for fatigue.   HENT: Negative.          Lack of appetite dysphagia   Eyes: Negative.    Respiratory: Negative.     Cardiovascular: Negative.    Gastrointestinal: Negative.    Endocrine: Negative.    Genitourinary: Negative.    Musculoskeletal: Negative.    Skin: Negative.    Allergic/Immunologic: Negative.    Neurological:  Positive for weakness.   Hematological: Negative.    Psychiatric/Behavioral: Negative.     All other systems reviewed and are negative.          Objective       ED Triage Vitals   Temperature Pulse Blood Pressure Respirations SpO2 Patient Position - Orthostatic VS   03/23/25 1405 03/23/25 1405 03/23/25 1408 03/23/25 1405 03/23/25 1408 --   98.4 °F (36.9 °C) 76 152/66 18 98 %       Temp Source Heart Rate Source BP Location FiO2 (%) Pain Score    03/23/25 1405 03/23/25 1405 -- -- 03/23/25 1749    Oral Monitor   No Pain      Vitals      Date and Time Temp Pulse SpO2 Resp BP Pain Score FACES Pain Rating User   03/23/25 1749 -- -- -- -- -- No Pain -- BM   03/23/25 1714 97.6 °F (36.4 °C) 70 97 % 17 120/58 -- -- DII   03/23/25 1408 -- 78 98 % 19 152/66 -- -- JG   03/23/25 1405 98.4 °F (36.9 °C) 76 -- 18 -- -- -- JG            Physical Exam  Vitals and nursing note reviewed.   Constitutional:       Appearance: Normal  appearance.   HENT:      Head: Normocephalic and atraumatic.      Nose: Nose normal.      Mouth/Throat:      Mouth: Mucous membranes are moist.   Eyes:      Extraocular Movements: Extraocular movements intact.      Pupils: Pupils are equal, round, and reactive to light.   Cardiovascular:      Rate and Rhythm: Normal rate and regular rhythm.   Pulmonary:      Effort: Pulmonary effort is normal.      Breath sounds: Normal breath sounds.   Abdominal:      General: Abdomen is flat. Bowel sounds are normal.      Palpations: Abdomen is soft.   Musculoskeletal:         General: Normal range of motion.      Cervical back: Normal range of motion and neck supple.   Skin:     General: Skin is warm.      Capillary Refill: Capillary refill takes less than 2 seconds.   Neurological:      General: No focal deficit present.      Mental Status: She is alert and oriented to person, place, and time. Mental status is at baseline.      Cranial Nerves: No cranial nerve deficit.      Sensory: No sensory deficit.      Motor: No weakness.      Coordination: Coordination normal.      Gait: Gait normal.      Deep Tendon Reflexes: Reflexes normal.   Psychiatric:         Mood and Affect: Mood normal.         Thought Content: Thought content normal.         Results Reviewed       Procedure Component Value Units Date/Time    Procalcitonin [[id number]]  (Abnormal) Collected: 03/23/25 1422    Lab Status: Final result Specimen: Blood from Arm, Left Updated: 03/23/25 1746     Procalcitonin 0.27 ng/ml     HS Troponin I 2hr [[id number]]  (Abnormal) Collected: 03/23/25 1654    Lab Status: Final result Specimen: Blood from Hand, Left Updated: 03/23/25 1722     hs TnI 2hr 66 ng/L      Delta 2hr hsTnI -3 ng/L     HS Troponin I 4hr [[id number]]     Lab Status: No result Specimen: Blood     Urine culture [[id number]] Collected: 03/23/25 1421    Lab Status: In process Specimen: Urine Updated: 03/23/25 1602    HS Troponin 0hr (reflex protocol) [[id number]]   (Abnormal) Collected: 03/23/25 1422    Lab Status: Final result Specimen: Blood from Arm, Left Updated: 03/23/25 1516     hs TnI 0hr 69 ng/L     TSH, 3rd generation with Free T4 reflex [[id number]]  (Normal) Collected: 03/23/25 1422    Lab Status: Final result Specimen: Blood from Arm, Left Updated: 03/23/25 1503     TSH 3RD GENERATON 0.779 uIU/mL     FLU/COVID Rapid Antigen (30 min. TAT) - Preferred screening test in ED [[id number]]  (Normal) Collected: 03/23/25 1422    Lab Status: Final result Specimen: Nares from Nose Updated: 03/23/25 1502     SARS COV Rapid Antigen Negative     Influenza A Rapid Antigen Negative     Influenza B Rapid Antigen Negative    Narrative:      This test has been performed using the [employer] 2 FLU+SARS Antigen test under the Emergency Use Authorization (EUA). This test has been validated by the  and verified by the performing laboratory. The Bonita uses lateral flow immunofluorescent sandwich assay to detect SARS-COV, Influenza A and Influenza B Antigen.     The [employer] Bon[employer] 2 SARS Antigen test does not differentiate between SARS-CoV and SARS-CoV-2.     Negative results are presumptive and may be confirmed with a molecular assay, if necessary, for patient management. Negative results do not rule out SARS-CoV-2 or influenza infection and should not be used as the sole basis for treatment or patient management decisions. A negative test result may occur if the level of antigen in a sample is below the limit of detection of this test.     Positive results are indicative of the presence of viral antigens, but do not rule out bacterial infection or co-infection with other viruses.     All test results should be used as an adjunct to clinical observations and other information available to the provider.    FOR PEDIATRIC PATIENTS - copy/paste COVID Guidelines URL to browser: [email or web address]    Lactic acid, plasma (w/reflex  if result > 2.0) [[id number]]  (Normal) Collected: 03/23/25 1422    Lab Status: Final result Specimen: Blood from Arm, Left Updated: 03/23/25 1451     LACTIC ACID 1.0 mmol/L     Narrative:      Result may be elevated if tourniquet was used during collection.    Comprehensive metabolic panel [[id number]]  (Abnormal) Collected: 03/23/25 1422    Lab Status: Final result Specimen: Blood from Arm, Left Updated: 03/23/25 1450     Sodium 139 mmol/L      Potassium 4.5 mmol/L      Chloride 112 mmol/L      CO2 18 mmol/L      ANION GAP 9 mmol/L      BUN 40 mg/dL      Creatinine 2.00 mg/dL      Glucose 105 mg/dL      Calcium 10.2 mg/dL      Corrected Calcium 10.9 mg/dL      AST 14 U/L      ALT 8 U/L      Alkaline Phosphatase 69 U/L      Total Protein 6.6 g/dL      Albumin 3.1 g/dL      Total Bilirubin 0.63 mg/dL      eGFR 22 ml/min/1.73sq m     Narrative:      National Kidney Disease Foundation guidelines for Chronic Kidney Disease (CKD):     Stage 1 with normal or high GFR (GFR > 90 mL/min/1.73 square meters)    Stage 2 Mild CKD (GFR = 60-89 mL/min/1.73 square meters)    Stage 3A Moderate CKD (GFR = 45-59 mL/min/1.73 square meters)    Stage 3B Moderate CKD (GFR = 30-44 mL/min/1.73 square meters)    Stage 4 Severe CKD (GFR = 15-29 mL/min/1.73 square meters)    Stage 5 End Stage CKD (GFR <15 mL/min/1.73 square meters)  Note: GFR calculation is accurate only with a steady state creatinine    Magnesium [[id number]]  (Abnormal) Collected: 03/23/25 1422    Lab Status: Final result Specimen: Blood from Arm, Left Updated: 03/23/25 1450     Magnesium 1.7 mg/dL     Urine Microscopic [[id number]]  (Abnormal) Collected: 03/23/25 1421    Lab Status: Final result Specimen: Urine, Right Nephrostomy Updated: 03/23/25 1444     RBC, UA Innumerable /hpf      WBC, UA Innumerable /hpf      Epithelial Cells       Field obscured, unable to enumerate     /hpf     Bacteria, UA       Field obscured, unable to enumerate     /hpf     Budding Yeast Present     UA (URINE) with reflex to Scope [[id number]]  (Abnormal) Collected: 03/23/25 1421    Lab Status: Final result Specimen: Urine, Right Nephrostomy Updated: 03/23/25 1431     Color, UA Dark Red     Clarity, UA Clear     Specific Gravity, UA 1.025     pH, UA 6.0     Leukocytes, UA Large     Nitrite, UA Positive     Protein,  (2+) mg/dl      Glucose, UA Negative mg/dl      Ketones, UA Trace mg/dl      Urobilinogen, UA <2.0 mg/dl      Bilirubin, UA Negative     Occult Blood, UA Large    CBC and differential [[id number]]  (Abnormal) Collected: 03/23/25 1422    Lab Status: Final result Specimen: Blood from Arm, Left Updated: 03/23/25 1430     WBC 10.88 Thousand/uL      RBC 4.25 Million/uL      Hemoglobin 11.8 g/dL      Hematocrit 36.9 %      MCV 87 fL      MCH 27.8 pg      MCHC 32.0 g/dL      RDW 14.4 %      MPV 10.7 fL      Platelets 322 Thousands/uL      nRBC 0 /100 WBCs      Segmented % 79 %      Immature Grans % 1 %      Lymphocytes % 10 %      Monocytes % 7 %      Eosinophils Relative 2 %      Basophils Relative 1 %      Absolute Neutrophils 8.75 Thousands/µL      Absolute Immature Grans 0.05 Thousand/uL      Absolute Lymphocytes 1.06 Thousands/µL      Absolute Monocytes 0.74 Thousand/µL      Eosinophils Absolute 0.17 Thousand/µL      Basophils Absolute 0.11 Thousands/µL             CT head without contrast   Final Interpretation by Vinnie Huang MD (03/23 1513)      No acute intracranial abnormality.   Unchanged asymmetric enlargement of the left lateral ventricle.               Workstation performed: [id number]         CT abdomen pelvis wo contrast   Final Interpretation by Vinnie Huang MD (03/23 1521)      1.  Possible right lower lobe bronchiolitis.   2.  Unchanged abdominal lymphadenopathy.   3.  No acute abdominopelvic pathology.      Workstation performed: [id number]         XR chest 1 view portable   Final Interpretation by Isra Colmenares MD (03/23 1838)      No acute cardiopulmonary disease.             Workstation performed: [id number]             ECG 12 Lead Documentation Only    Date/Time: 3/23/2025 6:40 PM    Performed by: Luh Thomas DO  Authorized by: Luh Thomas DO    ECG reviewed by me, the ED Provider: yes    Patient location:  ED  Previous ECG:     Previous ECG:  Unavailable    Comparison to cardiac monitor: Yes    Interpretation:     Interpretation: non-specific    Rate:     ECG rate assessment: normal    Rhythm:     Rhythm: sinus rhythm    Ectopy:     Ectopy: none    QRS:     QRS axis:  Normal  Conduction:     Conduction: normal    ST segments:     ST segments:  Non-specific  T waves:     T waves: non-specific        ED Medication and Procedure Management   Prior to Admission Medications   Prescriptions Last Dose Informant Patient Reported? Taking?   acetaminophen (TYLENOL) 500 mg tablet Past Week Self Yes Yes   Sig: Take 500 mg by mouth   aspirin 81 mg chewable tablet 3/23/2025 at  8:00 AM Self Yes Yes   Sig: Chew 81 mg daily   atorvastatin (LIPITOR) 40 mg tablet 3/22/2025 at  6:30 PM Self No Yes   Sig: Take 1 tablet (40 mg total) by mouth daily with dinner   cholecalciferol (VITAMIN D3) 1,000 units tablet 3/23/2025 at  8:00 AM Self Yes Yes   Sig: Take 1,000 Units by mouth daily   loperamide (IMODIUM) 2 mg capsule 3/22/2025 at  6:00 PM  Yes Yes   Sig: Take 2 mg by mouth as needed for diarrhea   metoprolol succinate (TOPROL-XL) 50 mg 24 hr tablet 3/23/2025 at  8:00 AM Self Yes Yes   Sig: Take 25 mg by mouth every morning   saccharomyces boulardii (FLORASTOR) 250 mg capsule 3/23/2025 at  8:00 AM  No Yes   Sig: Take 1 capsule (250 mg total) by mouth 2 (two) times a day   sodium chloride, PF, 0.9 %  Self No No   Sig: 10 mL by Intracatheter route daily Intracatheter flushing daily. May substitute prefilled syringe with normal saline 10 mL vials, 10 mL syringes, and 18 g blunt needles      Facility-Administered Medications: None     Current Discharge Medication List        CONTINUE these medications  which have NOT CHANGED    Details   loperamide (IMODIUM) 2 mg capsule Take 2 mg by mouth as needed for diarrhea      acetaminophen (TYLENOL) 500 mg tablet Take 500 mg by mouth      aspirin 81 mg chewable tablet Chew 81 mg daily      atorvastatin (LIPITOR) 40 mg tablet Take 1 tablet (40 mg total) by mouth daily with dinner  Qty: 14 tablet, Refills: 0    Associated Diagnoses: Stroke (HCC)      cholecalciferol (VITAMIN D3) 1,000 units tablet Take 1,000 Units by mouth daily      metoprolol succinate (TOPROL-XL) 50 mg 24 hr tablet Take 25 mg by mouth every morning      saccharomyces boulardii (FLORASTOR) 250 mg capsule Take 1 capsule (250 mg total) by mouth 2 (two) times a day    Associated Diagnoses: Cellulitis      sodium chloride, PF, 0.9 % 10 mL by Intracatheter route daily Intracatheter flushing daily. 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  ED SEPSIS DOCUMENTATION   Time reflects when diagnosis was documented in both MDM as applicable and the Disposition within this note       Time User Action Codes Description Comment    3/23/2025  3:54 PM Luh Thomas Add [R53.1] Weakness     3/23/2025  3:54 PM Luh Thomas Add [R13.10] Dysphagia     3/23/2025  6:41 PM Luh Thomas Add [N39.0] UTI (urinary tract infection)                  Luh Thomas, DO  03/23/25 1841       Luh Thomas, DO  03/23/25 1841

## 2025-03-23 NOTE — ASSESSMENT & PLAN NOTE
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

## 2025-03-23 NOTE — ASSESSMENT & PLAN NOTE
UA is abnormal.  Discussed with patient/family that in the setting of nephrostomy tubes, this could just be colonization however given weakness will treat  Continue IV Rocephin and follow-up on urine culture

## 2025-03-23 NOTE — PLAN OF CARE
Problem: PAIN - ADULT  Goal: Verbalizes/displays adequate comfort level or baseline comfort level  Description: Interventions:- Encourage patient to monitor pain and request assistance- Assess pain using appropriate pain scale- Administer analgesics based on type and severity of pain and evaluate response- Implement non-pharmacological measures as appropriate and evaluate response- Consider cultural and social influences on pain and pain management- Notify physician/advanced practitioner if interventions unsuccessful or patient reports new pain  Outcome: Progressing     Problem: DISCHARGE PLANNING  Goal: Discharge to home or other facility with appropriate resources  Description: INTERVENTIONS:- Identify barriers to discharge w/patient and caregiver- Arrange for needed discharge resources and transportation as appropriate- Identify discharge learning needs (meds, wound care, etc.)- Arrange for interpretive services to assist at discharge as needed- Refer to Case Management Department for coordinating discharge planning if the patient needs post-hospital services based on physician/advanced practitioner order or complex needs related to functional status, cognitive ability, or social support system  Outcome: Progressing

## 2025-03-24 ENCOUNTER — APPOINTMENT (INPATIENT)
Dept: NON INVASIVE DIAGNOSTICS | Facility: HOSPITAL | Age: 85
DRG: 641 | End: 2025-03-24
Attending: RADIOLOGY
Payer: COMMERCIAL

## 2025-03-24 LAB
ALBUMIN SERPL BCG-MCNC: 2.7 G/DL (ref 3.5–5)
ALP SERPL-CCNC: 63 U/L (ref 34–104)
ALT SERPL W P-5'-P-CCNC: 6 U/L (ref 7–52)
ANION GAP SERPL CALCULATED.3IONS-SCNC: 10 MMOL/L (ref 4–13)
AST SERPL W P-5'-P-CCNC: 10 U/L (ref 13–39)
ATRIAL RATE: 67 BPM
ATRIAL RATE: 79 BPM
BILIRUB SERPL-MCNC: 0.46 MG/DL (ref 0.2–1)
BUN SERPL-MCNC: 39 MG/DL (ref 5–25)
CALCIUM ALBUM COR SERPL-MCNC: 10.3 MG/DL (ref 8.3–10.1)
CALCIUM SERPL-MCNC: 9.3 MG/DL (ref 8.4–10.2)
CHLORIDE SERPL-SCNC: 111 MMOL/L (ref 96–108)
CO2 SERPL-SCNC: 20 MMOL/L (ref 21–32)
CREAT SERPL-MCNC: 1.74 MG/DL (ref 0.6–1.3)
ERYTHROCYTE [DISTWIDTH] IN BLOOD BY AUTOMATED COUNT: 14.3 % (ref 11.6–15.1)
GFR SERPL CREATININE-BSD FRML MDRD: 26 ML/MIN/1.73SQ M
GLUCOSE SERPL-MCNC: 85 MG/DL (ref 65–140)
HCT VFR BLD AUTO: 32 % (ref 34.8–46.1)
HGB BLD-MCNC: 10.4 G/DL (ref 11.5–15.4)
MAGNESIUM SERPL-MCNC: 2.2 MG/DL (ref 1.9–2.7)
MCH RBC QN AUTO: 28.4 PG (ref 26.8–34.3)
MCHC RBC AUTO-ENTMCNC: 32.5 G/DL (ref 31.4–37.4)
MCV RBC AUTO: 87 FL (ref 82–98)
P AXIS: -9 DEGREES
P AXIS: 63 DEGREES
PLATELET # BLD AUTO: 246 THOUSANDS/UL (ref 149–390)
PMV BLD AUTO: 10.8 FL (ref 8.9–12.7)
POTASSIUM SERPL-SCNC: 3.8 MMOL/L (ref 3.5–5.3)
PR INTERVAL: 168 MS
PR INTERVAL: 168 MS
PROCALCITONIN SERPL-MCNC: 0.25 NG/ML
PROT SERPL-MCNC: 5.6 G/DL (ref 6.4–8.4)
QRS AXIS: -47 DEGREES
QRS AXIS: -51 DEGREES
QRSD INTERVAL: 120 MS
QRSD INTERVAL: 122 MS
QT INTERVAL: 422 MS
QT INTERVAL: 438 MS
QTC INTERVAL: 463 MS
QTC INTERVAL: 483 MS
RBC # BLD AUTO: 3.66 MILLION/UL (ref 3.81–5.12)
SODIUM SERPL-SCNC: 141 MMOL/L (ref 135–147)
T WAVE AXIS: -17 DEGREES
T WAVE AXIS: -37 DEGREES
VENTRICULAR RATE: 67 BPM
VENTRICULAR RATE: 79 BPM
WBC # BLD AUTO: 6.79 THOUSAND/UL (ref 4.31–10.16)

## 2025-03-24 PROCEDURE — 84145 PROCALCITONIN (PCT): CPT | Performed by: FAMILY MEDICINE

## 2025-03-24 PROCEDURE — 83735 ASSAY OF MAGNESIUM: CPT | Performed by: FAMILY MEDICINE

## 2025-03-24 PROCEDURE — 97167 OT EVAL HIGH COMPLEX 60 MIN: CPT

## 2025-03-24 PROCEDURE — C1729 CATH, DRAINAGE: HCPCS | Performed by: RADIOLOGY

## 2025-03-24 PROCEDURE — 93005 ELECTROCARDIOGRAM TRACING: CPT

## 2025-03-24 PROCEDURE — 92610 EVALUATE SWALLOWING FUNCTION: CPT

## 2025-03-24 PROCEDURE — C1769 GUIDE WIRE: HCPCS | Performed by: RADIOLOGY

## 2025-03-24 PROCEDURE — 93010 ELECTROCARDIOGRAM REPORT: CPT | Performed by: INTERNAL MEDICINE

## 2025-03-24 PROCEDURE — 85027 COMPLETE CBC AUTOMATED: CPT | Performed by: FAMILY MEDICINE

## 2025-03-24 PROCEDURE — 0T25X0Z CHANGE DRAINAGE DEVICE IN KIDNEY, EXTERNAL APPROACH: ICD-10-PCS | Performed by: RADIOLOGY

## 2025-03-24 PROCEDURE — 97530 THERAPEUTIC ACTIVITIES: CPT

## 2025-03-24 PROCEDURE — 50435 EXCHANGE NEPHROSTOMY CATH: CPT

## 2025-03-24 PROCEDURE — 99232 SBSQ HOSP IP/OBS MODERATE 35: CPT | Performed by: FAMILY MEDICINE

## 2025-03-24 PROCEDURE — 97163 PT EVAL HIGH COMPLEX 45 MIN: CPT

## 2025-03-24 PROCEDURE — 80053 COMPREHEN METABOLIC PANEL: CPT | Performed by: FAMILY MEDICINE

## 2025-03-24 RX ORDER — LIDOCAINE WITH 8.4% SOD BICARB 0.9%(10ML)
SYRINGE (ML) INJECTION AS NEEDED
Status: COMPLETED | OUTPATIENT
Start: 2025-03-24 | End: 2025-03-24

## 2025-03-24 RX ORDER — DRONABINOL 2.5 MG/1
2.5 CAPSULE ORAL
Status: DISCONTINUED | OUTPATIENT
Start: 2025-03-24 | End: 2025-03-26 | Stop reason: HOSPADM

## 2025-03-24 RX ADMIN — Medication 250 MG: at 09:15

## 2025-03-24 RX ADMIN — DRONABINOL 2.5 MG: 2.5 CAPSULE ORAL at 18:10

## 2025-03-24 RX ADMIN — Medication 250 MG: at 18:09

## 2025-03-24 RX ADMIN — HEPARIN SODIUM 5000 UNITS: 5000 INJECTION INTRAVENOUS; SUBCUTANEOUS at 13:39

## 2025-03-24 RX ADMIN — METOPROLOL SUCCINATE 25 MG: 25 TABLET, EXTENDED RELEASE ORAL at 09:15

## 2025-03-24 RX ADMIN — Medication 3 ML: at 15:31

## 2025-03-24 RX ADMIN — SODIUM CHLORIDE, SODIUM GLUCONATE, SODIUM ACETATE, POTASSIUM CHLORIDE, MAGNESIUM CHLORIDE, SODIUM PHOSPHATE, DIBASIC, AND POTASSIUM PHOSPHATE 50 ML/HR: .53; .5; .37; .037; .03; .012; .00082 INJECTION, SOLUTION INTRAVENOUS at 10:33

## 2025-03-24 RX ADMIN — CHOLECALCIFEROL TAB 25 MCG (1000 UNIT) 1000 UNITS: 25 TAB at 09:15

## 2025-03-24 RX ADMIN — ATORVASTATIN CALCIUM 40 MG: 40 TABLET, FILM COATED ORAL at 16:30

## 2025-03-24 RX ADMIN — HEPARIN SODIUM 5000 UNITS: 5000 INJECTION INTRAVENOUS; SUBCUTANEOUS at 06:37

## 2025-03-24 RX ADMIN — ASPIRIN 81 MG CHEWABLE TABLET 81 MG: 81 TABLET CHEWABLE at 09:15

## 2025-03-24 RX ADMIN — IOHEXOL 10 ML: 350 INJECTION, SOLUTION INTRAVENOUS at 15:46

## 2025-03-24 RX ADMIN — Medication 3 ML: at 15:27

## 2025-03-24 RX ADMIN — HEPARIN SODIUM 5000 UNITS: 5000 INJECTION INTRAVENOUS; SUBCUTANEOUS at 21:54

## 2025-03-24 RX ADMIN — CEFTRIAXONE 1000 MG: 1 INJECTION, SOLUTION INTRAVENOUS at 16:31

## 2025-03-24 NOTE — PLAN OF CARE
Patient alert and oriented X4 denies any pain. Vitals monitored and medication administered as ordered. Mg replenishment administered. Call bell within reach and bed alarm intact. Plan of care is ongoing.    Problem: PAIN - ADULT  Goal: Verbalizes/displays adequate comfort level or baseline comfort level  Description: Interventions:- Encourage patient to monitor pain and request assistance- Assess pain using appropriate pain scale- Administer analgesics based on type and severity of pain and evaluate response- Implement non-pharmacological measures as appropriate and evaluate response- Consider cultural and social influences on pain and pain management- Notify physician/advanced practitioner if interventions unsuccessful or patient reports new pain  Outcome: Progressing     Problem: SAFETY ADULT  Goal: Patient will remain free of falls  Description: INTERVENTIONS:- Educate patient/family on patient safety including physical limitations- Instruct patient to call for assistance with activity - Consult OT/PT to assist with strengthening/mobility - Keep Call bell within reach- Keep bed low and locked with side rails adjusted as appropriate- Keep care items and personal belongings within reach- Initiate and maintain comfort rounds- Make Fall Risk Sign visible to staff- Offer Toileting every 2 Hours, in advance of need- Initiate/Maintain bed alarm  - Apply yellow socks and bracelet for high fall risk patients- Consider moving patient to room near nurses station  Outcome: Progressing  Goal: Maintain or return to baseline ADL function  Description: INTERVENTIONS:-  Assess patient's ability to carry out ADLs; assess patient's baseline for ADL function and identify physical deficits which impact ability to perform ADLs (bathing, care of mouth/teeth, toileting, grooming, dressing, etc.)- Assess/evaluate cause of self-care deficits - Assess range of motion- Assess patient's mobility; develop plan if impaired- Assess patient's  need for assistive devices and provide as appropriate- Encourage maximum independence but intervene and supervise when necessary- Involve family in performance of ADLs- Assess for home care needs following discharge - Consider OT consult to assist with ADL evaluation and planning for discharge- Provide patient education as appropriate  Outcome: Progressing  Goal: Maintains/Returns to pre admission functional level  Description: INTERVENTIONS:- Perform AM-PAC 6 Click Basic Mobility/ Daily Activity assessment daily.- Set and communicate daily mobility goal to care team and patient/family/caregiver. - Collaborate with rehabilitation services on mobility goals if consulted- Outcome: Progressing     Problem: DISCHARGE PLANNING  Goal: Discharge to home or other facility with appropriate resources  Description: INTERVENTIONS:- Identify barriers to discharge w/patient and caregiver- Arrange for needed discharge resources and transportation as appropriate- Identify discharge learning needs (meds, wound care, etc.)- Arrange for interpretive services to assist at discharge as needed- Refer to Case Management Department for coordinating discharge planning if the patient needs post-hospital services based on physician/advanced practitioner order or complex needs related to functional status, cognitive ability, or social support system  Outcome: Progressing     Problem: Knowledge Deficit  Goal: Patient/family/caregiver demonstrates understanding of disease process, treatment plan, medications, and discharge instructions  Description: Complete learning assessment and assess knowledge base.Interventions:- Provide teaching at level of understanding- Provide teaching via preferred learning methods  Outcome: Progressing     Problem: GASTROINTESTINAL - ADULT  Goal: Minimal or absence of nausea and/or vomiting  Description: INTERVENTIONS:- Administer IV fluids if ordered to ensure adequate hydration- Maintain NPO status until nausea and  vomiting are resolved- Nasogastric tube if ordered- Administer ordered antiemetic medications as needed- Provide nonpharmacologic comfort measures as appropriate- Advance diet as tolerated, if ordered- Consider nutrition services referral to assist patient with adequate nutrition and appropriate food choices  Outcome: Progressing  Goal: Maintains or returns to baseline bowel function  Description: INTERVENTIONS:- Assess bowel function- Encourage oral fluids to ensure adequate hydration- Administer IV fluids if ordered to ensure adequate hydration- Administer ordered medications as needed- Encourage mobilization and activity- Consider nutritional services referral to assist patient with adequate nutrition and appropriate food choices  Outcome: Progressing  Goal: Maintains adequate nutritional intake  Description: INTERVENTIONS:- Monitor percentage of each meal consumed- Identify factors contributing to decreased intake, treat as appropriate- Assist with meals as needed- Monitor I&O, weight, and lab values if indicated- Obtain nutrition services referral as needed  Outcome: Progressing  Goal: Establish and maintain optimal ostomy function  Description: INTERVENTIONS:- Assess bowel function- Encourage oral fluids to ensure adequate hydration- Administer IV fluids if ordered to ensure adequate hydration - Administer ordered medications as needed- Encourage mobilization and activity- Nutrition services referral to assist patient with appropriate food choices- Assess stoma site- Consider wound care consult   Outcome: Progressing  Goal: Oral mucous membranes remain intact  Description: INTERVENTIONS- Assess oral mucosa and hygiene practices- Implement preventative oral hygiene regimen- Implement oral medicated treatments as ordered- Initiate Nutrition services referral as needed  Outcome: Progressing

## 2025-03-24 NOTE — ASSESSMENT & PLAN NOTE
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 intake since January  Patient states she is unsure why she does not have much of an appetite.  Initially while at rehab she was not eating much because she did not like the food.  Patient reports having some difficulty swallowing her pills but per caretaker at bedside she has been taking her medications  Continue on mechanical soft diet with thin liquids after bedside nursing eval and consult speech pathologist for further evaluation  Start Marinol for appetite stimulant  CT abdomen/pelvis showed possible right lung bronchiolitis along with lymphadenopathy in the abdomen which is unchanged but no acute pathology  CT head was negative for acute pathology and chest x-ray negative for pneumonia  PT/OT

## 2025-03-24 NOTE — PLAN OF CARE
Recommended Diet: mechanically altered/level 2 diet and thin liquids (continue current textures)  Recommended Form of Meds: crushed with pudding  Aspiration precautions and swallowing strategies: upright posture and slow rate of feeding  Other Recommendations: Continue frequent oral care

## 2025-03-24 NOTE — ASSESSMENT & PLAN NOTE
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

## 2025-03-24 NOTE — DISCHARGE INSTR - OTHER ORDERS
Nephrostomy Tube Care     WHAT YOU NEED TO KNOW:   A nephrostomy tube is a catheter (thin plastic tube) that is inserted through your skin and into your kidney. The nephrostomy tube drains urine from your kidney into a collecting bag outside your body. You may need a nephrostomy tube when something is blocking the normal flow of urine. A nephrostomy tube may be used for a short or a long period of time. The nephrostomy tube comes out of your back, so you will need someone to help care for your nephrostomy tube.          DISCHARGE INSTRUCTIONS:      How to clean the skin around the nephrostomy tube and change the bandage:  Since the nephrostomy tube comes out of your back, you will not be able to care for it by yourself. Ask someone to follow the general directions below to check and care for your nephrostomy tube.   Gather the items you will need.          Disposable (single use) under-pad, and a clean washcloth  Plain soap, warm water, and new medical gloves  Sterile gauze bandages  Clear adhesive dressing or medical tape  Skin barrier  Protective skin film  Trash bag  Remove the old bandage, and check the tube entry site.    Have the patient lie on his side with the nephrostomy tube entry site facing up. Place the under-pad where it will catch drainage as you are working with the nephrostomy tube.   Wash your hands with soap and water. Put on new medical gloves.  Gently remove the old bandage, without pulling on the tube. Do this by holding the skin beside the tube with one hand. With the other hand, gently remove sticky tape and the skin barrier by pulling in the same direction as hair growth. Do not touch the side of the bandage that is placed over or around the tube. Throw the bandage and skin barrier away in a trash bag.  Look for signs of infection, such as skin redness and swelling. Report any skin changes to healthcare providers.  Clean the tube entry site.    Hold the tube in place to keep it from  being pulled out while you are cleaning around it.  You will need to clean the area twice. For the first cleaning, wet a new gauze bandage with soap and water.  Begin at the entry site of the tube. Wipe the skin in circles, moving away from the entry site. Remove blood and any other material with the gauze. Do this as often as needed. Use a new gauze bandage each time you clean the area, moving away from the entry site.   For the second cleaning, wet a new gauze bandage with water. Begin at the entry site of the tube. Wipe the skin in circles, moving away from the entry site. Use a new gauze bandage each time you clean the area, moving away from the entry site.   Gently pat the skin with a clean washcloth to dry it.    Apply the skin barrier and bandages.    Roll up a bandage to make it thick, and place it under  the place where the tube enters the skin. Place it to support the tube, and stop it from kinking or bending. Tape the bandage in place, and apply more bandages if directed by a healthcare provider.   Bring the tubing forward to the front and tape it to the skin. Do not stretch the tube tight, because this may pull the nephrostomy tube out.  How often to change the bandage.  Change the bandage around the tube, every other day. If your bandages  get dirty or wet, change them right away, and as often as needed. If your nephrostomy tube is to be used for a long period of time, the tube needs to be changed every 2 to 3 months. Healthcare providers will tell you when you need to make an appointment to have your tube changed.     How to care for the urine drainage bag:   Ask if you need to measure and write down how much urine is in the bag before you empty it. Drain urine out of the drainage bag when it is ½ to ? full. Open the spout at the bottom of the bag to empty the urine into the toilet.   You may need to detach the drainage bag from the nephrostomy tube to change it.. If so, attach a new drainage bag  tightly to the nephrostomy tube.     How to prevent problems with your nephrostomy tube:   Change bandages, directed.  This helps to prevent infection. Throw away or clean your drainage bag as directed by your healthcare provider.    Wipe the connecting ends of the drainage bag with alcohol before you reconnect the bag to the tube.  This helps prevent infection.     Keep the tube taped to your skin and connected to a drainage bag placed below the level of your kidneys.  This helps prevent urine from backing up into your kidneys. You may wear a small drainage bag strapped to your leg to let you move around more easily.    Check the catheter to be sure it is in place after you change your clothes or do other activities.  Do not wear tight clothing over the tube. Place the tubing over your thigh rather than under it when you are sitting down. Be sure that nothing is pulling on the nephrostomy tube when you move around.    Change positions if you see little or no urine in your drainage bag.  Check to see if the urine tube is twisted or bent. Be sure that you are not sitting or lying on the tube. If there are no kinks and there is little or no urine in the drainage bag, tell your healthcare provider.    Flush out the tube as directed. Some tubes get flushed one time a day with 10 mls of NSS You will be given a prescription for the flushes.  To flush the nephrostomy tube, clean both connections with alcohol swap. Twist off the drainage bag tube and twist the saline syringe into the nephrostomy tube and flush briskly. Remove the syringe and twist the drainage bag tube back into the nephrostomy tube.  Keep the site covered while you shower.  Tape a piece of clear adhesive plastic over the dressing to keep it dry while you shower. Do not take tub baths.    Contact Interventional Radiology at [phone] ([facility] PATIENTS: Contact Interventional Radiology at [phone]) ([facility] PATIENTS: Contact Interventional Radiology at  [phone]) if:  The skin around the nephrostomy tube is red, swollen, itches, or has a rash.   You have a fever greater than 101 or chills.  You have lower back or hip pain.  There are changes in how your urine looks or smells.  You have little or no urine draining from the nephrostomy tube.   You have nausea and are vomiting.  The black arleth on your tube has moved, or the tube is longer than when it was put in.   You have questions or concerns about your condition or care.  The nephrostomy tube comes out completely.   There is blood, pus, or a bad smell coming from the place where the tube enters your skin.  Urine is leaking around the tube.        The following pharmacies carry the flush syringes.       [facility]                     [facility]                         [facility]       [address]                     [address]                    [phone]  [place]                       [place]  Phone [phone]            Phone [phone]                 [facility]                                                                                                   [phone]  [facility]             [facility]                             [address] [place], [place] [address] [facility]   [place]                                 [phone]  Phone [phone]            Phone [phone]    [facility]                                                                         [facility] [phone]  [address]  [place]   Phone [phone]    Wound/Skin care plans:  1-Apply Prevent barrier cream to bilateral sacrum, buttock and heels BID and PRN  2-Float heels on 2 pillows to offload pressure so heels are not in contact with mattress or pillows.  3-Ehob pressure redistribution cushion in chair when out of bed. Avoid prolonged sitting.  4-Moisturize skin daily  with skin nourishing cream.  5-Turn/reposition q2h or when medically stable for pressure re-distribution on skin.

## 2025-03-24 NOTE — OCCUPATIONAL THERAPY NOTE
"OT EVALUATION       03/24/25 1035   OT Last Visit   OT Visit Date 03/24/25   Note Type   Note type Evaluation   Pain Assessment   Pain Assessment Tool 0-10   Pain Score No Pain   Restrictions/Precautions   Other Precautions Bed Alarm;Chair Alarm;Fall Risk;Cognitive;Contact/isolation  (B nephrostomy tubes)   Home Living   Type of Home House   Home Layout One level  (3 STEPHEN)   Bathroom Shower/Tub Walk-in shower   Bathroom Toilet Raised   Bathroom Equipment Grab bars in shower;Commode   Home Equipment Walker;Cane   Additional Comments pt was only transferring, has a 24 hour caregiver, assist with all ADLS and IADLS since discharge from [facility]   Prior Function   Level of [place] Needs assistance with ADLs;Needs assistance with functional mobility;Needs assistance with IADLS   Lives With Alone   Receives Help From Home health  (24 hour caregiver)   IADLs Family/Friend/Other provides transportation;Family/Friend/Other provides medication management;Family/Friend/Other provides meals   Comments prior to january hospitalization pt was independent with ADLS and mobility   Lifestyle   Reciprocal Relationships supportive son   Service to Others  at the [facility]   Subjective   Subjective \"If I can\"   ADL   Eating Assistance 4  Minimal Assistance   Grooming Assistance 4  Minimal Assistance   Grooming Deficit Brushing hair   UB Bathing Assistance 3  Moderate Assistance   UB Bathing Deficit Right arm;Left arm;Chest;Abdomen   LB Bathing Assistance 2  Maximal Assistance   UB Dressing Assistance 3  Moderate Assistance   UB Dressing Deficit Thread RUE;Thread LUE;Pull around back   LB Dressing Assistance 2  Maximal Assistance   LB Dressing Deficit Don/doff R sock;Don/doff L sock   Toileting Assistance  2  Maximal Assistance   Bed Mobility   Supine to Sit 3  Moderate assistance   Additional items Assist x 2;Verbal cues   Transfers   Sit to Stand 3  Moderate assistance   Additional items Assist x 2;Verbal cues   Stand " to Sit 3  Moderate assistance   Additional items Assist x 2;Verbal cues   Functional Mobility   Functional Mobility 3  Moderate assistance   Additional Comments short household distance with RW   Balance   Static Sitting Fair   Dynamic Sitting Fair -   Static Standing Poor   Dynamic Standing Poor   Activity Tolerance   Activity Tolerance Patient limited by fatigue   Nurse Made Aware yes, Patrica WEBBE Assessment   RUE Assessment WFL   LUE Assessment   LUE Assessment WFL   Cognition   Overall Cognitive Status Impaired   Arousal/Participation Cooperative   Attention Attends with cues to redirect   Orientation Level Oriented to person;Oriented to place;Disoriented to time   Following Commands Follows one step commands with increased time or repetition   Assessment   Limitation Decreased ADL status;Decreased Safe judgement during ADL;Decreased UE strength;Decreased endurance;Decreased self-care trans;Decreased high-level ADLs;Decreased cognition  (decreased balance and mobility)   Prognosis Good   Assessment Patient evaluated by Occupational Therapy.  Patient admitted with Generalized weakness.  The patients occupational profile, medical and therapy history includes a extensive additional review of physical, cognitive, or psychosocial history related to current functional performance.  Comorbidities affecting functional mobility and ADLS include: arthritis, cancer, CVA, hypertension, and MI.  Prior to admission, patient was requiring assist for functional mobility with walker, requiring assist for ADLS, and requiring assist for IADLS.  The evaluation identifies the following performance deficits: weakness, impaired balance, decreased endurance, increased fall risk, new onset of impairment of functional mobility, decreased ADLS, decreased IADLS, decreased activity tolerance, decreased safety awareness, impaired judgement, decreased cognition, and decreased strength, that result in activity limitations and/or participation  restrictions. This evaluation requires clinical decision making of high complexity, because the patient presents with comorbidites that affect occupational performance and required significant modification of tasks or assistance with consideration of multiple treatment options.  The Barthel Index was used as a functional outcome tool presenting with a score of Barthel Index Score: 25, indicating marked limitations of functional mobility and ADLS.  The patient's raw score on the AM-PAC Daily Activity Inpatient Short Form is 12. A raw score of less than 19 suggests the patient may benefit from discharge to post-acute rehabilitation services. Please refer to the recommendation of the Occupational Therapist for safe discharge planning.  Patient will benefit from skilled Occupational Therapy services to address above deficits and facilitate a safe return to prior level of function.   Goals   Patient Goals to go home   STG Time Frame   (1-7 days)   Short Term Goal  Goals established to promote Patient Goals: to go home:  Eating: supervision; Grooming: supervision seated; Bathing: mod assist; Upper Body Dressing min assist; Lower Body Dressing: mod assist; Toileting: mod assist; Patient will increase stand pivot commode transfer to mod assist with rolling walker to increase performance and safety with ADLS and functional mobility; Patient will increase standing tolerance to 2 minutes during ADL task to decrease assistance level and decrease fall risk; Patient will increase bed mobility to mod assist in preparation for ADLS and transfers; Patient will increase functional mobility to and from bathroom with rolling walker with mod assist to increase performance with ADLS and to use a toilet; Patient will tolerate 5 minutes of UE ROM/strengthening to increase general activity tolerance and performance in ADLS/IADLS; Patient will improve functional activity tolerance to 10 minutes of sustained functional tasks to increase  participation in basic self-care and decrease assistance level;  Patient will increase dynamic sitting balance to fair to improve the ability to sit at edge of bed or on a chair for ADLS;  Patient will increase dynamic standing balance to poor+ to improve postural stability and decrease fall risk during standing ADLS and transfers.   LTG Time Frame   (8-14 days)   Long Term Goal Eating: independent; Grooming: independent seated; Bathing: min assist; Upper Body Dressing supervision; Lower Body Dressing: min assist; Toileting: min assist; Patient will increase stand pivot commode transfer to min assist with rolling walker to increase performance and safety with ADLS and functional mobility; Patient will increase standing tolerance to 4 minutes during ADL task to decrease assistance level and decrease fall risk; Patient will increase bed mobility to min assist in preparation for ADLS and transfers; Patient will increase functional mobility to and from bathroom with rolling walker with min assist to increase performance with ADLS and to use a toilet; Patient will tolerate 10 minutes of UE ROM/strengthening to increase general activity tolerance and performance in ADLS/IADLS; Patient will improve functional activity tolerance to 20 minutes of sustained functional tasks to increase participation in basic self-care and decrease assistance level;  Patient will increase dynamic sitting balance to fair+ to improve the ability to sit at edge of bed or on a chair for ADLS;  Patient will increase dynamic standing balance to fair- to improve postural stability and decrease fall risk during standing ADLS and transfers.    Pt will score >/= 16/24 on AM-PAC Daily Activity Inpatient scale to promote safe independence with ADLs and functional mobility; Pt will score >/= 55/100 on Barthel Index in order to decrease caregiver assistance needed and increase ability to perform ADLs and functional mobility.   Plan   Treatment Interventions  ADL retraining;Functional transfer training;UE strengthening/ROM;Endurance training;Patient/family training;Equipment evaluation/education;Activityengagement;Compensatory technique education   Goal Expiration Date 04/07/25   OT Frequency 3-5x/wk   Discharge Recommendation   Rehab Resource Intensity Level, OT II (Moderate Resource Intensity)   AM-PAC Daily Activity Inpatient   Lower Body Dressing 1   Bathing 2   Toileting 1   Upper Body Dressing 2   Grooming 3   Eating 3   Daily Activity Raw Score 12   Daily Activity Standardized Score (Calc for Raw Score >=11) 30.6   AM-PAC Applied Cognition Inpatient   Following a Speech/Presentation 2   Understanding Ordinary Conversation 4   Taking Medications 2   Remembering Where Things Are Placed or Put Away 2   Remembering List of 4-5 Errands 2   Taking Care of Complicated Tasks 2   Applied Cognition Raw Score 14   Applied Cognition Standardized Score 32.02   Barthel Index   Feeding 5   Bathing 0   Grooming Score 0   Dressing Score 0   Bladder Score 5   Bowels Score 5   Toilet Use Score 5   Transfers (Bed/Chair) Score 5   Mobility (Level Surface) Score 0   Stairs Score 0   Barthel Index Score 25   Licensure   [place] License Number  Carmen Gutiérrez MS OTR/L [id number]

## 2025-03-24 NOTE — ASSESSMENT & PLAN NOTE
UA is abnormal.  Discussed with patient/family that in the setting of nephrostomy tubes, this could just be colonization however given weakness will treat  Continue IV Rocephin and follow-up on pending urine culture

## 2025-03-24 NOTE — PLAN OF CARE
Problem: PHYSICAL THERAPY ADULT  Goal: Performs mobility at highest level of function for planned discharge setting.  See evaluation for individualized goals.  Description: Treatment/Interventions: Therapeutic exercise, Endurance training, Elevations, LE strengthening/ROM, Functional transfer training, Gait training, Bed mobility, Equipment eval/education, Patient/family training          See flowsheet documentation for full assessment, interventions and recommendations.  Note: Prognosis: Good  Problem List: Decreased strength, Decreased endurance, Impaired balance, Decreased mobility  Assessment: Pt is 85 y.o. female seen for PT evaluation s/p admit to [facility] on 3/23/2025 w/ Generalized weakness. PT consulted to assess pt's functional mobility and d/c needs. Order placed for PT eval and tx, w/ up as tolerated order.  Co-morbidities affecting patient's physical performance include: bladder cancer, stroke, ambulatory dysfunction, CKD.  Personal factors affecting patient at time of initial evaluation include: inaccessible home environment, ambulating with assistive device, and inability to ambulate household distances.     Prior to admission, patient was recently DC from [facility] and able to transfer with assist x 1.  Upon evaluation: Pt requires assist x 2 to transfer and ambulate 8 feet.        Please find objective findings from Physical Therapy assessment regarding body systems outlined above with impairments and limitations including weakness, impaired balance, gait deviations, pain, decreased activity tolerance, decreased functional mobility tolerance, and fall risk.The Barthel Index was used as a functional outcome tool presenting with a score of Barthel Index Score: 25 today indicating marked limitations of functional mobility and ADLS.  Patient's clinical presentation is currently unstable/unpredictable as seen in patient's presentation of changing level of pain, increased fall risk, new onset of  impairment of functional mobility, and new onset of weakness. Pt would benefit from continued Physical Therapy treatment to address deficits as defined above and maximize level of functional mobility.     As demonstrated by objective findings, the assigned level of complexity for this evaluation is high.The patient's AM-[facility] Basic Mobility Inpatient Short Form Raw Score is 11. A Raw score of less than or equal to 16 suggests the patient may benefit from discharge to post-acute rehabilitation services. Please also refer to the recommendation of the Physical Therapist for safe discharge planning.        Rehab Resource Intensity Level, PT: II (Moderate Resource Intensity)    See flowsheet documentation for full assessment.

## 2025-03-24 NOTE — ASSESSMENT & PLAN NOTE
Bicarb low at 18 with normal anion gap.  Improving to 20 with gentle IV fluids with Plasma-Lyte   get repeat lab work in a.m.

## 2025-03-24 NOTE — SPEECH THERAPY NOTE
"Speech-Language Pathology Bedside Swallow Evaluation      Patient Name: Ning Chambers    Today's Date: 3/24/2025     Problem List  Principal Problem:    Generalized weakness  Active Problems:    Nephrostomy status (HCC)    Stage 4 chronic kidney disease (HCC)    Benign hypertension    Abnormal urinalysis    Low bicarbonate level    Diarrhea    Electrolyte abnormality    Elevated troponin      Past Medical History  Past Medical History:   Diagnosis Date    Arthritis     right knee    Hay fever     Hyperlipidemia     Hypertension     Myocardial infarction (HCC) 2003    questionable MI during exploratory surgery    Ovarian cancer (HCC) 2003    surgery (did not have chemo or radiation)    Stroke ([facility]) 2003    \" mini stroke \" no deficits ; another poss stroke with  left hand weakness    Wears glasses     Wears partial dentures     upper & lower         Summary   Pt presented with functional appearing oral and pharyngeal stage swallowing skills with the limited food/liquid accepted this am (pureed waffle, water). She endorsed difficulty swallowing pills (\"they just swirl around in mouth if I try to take them with a drink, but it goes better if they are crushed and in pudding\").  She is currently without her upper dentures but son will bring them in in the near future.    Recommended Diet: mechanically altered/level 2 diet and thin liquids (continue current textures)  Recommended Form of Meds: crushed with pudding  Aspiration precautions and swallowing strategies: upright posture and slow rate of feeding  Other Recommendations: Continue frequent oral care        Current Medical Status  Ning Chambers is a 85 y.o. female with a PMH of CKD stage IV who presented 3/23 with generalized weakness, decreased p.o. intake (c/o reduced appetite and intermittent diarrhea since Jan).  Patient was admitted in January with sepsis due to flank cellulitis along with MARTY on CKD and discharged to rehab. She has been home from rehab since " 3/12 and has had difficulty getting out of bed without assistance.  Patient reports 1 episode of vomiting 3/23.  Reports some cough since yesterday. Patient also reports having some difficulty swallowing her pills.   DX: Generalized weakness, abnormal urinalysis, nephrostomy status, diarrhea, stage IV CKD, low bicarb level, electrolyte abnormality.  SLP swallowing evaluation requested at this time    Current Precautions:  Fall, Strict Contact and Hand Hygiene (r/o C diff)    Allergies:  No known food allergies    Past medical history:  Please see H&P for details    Special Studies:  3/23 CT abd/pelvis:   1.  Possible right lower lobe bronchiolitis (Tree-in-bud nodules in the right lower lobe)  2.  Unchanged abdominal lymphadenopathy.  3.  No acute abdominopelvic pathology.     3/23 CT of head: No acute intracranial abnormality.  Unchanged asymmetric enlargement of the left lateral ventricle.  Current Procalcitonin 0.25        Social/Education/Vocational Hx:  Pt lives alone with assistance    Swallow Information   Current Diet: mechanically altered/level 2 diet and thin liquids   Baseline Diet: regular diet and thin liquids      Baseline Assessment   Behavior/Cognition: alert  Speech/Language Status: able to follow commands and express needs with no s/s dysarthria or dysphonia  Patient Positioning: upright in bed  Pain Status/Interventions/Response to Interventions:  No report of or nonverbal indications of pain.       Swallow Mechanism Exam  Facial: symmetrical but limited ROM (presume related to limited effort)  Labial: WFL  Lingual: WFL  Velum: symmetrical  Mandible: adequate ROM  Dentition: no upper teeth (and upper dentures at home), nearly full lower natural dentition  Vocal quality:clear/adequate   Respiratory Status: on RA      Consistencies Assessed and Performance   Materials administered included pureed waffle and water (refused al other foods, refused all juices.milk)    Oral Stage:   Bolus formation and  transfer were functional with no significant oral residue noted with puree.  No overt s/s reduced oral control. No upper dentures available this am and pt declined all other foods.      Pharyngeal Stage: WFL suspected with materials ingested today.  Swallow Mechanics:  Swallowing initiation appeared prompt.  Laryngeal rise was palpated and judged to be within functional limits.  No coughing, throat clearing, change in vocal quality or respiratory status noted today.     Esophageal Concerns: early satiety    Summary and Recommendations (see above)    Results Reviewed with: patient and RN     Treatment Recommended: yes     Frequency of treatment: 2x weekly    Patient Stated Goal: did not state    Short Term Goals:  -Pt will tolerate Dysphagia 2/mechanical soft diet and thin liquid with no significant s/s oral or pharyngeal dysphagia across 1-3 diagnostic session/s.    -Patient will tolerate trials of upgraded food texture with no significant s/s of oral or pharyngeal dysphagia including aspiration across 1-3 diagnostic sessions     Do Byrd MS CCC-SLP  [place] License [id number]  [place] License [id number]

## 2025-03-24 NOTE — PROGRESS NOTES
Progress Note - Hospitalist   Name: Ning Chambesr 85 y.o. female I MRN: [id number]  Unit/Bed#: 2 [address] Date of Admission: 3/23/2025   Date of Service: 3/24/2025 I Hospital Day: 1    Assessment & Plan  Generalized weakness  Patient presented from home as VNA nurse was concern for dehydration as patient has been weak along with decreased p.o. intake since January  Patient states she is unsure why she does not have much of an appetite.  Initially while at rehab she was not eating much because she did not like the food.  Patient reports having some difficulty swallowing her pills but per caretaker at bedside she has been taking her medications  Continue on mechanical soft diet with thin liquids after bedside nursing eval and consult speech pathologist for further evaluation  Start Marinol for appetite stimulant  CT abdomen/pelvis showed possible right lung bronchiolitis along with lymphadenopathy in the abdomen which is unchanged but no acute pathology  CT head was negative for acute pathology and chest x-ray negative for pneumonia  PT/OT  Abnormal urinalysis  UA is abnormal.  Discussed with patient/family that in the setting of nephrostomy tubes, this could just be colonization however given weakness will treat  Continue IV Rocephin and follow-up on pending urine culture  Nephrostomy status (HCC)  Patient with history of obstructive uropathy due to invasive bladder cancer status post bilateral nephrostomy tubes in place  Patient is overdue for tube exchange and is scheduled in April  IR consulted for tube exchange  Diarrhea  Patient reports on and off diarrhea since January 20.  Per caretaker at bedside not all her bowel movements are diarrhea-like  Was prescribed Imodium as needed on discharge from rehab  Given recent hospitalization and treatment with antibiotics, check for C. difficile and bacterial enteric panel if patient has further diarrhea here  Benign hypertension  Continue Toprol-XL and monitor blood  pressures.  Stage 4 chronic kidney disease (HCC)  Lab Results   Component Value Date    EGFR 26 03/24/2025    EGFR 22 03/23/2025    EGFR 19 01/30/2025    CREATININE 1.74 (H) 03/24/2025    CREATININE 2.00 (H) 03/23/2025    CREATININE 2.19 (H) 01/30/2025   Per prior nephrology notes, baseline 2.0-2.3  Creatinine today appears better than baseline.  Repeat lab work in a.m.  Low bicarbonate level  Bicarb low at 18 with normal anion gap.  Improving to 20 with gentle IV fluids with Plasma-Lyte   get repeat lab work in a.m.  Electrolyte abnormality  Patient with hypomagnesemia on lab work, improved status post repletion r  Get repeat lab work in a.m.  Elevated troponin  Initial troponin of 69 --> 66 --> 67  EKG nonischemic  Likely non-MI troponin elevation in the setting of CKD    VTE Pharmacologic Prophylaxis:    Heparin    Mobility:   Basic Mobility Inpatient Raw Score: 12  -[facility] Goal: 4: Move to chair/commode  JH-HLM Achieved: 1: Laying in bed  JH-HLM Goal NOT achieved. Continue with multidisciplinary rounding and encourage appropriate mobility to improve upon -HL goals.    Patient Centered Rounds: I performed bedside rounds with nursing staff today.   Discussions with Specialists or Other Care Team Provider: Yes    Education and Discussions with Family / Patient: Updated  (son) via phone.    Current Length of Stay: 1 day(s)  Current Patient Status: Inpatient   Certification Statement: The patient will continue to require additional inpatient hospital stay due to generalized weakness, decreased p.o. intake, abnormal UA  Discharge Plan: Anticipate discharge in 24-48 hrs to discharge location to be determined pending rehab evaluations.    Code Status: Level 3 - DNAR and DNI    Subjective   Patient denies any further diarrhea while here.  States she did finally eat some food for breakfast while at home she was not eating anything    Objective :  Temp:  [97.6 °F (36.4 °C)-98.4 °F (36.9 °C)] 98.4 °F (36.9  °C)  HR:  [70-78] 71  BP: (120-152)/(58-70) 126/66  Resp:  [17-19] 18  SpO2:  [97 %-99 %] 99 %  O2 Device: None (Room air)    Body mass index is 26.98 kg/m².     Input and Output Summary (last 24 hours):     Intake/Output Summary (Last 24 hours) at 3/24/2025 0910  Last data filed at 3/24/2025 0703  Gross per 24 hour   Intake 500 ml   Output 275 ml   Net 225 ml       Physical Exam  Vitals reviewed.   Constitutional:       General: She is not in acute distress.     Appearance: She is ill-appearing (chronically). She is not toxic-appearing.   HENT:      Head: Normocephalic and atraumatic.   Eyes:      General: No scleral icterus.  Cardiovascular:      Rate and Rhythm: Normal rate and regular rhythm.   Pulmonary:      Effort: Pulmonary effort is normal. No respiratory distress.      Breath sounds: Normal breath sounds. No wheezing or rales.   Abdominal:      General: Bowel sounds are normal. There is no distension.      Palpations: Abdomen is soft.      Tenderness: There is no abdominal tenderness.   Skin:     Comments: Bilateral nephrostomy tubes in place   Neurological:      Mental Status: She is alert and oriented to person, place, and time.         Lines/Drains:  Lines/Drains/Airways       Active Status       Name Placement date Placement time Site Days    Nephrostomy Left 10.2 Fr. 01/07/25  1124  Left  75    Nephrostomy Right 10.2 Fr. 01/07/25  1130  Right  75                            Lab Results: I have reviewed the following results:   Results from last 7 days   Lab Units 03/24/25  0654 03/23/25  1422   WBC Thousand/uL 6.79 10.88*   HEMOGLOBIN g/dL 10.4* 11.8   HEMATOCRIT % 32.0* 36.9   PLATELETS Thousands/uL 246 322   SEGS PCT %  --  79*   LYMPHO PCT %  --  10*   MONO PCT %  --  7   EOS PCT %  --  2     Results from last 7 days   Lab Units 03/24/25  0654   SODIUM mmol/L 141   POTASSIUM mmol/L 3.8   CHLORIDE mmol/L 111*   CO2 mmol/L 20*   BUN mg/dL 39*   CREATININE mg/dL 1.74*   ANION GAP mmol/L 10   CALCIUM  mg/dL 9.3   ALBUMIN g/dL 2.7*   TOTAL BILIRUBIN mg/dL 0.46   ALK PHOS U/L 63   ALT U/L 6*   AST U/L 10*   GLUCOSE RANDOM mg/dL 85                 Results from last 7 days   Lab Units 03/24/25  0654 03/23/25  1422   LACTIC ACID mmol/L  --  1.0   PROCALCITONIN ng/ml 0.25 0.27*       Recent Cultures (last 7 days):         Imaging Results Review: I reviewed radiology reports from this admission including: chest xray, CT abdomen/pelvis, and CT head.  Other Study Results Review: No additional pertinent studies reviewed.    Last 24 Hours Medication List:     Current Facility-Administered Medications:     aspirin chewable tablet 81 mg, Daily    atorvastatin (LIPITOR) tablet 40 mg, Daily With Dinner    cefTRIAXone (ROCEPHIN) IVPB (premix in dextrose) 1,000 mg 50 mL, Q24H    Cholecalciferol (VITAMIN D3) tablet 1,000 Units, Daily    heparin (porcine) subcutaneous injection 5,000 Units, Q8H JD    metoprolol succinate (TOPROL-XL) 24 hr tablet 25 mg, QAM    multi-electrolyte (Plasmalyte-A/Isolyte-S PH 7.4/Normosol-R) IV solution, Continuous, Last Rate: 50 mL/hr (03/23/25 7635)    saccharomyces boulardii (FLORASTOR) capsule 250 mg, BID    Administrative Statements   Today, Patient Was Seen By: Nata Valentin DO      **Please Note: This note may have been constructed using a voice recognition system.**

## 2025-03-24 NOTE — PHYSICAL THERAPY NOTE
"       PHYSICAL THERAPY EVALUATION/TREATMENT     03/24/25 1020   PT Last Visit   PT Visit Date 03/24/25   Note Type   Note type Evaluation   Pain Assessment   Pain Assessment Tool 0-10   Pain Score No Pain   Restrictions/Precautions   Other Precautions Chair Alarm;Bed Alarm;Cognitive;Fall Risk   Home Living   Type of Home House   Home Layout One level  (3 STEPHEN)   Bathroom Equipment Commode   Home Equipment Walker;Cane;Wheelchair-manual;Hospital bed   Prior Function   Level of [place] Needs assistance with ADLs;Needs assistance with functional mobility  (Pt reports that she needs assist to transfer since recent DC from [facility].)   Lives With Other (Comment)  (24 hour caregiver)   Receives Help From Personal care attendant   Falls in the last 6 months 1 to 4   General   Additional Pertinent History Pt admitted with generalized weakness and vomiting.  Pt was recently DC from [facility].  Pt has a history of bladder cancer with B nephrostomy tubes.   Cognition   Overall Cognitive Status Impaired   Arousal/Participation Cooperative   Attention Attends with cues to redirect   Orientation Level Oriented to person;Oriented to place;Disoriented to time   Following Commands Follows one step commands without difficulty   Subjective   Subjective \"I feel OK\"   RLE Assessment   RLE Assessment   (ROM WFL, MMT hip 3-/5, knee 3+/5, ankle 3+/5)   LLE Assessment   LLE Assessment   (ROM WFL, MMT hip 2/5, knee 3+/5, ankle 3/5)   Light Touch   RLE Light Touch Impaired   LLE Light Touch Impaired   Bed Mobility   Supine to Sit 3  Moderate assistance   Additional items HOB elevated;Assist x 2;Increased time required;LE management   Transfers   Sit to Stand 3  Moderate assistance   Additional items Assist x 2   Stand to Sit 3  Moderate assistance   Additional items Assist x 2   Stand pivot 3  Moderate assistance   Additional items Assist x 2  (with walker)   Ambulation/Elevation   Gait pattern   (difficulty advancing LEs especially L LE)   Gait " Assistance 3  Moderate assist   Additional items Assist x 2   Assistive Device Rolling walker  (chair follow)   Distance 2 feet   Balance   Static Sitting Fair   Static Standing Poor   Ambulatory Poor   Activity Tolerance   Activity Tolerance Patient limited by fatigue   Assessment   Prognosis Good   Problem List Decreased strength;Decreased endurance;Impaired balance;Decreased mobility   Assessment Pt is 85 y.o. female seen for PT evaluation s/p admit to [facility] on 3/23/2025 w/ Generalized weakness. PT consulted to assess pt's functional mobility and d/c needs. Order placed for PT eval and tx, w/ up as tolerated order.  Co-morbidities affecting patient's physical performance include: bladder cancer, stroke, ambulatory dysfunction, CKD.  Personal factors affecting patient at time of initial evaluation include: inaccessible home environment, ambulating with assistive device, and inability to ambulate household distances.         Prior to admission, patient was recently DC from [facility] and able to transfer with assist x 1.  Upon evaluation: Pt requires assist x 2 to transfer and ambulate 8 feet.            Please find objective findings from Physical Therapy assessment regarding body systems outlined above with impairments and limitations including weakness, impaired balance, gait deviations, pain, decreased activity tolerance, decreased functional mobility tolerance, and fall risk.The Barthel Index was used as a functional outcome tool presenting with a score of Barthel Index Score: 25 today indicating marked limitations of functional mobility and ADLS.  Patient's clinical presentation is currently unstable/unpredictable as seen in patient's presentation of changing level of pain, increased fall risk, new onset of impairment of functional mobility, and new onset of weakness. Pt would benefit from continued Physical Therapy treatment to address deficits as defined above and maximize level of functional mobility.  "    As demonstrated by objective findings, the assigned level of complexity for this evaluation is high.    The patient's -[facility] Basic Mobility Inpatient Short Form Raw Score is 11. A Raw score of less than or equal to 16 suggests the patient may benefit from discharge to post-acute rehabilitation services. Please also refer to the recommendation of the Physical Therapist for safe discharge planning.   Goals   Patient Goals \"go home\"   STG Expiration Date 03/31/25   Short Term Goal #1 1. improve bed mobility to mod assist, 2. improve transfers to mod assist with walker, 3. pt will ambulate with a walker 20 feet with mod assist   LTG Expiration Date 04/07/25   Long Term Goal #1 1. supervsion bed mobility, 2. min assist transfers, 3. min assist ambulation with a walker 50 feet with min assist   Plan   Treatment/Interventions Therapeutic exercise;Endurance training;Elevations;LE strengthening/ROM;Functional transfer training;Gait training;Bed mobility;Equipment eval/education;Patient/family training   PT Frequency 3-5x/wk   Discharge Recommendation   Rehab Resource Intensity Level, PT II (Moderate Resource Intensity)   AM-PAC Basic Mobility Inpatient   Turning in Flat Bed Without Bedrails 3   Lying on Back to Sitting on Edge of Flat Bed Without Bedrails 2   Moving Bed to Chair 2   Standing Up From Chair Using Arms 2   Walk in Room 1   Climb 3-5 Stairs With Railing 1   Basic Mobility Inpatient Raw Score 11   Basic Mobility Standardized Score 30.25   [facility] Highest Level Of Mobility   -[facility] Goal 4: Move to chair/commode   -[facility] Achieved 5: Stand (1 or more minutes)   Barthel Index   Feeding 5   Bathing 0   Grooming Score 0   Dressing Score 5   Bladder Score 5   Bowels Score 10   Toilet Use Score 5   Transfers (Bed/Chair) Score 5   Mobility (Level Surface) Score 0   Stairs Score 0   Barthel Index Score 35   Additional Treatment Session   Start Time 1010   End Time 1020   Treatment Assessment S:  I can try to walk "   O: Pt page from a chair with mod assist x 2, pt ambulated x 8 feet with a walker and chair follow with mod assist x 2.    A:  Pt is generally weak and requires assist x 2 to ambulate short distances.    P: Continue PT to improve strength and function.   End of Consult   Patient Position at End of Consult All needs within reach;Bed/Chair alarm activated;Bedside chair   Licensure   [place] License Number  Elinorralph Herreraliliya PT [id number]

## 2025-03-24 NOTE — CASE MANAGEMENT
Case Management Assessment & Discharge Planning Note    Patient name Ning Chambers  Location [address] 206/2 [facility] MRN [id number]  : 1940 Date 3/24/2025       Current Admission Date: 3/23/2025  Current Admission Diagnosis:Generalized weakness   Patient Active Problem List    Diagnosis Date Noted Date Diagnosed    Diarrhea 2025     Generalized weakness 2025     Electrolyte abnormality 2025     Elevated troponin 2025     Low bicarbonate level 2025     Lightheadedness 2025     Acute kidney injury superimposed on chronic kidney disease  (HCC) 2025     Abnormal urinalysis 2025     Cellulitis of flank 2025     Benign hypertension 2024     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, 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) 2024     Obstructive uropathy 2024     Class 1 obesity due to excess calories with serious comorbidity and body mass index (BMI) of 34.0 to 34.9 in adult 2024     Advanced directives, counseling/discussion 2024     Palliative care by specialist 2024     Bilateral leg pain 2024     Ambulatory dysfunction 2024     Stage 4 chronic kidney disease (HCC) 2024     Stage 5 chronic kidney disease 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) 2024     Morbid obesity due to excess calories ([facility]) 2024     Nephrostomy status ([facility]) 2023     Sensorineural hearing loss (SNHL), bilateral 2022     Sensorineural hearing loss (SNHL) of right ear with restricted hearing of left ear 2022     Goals of care, counseling/discussion 05/10/2022     Malignant neoplasm of overlapping sites of bladder ([facility]) 2021     Stroke ([facility]) 10/29/2021     Hypokalemia 10/28/2021     Wrist drop 10/28/2021       LOS (days): 1  Geometric Mean LOS (GMLOS) (days): 2.6  Days to GMLOS:1.6     OBJECTIVE:    Risk of Unplanned Readmission Score: 19.7     Current admission status:  Inpatient    Preferred Pharmacy:   [facility] - [place], [place] - [address]  [address]  [facility] [address]  Phone: [phone] Fax: [phone]    Primary Care Provider: BHAVIK Morillo    [facility] [place]  Secondary Insurance:     ASSESSMENT:  Active Health Care Proxies       ReyesBri (Marti) Health Care Agent - Daughter In-Law   Primary Phone: [phone] (Mobile)                 Advance Directives  Does patient have a Health Care POA?: Yes  Does patient have Advance Directives?: Yes  Advance Directives: Living will, Power of  for health care  Primary Contact: Bri Chambers    Readmission Root Cause  30 Day Readmission: No    Patient Information  Admitted from:: Home  Mental Status: Alert  During Assessment patient was accompanied by: Son, Other-Comment (daughter-in-law)  Primary Caregiver: Family  Caregiver's Name:: Meldrim  Caregiver's Relationship to Patient:: Other (Specify) (private duty caregiver)  Support Systems: Son, Family members, Private Caregivers  County of Residence: [place]  What city do you live in?: [place]  Home entry access options. Select all that apply.: Stairs  Number of steps to enter home.: 3  Type of Current Residence: [facility]  Living Arrangements: Lives Alone (with 24/7 private duty caregiver)    Activities of Daily Living Prior to Admission  Functional Status: Assistance  Completes ADLs independently?: No  Level of ADL dependence: Assistance  Ambulates independently?: No  Level of ambulatory dependence: Assistance  Does patient use assisted devices?: Yes  Assisted Devices (DME) used: Bedside Commode, Hospital Bed, Walker, Wheelchair  Does patient currently own DME?: Yes  What DME does the patient currently own?: Bedside Commode, Hospital Bed, Walker, Wheelchair  Does patient have a history of Outpatient Therapy (PT/OT)?: No  Does the patient have a history of Short-Term Rehab?: Yes ([facility])  Does  patient have a history of HHC?: Yes ([facility], [facility])  Does patient currently have HHC?: Yes    Current Home Health Care  Type of Current Home Care Services: Nurse visit, Home PT, Home OT, 24hr Caregiver  Home Health Agency Name:: [facility]  Current Home Health Follow-Up Provider:: PCP    Patient Information Continued  Income Source: Pension/long term  Does patient have prescription coverage?: Yes ([employer] [facility])  Can the patient afford their medications and any related supplies (such as glucometers or test strips)?: Yes  Does patient receive dialysis treatments?: No  Does patient have a history of substance abuse?: No  Does patient have a history of Mental Health Diagnosis?: No    Means of Transportation  Means of Transport to Appts:: Family transport        DISCHARGE DETAILS:    Discharge planning discussed with:: Sharonon and daughter-in-law, Jus Chambers  Freedom of Choice: Yes  Comments - Freedom of Choice: SW met with pt's stepson and daughter-in-law to assess needs and discuss plans.  Per family pt lives in her home with a 24 hour private duty caregiver through [facility].   Pt has also been receiving home care services through [facility] since discharge from rehab facility.  Family requesting services resume upon discharge.  Referral will be made to [facility].  Pt's teresa also inquired about transportation services.  DEDE provided information on options including wheelchair van transport.  SW offered ongoing support and assistance to pt and family.  SW will follow to monitor progress and assist with planning as needed.  CM contacted family/caregiver?: Yes  Were Treatment Team discharge recommendations reviewed with patient/caregiver?: Yes  Did patient/caregiver verbalize understanding of patient care needs?: Yes    Contacts  Patient Contacts: Rodrigo Chambers (teresa and daughter-in-law)  Relationship to Patient:: Family  Contact Method:  In Person  Reason/Outcome: Discharge Planning    Requested Home Health Care         Is the patient interested in HH at discharge?: Yes  Home Health Discipline requested:: Nursing, Physical Therapy, Occupational Therapy  [facility] Agency [facility]  HHA External Referral Reason (only applicable if external HHA name selected): Patient has established relationship with provider  Home Health Follow-Up Provider:: PCP  Home Health Services Needed:: Evaluate Functional Status and Safety, Gait/ADL Training, Strengthening/Theraputic Exercises to Improve Function, Wound/Ostomy Care  Homebound Criteria Met:: Requires the Assistance of Another Person for Safe Ambulation or to Leave the Home, Uses an Assist Device (i.e. cane, walker, etc)  Supporting Clincal Findings:: Fatigues Easliy in Short Distances, Limited Endurance    DME Referral Provided  Referral made for DME?: No    Other Referral/Resources/Interventions Provided:  Interventions: HHC  Referral Comments: Referral made to [facility] requesting resumption of care upon discharge    Treatment Team Recommendation: Home with Home Health Care, Short Term Rehab  Discharge Destination Plan:: Home with Home Health Care  Transport at Discharge :  (pending progress)

## 2025-03-24 NOTE — PLAN OF CARE
Problem: GASTROINTESTINAL - ADULT  Goal: Minimal or absence of nausea and/or vomiting  Description: INTERVENTIONS:- Administer IV fluids if ordered to ensure adequate hydration- Maintain NPO status until nausea and vomiting are resolved- Nasogastric tube if ordered- Administer ordered antiemetic medications as needed- Provide nonpharmacologic comfort measures as appropriate- Advance diet as tolerated, if ordered- Consider nutrition services referral to assist patient with adequate nutrition and appropriate food choices  Outcome: Progressing     Problem: Nutrition/Hydration-ADULT  Goal: Nutrient/Hydration intake appropriate for improving, restoring or maintaining nutritional needs  Description: Monitor and assess patient's nutrition/hydration status for malnutrition. Collaborate with interdisciplinary team and initiate plan and interventions as ordered.  Monitor patient's weight and dietary intake as ordered or per policy. Utilize nutrition screening tool and intervene as necessary. Determine patient's food preferences and provide high-protein, high-caloric foods as appropriate. INTERVENTIONS:- Monitor oral intake, urinary output, labs, and treatment plans- Assess nutrition and hydration status and recommend course of action- Evaluate amount of meals eaten- Assist patient with eating if necessary - Allow adequate time for meals- Recommend/ encourage appropriate diets, oral nutritional supplements, and vitamin/mineral supplements- Order, calculate, and assess calorie counts as needed- Recommend, monitor, and adjust tube feedings and TPN/PPN based on assessed needs- Assess need for intravenous fluids- Provide specific nutrition/hydration education as appropriate- Include patient/family/caregiver in decisions related to nutrition  Outcome: Progressing

## 2025-03-24 NOTE — UTILIZATION REVIEW
Initial Clinical Review    Admission: Date/Time/Statement:   Admission Orders (From admission, onward)       Ordered        03/23/25 1554  INPATIENT ADMISSION  Once                          Orders Placed This Encounter   Procedures    INPATIENT ADMISSION     Standing Status:   Standing     Number of Occurrences:   1     Level of Care:   Med Surg [16]     Estimated length of stay:   More than 2 Midnights     Certification:   I certify that inpatient services are medically necessary for this patient for a duration of greater than two midnights. See H&P and MD Progress Notes for additional information about the patient's course of treatment.     ED Arrival Information       Expected   -    Arrival   3/23/2025 13:55    Acuity   Urgent              Means of arrival   Ambulance    Escorted by   [facility]    [facility]   Hospitalist    Admission type   Emergency              Arrival complaint   Weakness             Chief Complaint   Patient presents with    Weakness - Generalized     Failure to thrive from home, reduced PO intake.  +N/V/D        Initial Presentation:  85 yof to ER from home, sent by visiting nurse, via EMS for lack of appetite not swallowing for the past week, generalized weakness, failure to thrive; diarrhea x 1 & productive cough today. Pt d/c'd from rehab couple weeks ago & deteriorated.  Hx CKD stage IV. Presents as above. Bilateral nephrostomy tubes in place. Left nephrostomy with yellow-colored urine. Right nephrostomy with dark colored urine. Admission CXR neg. CT head neg. CT a/p: Possible right lower lobe bronchiolitis. Unchanged abdominal lymphadenopathy. Labs: WBC 10.88, Cl 112, CO2 18, BUN 40, Cr 2.00, Mg 1.7, alb 3.1, elevated troponin, procalcitonin 0.27, u/a: dark red, +ketones, blood, prot, nitrite, leuk, WBC, RBC, bacteria.  Admitted to inpatient status for generalized weakness. Plan:IVABT, IVF, cultures, therapies.     Anticipated Length of Stay/Certification Statement:   Patient  will be admitted on an inpatient basis with an anticipated length of stay of greater than 2 midnights secondary to generalized weakness, abnormal UA, diarrhea     Date: 3/24/25   Day 2:   Generalized weakness, POA. Pt with ongoing poor appetite/intake. Started on Marinol for appetite stimulant. IVABT in progress for abnormal u/a, cultures pending. Bilateral nephrostomy tube in place, IR consulted for PCN change.    To IR for: bilateral nephrostomy tube exchanges.     Date: 3/25/25  Day 3: Has surpassed a 2nd midnight with active treatments and services.  Generalized weakness, POA. Awake, alert, disoriented to time. S/p bilateral nephrostomy tube exchanges. Urine culture +pseudomonas aeruginosa, IVABT in progress. Continue to monitor output, serial nephrostomy tube exams. Monitor VS, follow labs/cultures.         ED Treatment-Medication Administration from 03/23/2025 1355 to 03/23/2025 1705         Date/Time Order Dose Route Action     03/23/2025 1519 sodium chloride 0.9 % bolus 500 mL 500 mL Intravenous New Bag            Scheduled Medications:  Medications 03/16 03/17 03/18 03/19 03/20 03/21 03/22 03/23 03/24 03/25   aspirin chewable tablet 81 mg  Dose: 81 mg  Freq: Daily Route: PO  Start: 03/24/25 0900            0915      0822        atorvastatin (LIPITOR) tablet 40 mg  Dose: 40 mg  Freq: Daily with dinner Route: PO  Start: 03/23/25 1715           1817      1630      1630        cefTRIAXone (ROCEPHIN) IVPB (premix in dextrose) 1,000 mg 50 mL  Dose: 1,000 mg  Freq: Every 24 hours Route: IV  Last Dose: 1,000 mg (03/24/25 1631)  Start: 03/24/25 1700   Admin Instructions:      Order specific questions:               1631      1700        cefTRIAXone (ROCEPHIN) IVPB (premix in dextrose) 1,000 mg 50 mL  Dose: 1,000 mg  Freq: Once Route: IV  Last Dose: 1,000 mg (03/23/25 1714)  Start: 03/23/25 1645 End: 03/23/25 1744   Admin Instructions:      Order specific questions:              1714          Cholecalciferol (VITAMIN  D3) tablet 1,000 Units  Dose: 1,000 Units  Freq: Daily Route: PO  Start: 03/24/25 0900   Admin Instructions:               0915     7265      (0900) [C]        dronabinol (MARINOL) capsule 2.5 mg  Dose: 2.5 mg  Freq: 2 times daily (before lunch and dinner) Route: PO  Start: 03/24/25 1600   Admin Instructions:               1810 [C]      1120     1600        enoxaparin (LOVENOX) subcutaneous injection 40 mg  Dose: 40 mg  Freq: Daily Route: SC  Start: 03/24/25 0900 End: 03/23/25 1716   Admin Instructions:              1716-D/C'd        heparin (porcine) subcutaneous injection 5,000 Units  Dose: 5,000 Units  Freq: Every 8 hours scheduled Route: SC  Start: 03/23/25 1730   Admin Instructions:              1817     2146) [C]      0636     1339     2154      0543     1431     2200        magnesium sulfate 2 g/50 mL IVPB (premix) 2 g  Dose: 2 g  Freq: Once Route: IV  Last Dose: 2 g (03/23/25 2042)  Start: 03/23/25 1945 End: 03/23/25 2242   Admin Instructions:              2042          metoprolol succinate (TOPROL-XL) 24 hr tablet 25 mg  Dose: 25 mg  Freq: Every morning Route: PO  Start: 03/24/25 0900   Admin Instructions:      Order specific questions:               0915      0822        saccharomyces boulardii (FLORASTOR) capsule 250 mg  Dose: 250 mg  Freq: 2 times daily Route: PO  Start: 03/23/25 1800           1817      0915     1809      0822     1800        sodium chloride 0.9 % bolus 500 mL  Dose: 500 mL  Freq: Once Route: IV  Last Dose: Stopped (03/23/25 1619)  Start: 03/23/25 1415 End: 03/23/25 1619           1519 [C]     1619          Legend:       [id number]/1603/1703/1803/1903/2003/2103/2203/2303/2403/25        Continuous Meds Sorted by Name  for Ning Chambers as of 03/16/25 through 3/25/25  Legend:       Medications 03/16 03/17 03/18 03/19 03/20 03/21 03/22 03/23 03/24 03/25   multi-electrolyte (Plasmalyte-A/Isolyte-S PH 7.4/Normosol-R) IV solution  Rate: 75 mL/hr Dose: 75 mL/hr  Freq: Continuous Route:  IV  Last Dose: Stopped (03/25/25 0818)  Start: 03/23/25 1715 End: 03/25/25 0751           1735      1033     2301      0751-D/C'd  0818 [C]                    PRN Meds Sorted by Name  for Ning Chambers as of 03/16/25 through 3/25/25  Legend:       Medications 03/16 03/17 03/18 03/19 03/20 03/21 03/22 03/23 03/24 03/25   iohexol (OMNIPAQUE) 350 MG/ML injection (SINGLE-DOSE) 10 mL  Dose: 10 mL  Freq: Once in imaging Route: OTHER  PRN Reason: contrast  Start: 03/24/25 1545 End: 03/24/25 1546            1546         lidocaine 1% buffered  Freq: As needed Route: INFILTRATION  Start: 03/24/25 1527 End: 03/24/25 1531            1527     1531                         ED Triage Vitals   Temperature Pulse Respirations Blood Pressure SpO2 Pain Score   03/23/25 1405 03/23/25 1405 03/23/25 1405 03/23/25 1408 03/23/25 1408 03/23/25 1749   98.4 °F (36.9 °C) 76 18 152/66 98 % No Pain     Weight (last 2 days)       Date/Time Weight    03/23/25 1844 66.9 (147.49)            Vital Signs (last 3 days)       Date/Time Temp Pulse Resp BP MAP (mmHg) SpO2 O2 Device Golden Coma Scale Score Pain    03/25/25 14:37:07 97.9 °F (36.6 °C) 78 -- -- -- 98 % -- -- --    03/25/25 14:32:04 -- 78 18 132/60 84 98 % -- -- --    03/25/25 0900 -- -- -- -- -- -- -- -- No Pain    03/25/25 08:21:48 97.6 °F (36.4 °C) 74 -- 130/63 85 100 % None (Room air) -- --    03/24/25 23:36:47 97.4 °F (36.3 °C) 76 17 109/52 71 99 % -- -- --    03/24/25 2100 -- -- -- -- -- -- -- 15 --    03/24/25 19:52:37 97.2 °F (36.2 °C) 73 18 119/60 80 100 % None (Room air) -- No Pain    03/24/25 16:48:42 -- 69 -- 120/60 80 98 % -- -- --    03/24/25 16:13:14 97.1 °F (36.2 °C) 69 17 122/60 81 100 % -- -- --    03/24/25 1035 -- -- -- -- -- -- -- -- No Pain    03/24/25 1020 -- -- -- -- -- -- -- -- No Pain    03/24/25 09:18:15 97.3 °F (36.3 °C) 73 -- 115/47 70 95 % None (Room air) 15 --    03/24/25 0915 -- 73 -- 115/47 -- -- -- -- --    03/24/25 0900 -- -- -- -- -- -- None (Room air) -- No  Pain    03/23/25 22:47:06 98.4 °F (36.9 °C) 71 18 126/66 86 99 % -- -- --    03/23/25 19:57:26 97.6 °F (36.4 °C) 71 18 129/70 90 98 % None (Room air) 15 No Pain    03/23/25 1844 97.6 °F (36.4 °C) 70 17 120/58 -- -- -- -- --    03/23/25 1749 -- -- -- -- -- -- -- -- No Pain    03/23/25 17:14:27 97.6 °F (36.4 °C) 70 17 120/58 79 97 % -- -- --    03/23/25 1515 -- -- -- -- -- -- -- 15 --    03/23/25 1408 -- 78 19 152/66 95 98 % -- -- --    03/23/25 1405 98.4 °F (36.9 °C) 76 18 -- -- -- -- -- --              Pertinent Labs/Diagnostic Test Results:   Radiology:  IR nephrostomy tube check/change/reposition/reinsertion/upsize   Final Interpretation by David Fall MD (03/24 1617)   Uneventful exchange of bilateral percutaneous nephrostomy tubes.         Workstation performed: [id number]         CT head without contrast   Final Interpretation by Vinnie Huang MD (03/23 1513)      No acute intracranial abnormality.   Unchanged asymmetric enlargement of the left lateral ventricle.               Workstation performed: [id number]         CT abdomen pelvis wo contrast   Final Interpretation by Vinnie Huang MD (03/23 1521)      1.  Possible right lower lobe bronchiolitis.   2.  Unchanged abdominal lymphadenopathy.   3.  No acute abdominopelvic pathology.      Workstation performed: [id number]         XR chest 1 view portable   Final Interpretation by Isra Colmenares MD (03/23 3047)      No acute cardiopulmonary disease.            Workstation performed: [id number]           Cardiology:  ECG 12 lead   Final Result by Sebastian Long MD (03/24 5150)   Normal sinus rhythm   Right bundle branch block   Left anterior fascicular block    Bifascicular block    Moderate voltage criteria for LVH, may be normal variant   Cannot rule out Septal infarct , age undetermined   Abnormal ECG   Confirmed by Sebastian Long ([id number]) on 3/24/2025 11:10:04 PM      ECG 12 lead   Final Result by Sebastian Long MD (03/24 2309)   Sinus rhythm with  "occasional Premature ventricular complexes   Right bundle branch block   Left anterior fascicular block    Bifascicular block    Voltage criteria for left ventricular hypertrophy   Cannot rule out Septal infarct Possible Lateral infarct Abnormal ECG   When compared with ECG of 22-Jan-2025 16:30,   Significant changes have occurred   Confirmed by Sebastian Long ([id number]) on 3/24/2025 11:09:41 PM        GI:  No orders to display           Results from last 7 days   Lab Units 03/24/25  0654 03/23/25  1422   WBC Thousand/uL 6.79 10.88*   HEMOGLOBIN g/dL 10.4* 11.8   HEMATOCRIT % 32.0* 36.9   PLATELETS Thousands/uL 246 322   TOTAL NEUT ABS Thousands/µL  --  8.75*         Results from last 7 days   Lab Units 03/25/25  0559 03/24/25  0654 03/23/25  1422   SODIUM mmol/L 140 141 139   POTASSIUM mmol/L 3.7 3.8 4.5   CHLORIDE mmol/L 113* 111* 112*   CO2 mmol/L 20* 20* 18*   ANION GAP mmol/L 7 10 9   BUN mg/dL 36* 39* 40*   CREATININE mg/dL 1.52* 1.74* 2.00*   EGFR ml/min/1.73sq m 30 26 22   CALCIUM mg/dL 9.2 9.3 10.2   MAGNESIUM mg/dL  --  2.2 1.7*     Results from last 7 days   Lab Units 03/25/25  0559 03/24/25  0654 03/23/25  1422   AST U/L 10* 10* 14   ALT U/L 6* 6* 8   ALK PHOS U/L 54 63 69   TOTAL PROTEIN g/dL 5.3* 5.6* 6.6   ALBUMIN g/dL 2.6* 2.7* 3.1*   TOTAL BILIRUBIN mg/dL 0.41 0.46 0.63         Results from last 7 days   Lab Units 03/25/25  0559 03/24/25  0654 03/23/25  1422   GLUCOSE RANDOM mg/dL 87 85 105             No results found for: \"BETA-HYDROXYBUTYRATE\"                   Results from last 7 days   Lab Units 03/23/25  1917 03/23/25  1654 03/23/25  1422   HS TNI 0HR ng/L  --   --  69*   HS TNI 2HR ng/L  --  66*  --    HSTNI D2 ng/L  --  -3  --    HS TNI 4HR ng/L 67*  --   --    HSTNI D4 ng/L -2  --   --              Results from last 7 days   Lab Units 03/23/25  1422   TSH 3RD GENERATON uIU/mL 0.779     Results from last 7 days   Lab Units 03/24/25  0654 03/23/25  1422   PROCALCITONIN ng/ml 0.25 0.27*     Results " "from last 7 days   Lab Units 03/23/25  1422   LACTIC ACID mmol/L 1.0                                                 Results from last 7 days   Lab Units 03/23/25  1421   CLARITY UA  Clear   COLOR UA  Dark Red   SPEC GRAV UA  1.025   PH UA  6.0   GLUCOSE UA mg/dl Negative   KETONES UA mg/dl Trace*   BLOOD UA  Large*   PROTEIN UA mg/dl 100 (2+)*   NITRITE UA  Positive*   BILIRUBIN UA  Negative   UROBILINOGEN UA (BE) mg/dl <2.0   LEUKOCYTES UA  Large*   WBC UA /hpf Innumerable*   RBC UA /hpf Innumerable*   BACTERIA UA /hpf Field obscured, unable to enumerate*   EPITHELIAL CELLS WET PREP /hpf Field obscured, unable to enumerate*                                 Results from last 7 days   Lab Units 03/23/25  1421   URINE CULTURE  >100,000 cfu/ml Pseudomonas aeruginosa*  >100,000 cfu/ml Enterococcus faecium*                   Past Medical History:   Diagnosis Date    Arthritis     right knee    Hay fever     Hyperlipidemia     Hypertension     Myocardial infarction (HCC) 2003    questionable MI during exploratory surgery    Ovarian cancer (HCC) 2003    surgery (did not have chemo or radiation)    Stroke ([facility]) 2003    \" mini stroke \" no deficits ; another poss stroke with  left hand weakness    Wears glasses     Wears partial dentures     upper & lower     Present on Admission:   Abnormal urinalysis   Benign hypertension   Diarrhea   Generalized weakness   Stage 4 chronic kidney disease (HCC)   Low bicarbonate level   Elevated troponin      Admitting Diagnosis: Weakness [R53.1]  Dysphagia [R13.10]  Age/Sex: 85 y.o. female    Network Utilization Review Department  ATTENTION: Please call with any questions or concerns to [phone] and carefully listen to the prompts so that you are directed to the right person. All voicemails are confidential.   For Discharge needs, contact Care Management DC Support Team at [phone] opt. [phone]  Send all requests for admission clinical reviews, approved or denied determinations and any " other requests to dedicated fax number below belonging to the campus where the patient is receiving treatment. List of dedicated fax numbers for the Facilities:  FACILITY NAME UR FAX NUMBER   ADMISSION DENIALS (Administrative/Medical Necessity) [phone]   DISCHARGE SUPPORT TEAM (NETWORK) [phone]   PARENT CHILD HEALTH (Maternity/NICU/Pediatrics) [phone]   [facility] [phone]   [facility] [phone]   [facility] [phone]   [facility] [phone]   [facility] [phone]   [facility] [phone]   [facility] [phone]   [facility] [phone]   [facility] [phone]   [facility] [phone]   [facility] [phone]   [facility] [phone]

## 2025-03-24 NOTE — ASSESSMENT & PLAN NOTE
Patient with hypomagnesemia on lab work, improved status post repletion r  Get repeat lab work in a.m.

## 2025-03-24 NOTE — CONSULTS
e-Consult (IPC)  - Interventional Radiology  Ning Chambers 85 y.o. female MRN: [id number]  Unit/Bed#: [address] [place] [address] Encounter: [id number]          Interventional Radiology has been consulted to evaluate Ning Chambers    We were consulted by [facility] concerning this patient with bilateral nephrostomy tubes.    Inpatient Consult to IR  Consult performed by: David Fall MD  Consult ordered by: Nata Valentin DO        03/24/25    Assessment/Recommendation:   84 yo female with h/o bladder ca with chronic indwelling bilateral 10F PCNs typically changed q 2 months (last changed 1/7/2025), now admitted for decreased po intake and generalized weakness, possible due to dehydration. Abnormal UIA - ? Colonization. Pt on Rocephin. Request for her 2 month PCN change while admitted. Will plan for change on 3/24, pending IR schedule.    5-10 minutes, >50% of the total time devoted to medical consultative verbal/EMR discussion between providers. Written report will be generated in the EMR.     Thank you for allowing Interventional Radiology to participate in the care of Ning Chambers. Please don't hesitate to call or TigerText us with any questions.     David Fall MD

## 2025-03-24 NOTE — ASSESSMENT & PLAN NOTE
Patient reports on and off diarrhea since January 20.  Per caretaker at bedside not all her bowel movements are diarrhea-like  Was prescribed Imodium as needed on discharge from rehab  Given recent hospitalization and treatment with antibiotics, check for C. difficile and bacterial enteric panel if patient has further diarrhea here

## 2025-03-24 NOTE — CONSULTS
Nutrition consult received. Assessment completed and can be found in flowsheet (3/24/25). RD ordered ONS - Ensure +HP at lunch due to decreased intake observed. RD monitoring for acceptance/tolerance and will adjust as needed.

## 2025-03-24 NOTE — ASSESSMENT & PLAN NOTE
Lab Results   Component Value Date    EGFR 26 03/24/2025    EGFR 22 03/23/2025    EGFR 19 01/30/2025    CREATININE 1.74 (H) 03/24/2025    CREATININE 2.00 (H) 03/23/2025    CREATININE 2.19 (H) 01/30/2025   Per prior nephrology notes, baseline 2.0-2.3  Creatinine today appears better than baseline.  Repeat lab work in a.m.

## 2025-03-24 NOTE — ASSESSMENT & PLAN NOTE
Initial troponin of 69 --> 66 --> 67  EKG nonischemic  Likely non-MI troponin elevation in the setting of CKD

## 2025-03-25 LAB
ALBUMIN SERPL BCG-MCNC: 2.6 G/DL (ref 3.5–5)
ALP SERPL-CCNC: 54 U/L (ref 34–104)
ALT SERPL W P-5'-P-CCNC: 6 U/L (ref 7–52)
ANION GAP SERPL CALCULATED.3IONS-SCNC: 7 MMOL/L (ref 4–13)
AST SERPL W P-5'-P-CCNC: 10 U/L (ref 13–39)
BILIRUB SERPL-MCNC: 0.41 MG/DL (ref 0.2–1)
BUN SERPL-MCNC: 36 MG/DL (ref 5–25)
CALCIUM ALBUM COR SERPL-MCNC: 10.3 MG/DL (ref 8.3–10.1)
CALCIUM SERPL-MCNC: 9.2 MG/DL (ref 8.4–10.2)
CHLORIDE SERPL-SCNC: 113 MMOL/L (ref 96–108)
CO2 SERPL-SCNC: 20 MMOL/L (ref 21–32)
CREAT SERPL-MCNC: 1.52 MG/DL (ref 0.6–1.3)
GFR SERPL CREATININE-BSD FRML MDRD: 30 ML/MIN/1.73SQ M
GLUCOSE SERPL-MCNC: 87 MG/DL (ref 65–140)
POTASSIUM SERPL-SCNC: 3.7 MMOL/L (ref 3.5–5.3)
PROT SERPL-MCNC: 5.3 G/DL (ref 6.4–8.4)
SODIUM SERPL-SCNC: 140 MMOL/L (ref 135–147)

## 2025-03-25 PROCEDURE — 99232 SBSQ HOSP IP/OBS MODERATE 35: CPT | Performed by: STUDENT IN AN ORGANIZED HEALTH CARE EDUCATION/TRAINING PROGRAM

## 2025-03-25 PROCEDURE — 80053 COMPREHEN METABOLIC PANEL: CPT | Performed by: FAMILY MEDICINE

## 2025-03-25 RX ADMIN — HEPARIN SODIUM 5000 UNITS: 5000 INJECTION INTRAVENOUS; SUBCUTANEOUS at 05:43

## 2025-03-25 RX ADMIN — DRONABINOL 2.5 MG: 2.5 CAPSULE ORAL at 11:20

## 2025-03-25 RX ADMIN — METOPROLOL SUCCINATE 25 MG: 25 TABLET, EXTENDED RELEASE ORAL at 08:22

## 2025-03-25 RX ADMIN — Medication 250 MG: at 18:20

## 2025-03-25 RX ADMIN — ASPIRIN 81 MG CHEWABLE TABLET 81 MG: 81 TABLET CHEWABLE at 08:22

## 2025-03-25 RX ADMIN — Medication 250 MG: at 08:22

## 2025-03-25 RX ADMIN — DRONABINOL 2.5 MG: 2.5 CAPSULE ORAL at 18:21

## 2025-03-25 RX ADMIN — HEPARIN SODIUM 5000 UNITS: 5000 INJECTION INTRAVENOUS; SUBCUTANEOUS at 14:31

## 2025-03-25 RX ADMIN — HEPARIN SODIUM 5000 UNITS: 5000 INJECTION INTRAVENOUS; SUBCUTANEOUS at 21:11

## 2025-03-25 RX ADMIN — ATORVASTATIN CALCIUM 40 MG: 40 TABLET, FILM COATED ORAL at 18:20

## 2025-03-25 NOTE — ASSESSMENT & PLAN NOTE
Patient with history of obstructive uropathy due to invasive bladder cancer status post bilateral nephrostomy tubes in place  S/p IR tube exchange

## 2025-03-25 NOTE — PLAN OF CARE
Problem: Knowledge Deficit  Goal: Patient/family/caregiver demonstrates understanding of disease process, treatment plan, medications, and discharge instructions  Description: Complete learning assessment and assess knowledge base.Interventions:- Provide teaching at level of understanding- Provide teaching via preferred learning methods  Outcome: Progressing     Problem: GASTROINTESTINAL - ADULT  Goal: Minimal or absence of nausea and/or vomiting  Description: INTERVENTIONS:- Administer IV fluids if ordered to ensure adequate hydration- Maintain NPO status until nausea and vomiting are resolved- Nasogastric tube if ordered- Administer ordered antiemetic medications as needed- Provide nonpharmacologic comfort measures as appropriate- Advance diet as tolerated, if ordered- Consider nutrition services referral to assist patient with adequate nutrition and appropriate food choices  Outcome: Progressing     Problem: Prexisting or High Potential for Compromised Skin Integrity  Goal: Skin integrity is maintained or improved  Description: INTERVENTIONS:- Identify patients at risk for skin breakdown- Assess and monitor skin integrity- Assess and monitor nutrition and hydration status- Monitor labs - Assess for incontinence - Turn and reposition patient- Assist with mobility/ambulation- Relieve pressure over bony prominences- Avoid friction and shearing- Provide appropriate hygiene as needed including keeping skin clean and dry- Evaluate need for skin moisturizer/barrier cream- Collaborate with interdisciplinary team - Patient/family teaching- Consider wound care consult   Outcome: Progressing     Problem: Nutrition/Hydration-ADULT  Goal: Nutrient/Hydration intake appropriate for improving, restoring or maintaining nutritional needs  Description: Monitor and assess patient's nutrition/hydration status for malnutrition. Collaborate with interdisciplinary team and initiate plan and interventions as ordered.  Monitor patient's  weight and dietary intake as ordered or per policy. Utilize nutrition screening tool and intervene as necessary. Determine patient's food preferences and provide high-protein, high-caloric foods as appropriate. INTERVENTIONS:- Monitor oral intake, urinary output, labs, and treatment plans- Assess nutrition and hydration status and recommend course of action- Evaluate amount of meals eaten- Assist patient with eating if necessary - Allow adequate time for meals- Recommend/ encourage appropriate diets, oral nutritional supplements, and vitamin/mineral supplements- Order, calculate, and assess calorie counts as needed- Recommend, monitor, and adjust tube feedings and TPN/PPN based on assessed needs- Assess need for intravenous fluids- Provide specific nutrition/hydration education as appropriate- Include patient/family/caregiver in decisions related to nutrition  Outcome: Progressing

## 2025-03-25 NOTE — ASSESSMENT & PLAN NOTE
Lab Results   Component Value Date    EGFR 30 03/25/2025    EGFR 26 03/24/2025    EGFR 22 03/23/2025    CREATININE 1.52 (H) 03/25/2025    CREATININE 1.74 (H) 03/24/2025    CREATININE 2.00 (H) 03/23/2025     Per prior nephrology notes, baseline 2.0-2.3  Creatinine today appears better than baseline.  Repeat lab work in a.m.

## 2025-03-25 NOTE — ASSESSMENT & PLAN NOTE
UA is abnormal.  Discussed with patient/family that in the setting of nephrostomy tubes, this could just be colonization however given weakness will treat  Patient on ceftriaxone. Initial cultures growing enterococcus and pseudomonas, suspect this is likely a contaminant given no sepsis, or symptoms to suggest an acute infection despite not having adequate coverage. Monitor off antibiotics. Will request ID's input for this.

## 2025-03-25 NOTE — CASE MANAGEMENT
Case Management Discharge Planning Note    Patient name Ning Chambers  Location 2 [facility] [address] [place] [address] MRN [id number]  : 1940 Date 3/25/2025       Current Admission Date: 3/23/2025  Current Admission Diagnosis:Generalized weakness   Patient Active Problem List    Diagnosis Date Noted Date Diagnosed    Diarrhea 2025     Generalized weakness 2025     Electrolyte abnormality 2025     Elevated troponin 2025     Low bicarbonate level 2025     Lightheadedness 2025     Acute kidney injury superimposed on chronic kidney disease  (HCC) 2025     Abnormal urinalysis 2025     Cellulitis of flank 2025     Benign hypertension 2024     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, 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) 2024     Obstructive uropathy 2024     Class 1 obesity due to excess calories with serious comorbidity and body mass index (BMI) of 34.0 to 34.9 in adult 2024     Advanced directives, counseling/discussion 2024     Palliative care by specialist 2024     Bilateral leg pain 2024     Ambulatory dysfunction 2024     Stage 4 chronic kidney disease (HCC) 2024     Stage 5 chronic kidney disease 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) 2024     Morbid obesity due to excess calories ([facility]) 2024     Nephrostomy status ([facility]) 2023     Sensorineural hearing loss (SNHL), bilateral 2022     Sensorineural hearing loss (SNHL) of right ear with restricted hearing of left ear 2022     Goals of care, counseling/discussion 05/10/2022     Malignant neoplasm of overlapping sites of bladder ([facility]) 2021     Stroke ([facility]) 10/29/2021     Hypokalemia 10/28/2021     Wrist drop 10/28/2021       LOS (days): 2  Geometric Mean LOS (GMLOS) (days): 2.6  Days to GMLOS:0.6     OBJECTIVE:  Risk of Unplanned Readmission Score: 18.8     Current admission status: Inpatient    Preferred Pharmacy:   [facility] - [place], [place] - [address]  [address] [place] [address]  Phone: [phone] Fax: [phone]    Primary Care Provider: BHAVIK Morillo    Primary Insurance: JORJE ROY  Secondary Insurance:     DISCHARGE DETAILS:    Discharge planning discussed with:: Sharonon and daughter-in-law, Jus Chambers  Freedom of Choice: Yes  Comments - Freedom of Choice: SW following to assist with planning.  SW met with pt, stepson, daughter-in-law and private caregiver to review and confirm plans.  PT/OT evaluated pt at Level II.  SW reviewed option of return to [facility] or home with home care.  Family is planning on pt returning home with home care and 24 hour private duty caregiver.  [facility] has accepted pt for resumption of care and will be reserved in Aidin.  SW will continue to follow to monitor progress and assist as needed.  CM contacted family/caregiver?: Yes  Were Treatment Team discharge recommendations reviewed with patient/caregiver?: Yes  Did patient/caregiver verbalize understanding of patient care needs?: Yes    Contacts  Patient Contacts: Rodrigo Chambers (teresa and daughter-in-law)  Relationship to Patient:: Family  Contact Method: In Person  Reason/Outcome: Discharge Planning    Other Referral/Resources/Interventions Provided:  Interventions: [facility]    Treatment Team Recommendation: Home with Home Health Care, Short Term Rehab  Discharge Destination Plan:: Home with Home Health Care  Transport at Discharge : Wheelchair van

## 2025-03-25 NOTE — PLAN OF CARE
Problem: SAFETY ADULT  Goal: Maintain or return to baseline ADL function  Description: INTERVENTIONS:-  Assess patient's ability to carry out ADLs; assess patient's baseline for ADL function and identify physical deficits which impact ability to perform ADLs (bathing, care of mouth/teeth, toileting, grooming, dressing, etc.)- Assess/evaluate cause of self-care deficits - Assess range of motion- Assess patient's mobility; develop plan if impaired- Assess patient's need for assistive devices and provide as appropriate- Encourage maximum independence but intervene and supervise when necessary- Involve family in performance of ADLs- Assess for home care needs following discharge - Consider OT consult to assist with ADL evaluation and planning for discharge- Provide patient education as appropriate  Outcome: Progressing     Problem: DISCHARGE PLANNING  Goal: Discharge to home or other facility with appropriate resources  Description: INTERVENTIONS:- Identify barriers to discharge w/patient and caregiver- Arrange for needed discharge resources and transportation as appropriate- Identify discharge learning needs (meds, wound care, etc.)- Arrange for interpretive services to assist at discharge as needed- Refer to Case Management Department for coordinating discharge planning if the patient needs post-hospital services based on physician/advanced practitioner order or complex needs related to functional status, cognitive ability, or social support system  Outcome: Progressing     Problem: GASTROINTESTINAL - ADULT  Goal: Maintains or returns to baseline bowel function  Description: INTERVENTIONS:- Assess bowel function- Encourage oral fluids to ensure adequate hydration- Administer IV fluids if ordered to ensure adequate hydration- Administer ordered medications as needed- Encourage mobilization and activity- Consider nutritional services referral to assist patient with adequate nutrition and appropriate food  choices  Outcome: Progressing

## 2025-03-25 NOTE — PROGRESS NOTES
Progress Note - Hospitalist   Name: Ning Chambers 85 y.o. female I MRN: [id number]  Unit/Bed#: 2 [address] Date of Admission: 3/23/2025   Date of Service: 3/25/2025 I Hospital Day: 2    Assessment & Plan  Generalized weakness  Patient presented from home as VNA nurse was concern for dehydration as patient has been weak along with decreased p.o. intake since January  Patient states she is unsure why she does not have much of an appetite.  Initially while at rehab she was not eating much because she did not like the food.  Patient reports having some difficulty swallowing her pills but per caretaker at bedside she has been taking her medications  Continue on mechanical soft diet with thin liquids after bedside nursing eval and consult speech pathologist for further evaluation  Start Marinol for appetite stimulant  CT abdomen/pelvis showed possible right lung bronchiolitis along with lymphadenopathy in the abdomen which is unchanged but no acute pathology  CT head was negative for acute pathology and chest x-ray negative for pneumonia  PT/OT  Abnormal urinalysis  UA is abnormal.  Discussed with patient/family that in the setting of nephrostomy tubes, this could just be colonization however given weakness will treat  Patient on ceftriaxone. Initial cultures growing enterococcus and pseudomonas, suspect this is likely a contaminant given no sepsis, or symptoms to suggest an acute infection despite not having adequate coverage. Monitor off antibiotics. Will request ID's input for this.  Nephrostomy status (HCC)  Patient with history of obstructive uropathy due to invasive bladder cancer status post bilateral nephrostomy tubes in place  S/p IR tube exchange  Diarrhea  Patient reports on and off diarrhea since January 20.  Per caretaker at bedside not all her bowel movements are diarrhea-like  Was prescribed Imodium as needed on discharge from rehab  Given recent hospitalization and treatment with antibiotics, check for  C. difficile and bacterial enteric panel if patient has further diarrhea here. Fortunately, no more diarrhea episodes.  Benign hypertension  Continue Toprol-XL and monitor blood pressures.  Stage 4 chronic kidney disease (HCC)  Lab Results   Component Value Date    EGFR 30 2025    EGFR 26 2025    EGFR 22 2025    CREATININE 1.52 (H) 2025    CREATININE 1.74 (H) 2025    CREATININE 2.00 (H) 2025     Per prior nephrology notes, baseline 2.0-2.3  Creatinine today appears better than baseline.  Repeat lab work in a.m.  Low bicarbonate level    Recent Labs     25  1422 25  0654 25  0559   AGAP 9 10 7       Electrolyte abnormality    Recent Labs     25  1422 25  0654 25  0559   K 4.5 3.8 3.7   MG 1.7* 2.2  --        Elevated troponin  Initial troponin of 69 --> 66 --> 67  EKG nonischemic  Likely non-MI troponin elevation in the setting of CKD    VTE Pharmacologic Prophylaxis:   Moderate Risk (Score 3-4) - Pharmacological DVT Prophylaxis Ordered: heparin.    Mobility:   Basic Mobility Inpatient Raw Score: 14  [facility] Goal: 4: Move to chair/commode  -[facility] Achieved: 4: Move to chair/commode    Discussions with Specialists or Other Care Team Provider: cm    Education and Discussions with Family / Patient: patient, son    Current Length of Stay: 2 day(s)  Current Patient Status: Inpatient   Certification Statement: The patient will continue to require additional inpatient hospital stay due to aawiting safe discharge plan once cultures / sensitivities come back  Discharge Plan: 24 hours    Code Status: Level 3 - DNAR and DNI    Subjective   Patient seen and examined. No new complaints overnight.     Objective   Vitals:   Temp (24hrs), Av.5 °F (36.4 °C), Min:97.2 °F (36.2 °C), Max:97.9 °F (36.6 °C)    Temp:  [97.2 °F (36.2 °C)-97.9 °F (36.6 °C)] 97.4 °F (36.3 °C)  HR:  [73-80] 80  Resp:  [17-18] 18  BP: (109-132)/(52-63) 127/61  SpO2:  [98 %-100 %] 98  %  Body mass index is 26.98 kg/m².     Input and Output Summary (last 24 hours):     Intake/Output Summary (Last 24 hours) at 3/25/2025 1723  Last data filed at 3/25/2025 1442  Gross per 24 hour   Intake --   Output 325 ml   Net -325 ml       Physical Exam  Vitals reviewed.   Constitutional:       General: She is not in acute distress.  HENT:      Head: Normocephalic.      Nose: Nose normal.      Mouth/Throat:      Mouth: Mucous membranes are moist.   Eyes:      General: No scleral icterus.  Cardiovascular:      Rate and Rhythm: Normal rate.   Pulmonary:      Effort: Pulmonary effort is normal. No respiratory distress.   Abdominal:      Palpations: Abdomen is soft.      Tenderness: There is no abdominal tenderness.   Skin:     General: Skin is warm.   Neurological:      Mental Status: She is alert.   Psychiatric:         Mood and Affect: Mood normal.         Behavior: Behavior normal.       Lines/Drains:  Lines/Drains/Airways       Active Status       Name Placement date Placement time Site Days    Nephrostomy Left 10.2 Fr. 03/24/25  1527  Left  1    Nephrostomy Right 10.2 Fr. 03/24/25  1531  Right  1                            Lab Results: I have reviewed the following results:   Results from last 7 days   Lab Units 03/24/25  0654 03/23/25  1422   WBC Thousand/uL 6.79 10.88*   HEMOGLOBIN g/dL 10.4* 11.8   PLATELETS Thousands/uL 246 322   MCV fL 87 87     Results from last 7 days   Lab Units 03/25/25  0559 03/24/25  0654 03/23/25  1422   SODIUM mmol/L 140 141 139   POTASSIUM mmol/L 3.7 3.8 4.5   CHLORIDE mmol/L 113* 111* 112*   CO2 mmol/L 20* 20* 18*   ANION GAP mmol/L 7 10 9   BUN mg/dL 36* 39* 40*   CREATININE mg/dL 1.52* 1.74* 2.00*   CALCIUM mg/dL 9.2 9.3 10.2   ALBUMIN g/dL 2.6* 2.7* 3.1*   TOTAL BILIRUBIN mg/dL 0.41 0.46 0.63   ALK PHOS U/L 54 63 69   ALT U/L 6* 6* 8   AST U/L 10* 10* 14   EGFR ml/min/1.73sq m 30 26 22   GLUCOSE RANDOM mg/dL 87 85 105     Results from last 7 days   Lab Units 03/24/25  0677  03/23/25  1422   MAGNESIUM mg/dL 2.2 1.7*         Results from last 7 days   Lab Units 03/23/25  1917 03/23/25  1654 03/23/25  1422   HS TNI 0HR ng/L  --   --  69*   HS TNI 2HR ng/L  --  66*  --    HS TNI 4HR ng/L 67*  --   --           Results from last 7 days   Lab Units 03/24/25  0654 03/23/25  1422   LACTIC ACID mmol/L  --  1.0   PROCALCITONIN ng/ml 0.25 0.27*             Results from last 7 days   Lab Units 03/23/25  1422   TSH 3RD GENERATON uIU/mL 0.779       Recent Cultures (last 7 days):   Results from last 7 days   Lab Units 03/23/25  1421   URINE CULTURE  >100,000 cfu/ml Pseudomonas aeruginosa*  >100,000 cfu/ml Enterococcus faecium*       Imaging:  Reviewed radiology reports from this admission: no new imaging    Last 24 Hours Medication List:     Current Facility-Administered Medications:     aspirin chewable tablet 81 mg, Daily    atorvastatin (LIPITOR) tablet 40 mg, Daily With Dinner    cefTRIAXone (ROCEPHIN) IVPB (premix in dextrose) 1,000 mg 50 mL, Q24H, Last Rate: 1,000 mg (03/24/25 1631)    [Held by provider] Cholecalciferol (VITAMIN D3) tablet 1,000 Units, Daily    dronabinol (MARINOL) capsule 2.5 mg, BID before lunch/dinner    heparin (porcine) subcutaneous injection 5,000 Units, Q8H JD    metoprolol succinate (TOPROL-XL) 24 hr tablet 25 mg, QAM    saccharomyces boulardii (FLORASTOR) capsule 250 mg, BID      **Please Note: This note may have been constructed using a voice recognition system.**

## 2025-03-25 NOTE — PROGRESS NOTES
Patient:  TRENT POPE    MRN:  [id number]    [place] Request ID:  [id number]    Level of care reserved:  Home Health Agency    Partner Reserved:  [place] [address] [phone]    Clinical needs requested:    Geography searched:  [id number]    Start of Service:    Request sent:  4:33pm EDT on 3/24/2025 by Twyla Michele    Partner reserved:  4:34pm EDT on 3/25/2025 by Twyla Michele    Choice list shared:  1:53pm EDT on 3/25/2025 by Twyla Michele

## 2025-03-25 NOTE — ASSESSMENT & PLAN NOTE
Patient reports on and off diarrhea since January 20.  Per caretaker at bedside not all her bowel movements are diarrhea-like  Was prescribed Imodium as needed on discharge from rehab  Given recent hospitalization and treatment with antibiotics, check for C. difficile and bacterial enteric panel if patient has further diarrhea here. Fortunately, no more diarrhea episodes.

## 2025-03-25 NOTE — ASSESSMENT & PLAN NOTE
Recent Labs     03/23/25  1422 03/24/25  0654 03/25/25  0559   K 4.5 3.8 3.7   MG 1.7* 2.2  --

## 2025-03-26 VITALS
HEART RATE: 74 BPM | WEIGHT: 147.49 LBS | DIASTOLIC BLOOD PRESSURE: 59 MMHG | HEIGHT: 62 IN | OXYGEN SATURATION: 99 % | BODY MASS INDEX: 27.14 KG/M2 | RESPIRATION RATE: 18 BRPM | TEMPERATURE: 97.8 F | SYSTOLIC BLOOD PRESSURE: 124 MMHG

## 2025-03-26 PROBLEM — R82.71 BACTERIURIA: Status: ACTIVE | Noted: 2025-01-22

## 2025-03-26 LAB
ANION GAP SERPL CALCULATED.3IONS-SCNC: 6 MMOL/L (ref 4–13)
ANION GAP SERPL CALCULATED.3IONS-SCNC: 7 MMOL/L (ref 4–13)
BACTERIA UR CULT: ABNORMAL
BACTERIA UR CULT: ABNORMAL
BASOPHILS # BLD AUTO: 0.1 THOUSANDS/ÂΜL (ref 0–0.1)
BASOPHILS NFR BLD AUTO: 1 % (ref 0–1)
BUN SERPL-MCNC: 32 MG/DL (ref 5–25)
BUN SERPL-MCNC: 32 MG/DL (ref 5–25)
CALCIUM SERPL-MCNC: 9.3 MG/DL (ref 8.4–10.2)
CALCIUM SERPL-MCNC: 9.6 MG/DL (ref 8.4–10.2)
CHLORIDE SERPL-SCNC: 112 MMOL/L (ref 96–108)
CHLORIDE SERPL-SCNC: 116 MMOL/L (ref 96–108)
CO2 SERPL-SCNC: 18 MMOL/L (ref 21–32)
CO2 SERPL-SCNC: 21 MMOL/L (ref 21–32)
CREAT SERPL-MCNC: 1.47 MG/DL (ref 0.6–1.3)
CREAT SERPL-MCNC: 1.64 MG/DL (ref 0.6–1.3)
EOSINOPHIL # BLD AUTO: 0.81 THOUSAND/ÂΜL (ref 0–0.61)
EOSINOPHIL NFR BLD AUTO: 11 % (ref 0–6)
ERYTHROCYTE [DISTWIDTH] IN BLOOD BY AUTOMATED COUNT: 14.6 % (ref 11.6–15.1)
GFR SERPL CREATININE-BSD FRML MDRD: 28 ML/MIN/1.73SQ M
GFR SERPL CREATININE-BSD FRML MDRD: 32 ML/MIN/1.73SQ M
GLUCOSE SERPL-MCNC: 125 MG/DL (ref 65–140)
GLUCOSE SERPL-MCNC: 88 MG/DL (ref 65–140)
HCT VFR BLD AUTO: 31.4 % (ref 34.8–46.1)
HGB BLD-MCNC: 9.6 G/DL (ref 11.5–15.4)
IMM GRANULOCYTES # BLD AUTO: 0.06 THOUSAND/UL (ref 0–0.2)
IMM GRANULOCYTES NFR BLD AUTO: 1 % (ref 0–2)
LYMPHOCYTES # BLD AUTO: 1.62 THOUSANDS/ÂΜL (ref 0.6–4.47)
LYMPHOCYTES NFR BLD AUTO: 21 % (ref 14–44)
MCH RBC QN AUTO: 27.4 PG (ref 26.8–34.3)
MCHC RBC AUTO-ENTMCNC: 30.6 G/DL (ref 31.4–37.4)
MCV RBC AUTO: 90 FL (ref 82–98)
MONOCYTES # BLD AUTO: 0.73 THOUSAND/ÂΜL (ref 0.17–1.22)
MONOCYTES NFR BLD AUTO: 10 % (ref 4–12)
NEUTROPHILS # BLD AUTO: 4.32 THOUSANDS/ÂΜL (ref 1.85–7.62)
NEUTS SEG NFR BLD AUTO: 56 % (ref 43–75)
NRBC BLD AUTO-RTO: 0 /100 WBCS
PLATELET # BLD AUTO: 232 THOUSANDS/UL (ref 149–390)
PMV BLD AUTO: 11.7 FL (ref 8.9–12.7)
POTASSIUM SERPL-SCNC: 3.8 MMOL/L (ref 3.5–5.3)
POTASSIUM SERPL-SCNC: 3.9 MMOL/L (ref 3.5–5.3)
QRS AXIS: -50 DEGREES
QRSD INTERVAL: 116 MS
QT INTERVAL: 418 MS
QTC INTERVAL: 445 MS
RBC # BLD AUTO: 3.5 MILLION/UL (ref 3.81–5.12)
SODIUM SERPL-SCNC: 140 MMOL/L (ref 135–147)
SODIUM SERPL-SCNC: 140 MMOL/L (ref 135–147)
T WAVE AXIS: -9 DEGREES
VENTRICULAR RATE: 68 BPM
WBC # BLD AUTO: 7.64 THOUSAND/UL (ref 4.31–10.16)

## 2025-03-26 PROCEDURE — 80048 BASIC METABOLIC PNL TOTAL CA: CPT | Performed by: STUDENT IN AN ORGANIZED HEALTH CARE EDUCATION/TRAINING PROGRAM

## 2025-03-26 PROCEDURE — 99239 HOSP IP/OBS DSCHRG MGMT >30: CPT | Performed by: STUDENT IN AN ORGANIZED HEALTH CARE EDUCATION/TRAINING PROGRAM

## 2025-03-26 PROCEDURE — G0545 PR INHERENT VISIT TO INPT: HCPCS | Performed by: INTERNAL MEDICINE

## 2025-03-26 PROCEDURE — 93010 ELECTROCARDIOGRAM REPORT: CPT | Performed by: INTERNAL MEDICINE

## 2025-03-26 PROCEDURE — 85025 COMPLETE CBC W/AUTO DIFF WBC: CPT | Performed by: STUDENT IN AN ORGANIZED HEALTH CARE EDUCATION/TRAINING PROGRAM

## 2025-03-26 PROCEDURE — 99223 1ST HOSP IP/OBS HIGH 75: CPT | Performed by: INTERNAL MEDICINE

## 2025-03-26 RX ORDER — SODIUM BICARBONATE 650 MG/1
650 TABLET ORAL
Status: DISCONTINUED | OUTPATIENT
Start: 2025-03-26 | End: 2025-03-26 | Stop reason: HOSPADM

## 2025-03-26 RX ADMIN — METOPROLOL SUCCINATE 25 MG: 25 TABLET, EXTENDED RELEASE ORAL at 09:45

## 2025-03-26 RX ADMIN — DRONABINOL 2.5 MG: 2.5 CAPSULE ORAL at 11:23

## 2025-03-26 RX ADMIN — SODIUM BICARBONATE 650 MG TABLET 650 MG: at 09:40

## 2025-03-26 RX ADMIN — ATORVASTATIN CALCIUM 40 MG: 40 TABLET, FILM COATED ORAL at 16:33

## 2025-03-26 RX ADMIN — DRONABINOL 2.5 MG: 2.5 CAPSULE ORAL at 16:33

## 2025-03-26 RX ADMIN — Medication 250 MG: at 17:56

## 2025-03-26 RX ADMIN — SODIUM BICARBONATE 650 MG TABLET 650 MG: at 13:03

## 2025-03-26 RX ADMIN — ASPIRIN 81 MG CHEWABLE TABLET 81 MG: 81 TABLET CHEWABLE at 09:40

## 2025-03-26 RX ADMIN — HEPARIN SODIUM 5000 UNITS: 5000 INJECTION INTRAVENOUS; SUBCUTANEOUS at 13:04

## 2025-03-26 RX ADMIN — Medication 250 MG: at 09:40

## 2025-03-26 RX ADMIN — HEPARIN SODIUM 5000 UNITS: 5000 INJECTION INTRAVENOUS; SUBCUTANEOUS at 05:59

## 2025-03-26 RX ADMIN — SODIUM BICARBONATE 650 MG TABLET 650 MG: at 17:55

## 2025-03-26 NOTE — CASE MANAGEMENT
Case Management Discharge Planning Note    Patient name Ning Chambers  Location 2 [facility] [address] [place] [address] MRN [id number]  : 1940 Date 3/26/2025       Current Admission Date: 3/23/2025  Current Admission Diagnosis:Generalized weakness   Patient Active Problem List    Diagnosis Date Noted Date Diagnosed    Diarrhea 2025     Generalized weakness 2025     Electrolyte abnormality 2025     Elevated troponin 2025     Low bicarbonate level 2025     Lightheadedness 2025     Acute kidney injury superimposed on chronic kidney disease  (HCC) 2025     Bacteriuria 2025     Cellulitis of flank 2025     Benign hypertension 2024     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, 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) 2024     Obstructive uropathy 2024     Class 1 obesity due to excess calories with serious comorbidity and body mass index (BMI) of 34.0 to 34.9 in adult 2024     Advanced directives, counseling/discussion 2024     Palliative care by specialist 2024     Bilateral leg pain 2024     Ambulatory dysfunction 2024     Stage 4 chronic kidney disease (HCC) 2024     Stage 5 chronic kidney disease 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 ([facility]) 2024     Morbid obesity due to excess calories ([facility]) 2024     Nephrostomy status ([facility]) 2023     Sensorineural hearing loss (SNHL), bilateral 2022     Sensorineural hearing loss (SNHL) of right ear with restricted hearing of left ear 2022     Goals of care, counseling/discussion 05/10/2022     Malignant neoplasm of overlapping sites of bladder ([facility]) 2021     Stroke ([facility]) 10/29/2021     Hypokalemia 10/28/2021     Wrist drop 10/28/2021       LOS (days): 3  Geometric Mean LOS (GMLOS) (days): 2.6  Days to GMLOS:-0.4     OBJECTIVE:  Risk of Unplanned Readmission Score: 19.02     Current admission status: Inpatient   Preferred  Pharmacy:   [facility] - [place], [place] - [address] [place]  [address] [place]  [place] [address]  Phone: [phone] Fax: [phone]    Primary Care Provider: BHAVIK Morillo    Primary Insurance: JORJE ROY  Secondary Insurance:     DISCHARGE DETAILS:    Discharge planning discussed with:: Son, Rodrigo Chambers  [place] of Choice: Yes  Comments - Freedom of Choice: SW following to assist with planning.  Per physician discharge is anticipated today. SW placed call to pt's son to review plans and IMM.  Son is agreeable with discharge today and is requesting wheelchair van transport home.  Arrangements will be made.  SW will also notify home care agency.  CM contacted family/caregiver?: Yes    Contacts  Patient Contacts: Rodrigo Chambers  Relationship to Patient:: Family  Contact Method: Phone  Phone Number: [phone]  Reason/Outcome: Discharge Planning    Other Referral/Resources/Interventions Provided:  Interventions: [facility]  Referral Comments: [facility] notified of [facility]. AVS and F2F will be sent to agency via Aidin.    Treatment Team Recommendation: Short Term Rehab  Discharge Destination Plan:: Home with Home Health Care  Transport at Discharge : Wheelchair van    Number/Name of Dispatcher: Roundtrip  Transported by (Company and Unit #):  (pending)  ETA of Transport (Date): 03/26/25  ETA of Transport (Time): 1600 (requested)    IMM Given (Date):: 03/26/25  IMM Given to:: Family (IMM #2 reviewed with pt's son over phone.  Son verbalized understanding and agrees with discharge determination.  Copy will be placed with pt's discharge paperwork for son. Copy also placed in scan bin for chart.)

## 2025-03-26 NOTE — DISCHARGE SUMMARY
Discharge Summary - Hospitalist   Name: Ning Chambers 85 y.o. female I MRN: [id number]  Unit/Bed#: 2 [address] Date of Admission: 3/23/2025   Date of Service: 3/26/2025 I Hospital Day: 3     Assessment & Plan  Generalized weakness  Patient presented from home as VNA nurse was concern for dehydration as patient has been weak along with decreased p.o. intake since January  Patient states she is unsure why she does not have much of an appetite.  Initially while at rehab she was not eating much because she did not like the food.  Patient reports having some difficulty swallowing her pills but per caretaker at bedside she has been taking her medications  CT abdomen/pelvis showed possible right lung bronchiolitis along with lymphadenopathy in the abdomen which is unchanged but no acute pathology  CT head was negative for acute pathology and chest x-ray negative for pneumonia  Speech is recommending dysphagia 2  mechanical soft and thin liquid  Bacteriuria  UA is abnormal.  Discussed with patient/family that in the setting of nephrostomy tubes, this could just be colonization. Appreciate infectious disease recommendations. No antibiotics indicated at this time.   Nephrostomy status (HCC)  Patient with history of obstructive uropathy due to invasive bladder cancer status post bilateral nephrostomy tubes in place  S/p IR tube exchange  Diarrhea  Patient reports on and off diarrhea since January 20.  Per caretaker at bedside not all her bowel movements are diarrhea-like. Diarrhea resolved.  Benign hypertension  Continue Toprol.  Stage 4 chronic kidney disease (HCC)  Lab Results   Component Value Date    EGFR 28 03/26/2025    EGFR 32 03/26/2025    EGFR 30 03/25/2025    CREATININE 1.64 (H) 03/26/2025    CREATININE 1.47 (H) 03/26/2025    CREATININE 1.52 (H) 03/25/2025     Stable, does not meet MARTY criteria  Low bicarbonate level    Recent Labs     03/25/25  0559 03/26/25  0535 03/26/25  1302   AGAP 7 6 7       Electrolyte  abnormality    Recent Labs     03/24/25  0654 03/25/25  0559 03/26/25  0535 03/26/25  1302   K 3.8 3.7 3.8 3.9   MG 2.2  --   --   --        Elevated troponin  Initial troponin of 69 --> 66 --> 67  EKG nonischemic  Likely non-MI troponin elevation in the setting of CKD     Discharging Physician / Practitioner: Eitan Barth MD  PCP: BHAVIK Morillo  Admission Date:   Admission Orders (From admission, onward)       Ordered        03/23/25 1554  INPATIENT ADMISSION  Once                          Discharge Date: 03/26/25    Medical Problems       Resolved Problems  Date Reviewed: 10/28/2024   None         Consultations During Hospital Stay:  Infectious disease, nutrition, interventional radiology    Procedures Performed:   IR nephrostomy tube    Significant Findings / Test Results:   Imaging  CT a.p:  1.  Possible right lower lobe bronchiolitis.  2.  Unchanged abdominal lymphadenopathy.  3.  No acute abdominopelvic pathology.    CT head: No acute intracranial abnormality. Unchanged asymmetric enlargement of the left lateral ventricle.     Xr chest: No acute cardiopulmonary disease.      Outpatient Tests Requested:  PCP  CBC, BMP    Complications:  none    Reason for Admission: weakness    Hospital Course:     Ning Chambers is a 85 y.o. female patient who originally presented to the hospital on 3/23/2025 due to weakness    Patient is an 85 year old female was brought in here due to weakness and decreased po intake. She was resuscitated here. CT a/p was ordered which did not show any acute abdominopelvic pathology. There was possible right lower lobe bronchiolitis. She was initially given iv antibiotics due to abnormal urinalysis. Her nephrostomy tubes were changed. Pseudomonas and enterococcus grew. Infectious disease was consulted who did not see any indication for continued antibiotic therapy and cleared her for discharge from their standpoint.    Patient agrees to follow-up with her providers as  "scheduled and to take her medications as prescribed. All questions were addressed.    she understood the need to seek immediate medical attention should she develop any chest pain, shortness of breath, severe pain, fever, chills, or any other concerning symptoms.    Please see above list of diagnoses and related plan for additional information.     Condition at Discharge: good     Discharge Day Visit / Exam:     Subjective:  patient seen and examined at bedside. No new complaints.  Vitals: Blood Pressure: 123/54 (03/26/25 0944)  Pulse: 85 (03/26/25 0944)  Temperature: 98 °F (36.7 °C) (03/26/25 0817)  Temp Source: Oral (03/25/25 1932)  Respirations: 16 (03/26/25 0817)  Height: 5' 2\" (157.5 cm) (03/23/25 1844)  Weight - Scale: 66.9 kg (147 lb 7.8 oz) (03/23/25 1844)  SpO2: 98 % (03/26/25 0944)  Exam:   Physical Exam  Vitals reviewed.   Constitutional:       General: She is not in acute distress.  HENT:      Head: Normocephalic.      Nose: Nose normal.      Mouth/Throat:      Mouth: Mucous membranes are moist.   Eyes:      General: No scleral icterus.  Cardiovascular:      Rate and Rhythm: Normal rate and regular rhythm.   Pulmonary:      Effort: Pulmonary effort is normal. No respiratory distress.      Breath sounds: No wheezing.   Abdominal:      General: There is no distension.      Palpations: Abdomen is soft.      Tenderness: There is no abdominal tenderness.   Skin:     General: Skin is warm.   Neurological:      Mental Status: She is alert.   Psychiatric:         Mood and Affect: Mood normal.         Behavior: Behavior normal.       Discussion with Family: son    Discharge instructions/Information to patient and family:   See after visit summary for information provided to patient and family.      Provisions for Follow-Up Care:  See after visit summary for information related to follow-up care and any pertinent home health orders.      Disposition:     Home    Planned Readmission: No     Discharge Statement:  I " spent 40 minutes discharging the patient. This time was spent on the day of discharge. I had direct contact with the patient on the day of discharge. Greater than 50% of the total time was spent examining patient, answering all patient questions, arranging and discussing plan of care with patient as well as directly providing post-discharge instructions.  Additional time then spent on discharge activities.    Discharge Medications:  See after visit summary for reconciled discharge medications provided to patient and family.      ** Please Note: This note has been constructed using a voice recognition system **

## 2025-03-26 NOTE — ASSESSMENT & PLAN NOTE
UA obtained from nephrostomy tube for infectious workup, showing innumerable WBC and RBC, bacteria, yeast, and epithelial cells.  Culture grew Pseudomonas and Enterococcus faecalis.  She has not received antibiotics targeting these organisms, and remains afebrile with normal WBC count.  Procalcitonin was borderline elevated, which could be in the setting of renal disease.  CTAP on 3/23 shows well-positioned nephrostomy tubes, no hydronephrosis or perinephric fat stranding.  Nephrostomy tubes changed 3/24/2025.  - Likely tube colonization as no signs this is causing infection  - Continue to monitor off antibiotics

## 2025-03-26 NOTE — ASSESSMENT & PLAN NOTE
Patient reported a few days of nausea, vomiting, and diarrhea prior to admission.  Possibly viral gastroenteritis vs food-borne illness.  Appears to have resolved now.  No diarrhea observed after >72 hrs in the hospital.  No acute findings on CTAP.  - Discontinue cdiff and stool bacterial PCR  - Supportive care per primary team

## 2025-03-26 NOTE — CONSULTS
Consultation - Infectious Disease   Name: Ning Chambers 85 y.o. female I MRN: [id number]  Unit/Bed#: 2 [address] Date of Admission: 3/23/2025   Date of Service: 3/26/2025 I Hospital Day: 3   Inpatient consult to Infectious Diseases  Consult performed by: Elmer Field MD  Consult ordered by: Eitan Barth MD        Physician Requesting Evaluation: Eitan Barth MD   Reason for Evaluation / Principal Problem: Bacteriuria       Assessment & Plan  Bacteriuria  UA obtained from nephrostomy tube for infectious workup, showing innumerable WBC and RBC, bacteria, yeast, and epithelial cells.  Culture grew Pseudomonas and Enterococcus faecalis.  She has not received antibiotics targeting these organisms, and remains afebrile with normal WBC count.  Procalcitonin was borderline elevated, which could be in the setting of renal disease.  CTAP on 3/23 shows well-positioned nephrostomy tubes, no hydronephrosis or perinephric fat stranding.  Nephrostomy tubes changed 3/24/2025.  - Likely tube colonization as no signs this is causing infection  - Continue to monitor off antibiotics  Generalized weakness  Suspect in the setting of diarrhea and dehydration.  Improved following hospital admission.  No signs of infection driving this, as above. CT noted possible RLL bronchiolitis though she has no respiratory symptoms, negative procal, no fevers, and normal WBC count.  This may also be in the setting of her underlying malignancy, though no clear progression see on this imaging.  - Supportive care per primary team  Nephrostomy status (HCC)  Bilateral nephrostomy placed in setting of bladder cancer.  Changed 3/24/25.    - No signs of infection  - As above, monitor off antibiotics  - Follow up with IR for regular exchanges  Diarrhea  Patient reported a few days of nausea, vomiting, and diarrhea prior to admission.  Possibly viral gastroenteritis vs food-borne illness.  Appears to have resolved now.  No diarrhea observed after >72  hrs in the hospital.  No acute findings on CTAP.  - Discontinue cdiff and stool bacterial PCR  - Supportive care per primary team   Stage 4 chronic kidney disease (HCC)  Lab Results   Component Value Date    EGFR 32 03/26/2025    EGFR 30 03/25/2025    EGFR 26 03/24/2025    CREATININE 1.47 (H) 03/26/2025    CREATININE 1.52 (H) 03/25/2025    CREATININE 1.74 (H) 03/24/2025     Estimated Creatinine Clearance: 24.6 mL/min (A) (by C-G formula based on SCr of 1.47 mg/dL (H)).   - Dose adjust antimicrobials       I have discussed with Dr. Barth the above plan to monitor off antibiotics. He agrees with the plan.    I will sign off, but please call back with any questions or concerns.    Antibiotics:  None    History of Present Illness   Ning Chambers is a 85 y.o. woman with PMH of HTN, HLD, prior stroke, CKD 4, and bladder cancer status post bilateral nephrostomy for malignant obstruction who presented on 3/23/2025 with generalized weakness and poor p.o. intake.  She reported a few days of vomiting and diarrhea prior to admission.  She had some abdominal cramping, feeling like bowel movements were coming.  She denies fevers or chills.  She lives alone, no sick contacts.  She denies eating any new foods.  She denies feeling fevers or chills.  No issues with nephrostomy tubes, they were draining clear urine.  She denies any back or flank pain.  She is feeling much better since coming to the hospital, with no further episodes of vomiting or diarrhea here.      A complete review of systems is negative other than that noted in the HPI.    Medical History Review: I have reviewed the patient's PMH, PSH, Social History, Family History, Meds, and Allergies     Objective :  Temp:  [97.4 °F (36.3 °C)-98.2 °F (36.8 °C)] 98.2 °F (36.8 °C)  HR:  [74-80] 76  BP: (107-132)/(46-63) 107/46  Resp:  [18] 18  SpO2:  [95 %-100 %] 95 %  O2 Device: None (Room air)    General:  Elderly appearing woman in no acute distress  Psychiatric:  Awake and  alert  Pulmonary:  Normal respiratory excursion without accessory muscle use  Abdomen:  Soft, nontender  :  Bilateral nephrostomy tubes with clear yellow urine  Extremities:  No edema  Skin:  No rashes      Lab Results: I have reviewed the following results:  Results from last 7 days   Lab Units 03/26/25  0535 03/24/25  0654 03/23/25  1422   WBC Thousand/uL 7.64 6.79 10.88*   HEMOGLOBIN g/dL 9.6* 10.4* 11.8   PLATELETS Thousands/uL 232 246 322     Results from last 7 days   Lab Units 03/26/25  0535 03/25/25  0559 03/24/25  0654 03/23/25  1422   SODIUM mmol/L 140 140 141 139   POTASSIUM mmol/L 3.8 3.7 3.8 4.5   CHLORIDE mmol/L 116* 113* 111* 112*   CO2 mmol/L 18* 20* 20* 18*   BUN mg/dL 32* 36* 39* 40*   CREATININE mg/dL 1.47* 1.52* 1.74* 2.00*   EGFR ml/min/1.73sq m 32 30 26 22   CALCIUM mg/dL 9.3 9.2 9.3 10.2   AST U/L  --  10* 10* 14   ALT U/L  --  6* 6* 8   ALK PHOS U/L  --  54 63 69   ALBUMIN g/dL  --  2.6* 2.7* 3.1*     Results from last 7 days   Lab Units 03/23/25  1421   URINE CULTURE  >100,000 cfu/ml Pseudomonas aeruginosa*  >100,000 cfu/ml Enterococcus faecium*     Results from last 7 days   Lab Units 03/24/25  0654 03/23/25  1422   PROCALCITONIN ng/ml 0.25 0.27*                   Imaging Results Review: I personally reviewed the following image studies in PACS and associated radiology reports: CT abdomen/pelvis. My interpretation of the radiology images/reports is: There are bilateral nephrostomy tubes present, no clear perinephric fat stranding changes..  Other Study Results Review: EKG was reviewed.     Elmer Field MD  [facility]

## 2025-03-26 NOTE — ASSESSMENT & PLAN NOTE
UA is abnormal.  Discussed with patient/family that in the setting of nephrostomy tubes, this could just be colonization. Appreciate infectious disease recommendations. No antibiotics indicated at this time.

## 2025-03-26 NOTE — ASSESSMENT & PLAN NOTE
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 intake since January  Patient states she is unsure why she does not have much of an appetite.  Initially while at rehab she was not eating much because she did not like the food.  Patient reports having some difficulty swallowing her pills but per caretaker at bedside she has been taking her medications  CT abdomen/pelvis showed possible right lung bronchiolitis along with lymphadenopathy in the abdomen which is unchanged but no acute pathology  CT head was negative for acute pathology and chest x-ray negative for pneumonia  Speech is recommending dysphagia 2  mechanical soft and thin liquid

## 2025-03-26 NOTE — PLAN OF CARE
Problem: PAIN - ADULT  Goal: Verbalizes/displays adequate comfort level or baseline comfort level  Description: Interventions:- Encourage patient to monitor pain and request assistance- Assess pain using appropriate pain scale- Administer analgesics based on type and severity of pain and evaluate response- Implement non-pharmacological measures as appropriate and evaluate response- Consider cultural and social influences on pain and pain management- Notify physician/advanced practitioner if interventions unsuccessful or patient reports new pain  Outcome: Progressing     Problem: SAFETY ADULT  Goal: Patient will remain free of falls  Description: INTERVENTIONS:- Educate patient/family on patient safety including physical limitations- Instruct patient to call for assistance with activity - Consult OT/PT to assist with strengthening/mobility - Keep Call bell within reach- Keep bed low and locked with side rails adjusted as appropriate- Keep care items and personal belongings within reach- Initiate and maintain comfort rounds- Make Fall Risk Sign visible to staff- - Apply yellow socks and bracelet for high fall risk patients- Consider moving patient to room near nurses station  Outcome: Progressing  Goal: Maintain or return to baseline ADL function  Description: INTERVENTIONS:-  Assess patient's ability to carry out ADLs; assess patient's baseline for ADL function and identify physical deficits which impact ability to perform ADLs (bathing, care of mouth/teeth, toileting, grooming, dressing, etc.)- Assess/evaluate cause of self-care deficits - Assess range of motion- Assess patient's mobility; develop plan if impaired- Assess patient's need for assistive devices and provide as appropriate- Encourage maximum independence but intervene and supervise when necessary- Involve family in performance of ADLs- Assess for home care needs following discharge - Consider OT consult to assist with ADL evaluation and planning for  discharge- Provide patient education as appropriate  Outcome: Progressing     Problem: DISCHARGE PLANNING  Goal: Discharge to home or other facility with appropriate resources  Description: INTERVENTIONS:- Identify barriers to discharge w/patient and caregiver- Arrange for needed discharge resources and transportation as appropriate- Identify discharge learning needs (meds, wound care, etc.)- Arrange for interpretive services to assist at discharge as needed- Refer to Case Management Department for coordinating discharge planning if the patient needs post-hospital services based on physician/advanced practitioner order or complex needs related to functional status, cognitive ability, or social support system  Outcome: Progressing     Problem: Knowledge Deficit  Goal: Patient/family/caregiver demonstrates understanding of disease process, treatment plan, medications, and discharge instructions  Description: Complete learning assessment and assess knowledge base.Interventions:- Provide teaching at level of understanding- Provide teaching via preferred learning methods  Outcome: Progressing     Problem: GASTROINTESTINAL - ADULT  Goal: Minimal or absence of nausea and/or vomiting  Description: INTERVENTIONS:- Administer IV fluids if ordered to ensure adequate hydration- Maintain NPO status until nausea and vomiting are resolved- Nasogastric tube if ordered- Administer ordered antiemetic medications as needed- Provide nonpharmacologic comfort measures as appropriate- Advance diet as tolerated, if ordered- Consider nutrition services referral to assist patient with adequate nutrition and appropriate food choices  Outcome: Progressing  Goal: Maintains or returns to baseline bowel function  Description: INTERVENTIONS:- Assess bowel function- Encourage oral fluids to ensure adequate hydration- Administer IV fluids if ordered to ensure adequate hydration- Administer ordered medications as needed- Encourage mobilization and  activity- Consider nutritional services referral to assist patient with adequate nutrition and appropriate food choices  Outcome: Progressing  Goal: Maintains adequate nutritional intake  Description: INTERVENTIONS:- Monitor percentage of each meal consumed- Identify factors contributing to decreased intake, treat as appropriate- Assist with meals as needed- Monitor I&O, weight, and lab values if indicated- Obtain nutrition services referral as needed  Outcome: Progressing  Goal: Establish and maintain optimal ostomy function  Description: INTERVENTIONS:- Assess bowel function- Encourage oral fluids to ensure adequate hydration- Administer IV fluids if ordered to ensure adequate hydration - Administer ordered medications as needed- Encourage mobilization and activity- Nutrition services referral to assist patient with appropriate food choices- Assess stoma site- Consider wound care consult   Outcome: Progressing  Goal: Oral mucous membranes remain intact  Description: INTERVENTIONS- Assess oral mucosa and hygiene practices- Implement preventative oral hygiene regimen- Implement oral medicated treatments as ordered- Initiate Nutrition services referral as needed  Outcome: Progressing     Problem: Prexisting or High Potential for Compromised Skin Integrity  Goal: Skin integrity is maintained or improved  Description: INTERVENTIONS:- Identify patients at risk for skin breakdown- Assess and monitor skin integrity- Assess and monitor nutrition and hydration status- Monitor labs - Assess for incontinence - Turn and reposition patient- Assist with mobility/ambulation- Relieve pressure over bony prominences- Avoid friction and shearing- Provide appropriate hygiene as needed including keeping skin clean and dry- Evaluate need for skin moisturizer/barrier cream- Collaborate with interdisciplinary team - Patient/family teaching- Consider wound care consult   Outcome: Progressing     Problem: Nutrition/Hydration-ADULT  Goal:  Nutrient/Hydration intake appropriate for improving, restoring or maintaining nutritional needs  Description: Monitor and assess patient's nutrition/hydration status for malnutrition. Collaborate with interdisciplinary team and initiate plan and interventions as ordered.  Monitor patient's weight and dietary intake as ordered or per policy. Utilize nutrition screening tool and intervene as necessary. Determine patient's food preferences and provide high-protein, high-caloric foods as appropriate. INTERVENTIONS:- Monitor oral intake, urinary output, labs, and treatment plans- Assess nutrition and hydration status and recommend course of action- Evaluate amount of meals eaten- Assist patient with eating if necessary - Allow adequate time for meals- Recommend/ encourage appropriate diets, oral nutritional supplements, and vitamin/mineral supplements- Order, calculate, and assess calorie counts as needed- Recommend, monitor, and adjust tube feedings and TPN/PPN based on assessed needs- Assess need for intravenous fluids- Provide specific nutrition/hydration education as appropriate- Include patient/family/caregiver in decisions related to nutrition  Outcome: Progressing

## 2025-03-26 NOTE — ASSESSMENT & PLAN NOTE
Lab Results   Component Value Date    EGFR 32 03/26/2025    EGFR 30 03/25/2025    EGFR 26 03/24/2025    CREATININE 1.47 (H) 03/26/2025    CREATININE 1.52 (H) 03/25/2025    CREATININE 1.74 (H) 03/24/2025     Estimated Creatinine Clearance: 24.6 mL/min (A) (by C-G formula based on SCr of 1.47 mg/dL (H)).   - Dose adjust antimicrobials

## 2025-03-26 NOTE — ASSESSMENT & PLAN NOTE
Recent Labs     03/24/25  0654 03/25/25  0559 03/26/25  0535 03/26/25  1302   K 3.8 3.7 3.8 3.9   MG 2.2  --   --   --

## 2025-03-26 NOTE — ASSESSMENT & PLAN NOTE
Suspect in the setting of diarrhea and dehydration.  Improved following hospital admission.  No signs of infection driving this, as above. CT noted possible RLL bronchiolitis though she has no respiratory symptoms, negative procal, no fevers, and normal WBC count.  This may also be in the setting of her underlying malignancy, though no clear progression see on this imaging.  - Supportive care per primary team

## 2025-03-26 NOTE — PHYSICAL THERAPY NOTE
PT cancellation       03/26/25 1400   PT Last Visit   PT Visit Date 03/26/25   Note Type   Note Type Cancelled Session   Cancel Reasons Refusal  (Attempted PT 2x today.  Pt was incontinent of her bowel earlier today so she needed to be cleaned up, then this afternoon pt reported that she was too tired to get up despite encouragement.  Will follow.)   Licensure   [place] License Number  Elinor Herreraliliya PT   [id number]

## 2025-03-26 NOTE — DISCHARGE INSTR - DIET
"Was placed on \"Mechanical Soft food\" when admitted (ie soft moist minced foods as she did not have her dentures).  "
2% Lidocaine

## 2025-03-26 NOTE — PLAN OF CARE
Problem: PAIN - ADULT  Goal: Verbalizes/displays adequate comfort level or baseline comfort level  Description: Interventions:- Encourage patient to monitor pain and request assistance- Assess pain using appropriate pain scale- Administer analgesics based on type and severity of pain and evaluate response- Implement non-pharmacological measures as appropriate and evaluate response- Consider cultural and social influences on pain and pain management- Notify physician/advanced practitioner if interventions unsuccessful or patient reports new pain  Outcome: Progressing     Problem: SAFETY ADULT  Goal: Patient will remain free of falls  Description: INTERVENTIONS:- Educate patient/family on patient safety including physical limitations- Instruct patient to call for assistance with activity - Consult OT/PT to assist with strengthening/mobility - Keep Call bell within reach- Keep bed low and locked with side rails adjusted as appropriate- Keep care items and personal belongings within reach- Initiate and maintain comfort rounds- Make Fall Risk Sign visible to staff- Offer Toileting every 2 Hours, in advance of need- Initiate/Maintain bed/chjair alarm- Obtain necessary fall risk management equipment: - Apply yellow socks and bracelet for high fall risk patients- Consider moving patient to room near nurses station  Outcome: Progressing     Problem: SAFETY ADULT  Goal: Maintain or return to baseline ADL function  Description: INTERVENTIONS:-  Assess patient's ability to carry out ADLs; assess patient's baseline for ADL function and identify physical deficits which impact ability to perform ADLs (bathing, care of mouth/teeth, toileting, grooming, dressing, etc.)- Assess/evaluate cause of self-care deficits - Assess range of motion- Assess patient's mobility; develop plan if impaired- Assess patient's need for assistive devices and provide as appropriate- Encourage maximum independence but intervene and supervise when  necessary- Involve family in performance of ADLs- Assess for home care needs following discharge - Consider OT consult to assist with ADL evaluation and planning for discharge- Provide patient education as appropriate  Outcome: Progressing     Problem: SAFETY ADULT  Goal: Maintains/Returns to pre admission functional level  Description: INTERVENTIONS:- Perform AM-PAC 6 Click Basic Mobility/ Daily Activity assessment daily.- Set and communicate daily mobility goal to care team and patient/family/caregiver. - Collaborate with rehabilitation services on mobility goals if consulted- Perform Range of Motion 3 times a day.- Reposition patient every 2 hours.- Dangle patient 2 times a day- Stand patient 2 times a day- Ambulate patient 2 times a day- Out of bed to chair 2 times a day - Out of bed for meals 2 times a day- Out of bed for toileting- Record patient progress and toleration of activity level   Outcome: Progressing     Problem: DISCHARGE PLANNING  Goal: Discharge to home or other facility with appropriate resources  Description: INTERVENTIONS:- Identify barriers to discharge w/patient and caregiver- Arrange for needed discharge resources and transportation as appropriate- Identify discharge learning needs (meds, wound care, etc.)- Arrange for interpretive services to assist at discharge as needed- Refer to Case Management Department for coordinating discharge planning if the patient needs post-hospital services based on physician/advanced practitioner order or complex needs related to functional status, cognitive ability, or social support system  Outcome: Progressing     Problem: Knowledge Deficit  Goal: Patient/family/caregiver demonstrates understanding of disease process, treatment plan, medications, and discharge instructions  Description: Complete learning assessment and assess knowledge base.Interventions:- Provide teaching at level of understanding- Provide teaching via preferred learning methods  Outcome:  Progressing

## 2025-03-26 NOTE — NURSING NOTE
Patient discharged to her home via wheel chair accompanied by transportation member. Patient discharged with all of her belongings. Peripheral IV removed and dry dressing applied. Bilateral nephrostomy tubes patent and draining yellow urine, right nephrostomy is more than left. Dressings to nephrostomy sites clean, dry and intact.

## 2025-03-26 NOTE — WOUND OSTOMY CARE
Progress Note - Wound   Ning Chambers 85 y.o. female MRN: [id number]  Unit/Bed#: [address] [place] [address] Encounter: [id number]        Assessment:   This is an 85 year old female patient admitted on 3/23/25 with generalized weakness. She was sitting in reclining chair and was agreeable to have wound visit done. She was turned and repositioned with assist of 2 persons. She had bilateral nephrostomy tubes in place with no record of BM since before admission. Wound consult requested for changes to skin integrity (sacrobuttock area).    Assessment Findings:  1-Intact, blanchable hyperpigmentation to left sacrobuttock - orders are in place for skin care and for prevention.  2-Right sacrobuttock partial thickness wound which was noted on admission photo but has worsened slightly. Orders are in place for wound/skin care and for prevention.  3-Bilateral heels intact - orders in place for skin care and for prevention.    Wound/Skin care plans:  1-Apply Prevent barrier cream to bilateral sacrum, buttock and heels BID and PRN  2-Float heels on 2 pillows to offload pressure so heels are not in contact with mattress or pillows.  3-Ehob pressure redistribution cushion in chair when out of bed. Avoid prolonged sitting.  4-Moisturize skin daily with skin nourishing cream.  5-Turn/reposition q2h or when medically stable for pressure re-distribution on skin.     Wound 03/26/25 Buttocks Left (Active)   Wound Image    03/26/25 0934   Wound Description Intact;Blanchable;  Hyperpigmentation 03/26/25 1155   Non-staged Wound Description Not applicable 03/26/25 1155   Wound Length (cm) 0 cm 03/26/25 1155   Wound Width (cm) 0 cm 03/26/25 1155   Wound Depth (cm) 0 cm 03/26/25 1155   Wound Surface Area (cm^2) 0 cm^2 03/26/25 1155   Wound Volume (cm^3) 0 cm^3 03/26/25 1155   Calculated Wound Volume (cm^3) 0 cm^3 03/26/25 1155   Treatments Cleansed 03/26/25 1155   Dressing Protective barrier 03/26/25 1155   Dressing Changed New 03/26/25 1155   Dressing  Status Intact 03/26/25 1155     Wound 03/26/25 Irritant Contact Buttocks Right (Active)   Wound Image   03/26/25 0935   Wound Description Pink;Non-granulation tissue;Epithelialization 03/26/25 0935   Non-staged Wound Description Partial thickness 03/26/25 0935   Wound Length (cm) 0.5 cm 03/26/25 0935   Wound Width (cm) 1.3 cm 03/26/25 0935   Wound Depth (cm) 0.1 cm 03/26/25 0935   Wound Surface Area (cm^2) 0.65 cm^2 03/26/25 0935   Wound Volume (cm^3) 0.065 cm^3 03/26/25 0935   Calculated Wound Volume (cm^3) 0.07 cm^3 03/26/25 0935   Drainage Amount None 03/26/25 0935   Treatments Cleansed 03/26/25 0935   Dressing Protective barrier 03/26/25 0935   Dressing Changed New 03/26/25 0935   Dressing Status Intact 03/26/25 0935       Discussed assessment findings, and plan of care/recommendations with Denia MUJICA.    Wound care signing off, please call or text with questions and concerns.    Recommendations written as orders.  Linda Jean Baptiste MSN, RN, CWON

## 2025-03-26 NOTE — ASSESSMENT & PLAN NOTE
Lab Results   Component Value Date    EGFR 28 03/26/2025    EGFR 32 03/26/2025    EGFR 30 03/25/2025    CREATININE 1.64 (H) 03/26/2025    CREATININE 1.47 (H) 03/26/2025    CREATININE 1.52 (H) 03/25/2025     Stable, does not meet MARTY criteria

## 2025-03-26 NOTE — ASSESSMENT & PLAN NOTE
Bilateral nephrostomy placed in setting of bladder cancer.  Changed 3/24/25.    - No signs of infection  - As above, monitor off antibiotics  - Follow up with IR for regular exchanges

## 2025-03-26 NOTE — ASSESSMENT & PLAN NOTE
Patient reports on and off diarrhea since January 20.  Per caretaker at bedside not all her bowel movements are diarrhea-like. Diarrhea resolved.

## 2025-03-27 NOTE — UTILIZATION REVIEW
NOTIFICATION OF ADMISSION DISCHARGE   [facility]. Please be advised that this patient has been discharge from our facility. Below you will find the admission and discharge date and time including the patient’s disposition.   UTILIZATION REVIEW CONTACT:  Utilization Review Assistants  Network Utilization Review Department  Phone: [phone] x carefully listen to the prompts. All voicemails are confidential.  Email: [email or web address]     ADMISSION INFORMATION  PRESENTATION DATE: 3/23/2025  1:56 PM  OBERVATION ADMISSION DATE: N/A  INPATIENT ADMISSION DATE: 3/23/25  3:54 PM   DISCHARGE DATE: 3/26/2025  6:50 PM   DISPOSITION:Home/Self Care    Network Utilization Review Department  ATTENTION: Please call with any questions or concerns to [phone] and carefully listen to the prompts so that you are directed to the right person. All voicemails are confidential.   For Discharge needs, contact Care Management DC Support Team at [phone] opt. 2  Send all requests for admission clinical reviews, approved or denied determinations and any other requests to dedicated fax number below belonging to the campus where the patient is receiving treatment. List of dedicated fax numbers for the Facilities:  FACILITY NAME UR FAX NUMBER   ADMISSION DENIALS (Administrative/Medical Necessity) [phone]   DISCHARGE SUPPORT TEAM ([facility]) [phone]   PARENT CHILD HEALTH (Maternity/NICU/Pediatrics) [phone]   [facility] [phone]   [facility] [phone]   [facility] [phone]   [facility] [phone]   [facility] [phone]   [facility] [phone]   [facility] [phone]   [facility] [phone]   [facility]  [facility] [phone]   [facility] [phone]   [facility] [phone]   [facility] [phone]

## 2025-04-08 ENCOUNTER — RESULTS FOLLOW-UP (OUTPATIENT)
Dept: NEPHROLOGY | Facility: CLINIC | Age: 85
End: 2025-04-08

## 2025-04-22 DIAGNOSIS — Z93.6 NEPHROSTOMY STATUS (HCC): Primary | ICD-10-CM

## 2025-04-22 DIAGNOSIS — C67.8 MALIGNANT NEOPLASM OF OVERLAPPING SITES OF BLADDER (HCC): ICD-10-CM

## 2025-04-23 ENCOUNTER — HOSPITAL ENCOUNTER (OUTPATIENT)
Dept: NON INVASIVE DIAGNOSTICS | Facility: HOSPITAL | Age: 85
Discharge: HOME | End: 2025-04-23
Payer: COMMERCIAL

## 2025-04-23 DIAGNOSIS — C67.8 MALIGNANT NEOPLASM OF OVERLAPPING SITES OF BLADDER (HCC): ICD-10-CM

## 2025-04-23 DIAGNOSIS — Z93.6 NEPHROSTOMY STATUS (HCC): ICD-10-CM

## 2025-04-23 PROCEDURE — 50431 NJX PX NFROSGRM &/URTRGRM: CPT

## 2025-04-23 PROCEDURE — 50431 NJX PX NFROSGRM &/URTRGRM: CPT | Performed by: RADIOLOGY

## 2025-04-23 RX ADMIN — IOHEXOL 10 ML: 350 INJECTION, SOLUTION INTRAVENOUS at 11:12

## 2025-04-23 NOTE — BRIEF OP NOTE (RAD/CATH)
INTERVENTIONAL RADIOLOGY PROCEDURE NOTE    Date: 4/23/2025    Procedure:   Procedure Summary       Date: 04/23/25 Room / Location: [facility] Cardiac Cath Lab    Anesthesia Start:  Anesthesia Stop:     Procedure: IR NEPHROSTOMY TUBE CHECK/CHANGE/REPOSITION/REINSERTION/UPSIZE Diagnosis:       Nephrostomy status (HCC)      Malignant neoplasm of overlapping sites of bladder (HCC)      (right pcn difficult to flush and decreased output, routine check/change on left)    Scheduled Providers:  Responsible Provider:     Anesthesia Type: Not recorded ASA Status: Not recorded            Preoperative diagnosis:   1. Nephrostomy status (HCC)    2. Malignant neoplasm of overlapping sites of bladder (HCC)         Postoperative diagnosis: Same.    Surgeon: Fred Dixon MD     Assistant: None. No qualified resident was available.    Blood loss: None    Specimens: None     Findings: Tube in appropriate position, flushes without resistance.    Complications: None immediate.    Anesthesia: none

## 2025-04-23 NOTE — SEDATION DOCUMENTATION
Tube check completed by Dr Dixon. Pt tolerated without issues. Per Dr Dixon's assessment, no issues with tube, not indicated for exchange at this time. Education provided to pt and family, questions answered as offered. New dry dressings placed to sites.

## 2025-04-23 NOTE — DISCHARGE INSTRUCTIONS
Nephrostomy Tube Care     WHAT YOU NEED TO KNOW:   A nephrostomy tube is a catheter (thin plastic tube) that is inserted through your skin and into your kidney. The nephrostomy tube drains urine from your kidney into a collecting bag outside your body. You may need a nephrostomy tube when something is blocking the normal flow of urine. A nephrostomy tube may be used for a short or a long period of time. The nephrostomy tube comes out of your back, so you will need someone to help care for your nephrostomy tube.          DISCHARGE INSTRUCTIONS:      How to clean the skin around the nephrostomy tube and change the bandage:  Since the nephrostomy tube comes out of your back, you will not be able to care for it by yourself. Ask someone to follow the general directions below to check and care for your nephrostomy tube.   Gather the items you will need.          Disposable (single use) under-pad, and a clean washcloth  Plain soap, warm water, and new medical gloves  Sterile gauze bandages  Clear adhesive dressing or medical tape  Skin barrier  Protective skin film  Trash bag  Remove the old bandage, and check the tube entry site.    Have the patient lie on his side with the nephrostomy tube entry site facing up. Place the under-pad where it will catch drainage as you are working with the nephrostomy tube.   Wash your hands with soap and water. Put on new medical gloves.  Gently remove the old bandage, without pulling on the tube. Do this by holding the skin beside the tube with one hand. With the other hand, gently remove sticky tape and the skin barrier by pulling in the same direction as hair growth. Do not touch the side of the bandage that is placed over or around the tube. Throw the bandage and skin barrier away in a trash bag.  Look for signs of infection, such as skin redness and swelling. Report any skin changes to healthcare providers.  Clean the tube entry site.    Hold the tube in place to keep it from  being pulled out while you are cleaning around it.  You will need to clean the area twice. For the first cleaning, wet a new gauze bandage with soap and water.  Begin at the entry site of the tube. Wipe the skin in circles, moving away from the entry site. Remove blood and any other material with the gauze. Do this as often as needed. Use a new gauze bandage each time you clean the area, moving away from the entry site.   For the second cleaning, wet a new gauze bandage with water. Begin at the entry site of the tube. Wipe the skin in circles, moving away from the entry site. Use a new gauze bandage each time you clean the area, moving away from the entry site.   Gently pat the skin with a clean washcloth to dry it.    Apply the skin barrier and bandages.    Roll up a bandage to make it thick, and place it under  the place where the tube enters the skin. Place it to support the tube, and stop it from kinking or bending. Tape the bandage in place, and apply more bandages if directed by a healthcare provider.   Bring the tubing forward to the front and tape it to the skin. Do not stretch the tube tight, because this may pull the nephrostomy tube out.  How often to change the bandage.  Change the bandage around the tube, every other day. If your bandages  get dirty or wet, change them right away, and as often as needed. If your nephrostomy tube is to be used for a long period of time, the tube needs to be changed every 2 to 3 months. Healthcare providers will tell you when you need to make an appointment to have your tube changed.     How to care for the urine drainage bag:   Ask if you need to measure and write down how much urine is in the bag before you empty it. Drain urine out of the drainage bag when it is ½ to ? full. Open the spout at the bottom of the bag to empty the urine into the toilet.   You may need to detach the drainage bag from the nephrostomy tube to change it.. If so, attach a new drainage bag  tightly to the nephrostomy tube.     How to prevent problems with your nephrostomy tube:   Change bandages, directed.  This helps to prevent infection. Throw away or clean your drainage bag as directed by your healthcare provider.    Wipe the connecting ends of the drainage bag with alcohol before you reconnect the bag to the tube.  This helps prevent infection.     Keep the tube taped to your skin and connected to a drainage bag placed below the level of your kidneys.  This helps prevent urine from backing up into your kidneys. You may wear a small drainage bag strapped to your leg to let you move around more easily.    Check the catheter to be sure it is in place after you change your clothes or do other activities.  Do not wear tight clothing over the tube. Place the tubing over your thigh rather than under it when you are sitting down. Be sure that nothing is pulling on the nephrostomy tube when you move around.    Change positions if you see little or no urine in your drainage bag.  Check to see if the urine tube is twisted or bent. Be sure that you are not sitting or lying on the tube. If there are no kinks and there is little or no urine in the drainage bag, tell your healthcare provider.    Flush out the tube as directed. Some tubes get flushed one time a day with 10 mls of NSS You will be given a prescription for the flushes.  To flush the nephrostomy tube, clean both connections with alcohol swap. Twist off the drainage bag tube and twist the saline syringe into the nephrostomy tube and flush briskly. Remove the syringe and twist the drainage bag tube back into the nephrostomy tube.  Keep the site covered while you shower.  Tape a piece of clear adhesive plastic over the dressing to keep it dry while you shower. Do not take tub baths.    Contact Interventional Radiology at [phone] ([facility] PATIENTS: Contact Interventional Radiology at [phone]) ([facility] PATIENTS: Contact Interventional Radiology at  [phone]) if:  The skin around the nephrostomy tube is red, swollen, itches, or has a rash.   You have a fever greater than 101 or chills.  You have lower back or hip pain.  There are changes in how your urine looks or smells.  You have little or no urine draining from the nephrostomy tube.   You have nausea and are vomiting.  The black arleth on your tube has moved, or the tube is longer than when it was put in.   You have questions or concerns about your condition or care.  The nephrostomy tube comes out completely.   There is blood, pus, or a bad smell coming from the place where the tube enters your skin.  Urine is leaking around the tube.        The following pharmacies carry the flush syringes.       [facility]                     [address]                     [address]                    [phone]  [place]                       [place]  Phone [phone]            Phone [phone]                 [facility]                                                                                                   [phone]  [facility]             [facility]                             [address] [place], [place] [address] [facility]   [place]                                 [phone]  Phone [phone]            Phone [phone]    [facility]                                                                         [facility] [phone]  Dayna Bansal.  [place]   Phone [phone]

## 2025-06-21 ENCOUNTER — HOSPITAL ENCOUNTER (EMERGENCY)
Facility: HOSPITAL | Age: 85
Discharge: HOME | End: 2025-06-21
Attending: EMERGENCY MEDICINE | Admitting: EMERGENCY MEDICINE
Payer: COMMERCIAL

## 2025-06-21 VITALS
DIASTOLIC BLOOD PRESSURE: 65 MMHG | OXYGEN SATURATION: 94 % | BODY MASS INDEX: 22.98 KG/M2 | WEIGHT: 125.66 LBS | RESPIRATION RATE: 20 BRPM | SYSTOLIC BLOOD PRESSURE: 119 MMHG | TEMPERATURE: 98.2 F | HEART RATE: 79 BPM

## 2025-06-21 DIAGNOSIS — N39.0 UTI (URINARY TRACT INFECTION): Primary | ICD-10-CM

## 2025-06-21 LAB
ALBUMIN SERPL BCG-MCNC: 2.2 G/DL (ref 3.5–5)
ALP SERPL-CCNC: 104 U/L (ref 34–104)
ALT SERPL W P-5'-P-CCNC: 7 U/L (ref 7–52)
ANION GAP SERPL CALCULATED.3IONS-SCNC: 11 MMOL/L (ref 4–13)
AST SERPL W P-5'-P-CCNC: 14 U/L (ref 13–39)
BACTERIA UR QL AUTO: ABNORMAL /HPF
BASOPHILS # BLD AUTO: 0.11 THOUSANDS/ÂΜL (ref 0–0.1)
BASOPHILS NFR BLD AUTO: 1 % (ref 0–1)
BILIRUB SERPL-MCNC: 0.54 MG/DL (ref 0.2–1)
BILIRUB UR QL STRIP: NEGATIVE
BUN SERPL-MCNC: 38 MG/DL (ref 5–25)
CALCIUM ALBUM COR SERPL-MCNC: 10.3 MG/DL (ref 8.3–10.1)
CALCIUM SERPL-MCNC: 8.9 MG/DL (ref 8.4–10.2)
CHLORIDE SERPL-SCNC: 100 MMOL/L (ref 96–108)
CLARITY UR: ABNORMAL
CO2 SERPL-SCNC: 21 MMOL/L (ref 21–32)
COLOR UR: YELLOW
CREAT SERPL-MCNC: 1.91 MG/DL (ref 0.6–1.3)
EOSINOPHIL # BLD AUTO: 0.19 THOUSAND/ÂΜL (ref 0–0.61)
EOSINOPHIL NFR BLD AUTO: 1 % (ref 0–6)
ERYTHROCYTE [DISTWIDTH] IN BLOOD BY AUTOMATED COUNT: 13.4 % (ref 11.6–15.1)
GFR SERPL CREATININE-BSD FRML MDRD: 23 ML/MIN/1.73SQ M
GLUCOSE SERPL-MCNC: 98 MG/DL (ref 65–140)
GLUCOSE UR STRIP-MCNC: NEGATIVE MG/DL
HCT VFR BLD AUTO: 35.7 % (ref 34.8–46.1)
HGB BLD-MCNC: 11.3 G/DL (ref 11.5–15.4)
HGB UR QL STRIP.AUTO: ABNORMAL
IMM GRANULOCYTES # BLD AUTO: 0.08 THOUSAND/UL (ref 0–0.2)
IMM GRANULOCYTES NFR BLD AUTO: 1 % (ref 0–2)
KETONES UR STRIP-MCNC: NEGATIVE MG/DL
LEUKOCYTE ESTERASE UR QL STRIP: ABNORMAL
LYMPHOCYTES # BLD AUTO: 1.54 THOUSANDS/ÂΜL (ref 0.6–4.47)
LYMPHOCYTES NFR BLD AUTO: 9 % (ref 14–44)
MAGNESIUM SERPL-MCNC: 1.8 MG/DL (ref 1.9–2.7)
MCH RBC QN AUTO: 28.3 PG (ref 26.8–34.3)
MCHC RBC AUTO-ENTMCNC: 31.7 G/DL (ref 31.4–37.4)
MCV RBC AUTO: 90 FL (ref 82–98)
MONOCYTES # BLD AUTO: 1.35 THOUSAND/ÂΜL (ref 0.17–1.22)
MONOCYTES NFR BLD AUTO: 8 % (ref 4–12)
NEUTROPHILS # BLD AUTO: 14.01 THOUSANDS/ÂΜL (ref 1.85–7.62)
NEUTS SEG NFR BLD AUTO: 80 % (ref 43–75)
NITRITE UR QL STRIP: NEGATIVE
NON-SQ EPI CELLS URNS QL MICRO: ABNORMAL /HPF
NRBC BLD AUTO-RTO: 0 /100 WBCS
PH UR STRIP.AUTO: 5.5 [PH]
PLATELET # BLD AUTO: 618 THOUSANDS/UL (ref 149–390)
PMV BLD AUTO: 9.9 FL (ref 8.9–12.7)
POTASSIUM SERPL-SCNC: 4.5 MMOL/L (ref 3.5–5.3)
PROT SERPL-MCNC: 6 G/DL (ref 6.4–8.4)
PROT UR STRIP-MCNC: ABNORMAL MG/DL
RBC # BLD AUTO: 3.99 MILLION/UL (ref 3.81–5.12)
RBC #/AREA URNS AUTO: ABNORMAL /HPF
SODIUM SERPL-SCNC: 132 MMOL/L (ref 135–147)
SP GR UR STRIP.AUTO: 1.02 (ref 1–1.03)
UROBILINOGEN UR STRIP-ACNC: <2 MG/DL
WBC # BLD AUTO: 17.28 THOUSAND/UL (ref 4.31–10.16)
WBC #/AREA URNS AUTO: ABNORMAL /HPF

## 2025-06-21 PROCEDURE — 99284 EMERGENCY DEPT VISIT MOD MDM: CPT | Performed by: EMERGENCY MEDICINE

## 2025-06-21 PROCEDURE — 99284 EMERGENCY DEPT VISIT MOD MDM: CPT

## 2025-06-21 PROCEDURE — 87077 CULTURE AEROBIC IDENTIFY: CPT | Performed by: EMERGENCY MEDICINE

## 2025-06-21 PROCEDURE — 96365 THER/PROPH/DIAG IV INF INIT: CPT

## 2025-06-21 PROCEDURE — 85025 COMPLETE CBC W/AUTO DIFF WBC: CPT | Performed by: EMERGENCY MEDICINE

## 2025-06-21 PROCEDURE — 83735 ASSAY OF MAGNESIUM: CPT | Performed by: EMERGENCY MEDICINE

## 2025-06-21 PROCEDURE — 96367 TX/PROPH/DG ADDL SEQ IV INF: CPT

## 2025-06-21 PROCEDURE — 96360 HYDRATION IV INFUSION INIT: CPT

## 2025-06-21 PROCEDURE — 80053 COMPREHEN METABOLIC PANEL: CPT | Performed by: EMERGENCY MEDICINE

## 2025-06-21 PROCEDURE — 36415 COLL VENOUS BLD VENIPUNCTURE: CPT | Performed by: EMERGENCY MEDICINE

## 2025-06-21 PROCEDURE — 87086 URINE CULTURE/COLONY COUNT: CPT | Performed by: EMERGENCY MEDICINE

## 2025-06-21 PROCEDURE — 87186 SC STD MICRODIL/AGAR DIL: CPT | Performed by: EMERGENCY MEDICINE

## 2025-06-21 PROCEDURE — 81001 URINALYSIS AUTO W/SCOPE: CPT | Performed by: EMERGENCY MEDICINE

## 2025-06-21 RX ORDER — MAGNESIUM SULFATE HEPTAHYDRATE 40 MG/ML
2 INJECTION, SOLUTION INTRAVENOUS ONCE
Status: COMPLETED | OUTPATIENT
Start: 2025-06-21 | End: 2025-06-21

## 2025-06-21 RX ORDER — CEFTRIAXONE 1 G/50ML
1000 INJECTION, SOLUTION INTRAVENOUS ONCE
Status: COMPLETED | OUTPATIENT
Start: 2025-06-21 | End: 2025-06-21

## 2025-06-21 RX ORDER — CEPHALEXIN 500 MG/1
500 CAPSULE ORAL EVERY 12 HOURS SCHEDULED
Qty: 20 CAPSULE | Refills: 0 | Status: SHIPPED | OUTPATIENT
Start: 2025-06-21 | End: 2025-06-25 | Stop reason: ALTCHOICE

## 2025-06-21 RX ADMIN — MAGNESIUM SULFATE HEPTAHYDRATE 2 G: 40 INJECTION, SOLUTION INTRAVENOUS at 11:57

## 2025-06-21 RX ADMIN — SODIUM CHLORIDE 500 ML: 0.9 INJECTION, SOLUTION INTRAVENOUS at 11:18

## 2025-06-21 RX ADMIN — CEFTRIAXONE 1000 MG: 1 INJECTION, SOLUTION INTRAVENOUS at 13:14

## 2025-06-21 NOTE — ED PROVIDER NOTES
Time reflects when diagnosis was documented in both MDM as applicable and the Disposition within this note       Time User Action Codes Description Comment    6/21/2025  1:22 PM Dulce Jean-Baptiste Add [N39.0] UTI (urinary tract infection)           ED Disposition       ED Disposition   Discharge    Condition   Stable    Date/Time   Sat Jun 21, 2025  1:22 PM    Comment   Ning Chambers discharge to home/self care.                   Assessment & Plan       Medical Decision Making  Pt is a 84yo F who presents with confusion and foul smelling urine.     Differential diagnosis to include but not limited to UTI, MARTY, dehydration, electrolyte abnormality.  Will obtain blood work and urinalysis.  See ED course for results and details.    Plan to discharge pt with f/u to PCP. Discussed returning the ED with new or worsening of symptoms. Discussed taking new medication as prescribed and to completion. Pt expressed understanding of discharge instructions, return precautions, and medication instructions and is stable for discharge at this time. All questions were answered and pt was discharged without incident.         Amount and/or Complexity of Data Reviewed  Labs: ordered. Decision-making details documented in ED Course.    Risk  Prescription drug management.        ED Course as of 06/21/25 1533   Sat Jun 21, 2025   1124 WBC(!): 17.28  Elevated. Non-specific.    1124 Hemoglobin(!): 11.3   1124 Platelet Count(!): 618  All cell lines up. Possibly some degree of hemoconcentration. Fluids in process.   1150 Creatinine(!): 1.91  Slightly increased from prior but not meeting criteria for MARTY. Fluids in process.    1151 Sodium(!): 132  Mild hyponatremia.    1151 MAGNESIUM(!): 1.8  Mild hypoMg. Will replete.    1302 Leukocytes, UA(!): Large   1302 Nitrite, UA: Negative   1302 Blood, UA(!): Moderate  Awaiting micro.    1308 WBC, UA(!): Innumerable   1308 Bacteria, UA(!): Innumerable  Will send for culture and treat.        Medications    sodium chloride 0.9 % bolus 500 mL (0 mL Intravenous Stopped 6/21/25 1218)   magnesium sulfate 2 g/50 mL IVPB (premix) 2 g (0 g Intravenous Stopped 6/21/25 1257)   cefTRIAXone (ROCEPHIN) IVPB (premix in dextrose) 1,000 mg 50 mL (0 mg Intravenous Stopped 6/21/25 1344)       ED Risk Strat Scores                    No data recorded        SBIRT 20yo+      Flowsheet Row Most Recent Value   Initial Alcohol Screen: US AUDIT-C     1. How often do you have a drink containing alcohol? 0 Filed at: 06/21/2025 1054   2. How many drinks containing alcohol do you have on a typical day you are drinking?  0 Filed at: 06/21/2025 1054   3a. Male UNDER 65: How often do you have five or more drinks on one occasion? 0 Filed at: 06/21/2025 1054   3b. FEMALE Any Age, or MALE 65+: How often do you have 4 or more drinks on one occassion? 0 Filed at: 06/21/2025 1054   Audit-C Score 0 Filed at: 06/21/2025 1054   LESTER: How many times in the past year have you...    Used an illegal drug or used a prescription medication for non-medical reasons? Never Filed at: 06/21/2025 1054                            History of Present Illness       Chief Complaint   Patient presents with    Altered Mental Status     Pt arrives via EMS, reports increased confusion and diarrhea.        Past Medical History[1]   Past Surgical History[2]   Family History[3]   Social History[4]   E-Cigarette/Vaping    E-Cigarette Use Never User       E-Cigarette/Vaping Substances    Nicotine No     THC No     CBD No     Flavoring No     Other No     Unknown No       I have reviewed and agree with the history as documented.     Pt is an 86yo F who presents for confusion and foul-smelling urine.  Patient denies any complaints.  Patient does have baseline confusion but caregiver reports increased confusion.  Patient is bedbound and therefore has not had any falls.  Caregiver also reports foul-smelling urine.  Patient has history of bilateral nephrostomy tubes.  Patient has not had  any fevers or chills.  Patient has not had any recent sick contacts.          Objective       ED Triage Vitals [06/21/25 1052]   Temperature Pulse Blood Pressure Respirations SpO2 Patient Position - Orthostatic VS   98.2 °F (36.8 °C) 88 119/65 20 98 % --      Temp Source Heart Rate Source BP Location FiO2 (%) Pain Score    Oral -- -- -- No Pain      Vitals      Date and Time Temp Pulse SpO2 Resp BP Pain Score FACES Pain Rating User   06/21/25 1315 -- 79 94 % -- -- -- -- JG   06/21/25 1052 98.2 °F (36.8 °C) 88 98 % 20 119/65 No Pain -- DQ            Physical Exam  Vitals reviewed.   Constitutional:       Appearance: She is well-developed. She is not toxic-appearing or diaphoretic.   HENT:      Head: Normocephalic and atraumatic.      Right Ear: External ear normal.      Left Ear: External ear normal.      Nose: Nose normal.      Mouth/Throat:      Pharynx: Oropharynx is clear.     Eyes:      Conjunctiva/sclera: Conjunctivae normal.      Pupils: Pupils are equal, round, and reactive to light.       Cardiovascular:      Rate and Rhythm: Normal rate and regular rhythm.      Heart sounds: Normal heart sounds.   Pulmonary:      Effort: Pulmonary effort is normal. No respiratory distress.      Breath sounds: Normal breath sounds.   Abdominal:      General: Bowel sounds are normal. There is no distension.      Palpations: Abdomen is soft.      Tenderness: There is no abdominal tenderness. There is no guarding or rebound.     Musculoskeletal:         General: Normal range of motion.      Cervical back: Normal range of motion and neck supple.     Skin:     General: Skin is warm and dry.      Capillary Refill: Capillary refill takes less than 2 seconds.     Neurological:      General: No focal deficit present.      Mental Status: She is alert. Mental status is at baseline.      Comments: A&O to person and place but not time or situation   Psychiatric:         Speech: Speech normal.         Behavior: Behavior is cooperative.          Results Reviewed       Procedure Component Value Units Date/Time    Urine culture [[id number]] Collected: 06/21/25 1256    Lab Status: In process Specimen: Urine, Right Nephrostomy Updated: 06/21/25 1313    Urine Microscopic [[id number]]  (Abnormal) Collected: 06/21/25 1256    Lab Status: Final result Specimen: Urine, Right Nephrostomy Updated: 06/21/25 1308     RBC, UA       Field obscured, unable to enumerate     /hpf     WBC, UA Innumerable /hpf      Epithelial Cells       Field obscured, unable to enumerate     /hpf     Bacteria, UA Innumerable /hpf     UA (URINE) with reflex to Scope [[id number]]  (Abnormal) Collected: 06/21/25 1256    Lab Status: Final result Specimen: Urine, Right Nephrostomy Updated: 06/21/25 1302     Color, UA Yellow     Clarity, UA Cloudy     Specific Gravity, UA 1.025     pH, UA 5.5     Leukocytes, UA Large     Nitrite, UA Negative     Protein,  (2+) mg/dl      Glucose, UA Negative mg/dl      Ketones, UA Negative mg/dl      Urobilinogen, UA <2.0 mg/dl      Bilirubin, UA Negative     Occult Blood, UA Moderate    Comprehensive metabolic panel [[id number]]  (Abnormal) Collected: 06/21/25 1115    Lab Status: Final result Specimen: Blood from Arm, Left Updated: 06/21/25 1149     Sodium 132 mmol/L      Potassium 4.5 mmol/L      Chloride 100 mmol/L      CO2 21 mmol/L      ANION GAP 11 mmol/L      BUN 38 mg/dL      Creatinine 1.91 mg/dL      Glucose 98 mg/dL      Calcium 8.9 mg/dL      Corrected Calcium 10.3 mg/dL      AST 14 U/L      ALT 7 U/L      Alkaline Phosphatase 104 U/L      Total Protein 6.0 g/dL      Albumin 2.2 g/dL      Total Bilirubin 0.54 mg/dL      eGFR 23 ml/min/1.73sq m     Narrative:      National Kid[employer] guidelines for Chronic Kidney Disease (CKD):     Stage 1 with normal or high GFR (GFR > 90 mL/min/1.73 square meters)    Stage 2 Mild CKD (GFR = 60-89 mL/min/1.73 square meters)    Stage 3A Moderate CKD (GFR = 45-59 mL/min/1.73 square meters)     Stage 3B Moderate CKD (GFR = 30-44 mL/min/1.73 square meters)    Stage 4 Severe CKD (GFR = 15-29 mL/min/1.73 square meters)    Stage 5 End Stage CKD (GFR <15 mL/min/1.73 square meters)  Note: GFR calculation is accurate only with a steady state creatinine    Magnesium [[id number]]  (Abnormal) Collected: 06/21/25 1115    Lab Status: Final result Specimen: Blood from Arm, Left Updated: 06/21/25 1149     Magnesium 1.8 mg/dL     CBC and differential [[id number]]  (Abnormal) Collected: 06/21/25 1115    Lab Status: Final result Specimen: Blood from Arm, Left Updated: 06/21/25 1123     WBC 17.28 Thousand/uL      RBC 3.99 Million/uL      Hemoglobin 11.3 g/dL      Hematocrit 35.7 %      MCV 90 fL      MCH 28.3 pg      MCHC 31.7 g/dL      RDW 13.4 %      MPV 9.9 fL      Platelets 618 Thousands/uL      nRBC 0 /100 WBCs      Segmented % 80 %      Immature Grans % 1 %      Lymphocytes % 9 %      Monocytes % 8 %      Eosinophils Relative 1 %      Basophils Relative 1 %      Absolute Neutrophils 14.01 Thousands/µL      Absolute Immature Grans 0.08 Thousand/uL      Absolute Lymphocytes 1.54 Thousands/µL      Absolute Monocytes 1.35 Thousand/µL      Eosinophils Absolute 0.19 Thousand/µL      Basophils Absolute 0.11 Thousands/µL             No orders to display       Procedures    ED Medication and Procedure Management   Prior to Admission Medications   Prescriptions Last Dose Informant Patient Reported? Taking?   acetaminophen (TYLENOL) 500 mg tablet  Self Yes No   Sig: Take 500 mg by mouth   aspirin 81 mg chewable tablet  Self Yes No   Sig: Chew 81 mg daily   atorvastatin (LIPITOR) 40 mg tablet  Self No No   Sig: Take 1 tablet (40 mg total) by mouth daily with dinner   cholecalciferol (VITAMIN D3) 1,000 units tablet  Self Yes No   Sig: Take 1,000 Units by mouth daily   loperamide (IMODIUM) 2 mg capsule   Yes No   Sig: Take 2 mg by mouth as needed for diarrhea   metoprolol succinate (TOPROL-XL) 50 mg 24 hr tablet  Self Yes No   Sig:  "Take 25 mg by mouth every morning   saccharomyces boulardii (FLORASTOR) 250 mg capsule   No No   Sig: Take 1 capsule (250 mg total) by mouth 2 (two) times a day   sodium chloride, PF, 0.9 %  Self No No   Sig: 10 mL by Intracatheter route daily Intracatheter flushing daily. May substitute prefilled syringe with normal saline 10 mL vials, 10 mL syringes, and 18 g blunt needles      Facility-Administered Medications: None     Patient's Medications   Discharge Prescriptions    CEPHALEXIN (KEFLEX) 500 MG CAPSULE    Take 1 capsule (500 mg total) by mouth every 12 (twelve) hours for 10 days       Start Date: 6/21/2025 End Date: 7/1/2025       Order Dose: 500 mg       Quantity: 20 capsule    Refills: 0     No discharge procedures on file.  ED SEPSIS DOCUMENTATION   Time reflects when diagnosis was documented in both MDM as applicable and the Disposition within this note       Time User Action Codes Description Comment    6/21/2025  1:22 PM Dulce Jean-Baptiste Add [N39.0] UTI (urinary tract infection)                    [1]   Past Medical History:  Diagnosis Date    Arthritis     right knee    Hay fever     Hyperlipidemia     Hypertension     Myocardial infarction (HCC) 2003    questionable MI during exploratory surgery    Ovarian cancer (HCC) 2003    surgery (did not have chemo or radiation)    Stroke (HCC) 2003    \" mini stroke \" no deficits ; another poss stroke with  left hand weakness    Wears glasses     Wears partial dentures     upper & lower   [2]   Past Surgical History:  Procedure Laterality Date    BREAST SURGERY Right     exc. bx. lump (R) breast    DILATION AND CURETTAGE OF UTERUS      HYSTERECTOMY  2003    TAHBSO    IR NEPHROSTOMY TUBE CHECK/CHANGE/REPOSITION/REINSERTION/UPSIZE  5/24/2023    IR NEPHROSTOMY TUBE CHECK/CHANGE/REPOSITION/REINSERTION/UPSIZE  7/13/2023    IR NEPHROSTOMY TUBE CHECK/CHANGE/REPOSITION/REINSERTION/UPSIZE  8/29/2023    IR NEPHROSTOMY TUBE CHECK/CHANGE/REPOSITION/REINSERTION/UPSIZE  " 10/11/2023    IR NEPHROSTOMY TUBE CHECK/CHANGE/REPOSITION/REINSERTION/UPSIZE  12/11/2023    IR NEPHROSTOMY TUBE CHECK/CHANGE/REPOSITION/REINSERTION/UPSIZE  2/9/2024    IR NEPHROSTOMY TUBE CHECK/CHANGE/REPOSITION/REINSERTION/UPSIZE  4/8/2024    IR NEPHROSTOMY TUBE CHECK/CHANGE/REPOSITION/REINSERTION/UPSIZE  6/7/2024    IR NEPHROSTOMY TUBE CHECK/CHANGE/REPOSITION/REINSERTION/UPSIZE  8/7/2024    IR NEPHROSTOMY TUBE CHECK/CHANGE/REPOSITION/REINSERTION/UPSIZE  10/7/2024    IR NEPHROSTOMY TUBE CHECK/CHANGE/REPOSITION/REINSERTION/UPSIZE  1/7/2025    IR NEPHROSTOMY TUBE CHECK/CHANGE/REPOSITION/REINSERTION/UPSIZE  1/29/2025    IR NEPHROSTOMY TUBE CHECK/CHANGE/REPOSITION/REINSERTION/UPSIZE  3/24/2025    IR NEPHROSTOMY TUBE CHECK/CHANGE/REPOSITION/REINSERTION/UPSIZE  4/23/2025    IR NEPHROSTOMY TUBE PLACEMENT  4/13/2023    JOINT REPLACEMENT Right     OOPHORECTOMY Bilateral     cancer    OH CYSTO W/REMOVAL OF TUMORS SMALL N/A 4/4/2022    Procedure: TRANSURETHRAL RESECTION OF BLADDER (MULTIFOCAL) TUMOR (TURBT);  Surgeon: Vinnie Ricardo MD;  Location: [facility];  Service: Urology    OH CYSTOURETHROSCOPY W/DEST &/RMVL TUMOR LARGE Bilateral 11/11/2021    Procedure: CYSTOSCOPY, TRANSURETHRAL RESECTION OF BLADDER TUMOR (TURBT), EVACUATION OF CLOTS, LARGE TUMOR 7-8CM;  Surgeon: Malcolm Reyes MD;  Location: [facility];  Service: Urology    OH CYSTOURETHROSCOPY W/DEST &/RMVL TUMOR LARGE N/A 3/21/2023    Procedure: TRANSURETHRAL RESECTION OF BLADDER TUMOR (TURBT);  Surgeon: Vinnie Ricardo MD;  [facility]: [facility];  Service: Urology    OH CYSTOURETHROSCOPY W/URETERAL CATHETERIZATION Bilateral 4/4/2022    Procedure: CYSTOSCOPY WITH ATTEMPTED B/L RETROGRADE PYELOGRAM;  Surgeon: Vinnie Ricardo MD;  Location: [facility];  Service: Urology    OH XCAPSL CTRC RMVL INSJ IO LENS PROSTH W/O ECP Left 7/24/2017    Procedure: EXTRACTION EXTRACAPSULAR CATARACT PHACO INTRAOCULAR LENS (IOL);  Surgeon: Elmer Ocampo MD;  Location: [facility]  OR;  Service: Ophthalmology    TONSILLECTOMY     [3]   Family History  Problem Relation Name Age of Onset    Parkinsonism Mother      Heart disease Father      Cancer Father          leukemia    Heart disease Sister leonor     Diabetes Sister leonor     Diabetes Brother      Diabetes Sister yazmin     Melanoma Sister hesham     Melanoma Sister reina     Diabetes Maternal Aunt      No Known Problems Maternal Aunt      No Known Problems Paternal Aunt     [4]   Social History  Tobacco Use    Smoking status: Former     Current packs/day: 0.00     Average packs/day: 1.5 packs/day for 40.0 years (60.0 ttl pk-yrs)     Types: Cigarettes     Start date:      Quit date:      Years since quittin.4    Smokeless tobacco: Never   Vaping Use    Vaping status: Never Used   Substance Use Topics    Alcohol use: Yes     Comment: occassional, socially    Drug use: No        Dulce Jean-Baptiste MD  [phone]

## 2025-06-21 NOTE — DISCHARGE INSTRUCTIONS
Follow-up with primary care for further care. Contact info provided below if needed.  Use over the counter medications as stated on the bottle as needed for symptom control.  Take your new medications as prescribed and to completion.  Return to the ED with new or worsening symptoms.

## 2025-06-23 LAB
BACTERIA UR CULT: ABNORMAL
BACTERIA UR CULT: ABNORMAL

## 2025-06-25 RX ORDER — NITROFURANTOIN 25; 75 MG/1; MG/1
100 CAPSULE ORAL 2 TIMES DAILY
Qty: 14 CAPSULE | Refills: 0 | Status: SHIPPED | OUTPATIENT
Start: 2025-06-25 | End: 2025-07-02

## 2025-06-30 ENCOUNTER — HOSPITAL ENCOUNTER (OUTPATIENT)
Dept: NON INVASIVE DIAGNOSTICS | Facility: HOSPITAL | Age: 85
Discharge: HOME | End: 2025-06-30
Attending: RADIOLOGY
Payer: COMMERCIAL

## 2025-06-30 DIAGNOSIS — C67.8 MALIGNANT NEOPLASM OF OVERLAPPING SITES OF BLADDER (HCC): Primary | ICD-10-CM

## 2025-06-30 PROCEDURE — 50435 EXCHANGE NEPHROSTOMY CATH: CPT

## 2025-06-30 PROCEDURE — C1729 CATH, DRAINAGE: HCPCS | Performed by: RADIOLOGY

## 2025-06-30 PROCEDURE — C1894 INTRO/SHEATH, NON-LASER: HCPCS | Performed by: RADIOLOGY

## 2025-06-30 PROCEDURE — C1769 GUIDE WIRE: HCPCS | Performed by: RADIOLOGY

## 2025-06-30 RX ORDER — LIDOCAINE WITH 8.4% SOD BICARB 0.9%(10ML)
SYRINGE (ML) INJECTION AS NEEDED
Status: COMPLETED | OUTPATIENT
Start: 2025-06-30 | End: 2025-06-30

## 2025-06-30 RX ADMIN — IOHEXOL 20 ML: 350 INJECTION, SOLUTION INTRAVENOUS at 12:17

## 2025-06-30 RX ADMIN — Medication 5 ML: at 11:42

## 2025-06-30 RX ADMIN — Medication 5 ML: at 11:50

## 2025-06-30 NOTE — DISCHARGE INSTRUCTIONS
Nephrostomy Tube Care     WHAT YOU NEED TO KNOW:   A nephrostomy tube is a catheter (thin plastic tube) that is inserted through your skin and into your kidney. The nephrostomy tube drains urine from your kidney into a collecting bag outside your body. You may need a nephrostomy tube when something is blocking the normal flow of urine. A nephrostomy tube may be used for a short or a long period of time. The nephrostomy tube comes out of your back, so you will need someone to help care for your nephrostomy tube.          DISCHARGE INSTRUCTIONS:      How to clean the skin around the nephrostomy tube and change the bandage:  Since the nephrostomy tube comes out of your back, you will not be able to care for it by yourself. Ask someone to follow the general directions below to check and care for your nephrostomy tube.   Gather the items you will need.          Disposable (single use) under-pad, and a clean washcloth  Plain soap, warm water, and new medical gloves  Sterile gauze bandages  Clear adhesive dressing or medical tape  Skin barrier  Protective skin film  Trash bag  Remove the old bandage, and check the tube entry site.    Have the patient lie on his side with the nephrostomy tube entry site facing up. Place the under-pad where it will catch drainage as you are working with the nephrostomy tube.   Wash your hands with soap and water. Put on new medical gloves.  Gently remove the old bandage, without pulling on the tube. Do this by holding the skin beside the tube with one hand. With the other hand, gently remove sticky tape and the skin barrier by pulling in the same direction as hair growth. Do not touch the side of the bandage that is placed over or around the tube. Throw the bandage and skin barrier away in a trash bag.  Look for signs of infection, such as skin redness and swelling. Report any skin changes to healthcare providers.  Clean the tube entry site.    Hold the tube in place to keep it from  being pulled out while you are cleaning around it.  You will need to clean the area twice. For the first cleaning, wet a new gauze bandage with soap and water.  Begin at the entry site of the tube. Wipe the skin in circles, moving away from the entry site. Remove blood and any other material with the gauze. Do this as often as needed. Use a new gauze bandage each time you clean the area, moving away from the entry site.   For the second cleaning, wet a new gauze bandage with water. Begin at the entry site of the tube. Wipe the skin in circles, moving away from the entry site. Use a new gauze bandage each time you clean the area, moving away from the entry site.   Gently pat the skin with a clean washcloth to dry it.    Apply the skin barrier and bandages.    Roll up a bandage to make it thick, and place it under  the place where the tube enters the skin. Place it to support the tube, and stop it from kinking or bending. Tape the bandage in place, and apply more bandages if directed by a healthcare provider.   Bring the tubing forward to the front and tape it to the skin. Do not stretch the tube tight, because this may pull the nephrostomy tube out.  How often to change the bandage.  Change the bandage around the tube, every other day. If your bandages  get dirty or wet, change them right away, and as often as needed. If your nephrostomy tube is to be used for a long period of time, the tube needs to be changed every 2 to 3 months. Healthcare providers will tell you when you need to make an appointment to have your tube changed.     How to care for the urine drainage bag:   Ask if you need to measure and write down how much urine is in the bag before you empty it. Drain urine out of the drainage bag when it is ½ to ? full. Open the spout at the bottom of the bag to empty the urine into the toilet.   You may need to detach the drainage bag from the nephrostomy tube to change it.. If so, attach a new drainage bag  tightly to the nephrostomy tube.     How to prevent problems with your nephrostomy tube:   Change bandages, directed.  This helps to prevent infection. Throw away or clean your drainage bag as directed by your healthcare provider.    Wipe the connecting ends of the drainage bag with alcohol before you reconnect the bag to the tube.  This helps prevent infection.     Keep the tube taped to your skin and connected to a drainage bag placed below the level of your kidneys.  This helps prevent urine from backing up into your kidneys. You may wear a small drainage bag strapped to your leg to let you move around more easily.    Check the catheter to be sure it is in place after you change your clothes or do other activities.  Do not wear tight clothing over the tube. Place the tubing over your thigh rather than under it when you are sitting down. Be sure that nothing is pulling on the nephrostomy tube when you move around.    Change positions if you see little or no urine in your drainage bag.  Check to see if the urine tube is twisted or bent. Be sure that you are not sitting or lying on the tube. If there are no kinks and there is little or no urine in the drainage bag, tell your healthcare provider.    Flush out the tube as directed. Some tubes get flushed one time a day with 10 mls of NSS You will be given a prescription for the flushes.  To flush the nephrostomy tube, clean both connections with alcohol swap. Twist off the drainage bag tube and twist the saline syringe into the nephrostomy tube and flush briskly. Remove the syringe and twist the drainage bag tube back into the nephrostomy tube.  Keep the site covered while you shower.  Tape a piece of clear adhesive plastic over the dressing to keep it dry while you shower. Do not take tub baths.    Contact Interventional Radiology at [phone] ([facility] PATIENTS: Contact Interventional Radiology at [phone]) ([facility] PATIENTS: Contact Interventional Radiology at  [phone]) if:  The skin around the nephrostomy tube is red, swollen, itches, or has a rash.   You have a fever greater than 101 or chills.  You have lower back or hip pain.  There are changes in how your urine looks or smells.  You have little or no urine draining from the nephrostomy tube.   You have nausea and are vomiting.  The black arleth on your tube has moved, or the tube is longer than when it was put in.   You have questions or concerns about your condition or care.  The nephrostomy tube comes out completely.   There is blood, pus, or a bad smell coming from the place where the tube enters your skin.  Urine is leaking around the tube.        The following pharmacies carry the flush syringes.       [facility]                     [facility]                         [facility]       [address]                     [address]                    [phone]  [place]                       [place]  Phone [phone]            Phone [phone]                 [facility]                                                                                                   [phone]  [facility]             [facility]                             [address] [place], [place] [address] [facility]   [place]                                 [phone]  Phone [phone]            Phone [phone]    [facility]                                                                         [facility] [phone]  [address]  [place]   Phone [phone]      Nephrostomy Tube Care     WHAT YOU NEED TO KNOW:   A nephrostomy tube is a catheter (thin plastic tube) that is inserted through your skin and into your kidney. The nephrostomy tube drains urine from your kidney into a collecting bag outside your body. You may need a nephrostomy tube when something is blocking the normal  flow of urine. A nephrostomy tube may be used for a short or a long period of time. The nephrostomy tube comes out of your back, so you will need someone to help care for your nephrostomy tube.          DISCHARGE INSTRUCTIONS:      How to clean the skin around the nephrostomy tube and change the bandage:  Since the nephrostomy tube comes out of your back, you will not be able to care for it by yourself. Ask someone to follow the general directions below to check and care for your nephrostomy tube.   Gather the items you will need.          Disposable (single use) under-pad, and a clean washcloth  Plain soap, warm water, and new medical gloves  Sterile gauze bandages  Clear adhesive dressing or medical tape  Skin barrier  Protective skin film  Trash bag  Remove the old bandage, and check the tube entry site.    Have the patient lie on his side with the nephrostomy tube entry site facing up. Place the under-pad where it will catch drainage as you are working with the nephrostomy tube.   Wash your hands with soap and water. Put on new medical gloves.  Gently remove the old bandage, without pulling on the tube. Do this by holding the skin beside the tube with one hand. With the other hand, gently remove sticky tape and the skin barrier by pulling in the same direction as hair growth. Do not touch the side of the bandage that is placed over or around the tube. Throw the bandage and skin barrier away in a trash bag.  Look for signs of infection, such as skin redness and swelling. Report any skin changes to healthcare providers.  Clean the tube entry site.    Hold the tube in place to keep it from being pulled out while you are cleaning around it.  You will need to clean the area twice. For the first cleaning, wet a new gauze bandage with soap and water.  Begin at the entry site of the tube. Wipe the skin in circles, moving away from the entry site. Remove blood and any other material with the gauze. Do this as often as  needed. Use a new gauze bandage each time you clean the area, moving away from the entry site.   For the second cleaning, wet a new gauze bandage with water. Begin at the entry site of the tube. Wipe the skin in circles, moving away from the entry site. Use a new gauze bandage each time you clean the area, moving away from the entry site.   Gently pat the skin with a clean washcloth to dry it.    Apply the skin barrier and bandages.    Roll up a bandage to make it thick, and place it under  the place where the tube enters the skin. Place it to support the tube, and stop it from kinking or bending. Tape the bandage in place, and apply more bandages if directed by a healthcare provider.   Bring the tubing forward to the front and tape it to the skin. Do not stretch the tube tight, because this may pull the nephrostomy tube out.  How often to change the bandage.  Change the bandage around the tube, every other day. If your bandages  get dirty or wet, change them right away, and as often as needed. If your nephrostomy tube is to be used for a long period of time, the tube needs to be changed every 2 to 3 months. Healthcare providers will tell you when you need to make an appointment to have your tube changed.     How to care for the urine drainage bag:   Ask if you need to measure and write down how much urine is in the bag before you empty it. Drain urine out of the drainage bag when it is ½ to ? full. Open the spout at the bottom of the bag to empty the urine into the toilet.   You may need to detach the drainage bag from the nephrostomy tube to change it.. If so, attach a new drainage bag tightly to the nephrostomy tube.     How to prevent problems with your nephrostomy tube:   Change bandages, directed.  This helps to prevent infection. Throw away or clean your drainage bag as directed by your healthcare provider.    Wipe the connecting ends of the drainage bag with alcohol before you reconnect the bag to the tube.   This helps prevent infection.     Keep the tube taped to your skin and connected to a drainage bag placed below the level of your kidneys.  This helps prevent urine from backing up into your kidneys. You may wear a small drainage bag strapped to your leg to let you move around more easily.    Check the catheter to be sure it is in place after you change your clothes or do other activities.  Do not wear tight clothing over the tube. Place the tubing over your thigh rather than under it when you are sitting down. Be sure that nothing is pulling on the nephrostomy tube when you move around.    Change positions if you see little or no urine in your drainage bag.  Check to see if the urine tube is twisted or bent. Be sure that you are not sitting or lying on the tube. If there are no kinks and there is little or no urine in the drainage bag, tell your healthcare provider.    Flush out the tube as directed. Some tubes get flushed one time a day with 10 mls of NSS You will be given a prescription for the flushes.  To flush the nephrostomy tube, clean both connections with alcohol swap. Twist off the drainage bag tube and twist the saline syringe into the nephrostomy tube and flush briskly. Remove the syringe and twist the drainage bag tube back into the nephrostomy tube.  Keep the site covered while you shower.  Tape a piece of clear adhesive plastic over the dressing to keep it dry while you shower. Do not take tub baths.    Contact Interventional Radiology at [phone] ([place] PATIENTS: Contact Interventional Radiology at [phone]) ([facility] PATIENTS: Contact Interventional Radiology at [phone]) if:  The skin around the nephrostomy tube is red, swollen, itches, or has a rash.   You have a fever greater than 101 or chills.  You have lower back or hip pain.  There are changes in how your urine looks or smells.  You have little or no urine draining from the nephrostomy tube.   You have nausea and are  vomiting.  The black arleth on your tube has moved, or the tube is longer than when it was put in.   You have questions or concerns about your condition or care.  The nephrostomy tube comes out completely.   There is blood, pus, or a bad smell coming from the place where the tube enters your skin.  Urine is leaking around the tube.        The following pharmacies carry the flush syringes.       [facility]                     [facility]                         [facility]       [address]                     [address] [facility]                    [phone]  [place]                       [place]  Phone [phone]            Phone [phone]                 [facility]                                                                                                   [phone]  [facility]             [facility]                             [address] [facility]   [place]                                 [phone]  Phone [phone]            Phone [phone]    [facility]                                                                         [facility] [phone] [address]  [place]   Phone [phone]

## 2025-06-30 NOTE — BRIEF OP NOTE (RAD/CATH)
INTERVENTIONAL RADIOLOGY PROCEDURE NOTE    Date: 6/30/2025    Procedure:   Procedure Summary       Date: 06/30/25 Room / Location: [facility] Cardiac Cath Lab    Anesthesia Start:  Anesthesia Stop:     Procedure: IR NEPHROSTOMY TUBE CHECK/CHANGE/REPOSITION/REINSERTION/UPSIZE Diagnosis:       Malignant neoplasm of overlapping sites of bladder (HCC)      (routine exchange)    Scheduled Providers: David Fall MD Responsible Provider:     Anesthesia Type: Not recorded ASA Status: Not recorded            Preoperative diagnosis:   1. Malignant neoplasm of overlapping sites of bladder (HCC)         Postoperative diagnosis: Same.    Surgeon: David Fall MD     Assistant: None. No qualified resident was available.    Blood loss: None    Specimens: None     Findings: Uneventful bilateral 10F PCN exchange. Next change in 2 months.    Complications: None immediate.    Anesthesia: local

## 2025-07-06 PROBLEM — Z86.73 HISTORY OF STROKE: Status: ACTIVE | Noted: 2021-10-29

## 2025-07-06 PROBLEM — R65.21 SEPTIC SHOCK (HCC): Status: ACTIVE | Noted: 2025-01-22

## 2025-07-06 PROBLEM — Z51.5 COMFORT MEASURES ONLY STATUS: Status: ACTIVE | Noted: 2025-01-01

## 2025-07-06 NOTE — ASSESSMENT & PLAN NOTE
Presentation to the hospital with septic shock present on admission likely related to UTI  Briefly on norepinephrine and IV fluids  Acute metabolic encephalopathy.  Secondary to hypoperfusion/sepsis.  Mentation is much improved since arrival.  Patient now comfort care measures    Results from last 7 days   Lab Units 07/06/25  1403 07/06/25  1034   LACTIC ACID mmol/L 9.0* 9.8*

## 2025-07-06 NOTE — ASSESSMENT & PLAN NOTE
Patient comfort care.    Results from last 7 days   Lab Units 07/06/25  1034   BUN mg/dL 81*   CREATININE mg/dL 3.83*   EGFR ml/min/1.73sq m 10

## 2025-07-06 NOTE — ASSESSMENT & PLAN NOTE
History of stroke hypertension CKD 5 and bladder cancer with obstructive uropathy and bilateral nephrostomy tubes who presented to the hospital for change in mental status  In the ED found to have septic shock likely secondary to UTI  After discussion by ED with patient/son decision was for comfort care measures  Comfort medications ordered.  Inpatient hospice consult placed.

## 2025-07-06 NOTE — Clinical Note
Case was discussed with  and the patient's admission status was agreed to be Admission Status: observation status to the service of Dr. Mancera

## 2025-07-06 NOTE — SEPSIS NOTE
"Sepsis Note   Ning Chambers 85 y.o. female MRN: [id number]  Unit/Bed#: ED CT2 Encounter: [id number]       Initial Sepsis Screening       Row Name 07/06/25 1517                Is the patient's history suggestive of a new or worsening infection? Yes (Proceed)  -CJ        Suspected source of infection suspect infection, source unknown  -CJ        Indicate SIRS criteria Tachypnea > 20 resp per min;Leukocytosis (WBC > 40268 IJL) OR Leukopenia (WBC <4000 IJL) OR Bandemia (WBC >10% bands)  -CJ        Are two or more of the above signs & symptoms of infection both present and new to the patient? Yes (Proceed)  -CJ        Assess for evidence of organ dysfunction: Are any of the below criteria present within 6 hours of suspected infection and SIRS criteria that are NOT considered to be chronic conditions? SBP < 90;MAP < 65;Lactate >/equal 4.0  -CJ        Date of presentation of septic shock 07/06/25  -        Time of presentation of septic shock 1100  -        Fluid Resuscitation: 30 ml/kg IV fluid bolus will be given based on actual body weight  -CJ        Is the patient persistently hypotensive in the hour after fluid bolus administration? If yes, patient meets criteria for vasopressor use. YES  -CJ        Sepsis Note: Click \"NEXT\" below (NOT \"close\") to generate sepsis note based on above information. YES (proceed by clicking \"NEXT\")  -                  User Key  (r) = Recorded By, (t) = Taken By, (c) = Cosigned By      Initials Name Provider Type    JOSE Fierro, DO Physician                   Initial Sepsis Screening       Row Name 07/06/25 1517                   Initial Sepsis Assessment    Is the patient's history suggestive of a new or worsening infection? Yes (Proceed)  -CJ        Suspected source of infection suspect infection, source unknown  -CJ        Indicate SIRS criteria Tachypnea > 20 resp per min;Leukocytosis (WBC > 29301 IJL) OR Leukopenia (WBC <4000 IJL) OR Bandemia (WBC >10% bands)  -CJ     "    Are two or more of the above signs & symptoms of infection both present and new to the patient? Yes (Proceed)  -CJ           If the answer is yes to both above questions, suspicion of sepsis is present. Proceed with algorithm to determine if severe sepsis or septic shock criteria are met.    Assess for evidence of organ dysfunction: Are any of the below criteria present within 6 hours of suspected infection and SIRS criteria that are NOT considered to be chronic conditions? SBP < 90;MAP < 65;Lactate >/equal 4.0  -CJ           Based on the above information, the patient meets criteria for SEPTIC SHOCK. CALL A SEPSIS ALERT. Use sepsis order set. If the lactate is > or equal to 4, or the SBP is <90, give 30 ml/kg cystalloid IV fluid bolus or document exclusion criteria.    Date of presentation of septic shock 07/06/25  -CJ        Time of presentation of septic shock 1100  -CJ        Fluid Resuscitation: 30 ml/kg IV fluid bolus will be given based on actual body weight  -CJ        Is the patient persistently hypotensive in the hour after fluid bolus administration? If yes, patient meets criteria for vasopressor use. YES  -CJ                  User Key  (r) = Recorded By, (t) = Taken By, (c) = Cosigned By      Initials Name Provider Type    JOSE Fierro DO Physician                         Body mass index is 23.35 kg/m².  Wt Readings from Last 1 Encounters:   07/06/25 57.9 kg (127 lb 10.3 oz)        Ideal body weight: 50.1 kg (110 lb 7.2 oz)  Adjusted ideal body weight: 53.2 kg (117 lb 5.3 oz)

## 2025-07-06 NOTE — ED PROVIDER NOTES
Time reflects when diagnosis was documented in both MDM as applicable and the Disposition within this note       Time User Action Codes Description Comment    7/6/2025  2:55 PM Sherman Fierro Add [A41.9,  R65.21] Septic shock (HCC)     7/6/2025  3:17 PM Sherman Fierro Add [R79.89] Elevated lactic acid level     7/6/2025  3:17 PM Sherman Fierro Add [D72.829] Leukocytosis           ED Disposition       ED Disposition   Admit    Condition   Stable    Date/Time   Sun Jul 6, 2025  3:17 PM    Comment   Case was discussed with PANCHO and the patient's admission status was agreed to be Admission Status: inpatient status to the service of Dr. Arvizu .               Assessment & Plan       Medical Decision Making  Patient is a 85 y.o. female who presents to the ED for confusion, SOB.  Patient is ill-appearing.    Differential includes but is not limited to: UTI, pneumonia, bacteremia. Possibly related to nephrostomy tubes (abscess?). Differential also includes dehydration, electrolyte abnormalities.     Plan: Labs, imaging, likely admit                   Amount and/or Complexity of Data Reviewed  Labs: ordered.  Radiology: ordered. Decision-making details documented in ED Course.     Details: Pleural effusion    Risk  Prescription drug management.  Decision regarding hospitalization.        ED Course as of 07/06/25 1523   Sun Jul 06, 2025   1000 Patient with unobtainable blood pressure.  After lines placed, fluids started, able to obtain pressure.  Will give fluid bolus.  If no improvement.  To start peripheral pressors.  Will discuss with family about goals of care   1242 Discussed with son. Explained increasing pressor requirements. On way. No central line.    1315 CT chest abdomen pelvis wo contrast  Small to moderate bilateral pleural effusions with overlying compressive atelectasis.     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     Borderline and mildly enlarged retroperitoneal lymph  nodes, minimally decreased in size from March 2025.     Bilateral nephrostomy catheters. No hydronephrosis. Collapsed but diffusely thick-walled urinary bladder.     1315 Discussed with critical care.  Pending reeval once son gets here.  Likely transition to comfort care.   1404 Son here.  Plan for comfort care   1516 Critical Care Time Statement: Upon my evaluation, this patient had a high probability of imminent or life-threatening deterioration due to septic shock, which required my direct attention, intervention, and personal management.  I spent a total of 105 minutes directly providing critical care services, including interpretation of complex medical databases, evaluating for the presence of life-threatening injuries or illnesses, management of organ system failure(s) , complex medical decision making (to support/prevent further life-threatening deterioration)., interpretation of hemodynamic data, titration of vasoactive medications, and titration of continuous IV medications (drips). This time is exclusive of procedures, teaching, treating other patients, family meetings, and any prior time recorded by providers other than myself.           Medications   LORazepam (ATIVAN) injection 1 mg (has no administration in time range)   glycopyrrolate (ROBINUL) injection 0.1 mg (has no administration in time range)   sodium chloride 0.9 % bolus 1,000 mL (0 mL Intravenous Stopped 7/6/25 1236)   sodium chloride 0.9 % bolus 1,000 mL (0 mL Intravenous Stopped 7/6/25 1316)   vancomycin (VANCOCIN) 1500 mg in sodium chloride 0.9% 250 mL IVPB (0 mg Intravenous Stopped 7/6/25 1250)   cefepime (MAXIPIME) IVPB (premix in dextrose) 2,000 mg 50 mL (0 mg Intravenous Stopped 7/6/25 1116)   albumin human (FLEXBUMIN) 5 % injection 25 g (0 g Intravenous Stopped 7/6/25 1417)       ED Risk Strat Scores                    No data recorded                            History of Present Illness       Chief Complaint   Patient presents with     Altered Mental Status     Patient arrives via ALS - state patient started with SOB this morning.  State patient is confused, which is not her baseline per ALS.  Patient awake, staring.  Mumbles in response to questions but can shake head yes and no.         Past Medical History[1]   Past Surgical History[2]   Family History[3]   Social History[4]   E-Cigarette/Vaping    E-Cigarette Use Never User       E-Cigarette/Vaping Substances    Nicotine No     THC No     CBD No     Flavoring No     Other No     Unknown No       I have reviewed and agree with the history as documented.     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  Patient seen here 6/21 for confusion.  Was ultimately found to have a UTI.  Treated and discharged.  Patient finished antibiotics.  Today when she woke up she was short of breath.  Caregiver called family who called 911.  Patient given breathing treatment prior to arrival secondary to wheezing.  On arrival patient noted to be in no significant respiratory distress.  Faint scattered wheezing but good oxygen saturation.  Extremities are cool, patient ill-appearing.  Patient confused, unable to verbalize any complaints.          Review of Systems   Unable to perform ROS: Mental status change           Objective       ED Triage Vitals   Temperature Pulse Blood Pressure Respirations SpO2 Patient Position - Orthostatic VS   07/06/25 1026 07/06/25 1000 07/06/25 1010 07/06/25 1000 07/06/25 1000 07/06/25 1010   (!) 96.4 °F (35.8 °C) 66 (!) 44/0 (!) 24 (!) 87 % Lying      Temp Source Heart Rate Source BP Location FiO2 (%) Pain Score    07/06/25 1026 07/06/25 1000 07/06/25 1010 -- 07/06/25 1000    Rectal Monitor Left arm  No Pain      Vitals      Date and Time Temp Pulse SpO2 Resp BP Pain Score FACES Pain Rating User   07/06/25 1500  -- 75 95 % 26 95/57 -- -- CS   07/06/25 1430 -- 73 92 % 25 88/52 -- -- CS   07/06/25 1415 -- 71 92 % 22 105/51 -- -- CS   07/06/25 1400 -- 68 92 % 20 58/35 -- -- CS   07/06/25 1345 -- 71 93 % 25 102/47 -- -- CS   07/06/25 1330 -- 67 100 % 20 100/59 -- -- CS   07/06/25 1315 -- 65 94 % 20 84/46 -- -- CS   07/06/25 1306 -- 65 97 % 15 87/51 -- -- CS   07/06/25 1303 -- 65 97 % 15 97/51 -- -- CS   07/06/25 1255 -- 65 -- -- -- -- -- CS   07/06/25 1245 -- 72 90 % 26 64/35 -- -- CS   07/06/25 1240 -- 72 -- -- 64/35 -- -- CS   07/06/25 1226 -- -- -- -- 51/36 -- -- CS   07/06/25 1200 -- 78 92 % 26 78/42 -- -- CS   07/06/25 1136 -- -- -- -- 136/97 -- -- CS   07/06/25 1130 -- 79 100 % 24 93/52 -- -- CS   07/06/25 1117 -- -- -- -- 94/75 -- -- CS   07/06/25 1115 -- 81 100 % 24 94/75 -- -- CS   07/06/25 1057 -- 82 91 % 22 92/60 -- -- CS   07/06/25 1049 -- 82 -- -- 86/48 -- -- CS   07/06/25 1044 -- -- 91 % -- -- -- -- AC   07/06/25 1041 -- 76 91 % 24 -- -- -- CS   07/06/25 1039 -- 75 -- -- -- -- -- CS   07/06/25 1033 -- -- -- -- 46/0 -- -- AC   07/06/25 1026 96.4 °F (35.8 °C) -- -- -- -- -- --    07/06/25 1015 -- -- 88 % -- -- -- --    07/06/25 1010 -- -- -- -- 44/0 -- --    07/06/25 1000 -- 66 87 % 24 -- No Pain --             Physical Exam  Vitals and nursing note reviewed.   Constitutional:       General: She is in acute distress.      Appearance: She is well-developed. She is ill-appearing. She is not diaphoretic.   HENT:      Head: Normocephalic and atraumatic.      Right Ear: External ear normal.      Left Ear: External ear normal.      Nose: Nose normal.     Eyes:      General: Lids are normal. No scleral icterus.      Cardiovascular:      Rate and Rhythm: Normal rate and regular rhythm.      Heart sounds: Normal heart sounds. No murmur heard.     No friction rub. No gallop.   Pulmonary:      Effort: Pulmonary effort is normal. No respiratory distress.      Breath sounds: Decreased air movement present. Wheezing  present. No rales.   Abdominal:      Palpations: Abdomen is soft.      Tenderness: There is no abdominal tenderness. There is no guarding or rebound.     Musculoskeletal:         General: No deformity. Normal range of motion.      Cervical back: Normal range of motion and neck supple.     Skin:     General: Skin is dry.      Coloration: Skin is pale.           Comments: Extremities are cool     Neurological:      General: No focal deficit present.      Mental Status: She is alert.     Psychiatric:         Mood and Affect: Mood normal.         Behavior: Behavior normal.         Results Reviewed       Procedure Component Value Units Date/Time    Lactic acid 2 Hours [[id number]]  (Abnormal) Collected: 07/06/25 1403    Lab Status: Final result Specimen: Blood from Hand, Left Updated: 07/06/25 1512     LACTIC ACID 9.0 mmol/L     Narrative:      Result may be elevated if tourniquet was used during collection.    HS Troponin I 2hr [[id number]] Collected: 07/06/25 1243    Lab Status: No result Specimen: Blood from Arm, Right     HS Troponin I 4hr [[id number]]     Lab Status: No result Specimen: Blood     RBC Morphology Reflex Test [[id number]] Collected: 07/06/25 1034    Lab Status: Final result Specimen: Blood from Arm, Right Updated: 07/06/25 1201    CBC and differential [[id number]]  (Abnormal) Collected: 07/06/25 1034    Lab Status: Final result Specimen: Blood from Arm, Right Updated: 07/06/25 1127     WBC 34.95 Thousand/uL      RBC 4.24 Million/uL      Hemoglobin 12.0 g/dL      Hematocrit 39.6 %      MCV 93 fL      MCH 28.3 pg      MCHC 30.3 g/dL      RDW 14.4 %      MPV 10.8 fL      Platelets 607 Thousands/uL     Narrative:      This is an appended report.  These results have been appended to a previously verified report.    Manual Differential(PHLEBS Do Not Order) [[id number]]  (Abnormal) Collected: 07/06/25 1034    Lab Status: Final result Specimen: Blood from Arm, Right Updated: 07/06/25 1127     Segmented % 90 %       Bands % 2 %      Lymphocytes % 4 %      Monocytes % 3 %      Eosinophils % 0 %      Basophils % 0 %      Myelocytes % 1 %      Absolute Neutrophils 32.15 Thousand/uL      Absolute Lymphocytes 1.40 Thousand/uL      Absolute Monocytes 1.05 Thousand/uL      Absolute Eosinophils 0.00 Thousand/uL      Absolute Basophils 0.00 Thousand/uL      Absolute Myelocytes 0.35 Thousand/uL      Total Counted --     RBC Morphology Present     Platelet Estimate Increased     Anisocytosis Present     Donna Cells Present     Polychromasia Present    Lactic acid [[id number]]  (Abnormal) Collected: 07/06/25 1034    Lab Status: Final result Specimen: Blood from Arm, Right Updated: 07/06/25 1119     LACTIC ACID 9.8 mmol/L     Narrative:      Result may be elevated if tourniquet was used during collection.    Procalcitonin [[id number]]  (Abnormal) Collected: 07/06/25 1034    Lab Status: Final result Specimen: Blood from Arm, Right Updated: 07/06/25 1112     Procalcitonin 0.70 ng/ml     HS Troponin 0hr (reflex protocol) [[id number]]  (Normal) Collected: 07/06/25 1034    Lab Status: Final result Specimen: Blood from Arm, Right Updated: 07/06/25 1110     hs TnI 0hr 40 ng/L     Comprehensive metabolic panel [[id number]]  (Abnormal) Collected: 07/06/25 1034    Lab Status: Final result Specimen: Blood from Arm, Right Updated: 07/06/25 1102     Sodium 135 mmol/L      Potassium 4.9 mmol/L      Chloride 100 mmol/L      CO2 15 mmol/L      ANION GAP 20 mmol/L      BUN 81 mg/dL      Creatinine 3.83 mg/dL      Glucose 170 mg/dL      Calcium 9.2 mg/dL      Corrected Calcium 10.6 mg/dL      AST 21 U/L      ALT 10 U/L      Alkaline Phosphatase 136 U/L      Total Protein 6.6 g/dL      Albumin 2.3 g/dL      Total Bilirubin 0.58 mg/dL      eGFR 10 ml/min/1.73sq m     Narrative:      [employer] guidelines for Chronic Kidney Disease (CKD):     Stage 1 with normal or high GFR (GFR > 90 mL/min/1.73 square meters)    Stage 2 Mild CKD (GFR = 60-89  mL/min/1.73 square meters)    Stage 3A Moderate CKD (GFR = 45-59 mL/min/1.73 square meters)    Stage 3B Moderate CKD (GFR = 30-44 mL/min/1.73 square meters)    Stage 4 Severe CKD (GFR = 15-29 mL/min/1.73 square meters)    Stage 5 End Stage CKD (GFR <15 mL/min/1.73 square meters)  Note: GFR calculation is accurate only with a steady state creatinine    Blood gas, venous [[id number]]  (Abnormal) Collected: 07/06/25 1034    Lab Status: Final result Specimen: Blood from Arm, Right Updated: 07/06/25 1059     pH, Victor M 6.974     pCO2, Victor M 49.4 mm Hg      pO2, Victor M 45.4 mm Hg      HCO3, Victor M 11.2 mmol/L      Base Excess, Victor M -20.0 mmol/L      O2 Content, Victor M 8.5 ml/dL      O2 HGB, VENOUS 53.0 %     Protime-INR [[id number]]  (Normal) Collected: 07/06/25 1034    Lab Status: Final result Specimen: Blood from Arm, Right Updated: 07/06/25 1057     Protime 14.7 seconds      INR 1.10    Narrative:      INR Therapeutic Range    Indication                                             INR Range      Atrial Fibrillation                                               2.0-3.0  Hypercoagulable State                                    2.0.2.3  Left Ventricular Asist Device                            2.0-3.0  Mechanical Heart Valve                                  -    Aortic(with afib, MI, embolism, HF, LA enlargement,    and/or coagulopathy)                                     2.0-3.0 (2.5-3.5)     Mitral                                                             2.5-3.5  Prosthetic/Bioprosthetic Heart Valve               2.0-3.0  Venous thromboembolism (VTE: VT, PE        2.0-3.0    APTT [[id number]]  (Normal) Collected: 07/06/25 1034    Lab Status: Final result Specimen: Blood from Arm, Right Updated: 07/06/25 1057     PTT 28 seconds     Blood culture #1 [[id number]] Collected: 07/06/25 1033    Lab Status: In process Specimen: Blood from Arm, Right Updated: 07/06/25 1038    Blood culture #2 [[id number]] Collected: 07/06/25 1033    Lab Status:  In process Specimen: Blood from Arm, Right Updated: 07/06/25 1037    Fingerstick Glucose (POCT) [[id number]]  (Abnormal) Collected: 07/06/25 1008    Lab Status: Final result Specimen: Blood Updated: 07/06/25 1009     POC Glucose 143 mg/dl     UA w Reflex to Microscopic w Reflex to Culture [[id number]]     Lab Status: No result Specimen: Urine             CT head without contrast   Final Interpretation by Pallav N Shah, MD (07/06 1304)      No acute intracranial abnormality.      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      Stable asymmetric left lateral ventriculomegaly.                  Workstation performed: [id number]         CT chest abdomen pelvis wo contrast   Final Interpretation by Richar Drake MD (07/06 1314)      Small to moderate bilateral pleural effusions with overlying compressive atelectasis.      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      Borderline and mildly enlarged retroperitoneal lymph nodes, minimally decreased in size from March 2025.      Bilateral nephrostomy catheters. No hydronephrosis. Collapsed but diffusely thick-walled urinary bladder.            Computerized Assisted Algorithm (CAA) may have aided analysis of applicable images.         Workstation performed: [id number]         XR chest 1 view portable    (Results Pending)       Procedures    ED Medication and Procedure Management   Prior to Admission Medications   Prescriptions Last Dose Informant Patient Reported? Taking?   acetaminophen (TYLENOL) 500 mg tablet  Self Yes No   Sig: Take 500 mg by mouth   aspirin 81 mg chewable tablet  Self Yes No   Sig: Chew 81 mg daily   atorvastatin (LIPITOR) 40 mg tablet  Self No No   Sig: Take 1 tablet (40 mg total) by mouth daily with dinner   cholecalciferol (VITAMIN D3) 1,000 units tablet  Self Yes No   Sig: Take 1,000 Units by mouth daily   loperamide (IMODIUM) 2 mg capsule   Yes No   Sig: Take 2 mg by mouth as needed for diarrhea    metoprolol succinate (TOPROL-XL) 50 mg 24 hr tablet  Self Yes No   Sig: Take 25 mg by mouth every morning   saccharomyces boulardii (FLORASTOR) 250 mg capsule   No No   Sig: Take 1 capsule (250 mg total) by mouth 2 (two) times a day   sodium chloride, PF, 0.9 %  Self No No   Sig: 10 mL by Intracatheter route daily Intracatheter flushing daily. May substitute prefilled syringe with normal saline 10 mL vials, 10 mL syringes, and 18 g blunt needles      Facility-Administered Medications: None     Patient's Medications   Discharge Prescriptions    No medications on file     No discharge procedures on file.  ED SEPSIS DOCUMENTATION   Time reflects when diagnosis was documented in both MDM as applicable and the Disposition within this note       Time User Action Codes Description Comment    7/6/2025  2:55 PM Sherman Fierro Add [A41.9,  R65.21] Septic shock (HCC)     7/6/2025  3:17 PM Sherman Fierro Add [R79.89] Elevated lactic acid level     7/6/2025  3:17 PM Sherman Fierro Add [D72.829] Leukocytosis            Initial Sepsis Screening       Row Name 07/06/25 1517                Is the patient's history suggestive of a new or worsening infection? Yes (Proceed)  -CJ        Suspected source of infection suspect infection, source unknown  -CJ        Indicate SIRS criteria Tachypnea > 20 resp per min;Leukocytosis (WBC > 07677 IJL) OR Leukopenia (WBC <4000 IJL) OR Bandemia (WBC >10% bands)  -CJ        Are two or more of the above signs & symptoms of infection both present and new to the patient? Yes (Proceed)  -CJ        Assess for evidence of organ dysfunction: Are any of the below criteria present within 6 hours of suspected infection and SIRS criteria that are NOT considered to be chronic conditions? SBP < 90;MAP < 65;Lactate >/equal 4.0  -CJ        Date of presentation of septic shock 07/06/25  -        Time of presentation of septic shock 1100  -        Fluid Resuscitation: 30 ml/kg IV fluid bolus will be given  "based on actual body weight  -CJ        Is the patient persistently hypotensive in the hour after fluid bolus administration? If yes, patient meets criteria for vasopressor use. YES  -CJ        Sepsis Note: Click \"NEXT\" below (NOT \"close\") to generate sepsis note based on above information. YES (proceed by clicking \"NEXT\")  -                  User Key  (r) = Recorded By, (t) = Taken By, (c) = Cosigned By      Initials Name Provider Type    JOSE Fierro DO Physician                         [1]   Past Medical History:  Diagnosis Date    Arthritis     right knee    Hay fever     Hyperlipidemia     Hypertension     Myocardial infarction (HCC) 2003    questionable MI during exploratory surgery    Ovarian cancer (HCC) 2003    surgery (did not have chemo or radiation)    Stroke (HCC) 2003    \" mini stroke \" no deficits ; another poss stroke with  left hand weakness    Wears glasses     Wears partial dentures     upper & lower   [2]   Past Surgical History:  Procedure Laterality Date    BREAST SURGERY Right     exc. bx. lump (R) breast    DILATION AND CURETTAGE OF UTERUS      HYSTERECTOMY  2003    TAHBSO    IR NEPHROSTOMY TUBE CHECK/CHANGE/REPOSITION/REINSERTION/UPSIZE  5/24/2023    IR NEPHROSTOMY TUBE CHECK/CHANGE/REPOSITION/REINSERTION/UPSIZE  7/13/2023    IR NEPHROSTOMY TUBE CHECK/CHANGE/REPOSITION/REINSERTION/UPSIZE  8/29/2023    IR NEPHROSTOMY TUBE CHECK/CHANGE/REPOSITION/REINSERTION/UPSIZE  10/11/2023    IR NEPHROSTOMY TUBE CHECK/CHANGE/REPOSITION/REINSERTION/UPSIZE  12/11/2023    IR NEPHROSTOMY TUBE CHECK/CHANGE/REPOSITION/REINSERTION/UPSIZE  2/9/2024    IR NEPHROSTOMY TUBE CHECK/CHANGE/REPOSITION/REINSERTION/UPSIZE  4/8/2024    IR NEPHROSTOMY TUBE CHECK/CHANGE/REPOSITION/REINSERTION/UPSIZE  6/7/2024    IR NEPHROSTOMY TUBE CHECK/CHANGE/REPOSITION/REINSERTION/UPSIZE  8/7/2024    IR NEPHROSTOMY TUBE CHECK/CHANGE/REPOSITION/REINSERTION/UPSIZE  10/7/2024    IR NEPHROSTOMY TUBE " CHECK/CHANGE/REPOSITION/REINSERTION/UPSIZE  1/7/2025    IR NEPHROSTOMY TUBE CHECK/CHANGE/REPOSITION/REINSERTION/UPSIZE  1/29/2025    IR NEPHROSTOMY TUBE CHECK/CHANGE/REPOSITION/REINSERTION/UPSIZE  3/24/2025    IR NEPHROSTOMY TUBE CHECK/CHANGE/REPOSITION/REINSERTION/UPSIZE  4/23/2025    IR NEPHROSTOMY TUBE CHECK/CHANGE/REPOSITION/REINSERTION/UPSIZE  6/30/2025    IR NEPHROSTOMY TUBE PLACEMENT  4/13/2023    JOINT REPLACEMENT Right     OOPHORECTOMY Bilateral     cancer    SD CYSTO W/REMOVAL OF TUMORS SMALL N/A 4/4/2022    Procedure: TRANSURETHRAL RESECTION OF BLADDER (MULTIFOCAL) TUMOR (TURBT);  Surgeon: Vinnie Ricardo MD;  Location: [facility];  Service: Urology    SD CYSTOURETHROSCOPY W/DEST &/RMVL TUMOR LARGE Bilateral 11/11/2021    Procedure: CYSTOSCOPY, TRANSURETHRAL RESECTION OF BLADDER TUMOR (TURBT), EVACUATION OF CLOTS, LARGE TUMOR 7-8CM;  Surgeon: Malcolm Reyes MD;  Location: [facility];  Service: Urology    SD CYSTOURETHROSCOPY W/DEST &/RMVL TUMOR LARGE N/A 3/21/2023    Procedure: TRANSURETHRAL RESECTION OF BLADDER TUMOR (TURBT);  Surgeon: Vinnie Ricardo MD;  [facility]: [facility];  Service: Urology    SD CYSTOURETHROSCOPY W/URETERAL CATHETERIZATION Bilateral 4/4/2022    Procedure: CYSTOSCOPY WITH ATTEMPTED B/L RETROGRADE PYELOGRAM;  Surgeon: Vinnie Ricardo MD;  Location: [facility];  Service: Urology    SD XCAPSL CTRC RMVL INSJ IO LENS PROSTH W/O ECP Left 7/24/2017    Procedure: EXTRACTION EXTRACAPSULAR CATARACT PHACO INTRAOCULAR LENS (IOL);  Surgeon: Elmer Ocampo MD;  Location: [facility] OR;  Service: Ophthalmology    TONSILLECTOMY     [3]   Family History  Problem Relation Name Age of Onset    Parkinsonism Mother      Heart disease Father      Cancer Father          leukemia    Heart disease Sister leonor     Diabetes Sister leonor     Diabetes Brother      Diabetes Sister yazmin     Melanoma Sister hesham     Melanoma Sister reina     Diabetes Maternal Aunt      No Known Problems Maternal  Aunt      No Known Problems Paternal Aunt     [4]   Social History  Tobacco Use    Smoking status: Former     Current packs/day: 0.00     Average packs/day: 1.5 packs/day for 40.0 years (60.0 ttl pk-yrs)     Types: Cigarettes     Start date:      Quit date:      Years since quittin.    Smokeless tobacco: Never   Vaping Use    Vaping status: Never Used   Substance Use Topics    Alcohol use: Yes     Comment: occassional, socially    Drug use: No        Sherman Fierro DO  25 1524
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
## 2025-07-06 NOTE — PLAN OF CARE
Problem: PAIN - ADULT  Goal: Verbalizes/displays adequate comfort level or baseline comfort level  Description: Interventions:  - Encourage patient to monitor pain and request assistance  - Assess pain using appropriate pain scale  - Administer analgesics as ordered based on type and severity of pain and evaluate response  - Implement non-pharmacological measures as appropriate and evaluate response  - Consider cultural and social influences on pain and pain management  - Notify physician/advanced practitioner if interventions unsuccessful or patient reports new pain  - Educate patient/family on pain management process including their role and importance of  reporting pain   - Provide non-pharmacologic/complimentary pain relief interventions  Outcome: Progressing     Problem: SAFETY ADULT  Goal: Patient will remain free of falls  Description: INTERVENTIONS:  - Educate patient/family on patient safety including physical limitations  - Instruct patient to call for assistance with activity   - Consider consulting OT/PT to assist with strengthening/mobility based on AM PAC & JH-HLM score  - Consult OT/PT to assist with strengthening/mobility   - Keep Call bell within reach  - Keep bed low and locked with side rails adjusted as appropriate  - Keep care items and personal belongings within reach  - Initiate and maintain comfort rounds  - Make Fall Risk Sign visible to staff  - Offer Toileting every 2 Hours, in advance of need  - Initiate/Maintain bed alarm  - Obtain necessary fall risk management equipment: alarm  - Apply yellow socks and bracelet for high fall risk patients  - Consider moving patient to room near nurses station  Outcome: Progressing     Problem: DISCHARGE PLANNING  Goal: Discharge to home or other facility with appropriate resources  Description: INTERVENTIONS:  - Identify barriers to discharge w/patient and caregiver  - Arrange for needed discharge resources and transportation as appropriate  -  Identify discharge learning needs (meds, wound care, etc.)  - Arrange for interpretive services to assist at discharge as needed  - Refer to Case Management Department for coordinating discharge planning if the patient needs post-hospital services based on physician/advanced practitioner order or complex needs related to functional status, cognitive ability, or social support system  Outcome: Progressing     Problem: Knowledge Deficit  Goal: Patient/family/caregiver demonstrates understanding of disease process, treatment plan, medications, and discharge instructions  Description: Complete learning assessment and assess knowledge base.  Interventions:  - Provide teaching at level of understanding  - Provide teaching via preferred learning methods  Outcome: Progressing     Problem: SKIN/TISSUE INTEGRITY - ADULT  Goal: Skin Integrity remains intact(Skin Breakdown Prevention)  Description: Assess:  -Perform Ramirez assessment every 12  -Clean and moisturize skin every 12  -Inspect skin when repositioning, toileting, and assisting with ADLS    -Assess extremities for adequate circulation and sensation     Bed Management:  -Have minimal linens on bed & keep smooth, unwrinkled  -Change linens as needed when moist or perspiring  -Avoid sitting or lying in one position for more than 2 hours while in bed  -Keep HOB at 30degrees     Toileting:  -Offer bedside commode  -Assess for incontinence every 2  -Use incontinent care products after each incontinent episode such as foam    Activity:    -Encourage or provide ROM exercises   -Turn and reposition patient every 2 Hours  -Use appropriate equipment to lift or move patient in bed        Skin Care:  -Avoid use of baby powder, tape, friction and shearing, hot water or constrictive clothing  -Relieve pressure over bony prominences using foam  -Do not massage red bony areas      Outcome: Progressing  Goal: Pressure injury heals and does not worsen  Description: Interventions:  -  Implement low air loss mattress or specialty surface (Criteria met)  - Apply silicone foam dressing      - Use special pressure reducing interventions such as cushion when in chair   - Apply fecal or urinary incontinence containment device   - Perform passive or active ROM every 12  - Turn and reposition patient & offload bony prominences every 2 hours   - Utilize friction reducing device or surface for transfers     - Use incontinent care products after each incontinent episode such as foam  - Consider nutrition services referral as needed  Outcome: Progressing     Problem: Communication Deficit  Goal: Patient/caregiver/family will effectively communicate symptoms, needs and concerns  Outcome: Progressing     Problem: Dyspnea at end of life  Goal: Patient/caregiver/family will demonstrate understanding of an ability to manage respiratory symptoms at end of life  Outcome: Progressing     Problem: Imminent death  Goal: Collaborate with patient, family, caregiver and interdisciplinary team to minimize end of life symptoms  Outcome: Progressing

## 2025-07-07 NOTE — PROGRESS NOTES
Progress Note - Hospitalist   Name: Ning Chambers 85 y.o. female I MRN: [id number]  Unit/Bed#: 2 [address] Date of Admission: 7/6/2025   Date of Service: 7/7/2025 I Hospital Day: 1    Assessment & Plan  Comfort measures only status  History of stroke hypertension CKD 5 and bladder cancer with obstructive uropathy and bilateral nephrostomy tubes who presented to the hospital for change in mental status  In the ED found to have septic shock likely secondary to UTI  After discussion by ED with patient/son decision was for comfort care measures  Comfort medications ordered.  Inpatient hospice consult placed.  Septic shock (HCC)  Presentation to the hospital with septic shock present on admission likely related to UTI  Briefly on norepinephrine and IV fluids  Acute metabolic encephalopathy.  Secondary to hypoperfusion/sepsis.  Mentation is much improved since arrival.  Patient now comfort care measures    Results from last 7 days   Lab Units 07/06/25  1403 07/06/25  1034   LACTIC ACID mmol/L 9.0* 9.8*     Malignant neoplasm of overlapping sites of bladder (HCC)  Obstructive uropathy  Status post bilateral nephrostomy tubes  Stage 5 chronic kidney disease 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)  Patient comfort care.    Results from last 7 days   Lab Units 07/06/25  1034   BUN mg/dL 81*   CREATININE mg/dL 3.83*   EGFR ml/min/1.73sq m 10     History of stroke  Prior to admission on aspirin and atorvastatin  Benign hypertension  Holding metoprolol due to hypotension/septic shock    VTE Pharmacologic Prophylaxis: VTE Score: 3 Moderate Risk (Score 3-4) - Pharmacological DVT Prophylaxis Contraindicated. Sequential Compression Devices Ordered.    Mobility:   Basic Mobility Inpatient Raw Score: 8  -HLM Goal: 3: Sit at edge of bed  JH-HLM Achieved: 2: Bed activities/Dependent transfer  JH-HLM Goal achieved. Continue to encourage appropriate mobility.    Patient Centered Rounds: I performed bedside rounds with nursing staff today.    Discussions with Specialists or Other Care Team Provider: hospice     Education and Discussions with Family / Patient: Updated  (son) at bedside.    Current Length of Stay: 1 day(s)  Current Patient Status: Inpatient   Certification Statement: The patient will continue to require additional inpatient hospital stay due to septic shock on comfort measures requiring pain medications  Discharge Plan: Anticipate discharge in 24-48 hrs to Comfort m\earuse     Code Status: Level 4 - Comfort Care    Subjective   Patient seen today at bedside.  She was opening her eyes and answering briefly with 1 word statements.  She was noted to be in mild discomfort secondary to respiratory secretions and cough.  Family at bedside.  Discussed with them the anticipated plan above.    Objective :  Temp:  [96.4 °F (35.8 °C)-96.7 °F (35.9 °C)] 96.7 °F (35.9 °C)  HR:  [65-82] 73  BP: ()/(0-97) 94/42  Resp:  [15-26] 24  SpO2:  [90 %-100 %] 93 %  O2 Device: Nasal cannula  Nasal Cannula O2 Flow Rate (L/min):  [4 L/min-15 L/min] 4 L/min    Body mass index is 25.19 kg/m².     Input and Output Summary (last 24 hours):     Intake/Output Summary (Last 24 hours) at 7/7/2025 1019  Last data filed at 7/7/2025 0600  Gross per 24 hour   Intake 2939.5 ml   Output 275 ml   Net 2664.5 ml       Physical Exam  Vitals and nursing note reviewed.   Constitutional:       Appearance: She is ill-appearing.     Cardiovascular:      Rate and Rhythm: Normal rate.      Pulses: Normal pulses.      Heart sounds: Murmur heard.   Pulmonary:      Effort: Pulmonary effort is normal.      Breath sounds: Decreased breath sounds present.   Abdominal:      General: Abdomen is flat.      Palpations: Abdomen is soft.      Tenderness: There is generalized abdominal tenderness.     Skin:     General: Skin is warm.     Neurological:      Mental Status: She is disoriented.     Psychiatric:         Behavior: Behavior normal.            Lines/Drains:  Lines/Drains/Airways       Active Status       Name Placement date Placement time Site Days    Nephrostomy Left 10.2 Fr. 06/30/25  1140  Left  6    Nephrostomy Right 10.2 Fr. 06/30/25  1149  Right  6                            Lab Results: I have reviewed the following results:   Results from last 7 days   Lab Units 07/06/25  1034   WBC Thousand/uL 34.95*   HEMOGLOBIN g/dL 12.0   HEMATOCRIT % 39.6   PLATELETS Thousands/uL 607*   BANDS PCT % 2   LYMPHO PCT % 4*   MONO PCT % 3*   EOS PCT % 0     Results from last 7 days   Lab Units 07/06/25  1034   SODIUM mmol/L 135   POTASSIUM mmol/L 4.9   CHLORIDE mmol/L 100   CO2 mmol/L 15*   BUN mg/dL 81*   CREATININE mg/dL 3.83*   ANION GAP mmol/L 20*   CALCIUM mg/dL 9.2   ALBUMIN g/dL 2.3*   TOTAL BILIRUBIN mg/dL 0.58   ALK PHOS U/L 136*   ALT U/L 10   AST U/L 21   GLUCOSE RANDOM mg/dL 170*     Results from last 7 days   Lab Units 07/06/25  1034   INR  1.10     Results from last 7 days   Lab Units 07/06/25  1008   POC GLUCOSE mg/dl 143*         Results from last 7 days   Lab Units 07/06/25  1403 07/06/25  1034   LACTIC ACID mmol/L 9.0* 9.8*   PROCALCITONIN ng/ml  --  0.70*       Recent Cultures (last 7 days):   Results from last 7 days   Lab Units 07/06/25  1033   BLOOD CULTURE  Received in Microbiology Lab. Culture in Progress.  Received in Microbiology Lab. Culture in Progress.       Imaging Results Review: No pertinent imaging studies reviewed.  Other Study Results Review: No additional pertinent studies reviewed.    Last 24 Hours Medication List:     Current Facility-Administered Medications:     glycopyrrolate (ROBINUL) injection 0.2 mg, Q4H PRN    HYDROmorphone (DILAUDID) injection 0.5 mg, Q2H PRN    LORazepam (ATIVAN) injection 1 mg, Q10 Min PRN    ondansetron (ZOFRAN) injection 4 mg, Q4H PRN    Administrative Statements   Today, Patient Was Seen By: Carla Baltazar MD  I have spent a total time of 30 minutes in caring for this patient on the day  of the visit/encounter including Diagnostic results, Prognosis, Risks and benefits of tx options, and Instructions for management.    **Please Note: This note may have been constructed using a voice recognition system.**

## 2025-07-07 NOTE — PLAN OF CARE
Problem: PAIN - ADULT  Goal: Verbalizes/displays adequate comfort level or baseline comfort level  Description: Interventions:  - Encourage patient to monitor pain and request assistance  - Assess pain using appropriate pain scale  - Administer analgesics as ordered based on type and severity of pain and evaluate response  - Implement non-pharmacological measures as appropriate and evaluate response  - Consider cultural and social influences on pain and pain management  - Notify physician/advanced practitioner if interventions unsuccessful or patient reports new pain  - Educate patient/family on pain management process including their role and importance of  reporting pain   - Provide non-pharmacologic/complimentary pain relief interventions  Outcome: Progressing     Problem: DISCHARGE PLANNING  Goal: Discharge to home or other facility with appropriate resources  Description: INTERVENTIONS:  - Identify barriers to discharge w/patient and caregiver  - Arrange for needed discharge resources and transportation as appropriate  - Identify discharge learning needs (meds, wound care, etc.)  - Arrange for interpretive services to assist at discharge as needed  - Refer to Case Management Department for coordinating discharge planning if the patient needs post-hospital services based on physician/advanced practitioner order or complex needs related to functional status, cognitive ability, or social support system  Outcome: Progressing     Problem: Knowledge Deficit  Goal: Patient/family/caregiver demonstrates understanding of disease process, treatment plan, medications, and discharge instructions  Description: Complete learning assessment and assess knowledge base.  Interventions:  - Provide teaching at level of understanding  - Provide teaching via preferred learning methods  Outcome: Progressing     Problem: Communication Deficit  Goal: Patient/caregiver/family will effectively communicate symptoms, needs and  concerns  Outcome: Progressing     Problem: Dyspnea at end of life  Goal: Patient/caregiver/family will demonstrate understanding of an ability to manage respiratory symptoms at end of life  Outcome: Progressing     Problem: Imminent death  Goal: Collaborate with patient, family, caregiver and interdisciplinary team to minimize end of life symptoms  Outcome: Progressing     Problem: SKIN/TISSUE INTEGRITY - ADULT  Goal: Skin Integrity remains intact(Skin Breakdown Prevention)  Description: Assess:  -Perform Ramirez assessment every 12  -Clean and moisturize skin every 12  -Inspect skin when repositioning, toileting, and assisting with ADLS    -Assess extremities for adequate circulation and sensation     Bed Management:  -Have minimal linens on bed & keep smooth, unwrinkled  -Change linens as needed when moist or perspiring  -Avoid sitting or lying in one position for more than 2 hours while in bed  -Keep HOB at 30degrees     Toileting:  -Offer bedside commode  -Assess for incontinence every 2  -Use incontinent care products after each incontinent episode such as foam    Activity:    -Encourage or provide ROM exercises   -Turn and reposition patient every 2 Hours  -Use appropriate equipment to lift or move patient in bed        Skin Care:  -Avoid use of baby powder, tape, friction and shearing, hot water or constrictive clothing  -Relieve pressure over bony prominences using foam  -Do not massage red bony areas      Outcome: Progressing  Goal: Pressure injury heals and does not worsen  Description: Interventions:  - Implement low air loss mattress or specialty surface (Criteria met)  - Apply silicone foam dressing      - Use special pressure reducing interventions such as cushion when in chair   - Apply fecal or urinary incontinence containment device   - Perform passive or active ROM every 12  - Turn and reposition patient & offload bony prominences every 2 hours   - Utilize friction reducing device or surface for  transfers     - Use incontinent care products after each incontinent episode such as foam  - Consider nutrition services referral as needed  Outcome: Progressing

## 2025-07-07 NOTE — PLAN OF CARE
Problem: PAIN - ADULT  Goal: Verbalizes/displays adequate comfort level or baseline comfort level  Description: Interventions:  - Encourage patient to monitor pain and request assistance  - Assess pain using appropriate pain scale  - Administer analgesics as ordered based on type and severity of pain and evaluate response  - Implement non-pharmacological measures as appropriate and evaluate response  - Consider cultural and social influences on pain and pain management  - Notify physician/advanced practitioner if interventions unsuccessful or patient reports new pain  - Educate patient/family on pain management process including their role and importance of  reporting pain   - Provide non-pharmacologic/complimentary pain relief interventions  Outcome: Progressing     Problem: SAFETY ADULT  Goal: Patient will remain free of falls  Description: INTERVENTIONS:  - Educate patient/family on patient safety including physical limitations  - Instruct patient to call for assistance with activity   - Consider consulting OT/PT to assist with strengthening/mobility based on AM PAC & JH-HLM score  - Consult OT/PT to assist with strengthening/mobility   - Keep Call bell within reach  - Keep bed low and locked with side rails adjusted as appropriate  - Keep care items and personal belongings within reach  - Initiate and maintain comfort rounds  - Make Fall Risk Sign visible to staff  - Offer Toileting every 2 Hours, in advance of need  - Initiate/Maintain bed alarm  - Obtain necessary fall risk management equipment: alarm  - Apply yellow socks and bracelet for high fall risk patients  - Consider moving patient to room near nurses station  Outcome: Progressing     Problem: DISCHARGE PLANNING  Goal: Discharge to home or other facility with appropriate resources  Description: INTERVENTIONS:  - Identify barriers to discharge w/patient and caregiver  - Arrange for needed discharge resources and transportation as appropriate  -  Identify discharge learning needs (meds, wound care, etc.)  - Arrange for interpretive services to assist at discharge as needed  - Refer to Case Management Department for coordinating discharge planning if the patient needs post-hospital services based on physician/advanced practitioner order or complex needs related to functional status, cognitive ability, or social support system  Outcome: Progressing     Problem: Knowledge Deficit  Goal: Patient/family/caregiver demonstrates understanding of disease process, treatment plan, medications, and discharge instructions  Description: Complete learning assessment and assess knowledge base.  Interventions:  - Provide teaching at level of understanding  - Provide teaching via preferred learning methods  Outcome: Progressing     Problem: SKIN/TISSUE INTEGRITY - ADULT  Goal: Skin Integrity remains intact(Skin Breakdown Prevention)  Description: Assess:  -Perform Ramirez assessment every 12  -Clean and moisturize skin every 12  -Inspect skin when repositioning, toileting, and assisting with ADLS    -Assess extremities for adequate circulation and sensation     Bed Management:  -Have minimal linens on bed & keep smooth, unwrinkled  -Change linens as needed when moist or perspiring  -Avoid sitting or lying in one position for more than 2 hours while in bed  -Keep HOB at 30degrees     Toileting:  -Offer bedside commode  -Assess for incontinence every 2  -Use incontinent care products after each incontinent episode such as foam    Activity:    -Encourage or provide ROM exercises   -Turn and reposition patient every 2 Hours  -Use appropriate equipment to lift or move patient in bed        Skin Care:  -Avoid use of baby powder, tape, friction and shearing, hot water or constrictive clothing  -Relieve pressure over bony prominences using foam  -Do not massage red bony areas      Outcome: Progressing  Goal: Pressure injury heals and does not worsen  Description: Interventions:  -  Implement low air loss mattress or specialty surface (Criteria met)  - Apply silicone foam dressing      - Use special pressure reducing interventions such as cushion when in chair   - Apply fecal or urinary incontinence containment device   - Perform passive or active ROM every 12  - Turn and reposition patient & offload bony prominences every 2 hours   - Utilize friction reducing device or surface for transfers     - Use incontinent care products after each incontinent episode such as foam  - Consider nutrition services referral as needed  Outcome: Progressing     Problem: Communication Deficit  Goal: Patient/caregiver/family will effectively communicate symptoms, needs and concerns  Outcome: Progressing     Problem: Dyspnea at end of life  Goal: Patient/caregiver/family will demonstrate understanding of an ability to manage respiratory symptoms at end of life  Outcome: Progressing     Problem: Imminent death  Goal: Collaborate with patient, family, caregiver and interdisciplinary team to minimize end of life symptoms  Outcome: Progressing     Problem: Prexisting or High Potential for Compromised Skin Integrity  Goal: Skin integrity is maintained or improved  Description: INTERVENTIONS:  - Identify patients at risk for skin breakdown  - Assess and monitor skin integrity including under and around medical devices   - Assess and monitor nutrition and hydration status  - Monitor labs  - Assess for incontinence   - Turn and reposition patient  - Assist with mobility/ambulation  - Relieve pressure over kirstie prominences   - Avoid friction and shearing  - Provide appropriate hygiene as needed including keeping skin clean and dry  - Evaluate need for skin moisturizer/barrier cream  - Collaborate with interdisciplinary team  - Patient/family teaching  - Consider wound care consult    Assess:  - Review Ramirez scale daily  - Clean and moisturize skin every shift  - Inspect skin when repositioning, toileting, and assisting  with ADLS    - Assess extremities for adequate circulation and sensation     Bed Management:  - Have minimal linens on bed & keep smooth, unwrinkled  - Change linens as needed when moist or perspiring  - Avoid sitting or lying in one position for more than 2 hours while in bed?Keep HOB at 30 degrees   - Toileting:  - Offer bedside commode  - Assess for incontinence every 2 to 3 hours  - Use incontinent care products after each incontinent episode  Activity:    - Encourage activity and walks on unit  - Encourage or provide ROM exercises   - Turn and reposition patient every 2 Hours  - Use appropriate equipment to lift or move patient in bed  - Instruct/ Assist with weight shifting every 2 hours when out of bed in chair  - Consider limitation of chair time 2 hour intervals    Skin Care:  - Avoid use of baby powder, tape, friction and shearing, hot water or constrictive clothing  - Relieve pressure over bony prominences using waffle cushion, wedges  - Do not massage red bony areas      Outcome: Progressing

## 2025-07-07 NOTE — DISCHARGE SUMMARY
Discharge Summary - Hospitalist   Name: Ning Chambers 85 y.o. female I MRN: [id number]  Unit/Bed#: 2 [address] Date of Admission: 7/6/2025   Date of Service: 7/7/2025 I Hospital Day: 1     Assessment & Plan  Comfort measures only status  History of stroke hypertension CKD 5 and bladder cancer with obstructive uropathy and bilateral nephrostomy tubes who presented to the hospital for change in mental status  In the ED found to have septic shock likely secondary to UTI  After discussion by ED with patient/son decision was for comfort care measures  Comfort medications ordered.  Inpatient hospice consult placed.  Septic shock (HCC)  Presentation to the hospital with septic shock present on admission likely related to UTI  Briefly on norepinephrine and IV fluids  Acute metabolic encephalopathy.  Secondary to hypoperfusion/sepsis.  Mentation is much improved since arrival.  Patient now comfort care measures    Results from last 7 days   Lab Units 07/06/25  1403 07/06/25  1034   LACTIC ACID mmol/L 9.0* 9.8*     Malignant neoplasm of overlapping sites of bladder (HCC)  Obstructive uropathy  Status post bilateral nephrostomy tubes  Stage 5 chronic kidney disease 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)  Patient comfort care.    Results from last 7 days   Lab Units 07/06/25  1034   BUN mg/dL 81*   CREATININE mg/dL 3.83*   EGFR ml/min/1.73sq m 10     History of stroke  Prior to admission on aspirin and atorvastatin  Benign hypertension  Holding metoprolol due to hypotension/septic shock     Medical Problems       Resolved Problems  Date Reviewed: 7/7/2025   None       Discharging Physician / Practitioner: Carla Baltazar MD  PCP: BHAVIK Morillo  Admission Date:   Admission Orders (From admission, onward)       Ordered        07/06/25 1458  INPATIENT ADMISSION  Once            07/06/25 1455  Place in Observation  Once,   Status:  Canceled                          Discharge Date: 07/07/25        Consultations During Hospital  Stay:  Hospice     Procedures Performed:   CT head without contrast   Final Result      No acute intracranial abnormality.      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      Stable asymmetric left lateral ventriculomegaly.                  Workstation performed: [id number]         CT chest abdomen pelvis wo contrast   Final Result      Small to moderate bilateral pleural effusions with overlying compressive atelectasis.      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      Borderline and mildly enlarged retroperitoneal lymph nodes, minimally decreased in size from March 2025.      Bilateral nephrostomy catheters. No hydronephrosis. Collapsed but diffusely thick-walled urinary bladder.            Computerized Assisted Algorithm (CAA) may have aided analysis of applicable images.         Workstation performed: [id number]         XR chest 1 view portable   Final Result      Bilateral pleural effusions            Workstation performed: [id number]               Significant Findings / Test Results:   Results for orders placed or performed during the hospital encounter of 07/06/25   Blood culture #1    Specimen: Arm, Right; Blood   Result Value Ref Range    Blood Culture Received in Microbiology Lab. Culture in Progress.    Blood culture #2    Specimen: Arm, Right; Blood   Result Value Ref Range    Blood Culture Received in Microbiology Lab. Culture in Progress.    CBC and differential   Result Value Ref Range    WBC 34.95 (H) 4.31 - 10.16 Thousand/uL    RBC 4.24 3.81 - 5.12 Million/uL    Hemoglobin 12.0 11.5 - 15.4 g/dL    Hematocrit 39.6 34.8 - 46.1 %    MCV 93 82 - 98 fL    MCH 28.3 26.8 - 34.3 pg    MCHC 30.3 (L) 31.4 - 37.4 g/dL    RDW 14.4 11.6 - 15.1 %    MPV 10.8 8.9 - 12.7 fL    Platelets 607 (H) 149 - 390 Thousands/uL   Comprehensive metabolic panel   Result Value Ref Range    Sodium 135 135 - 147 mmol/L    Potassium 4.9 3.5 - 5.3 mmol/L    Chloride 100 96 - 108  "mmol/L    CO2 15 (L) 21 - 32 mmol/L    ANION GAP 20 (H) 4 - 13 mmol/L    BUN 81 (H) 5 - 25 mg/dL    Creatinine 3.83 (H) 0.60 - 1.30 mg/dL    Glucose 170 (H) 65 - 140 mg/dL    Calcium 9.2 8.4 - 10.2 mg/dL    Corrected Calcium 10.6 (H) 8.3 - 10.1 mg/dL    AST 21 13 - 39 U/L    ALT 10 7 - 52 U/L    Alkaline Phosphatase 136 (H) 34 - 104 U/L    Total Protein 6.6 6.4 - 8.4 g/dL    Albumin 2.3 (L) 3.5 - 5.0 g/dL    Total Bilirubin 0.58 0.20 - 1.00 mg/dL    eGFR 10 ml/min/1.73sq m   Lactic acid   Result Value Ref Range    LACTIC ACID 9.8 (HH) 0.5 - 2.0 mmol/L   Procalcitonin   Result Value Ref Range    Procalcitonin 0.70 (H) <=0.25 ng/ml   Protime-INR   Result Value Ref Range    Protime 14.7 12.3 - 15.0 seconds    INR 1.10 0.85 - 1.19   APTT   Result Value Ref Range    PTT 28 23 - 34 seconds   Blood gas, venous   Result Value Ref Range    pH, Victor M 6.974 (LL) 7.300 - 7.400    pCO2, Victor M 49.4 42.0 - 50.0 mm Hg    pO2, Victor M 45.4 (H) 35.0 - 45.0 mm Hg    HCO3, Victor M 11.2 (L) 24 - 30 mmol/L    Base Excess, Victor M -20.0 mmol/L    O2 Content, Victor M 8.5 ml/dL    O2 HGB, VENOUS 53.0 (L) 60.0 - 80.0 %   HS Troponin 0hr (reflex protocol)   Result Value Ref Range    hs TnI 0hr 40 \"Refer to ACS Flowchart\"- see link ng/L   Lactic acid 2 Hours   Result Value Ref Range    LACTIC ACID 9.0 (HH) 0.5 - 2.0 mmol/L   ECG 12 lead   Result Value Ref Range    Ventricular Rate 65 BPM    Atrial Rate 65 BPM    AZ Interval 180 ms    QRSD Interval 122 ms    QT Interval 500 ms    QTC Interval 520 ms    P [place] 49 degrees    QRS Axis -39 degrees    T Wave Axis -83 degrees   Fingerstick Glucose (POCT)   Result Value Ref Range    POC Glucose 143 (H) 65 - 140 mg/dl   Manual Differential(PHLEBS Do Not Order)   Result Value Ref Range    Segmented % 90 (H) 43 - 75 %    Bands % 2 0 - 8 %    Lymphocytes % 4 (L) 14 - 44 %    Monocytes % 3 (L) 4 - 12 %    Eosinophils % 0 0 - 6 %    Basophils % 0 0 - 1 %    Myelocytes % 1 0 - 1 %    Absolute Neutrophils 32.15 (H) 1.85 - 7.62 " Thousand/uL    Absolute Lymphocytes 1.40 0.60 - 4.47 Thousand/uL    Absolute Monocytes 1.05 0.00 - 1.22 Thousand/uL    Absolute Eosinophils 0.00 0.00 - 0.40 Thousand/uL    Absolute Basophils 0.00 0.00 - 0.10 Thousand/uL    Absolute Myelocytes 0.35 (H) 0.00 - 0.10 Thousand/uL    Total Counted      RBC Morphology Present     Platelet Estimate Increased (A) Adequate    Anisocytosis Present     Donna Cells Present     Polychromasia Present          Incidental Findings:   None    I reviewed the above mentioned incidental findings with the patient and/or family and they expressed understanding.    Hospital Course:   Ning Chambers is a 85 y.o. female patient who originally presented to the hospital on 2025 due to septic shock secondary to possibleUTI . Family opted to transitioning patient to comfort measures only with hospice. Patient was kept comfortable during hospitalization as possible with family at bedside. Patient passed /  at  10:28 am on 2025 with family at bedside. [facility].         The patient, initially admitted to the hospital as inpatient, was discharged earlier than expected given the following: comfort measures / passed on hospice .  Please see above list of diagnoses and related plan for additional information.     Discharge Day Visit / Exam:   Subjective:  patient seen at bedside. She is not responsive to voice/stimuli .,.. No light reflex.   Vitals: Blood Pressure: (!) 94/42 (25)  Pulse: 73 (25)  Temperature: (!) 96.7 °F (35.9 °C) (25)  Temp Source: Temporal (25)  Respirations: (!) 24 (25)  Height: 5' (152.4 cm) (25)  Weight - Scale: 58.5 kg (129 lb) (25)  SpO2: 93 % (25)  Physical Exam  Vitals and nursing note reviewed.   Constitutional:       Appearance: She is ill-appearing.   Abdominal:      General: Abdomen is flat.     Neurological:      Mental Status:  She is unresponsive.          Discussion with Family: Updated  (son and sister) at bedside.    Discharge instructions/Information to patient and family:   See after visit summary for information provided to patient and family.      Provisions for Follow-Up Care:  See after visit summary for information related to follow-up care and any pertinent home health orders.      Mobility at time of Discharge:   Basic Mobility Inpatient Raw Score: 8  JH-HLM Goal: 3: Sit at edge of bed  -HLM Achieved: 2: Bed activities/Dependent transfer     Disposition:   Other:     Planned Readmission: none     Administrative Statements   Discharge Statement:  I have spent a total time of 45 minutes in caring for this patient on the day of the visit/encounter. >30 minutes of time was spent on: Diagnostic results, Prognosis, Risks and benefits of tx options, and Patient and family education.    **Please Note: This note may have been constructed using a voice recognition system**

## 2025-07-07 NOTE — UTILIZATION REVIEW
Initial Clinical Review    Admission: Date/Time/Statement:   Admission Orders (From admission, onward)       Ordered        07/06/25 1458  INPATIENT ADMISSION  Once                          Orders Placed This Encounter   Procedures    INPATIENT ADMISSION     Standing Status:   Standing     Number of Occurrences:   1     Level of Care:   Med Surg [16]     Estimated length of stay:   More than 2 Midnights     Certification:   I certify that inpatient services are medically necessary for this patient for a duration of greater than two midnights. See H&P and MD Progress Notes for additional information about the patient's course of treatment.     ED Arrival Information       Expected   -    Arrival   7/6/2025 09:56    Acuity   Immediate              Means of arrival   Ambulance    Escorted by   [facility]    Service   Hospitalist    Admission type   Emergency              Arrival complaint   shortness of breath             Chief Complaint   Patient presents with    Altered Mental Status     Patient arrives via ALS - state patient started with SOB this morning.  State patient is confused, which is not her baseline per ALS.  Patient awake, staring.  Mumbles in response to questions but can shake head yes and no.         Initial Presentation:   85 yof to ER from home via EMS for for continued confusion, shortness of breath. Patient seen here 6/21 for confusion, found to have a UTI. Treated and discharged. Patient finished antibiotics. Hx obstructive uropathy due to invasive bladder cancer status post bilateral nephrostomy tubes in place. Presents confused, tachypneic, hypotensive, hypoxic, wheezing, pale,cool extremities, bilateral nephrostomy tubes in place. Admission CXR: Bilateral pleural effusions. CT chest, a/p: Small to moderate bilateral pleural effusions with overlying compressive atelectasis. 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 Borderline and mildly enlarged  retroperitoneal lymph nodes, minimally decreased in size from March 2025. Bilateral nephrostomy catheters. No hydronephrosis. Collapsed but diffusely thick-walled urinary bladder. 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 Stable asymmetric left lateral ventriculomegaly.  Labs: WBC 34.95, , CO2 15, anion gap 20, BUN 81, Cr 3.83, alk phos 136, gluc 143, VBG: pH 6.974, pO2 45.4, HCO3 11.2, procalcitonin 0.70, lactic acid 9.8.  Admitted to inpatient status for septic shock, acute metabolic encephalopathy 2nd hypoperfusion/sepsis. Plan: IV Levophed, IVF. She was started on resuscitation but after having GOC, patient/family opted for comfort measures.     Anticipated Length of Stay/Certification Statement:    Patient will be admitted on an inpatient basis with an anticipated length of stay of greater than 2 midnights secondary to end-of-life coordination     Date: 7/7/25   Day 2:   Pt passed at 1028.    ED Treatment-Medication Administration from 07/06/2025 0956 to 07/06/2025 1545         Date/Time Order Dose Route Action     07/06/2025 1036 sodium chloride 0.9 % bolus 1,000 mL 1,000 mL Intravenous New Bag     07/06/2025 1116 sodium chloride 0.9 % bolus 1,000 mL 1,000 mL Intravenous New Bag     07/06/2025 1039 norepinephrine (LEVOPHED) 4 mg (STANDARD CONCENTRATION) IV in sodium chloride 0.9% 250 mL 2 mcg/min Intravenous New Bag     07/06/2025 1049 norepinephrine (LEVOPHED) 4 mg (STANDARD CONCENTRATION) IV in sodium chloride 0.9% 250 mL 4 mcg/min Intravenous Rate/Dose Change     07/06/2025 1151 norepinephrine (LEVOPHED) 4 mg (STANDARD CONCENTRATION) IV in sodium chloride 0.9% 250 mL 2 mcg/min Intravenous Rate/Dose Change     07/06/2025 1210 norepinephrine (LEVOPHED) 4 mg (STANDARD CONCENTRATION) IV in sodium chloride 0.9% 250 mL 4 mcg/min Intravenous Rate/Dose Change     07/06/2025 1211 norepinephrine (LEVOPHED) 4 mg (STANDARD CONCENTRATION) IV in sodium chloride 0.9% 250 mL 6 mcg/min  Intravenous Rate/Dose Change     07/06/2025 1218 norepinephrine (LEVOPHED) 4 mg (STANDARD CONCENTRATION) IV in sodium chloride 0.9% 250 mL 8 mcg/min Intravenous Rate/Dose Change     07/06/2025 1226 norepinephrine (LEVOPHED) 4 mg (STANDARD CONCENTRATION) IV in sodium chloride 0.9% 250 mL 10 mcg/min Intravenous Rate/Dose Change     07/06/2025 1240 norepinephrine (LEVOPHED) 4 mg (STANDARD CONCENTRATION) IV in sodium chloride 0.9% 250 mL 14 mcg/min Intravenous Rate/Dose Change     07/06/2025 1255 norepinephrine (LEVOPHED) 4 mg (STANDARD CONCENTRATION) IV in sodium chloride 0.9% 250 mL 16 mcg/min Intravenous Rate/Dose Change     07/06/2025 1300 norepinephrine (LEVOPHED) 4 mg (STANDARD CONCENTRATION) IV in sodium chloride 0.9% 250 mL 18 mcg/min Intravenous Rate/Dose Change     07/06/2025 1120 vancomycin (VANCOCIN) 1500 mg in sodium chloride 0.9% 250 mL IVPB 1,500 mg Intravenous New Bag     07/06/2025 1046 cefepime (MAXIPIME) IVPB (premix in dextrose) 2,000 mg 50 mL 2,000 mg Intravenous New Bag     07/06/2025 1252 albumin human (FLEXBUMIN) 5 % injection 25 g 25 g Intravenous New Bag            Scheduled Medications:  Medications 06/28 06/29 06/30 07/01 07/02 07/03 07/04 07/05 07/06 07/07   albumin human (FLEXBUMIN) 5 % injection 25 g  Dose: 25 g  Freq: Once Route: IV  Last Dose: Stopped (07/06/25 1417)  Start: 07/06/25 1245 End: 07/06/25 1417   Admin Instructions:               1252     1417         cefepime (MAXIPIME) IVPB (premix in dextrose) 2,000 mg 50 mL  Dose: 2,000 mg  Freq: Once Route: IV  Last Dose: Stopped (07/06/25 1116)  Start: 07/06/25 1030 End: 07/06/25 1116   Order specific questions:               1046     1116         sodium chloride 0.9 % bolus 1,000 mL  Dose: 1,000 mL  Freq: Once Route: IV  Last Dose: Stopped (07/06/25 1316)  Start: 07/06/25 1030 End: 07/06/25 1316            1116     1316         sodium chloride 0.9 % bolus 1,000 mL  Dose: 1,000 mL  Freq: Once Route: IV  Last Dose: Stopped (07/06/25  1236)  Start: 07/06/25 1015 End: 07/06/25 1236            1036     1236         vancomycin (VANCOCIN) 1500 mg in sodium chloride 0.9% 250 mL IVPB  Dose: 25 mg/kg  Weight Dosing Info: 57.9 kg  Freq: Once Route: IV  Last Dose: Stopped (07/06/25 1250)  Start: 07/06/25 1030 End: 07/06/25 1250   Admin Instructions:      Order specific questions:               1120     1250                     Continuous Meds Sorted by Name  for Ning Chambers as of 06/28/25 through 7/7/25  Legend:       Medications 06/28 06/29 06/30 07/01 07/02 07/03 07/04 07/05 07/06 07/07   norepinephrine (LEVOPHED) 4 mg (STANDARD CONCENTRATION) IV in sodium chloride 0.9% 250 mL  Rate: 3.8-112.5 mL/hr Dose: 1-30 mcg/min  Freq: Titrated Route: IV  Last Dose: Stopped (07/06/25 1417)  Start: 07/06/25 1030 End: 07/06/25 1410   Admin Instructions:               1039     1049     1151     1210     1211     1218     1226     1240     1255     1300     1410-D/C'd  1417         vasopressin (PITRESSIN) 20 Units in sodium chloride 0.9 % 100 mL infusion  Rate: 12 mL/hr Dose: 0.04 Units/min  Freq: Continuous Route: IV  Start: 07/06/25 1245 End: 07/06/25 1410   Admin Instructions:               1410-D/C'd  (1416)         Legend:       [id number]/2806/2906/3007/0107/0207/0307/0407/0507/0607/07        PRN Meds Sorted by Name  for Ning Chambers as of 06/28/25 through 7/7/25  Legend:       Medications 06/28 06/29 06/30 07/01 07/02 07/03 07/04 07/05 07/06 07/07   glycopyrrolate (ROBINUL) injection 0.1 mg  Dose: 0.1 mg  Freq: Every 4 hours PRN Route: IV  PRN Reason: pulmonary secretions  PRN Comment: excessive secretions  Indications of Use: EXCESSIVE UPPER AIRWAY SECRETIONS  Start: 07/06/25 1408   Admin Instructions:                   HYDROmorphone (DILAUDID) injection 0.5 mg  Dose: 0.5 mg  Freq: Every 2 hour PRN Route: IV  PRN Reasons: severe pain,breakthrough pain  PRN Comment: for pain or dyspnea  Start: 07/06/25 1544   Admin Instructions:                0608  [C]        iohexol (OMNIPAQUE) 350 MG/ML injection (SINGLE-DOSE) 20 mL  Dose: 20 mL  Freq: Once in imaging Route: OTHER  PRN Reason: contrast  Start: 06/30/25 1217 End: 06/30/25 1217      1217               lidocaine 1% buffered  Freq: As needed Route: INFILTRATION  Start: 06/30/25 1142 End: 06/30/25 1150      1142     1150               LORazepam (ATIVAN) injection 1 mg  Dose: 1 mg  Freq: Every 10 minutes PRN Route: IV  PRN Comment: for anxiety/dyspnea  Start: 07/06/25 1408   Admin Instructions:                   ondansetron (ZOFRAN) injection 4 mg  Dose: 4 mg  Freq: Every 4 hours PRN Route: IV  PRN Reasons: nausea,vomiting  Start: 07/06/25 1544   Admin Instructions:                                 ED Triage Vitals   Temperature Pulse Respirations Blood Pressure SpO2 Pain Score   07/06/25 1026 07/06/25 1000 07/06/25 1000 07/06/25 1010 07/06/25 1000 07/06/25 1000   (!) 96.4 °F (35.8 °C) 66 (!) 24 (!) 44/0 (!) 87 % No Pain     Weight (last 2 days)       Date/Time Weight    07/06/25 1557 58.5 (129)    07/06/25 1026 57.9 (127.65)            Vital Signs (last 3 days)       Date/Time Temp Pulse Resp BP MAP (mmHg) SpO2 Calculated FIO2 (%) - Nasal Cannula Nasal Cannula O2 Flow Rate (L/min) O2 Device Patient Position - Orthostatic VS Sandra Coma Scale Score Pain    07/07/25 0608 -- -- -- -- -- -- -- -- -- -- -- Med Not Given for Pain - for MAR use only    07/06/25 1951 -- 73 24 -- -- 93 % 36 4 L/min Nasal cannula -- -- No Pain    07/06/25 1612 96.7 °F (35.9 °C) 74 20 94/42 -- 95 % 36 4 L/min Nasal cannula -- -- --    07/06/25 1558 -- -- -- -- -- -- -- -- -- -- -- No Pain    07/06/25 1500 -- 75 26 95/57 66 95 % -- -- Nasal cannula -- -- --    07/06/25 1430 -- 73 25 88/52 65 92 % 36 4 L/min Nasal cannula -- -- --    07/06/25 1415 -- 71 22 105/51 74 92 % -- -- Nasal cannula -- -- --    07/06/25 1400 -- 68 20 58/35 43 92 % -- -- Nasal cannula -- -- --    07/06/25 1345 -- 71 25 102/47 65 93 % -- -- Nasal cannula -- -- --     07/06/25 1330 -- 67 20 100/59 68 100 % -- -- Nasal cannula -- -- --    07/06/25 1315 -- 65 20 84/46 61 94 % -- -- Non-rebreather mask Lying -- --    07/06/25 1306 -- 65 15 87/51 -- 97 % -- -- Non-rebreather mask -- -- --    07/06/25 1303 -- 65 15 97/51 -- 97 % -- -- Mid flow nasal cannula -- -- --    07/06/25 1255 -- 65 -- -- -- -- -- -- -- -- -- --    07/06/25 1245 -- 72 26 64/35 44 90 % -- -- Mid flow nasal cannula -- -- --    07/06/25 1240 -- 72 -- 64/35 -- -- -- -- -- -- -- --    07/06/25 1226 -- -- -- 51/36 -- -- -- -- -- -- -- --    07/06/25 1200 -- 78 26 78/42 54 92 % -- -- Mid flow nasal cannula -- -- --    07/06/25 1136 -- -- -- 136/97 -- -- -- -- -- -- -- --    07/06/25 1130 -- 79 24 93/52 67 100 % -- -- Mid flow nasal cannula -- 12 --    07/06/25 1117 -- -- -- 94/75 -- -- -- -- -- -- -- --    07/06/25 1115 -- 81 24 94/75 80 100 % -- -- Mid flow nasal cannula -- -- --    07/06/25 1100 -- -- -- -- -- -- -- -- -- -- 7 --    07/06/25 1057 -- 82 22 92/60 -- 91 % -- -- Mid flow nasal cannula Lying -- --    07/06/25 1049 -- 82 -- 86/48 -- -- -- -- -- -- -- --    07/06/25 1044 -- -- -- -- -- 91 % 80 15 L/min Mid flow nasal cannula -- -- --    07/06/25 1041 -- 76 24 -- -- 91 % 56 9 L/min Nasal cannula -- -- --    07/06/25 1039 -- 75 -- -- -- -- -- -- -- -- -- --    07/06/25 1033 -- -- -- 46/0 -- -- -- -- -- Lying -- --    07/06/25 1030 -- -- -- -- -- -- -- -- Mid flow nasal cannula -- 6 --    07/06/25 1026 96.4 °F (35.8 °C) -- -- -- -- -- -- -- -- -- -- --    07/06/25 1015 -- -- -- -- -- 88 % 36 4 L/min Nasal cannula -- -- --    07/06/25 1010 -- -- -- 44/0 -- -- -- -- -- Lying -- --    07/06/25 1000 -- 66 24 -- -- 87 % -- -- None (Room air) -- -- No Pain              Pertinent Labs/Diagnostic Test Results:   Radiology:  CT head without contrast   Final Interpretation by Pallav N Shah, MD (07/06 0217)      No acute intracranial abnormality.      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     "  Stable asymmetric left lateral ventriculomegaly.                  Workstation performed: [id number]         CT chest abdomen pelvis wo contrast   Final Interpretation by Richar Drake MD (07/06 1314)      Small to moderate bilateral pleural effusions with overlying compressive atelectasis.      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      Borderline and mildly enlarged retroperitoneal lymph nodes, minimally decreased in size from March 2025.      Bilateral nephrostomy catheters. No hydronephrosis. Collapsed but diffusely thick-walled urinary bladder.            Computerized Assisted Algorithm (CAA) may have aided analysis of applicable images.         Workstation performed: [id number]         XR chest 1 view portable   Final Interpretation by Vinnie Huang MD (07/07 0756)      Bilateral pleural effusions            Workstation performed: [id number]           Cardiology:  ECG 12 lead    by Interface, Ris Results In (07/06 1030)        GI:  No orders to display           Results from last 7 days   Lab Units 07/06/25  1034   WBC Thousand/uL 34.95*   HEMOGLOBIN g/dL 12.0   HEMATOCRIT % 39.6   PLATELETS Thousands/uL 607*   BANDS PCT % 2         Results from last 7 days   Lab Units 07/06/25  1034   SODIUM mmol/L 135   POTASSIUM mmol/L 4.9   CHLORIDE mmol/L 100   CO2 mmol/L 15*   ANION GAP mmol/L 20*   BUN mg/dL 81*   CREATININE mg/dL 3.83*   EGFR ml/min/1.73sq m 10   CALCIUM mg/dL 9.2     Results from last 7 days   Lab Units 07/06/25  1034   AST U/L 21   ALT U/L 10   ALK PHOS U/L 136*   TOTAL PROTEIN g/dL 6.6   ALBUMIN g/dL 2.3*   TOTAL BILIRUBIN mg/dL 0.58     Results from last 7 days   Lab Units 07/06/25  1008   POC GLUCOSE mg/dl 143*     Results from last 7 days   Lab Units 07/06/25  1034   GLUCOSE RANDOM mg/dL 170*             No results found for: \"BETA-HYDROXYBUTYRATE\"       Results from last 7 days   Lab Units 07/06/25  1034   PH SIOBHAN  6.974*   PCO2 SIOBHAN mm Hg 49.4   PO2 " SIOBHAN mm Hg 45.4*   HCO3 SIOBHAN mmol/L 11.2*   BASE EXC SIOBHAN mmol/L -20.0   O2 CONTENT SIOBHAN ml/dL 8.5   O2 HGB, VENOUS % 53.0*             Results from last 7 days   Lab Units 07/06/25  1034   HS TNI 0HR ng/L 40         Results from last 7 days   Lab Units 07/06/25  1034   PROTIME seconds 14.7   INR  1.10   PTT seconds 28         Results from last 7 days   Lab Units 07/06/25  1034   PROCALCITONIN ng/ml 0.70*     Results from last 7 days   Lab Units 07/06/25  1403 07/06/25  1034   LACTIC ACID mmol/L 9.0* 9.8*                                                                                 Results from last 7 days   Lab Units 07/06/25  1033   BLOOD CULTURE  Received in Microbiology Lab. Culture in Progress.  Received in Microbiology Lab. Culture in Progress.                   Past Medical History[1]  Present on Admission:   Septic shock (HCC)   Benign hypertension   Malignant neoplasm of overlapping sites of bladder (HCC)   Obstructive uropathy   Stage 5 chronic kidney disease 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)      Admitting Diagnosis: Leukocytosis [D72.829]  Altered mental status [R41.82]  Elevated lactic acid level [R79.89]  Septic shock (HCC) [A41.9, R65.21]  Comfort measures only status [Z51.5]  Age/Sex: 85 y.o. female    Network Utilization Review Department  ATTENTION: Please call with any questions or concerns to [phone] and carefully listen to the prompts so that you are directed to the right person. All voicemails are confidential.   For Discharge needs, contact Care Management DC Support Team at [phone] opt. 2  Send all requests for admission clinical reviews, approved or denied determinations and any other requests to dedicated fax number below belonging to the campus where the patient is receiving treatment. List of dedicated fax numbers for the Facilities:  FACILITY NAME UR FAX NUMBER   ADMISSION DENIALS (Administrative/Medical Necessity) [phone]   DISCHARGE SUPPORT TEAM (NETWORK) [phone]  "  PARENT CHILD HEALTH (Maternity/NICU/Pediatrics) [phone]   [facility] [phone]   [facility] [phone]   [facility] [phone]   [facility] [phone]   [facility] [phone]   [facility] [phone]   [facility] [phone]   [facility] [phone]   [facility] [phone]   [facility] [phone]   [facility] [phone]   [facility] [place] [phone]              [1]   Past Medical History:  Diagnosis Date    Arthritis     right knee    Hay fever     Hyperlipidemia     Hypertension     Myocardial infarction (HCC) 2003    questionable MI during exploratory surgery    Ovarian cancer (HCC) 2003    surgery (did not have chemo or radiation)    Stroke (HCC) 2003    \" mini stroke \" no deficits ; another poss stroke with  left hand weakness    Wears glasses     Wears partial dentures     upper & lower     "

## 2025-07-07 NOTE — DEATH NOTE
INPATIENT DEATH NOTE  Ning Chambers 85 y.o. female MRN: [id number]  Unit/Bed#: [address] [place] [address] Encounter: [id number]  Date, Time and Cause of Death    Date of Death: 25  Time of Death: 10:28 AM  Preliminary Cause of Death: Septic shock (HCC)        Patient's Information  Did the patient's death occur in the ED?: No  Did the patient's death occur in the OR?: No  Did the patient's death occur less than 10 days post-op?: No  Did the patient's death occur within 24 hours of admission?: No  Was code status DNR at the time of death?: Yes    Physical Exam: Unresponsive to noxious stimuli, Spontaneous respirations absent, Breath sounds absent, Carotid pulse absent, Heart sounds absent, Pupillary light reflex absent, and Corneal blink reflex absent    Medical Examiner notification criteria: NONE APPLICABLE   Medical Examiner's office notified?: No, does not meet ME notification criteria   NOTE: Cases related to accidents, trauma, overdose, or crimes must be reported regardless of the time lapse between the incident and death.    Medical Examiner accepted case?:  No  Name of Medical Examiner: n/a    Family Notification  Was the family notified?: Yes  Date Notified: 25  Time Notified: 1035  Name of Family Notified of Death: Rodrigo Chambers   Relationship to Patient: Son  Family Notification Route: At bedside  Was the family told to contact a  home?: Yes  Name of  Home:: [place]    Autopsy Options: No Autopsy:  and Next-of-kin declined  NOTE: Autopsy Authorization Form Next-of kin should be notified that final autopsy reports take up to 90 working days.     Primary Service Attending Physician notified?:  yes - Attending:  Carla Terrazas MD    Physician/Resident responsible for completing Discharge Summary:  Dr. TERRAZAS

## 2025-07-08 NOTE — UTILIZATION REVIEW
NOTIFICATION OF ADMISSION DISCHARGE   This is a Notification of Discharge from [facility]. Please be advised that this patient has been discharge from our facility. Below you will find the admission and discharge date and time including the patient’s disposition.   UTILIZATION REVIEW CONTACT:  Utilization Review Assistants  Network Utilization Review Department  Phone: [phone] x carefully listen to the prompts. All voicemails are confidential.  Email: [email or web address]     ADMISSION INFORMATION  PRESENTATION DATE: 2025  9:57 AM  OBERVATION ADMISSION DATE: 2025 1455  INPATIENT ADMISSION DATE: 25  2:58 PM   DISCHARGE DATE: 2025  3:08 PM   DISPOSITION:    Network Utilization Review Department  ATTENTION: Please call with any questions or concerns to [phone] and carefully listen to the prompts so that you are directed to the right person. All voicemails are confidential.   For Discharge needs, contact Care Management DC Support Team at [phone] opt. 2  Send all requests for admission clinical reviews, approved or denied determinations and any other requests to dedicated fax number below belonging to the campus where the patient is receiving treatment. List of dedicated fax numbers for the Facilities:  FACILITY NAME UR FAX NUMBER   ADMISSION DENIALS (Administrative/Medical Necessity) [phone]   DISCHARGE SUPPORT TEAM ([facility]) [phone]   PARENT CHILD HEALTH (Maternity/NICU/Pediatrics) [phone]   [facility] [phone]   [facility] [phone]   [facility] [phone]   [facility] [phone]   Critical access hospital [phone]   [facility] [phone]   [facility] [phone]   [facility] [phone]   [facility]  [facility] [phone]   [facility] [phone]   [facility] [phone]   [facility] [phone]

## 2025-07-08 NOTE — UTILIZATION REVIEW
NOTIFICATION OF ADMISSION DISCHARGE   This is a Notification of Discharge from [facility]. Please be advised that this patient has been discharge from our facility. Below you will find the admission and discharge date and time including the patient’s disposition.   UTILIZATION REVIEW CONTACT:  Utilization Review Assistants  Network Utilization Review Department  Phone: [phone] x carefully listen to the prompts. All voicemails are confidential.  Email: [email or web address]     ADMISSION INFORMATION  PRESENTATION DATE: 2025  9:57 AM  OBERVATION ADMISSION DATE: 2025 1455  INPATIENT ADMISSION DATE: 25  2:58 PM   DISCHARGE DATE: 2025  3:08 PM   DISPOSITION:    Network Utilization Review Department  ATTENTION: Please call with any questions or concerns to [phone] and carefully listen to the prompts so that you are directed to the right person. All voicemails are confidential.   For Discharge needs, contact Care Management DC Support Team at [phone] opt. 2  Send all requests for admission clinical reviews, approved or denied determinations and any other requests to dedicated fax number below belonging to the campus where the patient is receiving treatment. List of dedicated fax numbers for the Facilities:  FACILITY NAME UR FAX NUMBER   ADMISSION DENIALS (Administrative/Medical Necessity) [phone]   DISCHARGE SUPPORT TEAM ([facility]) [phone]   PARENT CHILD HEALTH (Maternity/NICU/Pediatrics) [phone]   [facility] [phone]   [facility] [phone]   [facility] [phone]   [facility] [phone]   [facility] [phone]   [facility] [phone]   [facility] [phone]   [facility] [phone]   [facility]  [facility] [phone]   [facility] [phone]   [facility] [phone]   [facility] [phone]

## 2025-07-11 LAB — BACTERIA BLD CULT: NORMAL

## 2025-07-12 LAB
BACTERIA BLD CULT: ABNORMAL
GRAM STN SPEC: ABNORMAL

## (undated) DEVICE — 45° KELMAN®, 0.9 MM TURBOSONICS® MINI-FLARED ABS® TIP: Brand: ALCON, KELMAN, TURBOSONICS, MINI-FLARED ABS

## (undated) DEVICE — PAD GROUNDING ADULT

## (undated) DEVICE — CATH FOLEY 18FR 5ML 2 WAY SILICONE ELASTIMER

## (undated) DEVICE — CLEARCUT® SLIT KNIFE INTREPID MICRO-COAXIAL SYSTEM 2.4 SB: Brand: CLEARCUT®; INTREPID

## (undated) DEVICE — STERILE SURGICAL LUBRICANT,  TUBE: Brand: SURGILUBE

## (undated) DEVICE — GUIDEWIRE STRGHT TIP 0.035 IN  SOLO PLUS

## (undated) DEVICE — POUCH UR CATCHER STERILE

## (undated) DEVICE — CATH URETERAL 5FR X 70 CM FLEX TIP POLYUR BARD

## (undated) DEVICE — INVIEW CLEAR LEGGINGS: Brand: CONVERTORS

## (undated) DEVICE — EVACUATOR BLADDER ELLIK DISP STRL

## (undated) DEVICE — GLOVE SRG BIOGEL 7.5

## (undated) DEVICE — GLOVE SRG BIOGEL ECLIPSE 7.5

## (undated) DEVICE — 0.9MM MICROSMOOTH NULTRA INFUSION SLEEVE KIT: Brand: INFINIT, MICROSMOOTH, ALCON

## (undated) DEVICE — CYSTOSCOPY PACK: Brand: CONVERTORS

## (undated) DEVICE — PACK TUR

## (undated) DEVICE — UROCATCH BAG

## (undated) DEVICE — GROUNDING PAD UNIVERSAL SLW

## (undated) DEVICE — GLOVE INDICATOR PI UNDERGLOVE SZ 8 BLUE

## (undated) DEVICE — PREMIUM DRY TRAY LF: Brand: MEDLINE INDUSTRIES, INC.

## (undated) DEVICE — Device

## (undated) DEVICE — CATH SECURE FOLEY

## (undated) DEVICE — BAG URINE DRAINAGE 4000ML CONTINUOUS IRR

## (undated) DEVICE — GLOVE SRG BIOGEL 8

## (undated) DEVICE — SPONGE STICK WITH PVP-I: Brand: KENDALL

## (undated) DEVICE — LUBRICANT SURGILUBE TUBE 4 OZ  FLIP TOP

## (undated) DEVICE — B-H IRRIGATING CAN 19GA FLAT ANGLED 8MM: Brand: OPHTHALMIC CANNULA

## (undated) DEVICE — SPECIMEN CONTAINER STERILE PEEL PACK

## (undated) DEVICE — CATH FOLEY 24FR 5ML 2 WAY SILICONE ELASTIMER

## (undated) DEVICE — HF-RESECTION ELECTRODE PLASMALOOP LOOP, LARGE, 24 FR., 12°/16°, ESG TURIS: Brand: OLYMPUS

## (undated) DEVICE — SYRINGE 20ML LL

## (undated) DEVICE — STANDARD SURGICAL GOWN, L: Brand: CONVERTORS

## (undated) DEVICE — AIR INJECT CANNULA 27GA: Brand: OPHTHALMIC CANNULA

## (undated) DEVICE — CYSTO TUBING TUR Y IRRIGATION

## (undated) DEVICE — THE MONARCH® "D" CARTRIDGE IS A SINGLE-USE POLYPROPYLENE CARTRIDGE FOR POSTERIOR CHAMBER IOL DELIVERY: Brand: MONARCH® III

## (undated) DEVICE — CHLORHEXIDINE 4PCT 4 OZ

## (undated) DEVICE — BASIC ULTRASOUND: Brand: ALCON, INFINITI

## (undated) DEVICE — EYE PACK CUSTOM -FINNEGAN